# Patient Record
Sex: FEMALE | Race: WHITE | NOT HISPANIC OR LATINO | Employment: FULL TIME | URBAN - METROPOLITAN AREA
[De-identification: names, ages, dates, MRNs, and addresses within clinical notes are randomized per-mention and may not be internally consistent; named-entity substitution may affect disease eponyms.]

---

## 2017-01-09 ENCOUNTER — PRENATAL OFFICE VISIT (OUTPATIENT)
Dept: OBGYN | Facility: CLINIC | Age: 32
End: 2017-01-09
Payer: COMMERCIAL

## 2017-01-09 VITALS
BODY MASS INDEX: 35.91 KG/M2 | HEIGHT: 61 IN | WEIGHT: 190.2 LBS | TEMPERATURE: 98.2 F | SYSTOLIC BLOOD PRESSURE: 94 MMHG | DIASTOLIC BLOOD PRESSURE: 60 MMHG

## 2017-01-09 DIAGNOSIS — O09.623 HIGH-RISK PREGNANCY, YOUNG MULTIGRAVIDA, THIRD TRIMESTER: Primary | ICD-10-CM

## 2017-01-09 DIAGNOSIS — O09.623 HIGH-RISK PREGNANCY, YOUNG MULTIGRAVIDA, THIRD TRIMESTER: ICD-10-CM

## 2017-01-09 PROCEDURE — 76816 OB US FOLLOW-UP PER FETUS: CPT | Performed by: OBSTETRICS & GYNECOLOGY

## 2017-01-09 PROCEDURE — 99207 ZZC COMPLICATED OB VISIT: CPT | Performed by: OBSTETRICS & GYNECOLOGY

## 2017-01-09 NOTE — NURSING NOTE
"30w4d    Chief Complaint   Patient presents with     Prenatal Care       Initial BP 94/60 mmHg  Temp(Src) 98.2  F (36.8  C) (Oral)  Ht 5' 1\" (1.549 m)  Wt 190 lb 3.2 oz (86.274 kg)  BMI 35.96 kg/m2  LMP 06/09/2016 Estimated body mass index is 35.96 kg/(m^2) as calculated from the following:    Height as of this encounter: 5' 1\" (1.549 m).    Weight as of this encounter: 190 lb 3.2 oz (86.274 kg).  BP completed using cuff size: cherelle Noel CMA      "

## 2017-01-23 ENCOUNTER — PRENATAL OFFICE VISIT (OUTPATIENT)
Dept: OBGYN | Facility: CLINIC | Age: 32
End: 2017-01-23
Payer: COMMERCIAL

## 2017-01-23 VITALS
SYSTOLIC BLOOD PRESSURE: 124 MMHG | BODY MASS INDEX: 36.75 KG/M2 | WEIGHT: 194.4 LBS | TEMPERATURE: 98.1 F | DIASTOLIC BLOOD PRESSURE: 76 MMHG

## 2017-01-23 DIAGNOSIS — O09.623 HIGH-RISK PREGNANCY, YOUNG MULTIGRAVIDA, THIRD TRIMESTER: Primary | ICD-10-CM

## 2017-01-23 PROCEDURE — 99207 ZZC COMPLICATED OB VISIT: CPT | Performed by: OBSTETRICS & GYNECOLOGY

## 2017-01-23 RX ORDER — BREAST PUMP
1 EACH MISCELLANEOUS PRN
Qty: 1 EACH | Refills: 0 | Status: SHIPPED | OUTPATIENT
Start: 2017-01-23 | End: 2017-07-04

## 2017-01-23 NOTE — NURSING NOTE
"Chief Complaint   Patient presents with     Prenatal Care     no concerns.     32w4d    Initial /76 mmHg  Temp(Src) 98.1  F (36.7  C) (Oral)  Wt 194 lb 6.4 oz (88.179 kg)  LMP 06/09/2016 Estimated body mass index is 36.75 kg/(m^2) as calculated from the following:    Height as of 1/9/17: 5' 1\" (1.549 m).    Weight as of this encounter: 194 lb 6.4 oz (88.179 kg).  BP completed using cuff size: regular  Sue Anton MA      "

## 2017-01-23 NOTE — PROGRESS NOTES
IUP at 32 4/7 weeks; no regular contractions/bleeding    Cerclage removal; 2/24/17 (37 weeks)    Repeat C/S 3/13/17 (39 weeks)

## 2017-01-30 ENCOUNTER — PRENATAL OFFICE VISIT (OUTPATIENT)
Dept: OBGYN | Facility: CLINIC | Age: 32
End: 2017-01-30
Payer: COMMERCIAL

## 2017-01-30 VITALS
BODY MASS INDEX: 37.2 KG/M2 | HEART RATE: 100 BPM | SYSTOLIC BLOOD PRESSURE: 110 MMHG | WEIGHT: 196.8 LBS | DIASTOLIC BLOOD PRESSURE: 64 MMHG

## 2017-01-30 DIAGNOSIS — O09.623 HIGH-RISK PREGNANCY, YOUNG MULTIGRAVIDA, THIRD TRIMESTER: Primary | ICD-10-CM

## 2017-01-30 PROCEDURE — 99207 ZZC COMPLICATED OB VISIT: CPT | Performed by: OBSTETRICS & GYNECOLOGY

## 2017-01-30 NOTE — NURSING NOTE
"Chief Complaint   Patient presents with     Prenatal Care   33w4d  C/o chest pain / SOB last night approx 9pm -3am    initial /64 mmHg  Pulse 100  Wt 196 lb 12.8 oz (89.268 kg)  LMP 06/09/2016 Estimated body mass index is 37.2 kg/(m^2) as calculated from the following:    Height as of 1/9/17: 5' 1\" (1.549 m).    Weight as of this encounter: 196 lb 12.8 oz (89.268 kg).  BP completed using cuff size regular.  Gina John CMA    "

## 2017-02-07 ENCOUNTER — PRENATAL OFFICE VISIT (OUTPATIENT)
Dept: OBGYN | Facility: CLINIC | Age: 32
End: 2017-02-07
Payer: COMMERCIAL

## 2017-02-07 VITALS
WEIGHT: 197 LBS | BODY MASS INDEX: 37.24 KG/M2 | SYSTOLIC BLOOD PRESSURE: 110 MMHG | DIASTOLIC BLOOD PRESSURE: 56 MMHG | HEART RATE: 88 BPM

## 2017-02-07 DIAGNOSIS — O09.623 HIGH-RISK PREGNANCY, YOUNG MULTIGRAVIDA, THIRD TRIMESTER: Primary | ICD-10-CM

## 2017-02-07 PROCEDURE — 99207 ZZC COMPLICATED OB VISIT: CPT | Performed by: OBSTETRICS & GYNECOLOGY

## 2017-02-07 NOTE — PROGRESS NOTES
"Chief Complaint   Patient presents with     Prenatal Care   34w5d  Baby active.  No contractions.    Initial /56 mmHg  Pulse 88  Wt 197 lb (89.359 kg)  LMP 06/09/2016 Estimated body mass index is 37.24 kg/(m^2) as calculated from the following:    Height as of 1/9/17: 5' 1\" (1.549 m).    Weight as of this encounter: 197 lb (89.359 kg).  Medication Reconciliation: complete   nurse assisted visit.  SERA Perez RN    "

## 2017-02-13 ENCOUNTER — PRENATAL OFFICE VISIT (OUTPATIENT)
Dept: OBGYN | Facility: CLINIC | Age: 32
End: 2017-02-13
Payer: COMMERCIAL

## 2017-02-13 ENCOUNTER — TELEPHONE (OUTPATIENT)
Dept: OBGYN | Facility: CLINIC | Age: 32
End: 2017-02-13

## 2017-02-13 VITALS
SYSTOLIC BLOOD PRESSURE: 118 MMHG | BODY MASS INDEX: 37.58 KG/M2 | WEIGHT: 198.9 LBS | HEART RATE: 96 BPM | DIASTOLIC BLOOD PRESSURE: 70 MMHG

## 2017-02-13 DIAGNOSIS — O09.623 HIGH-RISK PREGNANCY, YOUNG MULTIGRAVIDA, THIRD TRIMESTER: Primary | ICD-10-CM

## 2017-02-13 PROCEDURE — 87653 STREP B DNA AMP PROBE: CPT | Performed by: OBSTETRICS & GYNECOLOGY

## 2017-02-13 PROCEDURE — 87186 SC STD MICRODIL/AGAR DIL: CPT | Performed by: OBSTETRICS & GYNECOLOGY

## 2017-02-13 PROCEDURE — 99207 ZZC COMPLICATED OB VISIT: CPT | Performed by: OBSTETRICS & GYNECOLOGY

## 2017-02-13 NOTE — MR AVS SNAPSHOT
After Visit Summary   2/13/2017    Dot Ramirez    MRN: [id number]           Patient Information     Date Of Birth          1985        Visit Information        Provider Department      2/13/2017 10:15 AM Jamison Florian MD [facility]        Today's Diagnoses     High-risk pregnancy, young multigravida, third trimester    -  1       Follow-ups after your visit        Follow-up notes from your care team     Return in about 1 week (around 2/20/2017).      Your next 10 appointments already scheduled     Feb 20, 2017  3:00 PM CST   ESTABLISHED PRENATAL with Jamison Florian MD   [facility] ([facility]    [address]  [facility] [address]   [phone]            Feb 27, 2017  3:00 PM CST   ESTABLISHED PRENATAL with Elfego Mustafa MD   [facility] ([facility])    [address]  [facility] [address]   [phone]            Mar 06, 2017  6:45 PM CST   ESTABLISHED PRENATAL with Jamison Florian MD   [facility] ([facility])    [address]  [facility] [address]   [phone]            Mar 13, 2017   Procedure with Jamison Florian MD   [facility] Labor and Delivery (--)    [address]  [facility] [address]   [phone]              Who to contact     If you have questions or need follow up information about today's clinic visit or your schedule please contact [facility] directly at [phone].  Normal or non-critical lab and imaging results will be communicated to you by MyChart, letter or phone within 4 business days after the clinic has received the results. If you do not hear from us within 7 days, please contact the clinic through MyChart or phone. If you have a critical or abnormal lab result, we will notify you by phone as soon as possible.  Submit refill requests through DesignMyNighthart or  call your pharmacy and they will forward the refill request to us. Please allow 3 business days for your refill to be completed.          Additional Information About Your Visit        SlamDatahart Information     [employer] gives you secure access to your electronic health record. If you see a primary care provider, you can also send messages to your care team and make appointments. If you have questions, please call your primary care clinic.  If you do not have a primary care provider, please call [phone] and they will assist you.        Care EveryWhere ID     This is your Care EveryWhere ID. This could be used by other organizations to access your [place] medical records  [id number]        Your Vitals Were     Pulse Last Period BMI (Body Mass Index)             96 06/09/2016 37.58 kg/m2          Blood Pressure from Last 3 Encounters:   02/13/17 118/70   02/07/17 110/56   01/30/17 110/64    Weight from Last 3 Encounters:   02/13/17 198 lb 14.4 oz (90.2 kg)   02/07/17 197 lb (89.4 kg)   01/30/17 196 lb 12.8 oz (89.3 kg)              We Performed the Following     Group B strep PCR        Primary Care Provider Office Phone # Fax #    Jamison Florian [phone]       [facility] [address] [place] [address]        Thank you!     Thank you for choosing [facility]  for your care. Our goal is always to provide you with excellent care. Hearing back from our patients is one way we can continue to improve our services. Please take a few minutes to complete the written survey that you may receive in the mail after your visit with us. Thank you!             Your Updated Medication List - Protect others around you: Learn how to safely use, store and throw away your medicines at www.disposemymeds.org.          This list is accurate as of: 2/13/17 11:48 AM.  Always use your most recent med list.                   Brand Name Dispense Instructions for use    breast pump Misc      1 each    1 each as needed       PRE-AMEENA PO          sertraline 100 MG tablet    ZOLOFT    30 tablet    Take 1 tablet (100 mg) by mouth daily

## 2017-02-13 NOTE — TELEPHONE ENCOUNTER
Procedure: Cerclage Removal  Date: 2/24/2017  Time: 12:15pm  Hospital: [facility]  Orders faxed and the patient was advised to call [facility] [phone] labor and delivery department one hour prior to arrival.  Gina John CMA

## 2017-02-13 NOTE — NURSING NOTE
"Chief Complaint   Patient presents with     Prenatal Care   35w4d  GBS due  No specific concerns    initial /70  Pulse 96  Wt 198 lb 14.4 oz (90.2 kg)  LMP 06/09/2016  BMI 37.58 kg/m2 Estimated body mass index is 37.58 kg/(m^2) as calculated from the following:    Height as of 1/9/17: 5' 1\" (1.549 m).    Weight as of this encounter: 198 lb 14.4 oz (90.2 kg).  BP completed using cuff size regular.  Gina John CMA    "

## 2017-02-14 ENCOUNTER — PRENATAL OFFICE VISIT (OUTPATIENT)
Dept: OBGYN | Facility: CLINIC | Age: 32
End: 2017-02-14
Payer: COMMERCIAL

## 2017-02-14 ENCOUNTER — TELEPHONE (OUTPATIENT)
Dept: OBGYN | Facility: CLINIC | Age: 32
End: 2017-02-14

## 2017-02-14 VITALS
HEART RATE: 96 BPM | SYSTOLIC BLOOD PRESSURE: 110 MMHG | BODY MASS INDEX: 37.41 KG/M2 | DIASTOLIC BLOOD PRESSURE: 60 MMHG | WEIGHT: 198 LBS

## 2017-02-14 DIAGNOSIS — O09.623 HIGH-RISK PREGNANCY, YOUNG MULTIGRAVIDA, THIRD TRIMESTER: Primary | ICD-10-CM

## 2017-02-14 LAB
GP B STREP DNA SPEC QL NAA+PROBE: ABNORMAL
SPECIMEN SOURCE: ABNORMAL

## 2017-02-14 PROCEDURE — 99207 ZZC PRENATAL VISIT: CPT | Performed by: OBSTETRICS & GYNECOLOGY

## 2017-02-14 NOTE — MR AVS SNAPSHOT
After Visit Summary   2/14/2017    Dot Ramirez    MRN: [id number]           Patient Information     Date Of Birth          1985        Visit Information        Provider Department      2/14/2017 2:30 PM Jamison Florian MD [facility]        Today's Diagnoses     High-risk pregnancy, young multigravida, third trimester    -  1       Follow-ups after your visit        Follow-up notes from your care team     Return in about 1 week (around 2/21/2017).      Your next 10 appointments already scheduled     Feb 20, 2017  3:00 PM CST   ESTABLISHED PRENATAL with Jamison Florian MD   [facility] ([facility])    [address]  [facility] [address]   [phone]            Feb 27, 2017  3:00 PM CST   ESTABLISHED PRENATAL with Elfego Mustafa MD   [facility] ([facility])    [address]  [facility] [address]   [phone]            Mar 06, 2017  6:45 PM CST   ESTABLISHED PRENATAL with Jamison Florian MD   [facility] ([facility])    [address]  [facility] [address]   [phone]            Mar 13, 2017   Procedure with Jamison Florian MD   [facility] Labor and Delivery (--)    [address]  [facility] [address]   [phone]              Who to contact     If you have questions or need follow up information about today's clinic visit or your schedule please contact [facility] directly at [phone].  Normal or non-critical lab and imaging results will be communicated to you by MyChart, letter or phone within 4 business days after the clinic has received the results. If you do not hear from us within 7 days, please contact the clinic through MyChart or phone. If you have a critical or abnormal lab result, we will notify you by phone as soon as possible.  Submit refill requests through [employer] Helperhart or call your  pharmacy and they will forward the refill request to us. Please allow 3 business days for your refill to be completed.          Additional Information About Your Visit        Sernovahart Information     [employer] gives you secure access to your electronic health record. If you see a primary care provider, you can also send messages to your care team and make appointments. If you have questions, please call your primary care clinic.  If you do not have a primary care provider, please call [phone] and they will assist you.        Care EveryWhere ID     This is your Care EveryWhere ID. This could be used by other organizations to access your [place] medical records  [id number]        Your Vitals Were     Pulse Last Period BMI (Body Mass Index)             96 06/09/2016 37.41 kg/m2          Blood Pressure from Last 3 Encounters:   02/14/17 110/60   02/13/17 118/70   02/07/17 110/56    Weight from Last 3 Encounters:   02/14/17 198 lb (89.8 kg)   02/13/17 198 lb 14.4 oz (90.2 kg)   02/07/17 197 lb (89.4 kg)              We Performed the Following     Referral sensitivity        Primary Care Provider Office Phone # Fax #    Jamison Florian [phone]       [facility] [address] [place] [address]        Thank you!     Thank you for choosing [facility]  for your care. Our goal is always to provide you with excellent care. Hearing back from our patients is one way we can continue to improve our services. Please take a few minutes to complete the written survey that you may receive in the mail after your visit with us. Thank you!             Your Updated Medication List - Protect others around you: Learn how to safely use, store and throw away your medicines at www.disposemymeds.org.          This list is accurate as of: 2/14/17  3:00 PM.  Always use your most recent med list.                   Brand Name Dispense Instructions for use    breast pump Misc     1 each    1 each as  needed       PRE- PO          sertraline 100 MG tablet    ZOLOFT    30 tablet    Take 1 tablet (100 mg) by mouth daily

## 2017-02-14 NOTE — TELEPHONE ENCOUNTER
"Call from pt stating last night when she went to the bathroom, she felt a \"popping sound\" in her right groin. States she then had severe pain in her right groin to the point that she was unable to get off the toilet and back to bed with out assistance. States she still has pain in her right groin today but it is milder. Asking if this is anything she should be concerned about. Next apt 2/20. Please advise.    "

## 2017-02-14 NOTE — PROGRESS NOTES
Notes pain in R groin; sharp at first now dull ache    -no associated bleeding, regular contractions or bleeding    -cerclage intact  Recommend symptomatic therapy

## 2017-02-17 LAB
BACTERIA SPEC CULT: ABNORMAL
MICRO REPORT STATUS: ABNORMAL
MICROORGANISM SPEC CULT: ABNORMAL
SPECIMEN SOURCE: ABNORMAL

## 2017-02-20 ENCOUNTER — PRENATAL OFFICE VISIT (OUTPATIENT)
Dept: OBGYN | Facility: CLINIC | Age: 32
End: 2017-02-20
Payer: COMMERCIAL

## 2017-02-20 VITALS
BODY MASS INDEX: 37.41 KG/M2 | TEMPERATURE: 98.4 F | DIASTOLIC BLOOD PRESSURE: 74 MMHG | SYSTOLIC BLOOD PRESSURE: 102 MMHG | WEIGHT: 198 LBS

## 2017-02-20 DIAGNOSIS — O09.623 HIGH-RISK PREGNANCY, YOUNG MULTIGRAVIDA, THIRD TRIMESTER: Primary | ICD-10-CM

## 2017-02-20 PROCEDURE — 99207 ZZC COMPLICATED OB VISIT: CPT | Performed by: OBSTETRICS & GYNECOLOGY

## 2017-02-20 NOTE — NURSING NOTE
"Chief Complaint   Patient presents with     Prenatal Care       Initial /74 (BP Location: Left arm, Patient Position: Chair, Cuff Size: Adult Regular)  Temp 98.4  F (36.9  C) (Oral)  Wt 198 lb (89.8 kg)  LMP 06/09/2016  BMI 37.41 kg/m2 Estimated body mass index is 37.41 kg/(m^2) as calculated from the following:    Height as of 1/9/17: 5' 1\" (1.549 m).    Weight as of this encounter: 198 lb (89.8 kg).  Medication Reconciliation: complete  36w4d    "

## 2017-02-20 NOTE — MR AVS SNAPSHOT
After Visit Summary   2/20/2017    Dot Ramirez    MRN: [id number]           Patient Information     Date Of Birth          1985        Visit Information        Provider Department      2/20/2017 3:00 PM Jamison Florian MD [facility]        Today's Diagnoses     High-risk pregnancy, young multigravida, third trimester    -  1       Follow-ups after your visit        Follow-up notes from your care team     Return in about 4 days (around 2/24/2017).      Your next 10 appointments already scheduled     Feb 27, 2017  3:00 PM CST   ESTABLISHED PRENATAL with Elfego [facility]   [facility]    [address]  [facility] [address] [phone]            Mar 06, 2017  6:45 PM CST   ESTABLISHED PRENATAL with Jamison Florian MD   [facility]    [address]  [facility] [id number] [phone]            Mar 13, 2017   Procedure with Jamison Florian MD   [facility] Labor and Delivery (--)    [address]  [facility] [address] [phone]              Who to contact     If you have questions or need follow up information about today's clinic visit or your schedule please contact [facility] directly at [phone].  Normal or non-critical lab and imaging results will be communicated to you by MyChart, letter or phone within 4 business days after the clinic has received the results. If you do not hear from us within 7 days, please contact the clinic through MyChart or phone. If you have a critical or abnormal lab result, we will notify you by phone as soon as possible.  Submit refill requests through Yowza or call your pharmacy and they will forward the refill request to us. Please allow 3 business days for your refill to be completed.          Additional Information About Your Visit        MyChart Information     [employer]  gives you secure access to your electronic health record. If you see a primary care provider, you can also send messages to your care team and make appointments. If you have questions, please call your primary care clinic.  If you do not have a primary care provider, please call [phone] and they will assist you.        Care EveryWhere ID     This is your Care EveryWhere ID. This could be used by other organizations to access your [place] medical records  [id number]        Your Vitals Were     Temperature Last Period BMI (Body Mass Index)             98.4  F (36.9  C) (Oral) 2016 37.41 kg/m2          Blood Pressure from Last 3 Encounters:   17 102/74   17 110/60   17 118/70    Weight from Last 3 Encounters:   17 198 lb (89.8 kg)   17 198 lb (89.8 kg)   17 198 lb 14.4 oz (90.2 kg)              Today, you had the following     No orders found for display       Primary Care Provider Office Phone # Fax #    Jamison Florian [phone]       [address] [place] [address]        Thank you!     Thank you for choosing [facility]  for your care. Our goal is always to provide you with excellent care. Hearing back from our patients is one way we can continue to improve our services. Please take a few minutes to complete the written survey that you may receive in the mail after your visit with us. Thank you!             Your Updated Medication List - Protect others around you: Learn how to safely use, store and throw away your medicines at www.disposemymeds.org.          This list is accurate as of: 17  3:38 PM.  Always use your most recent med list.                   Brand Name Dispense Instructions for use    breast pump Misc     1 each    1 each as needed       PRE- PO          sertraline 100 MG tablet    ZOLOFT    30 tablet    Take 1 tablet (100 mg) by mouth daily

## 2017-02-27 ENCOUNTER — PRENATAL OFFICE VISIT (OUTPATIENT)
Dept: OBGYN | Facility: CLINIC | Age: 32
End: 2017-02-27
Payer: COMMERCIAL

## 2017-02-27 VITALS
WEIGHT: 200.7 LBS | DIASTOLIC BLOOD PRESSURE: 70 MMHG | SYSTOLIC BLOOD PRESSURE: 116 MMHG | TEMPERATURE: 97.9 F | BODY MASS INDEX: 37.92 KG/M2

## 2017-02-27 DIAGNOSIS — O09.623 HIGH-RISK PREGNANCY, YOUNG MULTIGRAVIDA, THIRD TRIMESTER: ICD-10-CM

## 2017-02-27 DIAGNOSIS — O99.820 STREPTOCOCCUS B CARRIER STATE COMPLICATING PREGNANCY: ICD-10-CM

## 2017-02-27 DIAGNOSIS — O34.219 PREVIOUS CESAREAN DELIVERY, ANTEPARTUM CONDITION OR COMPLICATION: Primary | ICD-10-CM

## 2017-02-27 DIAGNOSIS — Z86.19 HISTORY OF GROUP B STREPTOCOCCUS (GBS) INFECTION: ICD-10-CM

## 2017-02-27 PROCEDURE — 99207 ZZC PRENATAL VISIT: CPT | Performed by: OBSTETRICS & GYNECOLOGY

## 2017-02-27 NOTE — NURSING NOTE
"Chief Complaint   Patient presents with     Prenatal Care       Initial /70  Temp 97.9  F (36.6  C) (Oral)  Wt 200 lb 11.2 oz (91 kg)  LMP 06/09/2016  BMI 37.92 kg/m2 Estimated body mass index is 37.92 kg/(m^2) as calculated from the following:    Height as of 2/24/17: 5' 1\" (1.549 m).    Weight as of this encounter: 200 lb 11.2 oz (91 kg).  Medication Reconciliation: complete  37w4d    "

## 2017-02-27 NOTE — MR AVS SNAPSHOT
After Visit Summary   2017    Dot Ramirez    MRN: [id number]           Patient Information     Date Of Birth          1985        Visit Information        Provider Department      2017 3:00 PM Elfego Mustafa MD [facility]        Today's Diagnoses     Previous  delivery, antepartum condition or complication    -  1    High-risk pregnancy, young multigravida, third trimester        History of group B Streptococcus (GBS) infection        Streptococcus B carrier state complicating pregnancy          Care Instructions    You can reach your [facility] Care Team any time of the day by calling [phone]. This number will put you in touch with the 24 hour nurse line if the clinic is closed.    To contact your OB/GYN Station Coordinator/Surgery Scheduler please call [phone]. This is a direct number for your care team between 8 a.m. and 4 p.m. Monday through Friday.    [facility] is open for your convenience:  Monday through Friday 8 a.m. to 6 p.m.  Closed weekends and all major holidays.          Follow-ups after your visit        Follow-up notes from your care team     Return in about 1 week (around 3/6/2017) for prenatal Visit.      Your next 10 appointments already scheduled     Mar 06, 2017  6:45 PM CST   ESTABLISHED PRENATAL with Jamison Florian MD   [facility]    [address]  [facility] [id number] [phone]            Mar 13, 2017   Procedure with Jamison Florian MD   [facility] Labor and Delivery (--)    [address]  [facility] [id number] [phone]              Who to contact     If you have questions or need follow up information about today's clinic visit or your schedule please contact [facility] directly at [phone].  Normal or non-critical lab and imaging results will be communicated to you by MyChart, letter or phone within 4  business days after the clinic has received the results. If you do not hear from us within 7 days, please contact the clinic through Blueleaf or phone. If you have a critical or abnormal lab result, we will notify you by phone as soon as possible.  Submit refill requests through Blueleaf or call your pharmacy and they will forward the refill request to us. Please allow 3 business days for your refill to be completed.          Additional Information About Your Visit        Blueleaf Information     Blueleaf gives you secure access to your electronic health record. If you see a primary care provider, you can also send messages to your care team and make appointments. If you have questions, please call your primary care clinic.  If you do not have a primary care provider, please call [phone] and they will assist you.        Care EveryWhere ID     This is your Care EveryWhere ID. This could be used by other organizations to access your [place] medical records  [id number]        Your Vitals Were     Temperature Last Period BMI (Body Mass Index)             97.9  F (36.6  C) (Oral) 06/09/2016 37.92 kg/m2          Blood Pressure from Last 3 Encounters:   02/27/17 116/70   02/24/17 116/75   02/20/17 102/74    Weight from Last 3 Encounters:   02/27/17 200 lb 11.2 oz (91 kg)   02/24/17 189 lb (85.7 kg)   02/20/17 198 lb (89.8 kg)              Today, you had the following     No orders found for display       Primary Care Provider Office Phone # Fax #    Jamison Florian [phone]       [facility] [address] [place] [address]        Thank you!     Thank you for choosing [facility]  for your care. Our goal is always to provide you with excellent care. Hearing back from our patients is one way we can continue to improve our services. Please take a few minutes to complete the written survey that you may receive in the mail after your visit with us. Thank you!              Your Updated Medication List - Protect others around you: Learn how to safely use, store and throw away your medicines at www.disposemymeds.org.          This list is accurate as of: 17  3:13 PM.  Always use your most recent med list.                   Brand Name Dispense Instructions for use    breast pump Misc     1 each    1 each as needed       PRE- PO          sertraline 100 MG tablet    ZOLOFT    30 tablet    Take 1 tablet (100 mg) by mouth daily

## 2017-02-27 NOTE — PATIENT INSTRUCTIONS
You can reach your [facility] Care Team any time of the day by calling [phone]. This number will put you in touch with the 24 hour nurse line if the clinic is closed.    To contact your OB/GYN Station Coordinator/Surgery Scheduler please call [phone]. This is a direct number for your care team between 8 a.m. and 4 p.m. Monday through Friday.    [facility] is open for your convenience:  Monday through Friday 8 a.m. to 6 p.m.  Closed weekends and all major holidays.

## 2017-02-27 NOTE — PROGRESS NOTES
Dot Ramirez is a 31 year old female Estimated Date of Delivery: Mar 16, 2017 at 37-4/7 weeks who presents for prenatal care today  AGA doing well, good FM no concerns  No vaginal bleeding no labor symptoms No complications since cerclage removal    A: intrauterine pregnancy 37-4/7 weeks;  Group B strep carrier;  C/B scheduled for March 13, 2017  P: symptoms of concern and labor discussed patient to call

## 2017-03-01 ENCOUNTER — TELEPHONE (OUTPATIENT)
Dept: NURSING | Facility: CLINIC | Age: 32
End: 2017-03-01

## 2017-03-01 ENCOUNTER — HOSPITAL ENCOUNTER (OUTPATIENT)
Facility: CLINIC | Age: 32
Discharge: HOME | End: 2017-03-01
Attending: OBSTETRICS & GYNECOLOGY | Admitting: OBSTETRICS & GYNECOLOGY
Payer: COMMERCIAL

## 2017-03-01 VITALS
TEMPERATURE: 98.3 F | DIASTOLIC BLOOD PRESSURE: 73 MMHG | HEIGHT: 61 IN | WEIGHT: 199 LBS | RESPIRATION RATE: 16 BRPM | HEART RATE: 112 BPM | SYSTOLIC BLOOD PRESSURE: 124 MMHG | BODY MASS INDEX: 37.57 KG/M2

## 2017-03-01 LAB
ALBUMIN UR-MCNC: NEGATIVE MG/DL
APPEARANCE UR: CLEAR
BACTERIA #/AREA URNS HPF: ABNORMAL /HPF
BILIRUB UR QL STRIP: NEGATIVE
COLOR UR AUTO: YELLOW
CRYSTALS AMN MICRO: NORMAL
GLUCOSE UR STRIP-MCNC: NEGATIVE MG/DL
HGB UR QL STRIP: NEGATIVE
KETONES UR STRIP-MCNC: NEGATIVE MG/DL
LEUKOCYTE ESTERASE UR QL STRIP: ABNORMAL
MICRO REPORT STATUS: NORMAL
MUCOUS THREADS #/AREA URNS LPF: PRESENT /LPF
NITRATE UR QL: NEGATIVE
PH UR STRIP: 7 PH (ref 5–7)
RBC #/AREA URNS AUTO: <1 /HPF (ref 0–2)
SP GR UR STRIP: 1.02 (ref 1–1.03)
SPECIMEN SOURCE: NORMAL
SQUAMOUS #/AREA URNS AUTO: 2 /HPF (ref 0–1)
URN SPEC COLLECT METH UR: ABNORMAL
UROBILINOGEN UR STRIP-MCNC: 0 MG/DL (ref 0–2)
WBC #/AREA URNS AUTO: 3 /HPF (ref 0–2)
WET PREP SPEC: NORMAL

## 2017-03-01 PROCEDURE — 81001 URINALYSIS AUTO W/SCOPE: CPT | Performed by: OBSTETRICS & GYNECOLOGY

## 2017-03-01 PROCEDURE — 59025 FETAL NON-STRESS TEST: CPT

## 2017-03-01 PROCEDURE — 99214 OFFICE O/P EST MOD 30 MIN: CPT | Mod: 25

## 2017-03-01 PROCEDURE — 99214 OFFICE O/P EST MOD 30 MIN: CPT

## 2017-03-01 PROCEDURE — 87210 SMEAR WET MOUNT SALINE/INK: CPT | Performed by: OBSTETRICS & GYNECOLOGY

## 2017-03-01 RX ORDER — ONDANSETRON 2 MG/ML
4 INJECTION INTRAMUSCULAR; INTRAVENOUS EVERY 6 HOURS PRN
Status: DISCONTINUED | OUTPATIENT
Start: 2017-03-01 | End: 2017-03-02 | Stop reason: HOSPADM

## 2017-03-01 NOTE — IP AVS SNAPSHOT
MRN:[id number]                      After Visit Summary   3/1/2017    Dot Ramirez    MRN: [id number]           Thank you!     Thank you for choosing [facility] for your care. Our goal is always to provide you with excellent care. Hearing back from our patients is one way we can continue to improve our services. Please take a few minutes to complete the written survey that you may receive in the mail after you visit. If you would like to speak to someone directly about your visit please contact Patient Relations at [phone]. Thank you!          Patient Information     Date Of Birth          1985        About your hospital stay     You were admitted on:  March 1, 2017 You last received care in the:  [facility] Delivery    You were discharged on:  March 1, 2017       Who to Call     For medical emergencies, please call 911.  For non-urgent questions about your medical care, please call your primary care provider or clinic, [phone]          Attending Provider     Provider Specialty    Michelle Thomson MD OB/Gyn       Primary Care Provider Office Phone # Fax #    Jamison Florian [phone]       [address] [place] [address]        Your next 10 appointments already scheduled     Mar 06, 2017  6:45 PM CST   ESTABLISHED PRENATAL with Jamison Florian MD   [facility]    [address]  [facility] [id number] [phone]            Mar 13, 2017   Procedure with Jamison Florian MD   [facility] Delivery (--)    [address]  [facility] [address]   [phone]              Further instructions from your care team       Discharge Instruction for Undelivered Patients      You were seen for: Vaginal Discharge  We Consulted: Dr Thomson  You had (Test or Medicine): Wet prep, fern test, fetal and uterine monitoring, SVE  "    Diet:   Drink 8 to 12 glasses of liquids (milk, juice, water) every day.  You may eat meals and snacks.     Activity:  Count fetal kicks everyday (see handout)  Call your doctor or nurse midwife if your baby is moving less than usual.     Call your provider if you notice:  Swelling in your face or increased swelling in your hands or legs.  Headaches that are not relieved by Tylenol (acetaminophen).  Changes in your vision (blurring: seeing spots or stars.)  Nausea (sick to your stomach) and vomiting (throwing up).   Weight gain of 5 pounds or more per week.  Heartburn that doesn't go away.  Signs of bladder infection: pain when you urinate (use the toilet), need to go more often and more urgently.  The bag of dickinson (rupture of membranes) breaks, or you notice leaking in your underwear.  Bright red blood in your underwear.  Abdominal (lower belly) or stomach pain.  For first baby: Contractions (tightening) less than 5 minutes apart for one hour or more.  Second (plus) baby: Contractions (tightening) less than 10 minutes apart and getting stronger.  *If less than 34 weeks: Contractions (tightenings) more than 6 times in one hour.  Increase or change in vaginal discharge (note the color and amount)    Follow-up:  Follow up at your regular scheduled appointment          Pending Results     No orders found from 2/27/2017 to 3/2/2017.            Admission Information     Date & Time Provider Department Dept. Phone    3/1/2017 Michelle Thomson MD [facility] Labor and Delivery [phone]      Your Vitals Were     Blood Pressure Pulse Temperature Respirations Height Weight    124/73 112 98.3  F (36.8  C) (Axillary) 16 1.549 m (5' 1\") 90.3 kg (199 lb)    Last Period BMI (Body Mass Index)                06/09/2016 37.6 kg/m2          [employer] Information     [employer] gives you secure access to your electronic health record. If you see a primary care provider, you can also send messages to your care team and make " appointments. If you have questions, please call your primary care clinic.  If you do not have a primary care provider, please call [phone] and they will assist you.        Care EveryWhere ID     This is your Care EveryWhere ID. This could be used by other organizations to access your [facility] medical records  [id number]           Review of your medicines      UNREVIEWED medicines. Ask your doctor about these medicines        Dose / Directions    PRE- PO        Refills:  0       sertraline 100 MG tablet   Commonly known as:  ZOLOFT   Used for:  ELIANA (generalized anxiety disorder)        Dose:  100 mg   Take 1 tablet (100 mg) by mouth daily   Quantity:  30 tablet   Refills:  6         CONTINUE these medicines which have NOT CHANGED        Dose / Directions    breast pump Misc   Used for:  High-risk pregnancy, young multigravida, third trimester        Dose:  1 each   1 each as needed   Quantity:  1 each   Refills:  0                Protect others around you: Learn how to safely use, store and throw away your medicines at www.disposemymeds.org.             Medication List: This is a list of all your medications and when to take them. Check marks below indicate your daily home schedule. Keep this list as a reference.      Medications           Morning Afternoon Evening Bedtime As Needed    breast pump Misc   1 each as needed                                PRE- PO                                sertraline 100 MG tablet   Commonly known as:  ZOLOFT   Take 1 tablet (100 mg) by mouth daily                                          More Information        False Labor    If your pregnancy is at 37 weeks or longer and you are having contractions that are not true labor, you are having false labor. This means that it is not time yet to give birth to your baby.  True labor contractions can start unevenly but soon take on a regular pattern. As time goes on, the contractions will get stronger. Also, the intervals  between the contractions will get shorter. Even at the onset, these contractions last at least 30 seconds and may increase to a minute. True labor contractions often start in the back and then move to the front.  False labor contractions can be strong, frequent, and painful, but there is no regular pattern. The intensity can vary from strong to mild to strong again. False labor contractions are most often felt in the front. While true labor contractions don t stop no matter what you are doing, false labor contractions may stop on their own or when you rest or move around.  False labor contractions might make you feel anxious or lose sleep. However, they don t mean that you are sick or that anything is wrong with your baby. You don t need to take any medicine for false labor.  Sometimes, it may be too hard to tell false labor from true labor. In such cases, you may need to have a vaginal exam. This allows your healthcare provider to check for changes in the cervix that only occur with true labor.  Home care    It may help to drink plenty of water and take warm baths. Do what you can ahead of time to prepare for giving birth so you ll have less to worry about later.    Keep a record of your contractions. Write down what time each one starts and how long it lasts. A stopwatch is helpful. Look for the pattern of regularly spaced out contractions with a gradual increase in the time each one lasts.    Don t be embarrassed about going to the hospital with a  false alarm.  Think of it as good practice for the real thing.    Follow-up care  Follow up with your healthcare provider, or as advised. If you are very worried, confused, can t eat or sleep, or have questions about your health or pregnancy, schedule an appointment with your provider.  When to seek medical advice  Call your healthcare provider right away if any of these occur:    You are fewer than 37 weeks along in your pregnancy and you re having  contractions.    You have contractions that are regular, getting longer, stronger, and closer together.    Your water breaks.    You have vaginal bleeding.    You feel a decrease in your baby s movement or any other unusual changes.     You re not sure if you are having false or true labor.       0191-2196 The [employer]. [address] [place] [address]. All rights reserved. This information is not intended as a substitute for professional medical care. Always follow your healthcare professional's instructions.                Labor and Childbirth: Your Body Prepares  Labor is the series of uterine contractions that dilate (open) and efface (thin) your cervix for birth. Your due date is a guide to when labor will begin, but babies often come days or weeks before or after due dates. Even so, labor need not take you by surprise. In the last weeks of pregnancy, you or your healthcare provider may notice changes that mean labor is near.     Changes in your body  Physical changes often signal that your baby will soon be born:    Discharge from your vagina may increase and become thicker. You may notice a pink or brownish discharge called the bloody show.    The mucous plug may break down over a few weeks or all at once. Losing the plug doesn t mean that labor will start right away.    You may feel Sergio Balderas contractions (false labor). These irregular contractions start to soften and thin the cervix. Many women mistake these contractions for true labor. They may be more noticeable towards the end of the day.    Feeling like the baby has dropped lower. In preparation for birth, the baby's head has settled deep into your pelvis.     1487-5295 [employer]. [address] [place] [address]. All rights reserved. This information is not intended as a substitute for professional medical care. Always follow your healthcare professional's instructions.                Recognizing  Labor  The beginning of labor is the beginning of birth. You ll start to feel strong contractions. That s when the muscles of your uterus tighten up to help push your baby out during birth.    Yes, labor has probably started   Signs of labor include:    Your contractions are getting stronger and more painful instead of weaker. You ll probably feel them throughout your whole uterus.    Your contractions are regular (you feel them about every 5 to 10 minutes) and they are getting closer together.    You have pink-colored or blood-streaked fluid from your vagina.    Your water breaks. It may be a gush or a slow trickle of clear fluid from your vagina.  No, it s probably not real labor   Signs of false labor include:    Your contractions aren t regular or strong.    You feel the contractions only in your lower uterus.    Your contractions go away when you walk or change position.    Your contractions go away after drinking fluids.  When to call your healthcare provider  Call your healothcare provider or clinic right away if you notice any of these signs:    Fluid from your vagina, with or without contractions.    Bleeding heavy enough that you need a sanitary pad.    You don t feel your baby moving as much as before.     NOTE: Contractions are timed by both of these measures:    The length of each contraction from its start to its finish.    How far apart the contractions are -- the time between the start of 1 contraction and the start of the next contraction.     4092-7220 The [employer]. [address] [place] [address], [place] [address]. All rights reserved. This information is not intended as a substitute for professional medical care. Always follow your healthcare professional's instructions.                Pregnancy: Your Third Trimester Changes  As the baby grows, your body changes too. You may also see signs that your body is getting ready for labor. Be patient. Within a few more weeks, your baby will be  born.    How you are changing  Your body is preparing for the birth of your baby. Some of the most common changes are listed below. If you have any questions or concerns, ask your healthcare provider:    You ll gain more weight from fluids, extra blood, and fat deposits.    Your breasts will grow as your body gets ready to feed the baby. They may be more tender. You may also notice a slight yellow or white discharge from the nipples.    Discharge from your vagina may increase. This is normal.    You might see some skin color changes on your forehead, cheeks, or nose. Most of these will go away after you deliver.  How your baby is growing    Month 7  Your baby can open and close his or her eyes, and weighs around 4 pounds. If born prematurely (too early), your baby would likely survive with special care.   Month 8  Your baby is building up body fat, and weighs around 6 pounds.    Month 9  Your baby weighs nearly 7 pounds and is about 18 to 20 inches long. In other words, any day now...     9727-5703 The [employer]. [address] [place] [address], [place] [address]. All rights reserved. This information is not intended as a substitute for professional medical care. Always follow your healthcare professional's instructions.

## 2017-03-01 NOTE — IP AVS SNAPSHOT
[facility] Labor and Delivery    [address]    [facility] [address]    Phone:  [phone]    Fax:  [phone]                                       After Visit Summary   3/1/2017    Dot Ramirez    MRN: [id number]           After Visit Summary Signature Page     I have received my discharge instructions, and my questions have been answered. I have discussed any challenges I see with this plan with the nurse or doctor.    ..........................................................................................................................................  Patient/Patient Representative Signature      ..........................................................................................................................................  Patient Representative Print Name and Relationship to Patient    ..................................................               ................................................  Date                                            Time    ..........................................................................................................................................  Reviewed by Signature/Title    ...................................................              ..............................................  Date                                                            Time

## 2017-03-02 NOTE — DISCHARGE INSTRUCTIONS
Discharge Instruction for Undelivered Patients      You were seen for: Vaginal Discharge  We Consulted: Dr Thomson  You had (Test or Medicine): Wet prep, fern test, fetal and uterine monitoring, SVE     Diet:   Drink 8 to 12 glasses of liquids (milk, juice, water) every day.  You may eat meals and snacks.     Activity:  Count fetal kicks everyday (see handout)  Call your doctor or nurse midwife if your baby is moving less than usual.     Call your provider if you notice:  Swelling in your face or increased swelling in your hands or legs.  Headaches that are not relieved by Tylenol (acetaminophen).  Changes in your vision (blurring: seeing spots or stars.)  Nausea (sick to your stomach) and vomiting (throwing up).   Weight gain of 5 pounds or more per week.  Heartburn that doesn't go away.  Signs of bladder infection: pain when you urinate (use the toilet), need to go more often and more urgently.  The bag of dickinson (rupture of membranes) breaks, or you notice leaking in your underwear.  Bright red blood in your underwear.  Abdominal (lower belly) or stomach pain.  For first baby: Contractions (tightening) less than 5 minutes apart for one hour or more.  Second (plus) baby: Contractions (tightening) less than 10 minutes apart and getting stronger.  *If less than 34 weeks: Contractions (tightenings) more than 6 times in one hour.  Increase or change in vaginal discharge (note the color and amount)    Follow-up:  Follow up at your regular scheduled appointment

## 2017-03-02 NOTE — PROVIDER NOTIFICATION
03/01/17 3155   Provider Notification   Provider Name/Title Dr Thomson   Method of Notification Phone   Request Evaluate - Remote   Notification Reason Patient Arrived;Membrane Status;Labor Status;Uterine Activity;Pain;Lab/Diagnostic Study;SVE   Comments   Comments ok to send home, follow up in clinic   Went over discharge instructions with patient, patient ok with going home.

## 2017-03-02 NOTE — TELEPHONE ENCOUNTER
"Call Type: Triage Call    Presenting Problem: \"I had a cerclage removed on Friday and since  yesterday everytime I go to the bathroom I have thick yellow, slimy  mucos\". Caller is also reporting that clear fluid just leaked  through underwear.  Paged on call provider for [facility]/OB to  speak to caller at [phone].  Dr. Thomson is on call, page sent at  8:47 pm via smart web.  Triage Note:  Guideline Title: Pregnancy: Signs of Labor, 37 Weeks or Greater  Recommended Disposition: Call Provider Immediately  Original Inclination: Wanted to speak with a nurse  Override Disposition:  Intended Action: Follow advice given  Physician Contacted: Yes  Sudden gush or trickle of fluid from the vagina ?  YES  First childbirth with regular contractions for 1 hour and contractions are  feeling stronger and closer together. ? NO  New or worsening signs and symptoms that may indicate shock ? NO  Feeling of baby coming or wanting to push (urge to bear down) ? NO  Umbilical cord or any part of the baby (head, bottom, arm or leg) at the opening  of the vagina ? NO  Gestation 20 to 37 weeks ? NO  Gush or leakage of green or green-tinged or port-wine colored fluid (reddish and  watery) from the vagina ? NO  Previous childbirth AND moderate intensity contractions less than 5 minutes apart  for 1 hour or history of rapid delivery (less than 6 hours of labor) ? NO  Decreased fetal movement (less than 10 kicks/movements within two hours or a  significant change in usual pattern compared to previous days) ? NO  No relief between contractions ? NO  Continuous bright red vaginal bleeding for more than 15 minutes (more than  spotting) ? NO  Physician Instructions:  Care Advice: Lie on left side, if possible.  Follow your provider's instructions for the management of these signs and  symptoms.  May have clear liquids (such as water, clear fruit juices without pulp,  soda, tea or coffee without dairy or non-dairy creamer, clear broth " or  bouillon, oral hydration solution, or plain gelatin, fruit ices/popsicles,  hard candy) but do not eat solid foods before talking with your provider.  Call  if any of these occur: profuse bright red vaginal bleeding  continuous (without relaxation) abdominal pain  the umbilical cord or any fetal part in vagina  bag of dickinson coming through vagina  feeling of wanting to push or have a bowel movement.

## 2017-03-06 ENCOUNTER — ANESTHESIA EVENT (OUTPATIENT)
Dept: SURGERY | Facility: CLINIC | Age: 32
End: 2017-03-06
Payer: COMMERCIAL

## 2017-03-06 ENCOUNTER — PRENATAL OFFICE VISIT (OUTPATIENT)
Dept: OBGYN | Facility: CLINIC | Age: 32
End: 2017-03-06
Payer: COMMERCIAL

## 2017-03-06 ENCOUNTER — HOSPITAL ENCOUNTER (INPATIENT)
Facility: CLINIC | Age: 32
LOS: 3 days | Discharge: HOME HEALTH | End: 2017-03-09
Attending: OBSTETRICS & GYNECOLOGY | Admitting: OBSTETRICS & GYNECOLOGY
Payer: COMMERCIAL

## 2017-03-06 ENCOUNTER — SURGERY (OUTPATIENT)
Age: 32
End: 2017-03-06

## 2017-03-06 ENCOUNTER — ANESTHESIA (OUTPATIENT)
Dept: SURGERY | Facility: CLINIC | Age: 32
End: 2017-03-06
Payer: COMMERCIAL

## 2017-03-06 DIAGNOSIS — O34.219 PREVIOUS CESAREAN DELIVERY, ANTEPARTUM CONDITION OR COMPLICATION: ICD-10-CM

## 2017-03-06 DIAGNOSIS — Z01.818 PREOP GENERAL PHYSICAL EXAM: ICD-10-CM

## 2017-03-06 DIAGNOSIS — Z98.891 S/P C-SECTION: Primary | ICD-10-CM

## 2017-03-06 DIAGNOSIS — O09.623 HIGH-RISK PREGNANCY, YOUNG MULTIGRAVIDA, THIRD TRIMESTER: Primary | ICD-10-CM

## 2017-03-06 LAB
A1 MICROGLOB PLACENTAL VAG QL: POSITIVE
ABO + RH BLD: NORMAL
ABO + RH BLD: NORMAL
BLD GP AB SCN SERPL QL: NORMAL
BLOOD BANK CMNT PATIENT-IMP: NORMAL
HGB BLD-MCNC: 11.9 G/DL (ref 11.7–15.7)
SPECIMEN EXP DATE BLD: NORMAL

## 2017-03-06 PROCEDURE — 25000128 H RX IP 250 OP 636: Performed by: NURSE ANESTHETIST, CERTIFIED REGISTERED

## 2017-03-06 PROCEDURE — 36415 COLL VENOUS BLD VENIPUNCTURE: CPT | Performed by: OBSTETRICS & GYNECOLOGY

## 2017-03-06 PROCEDURE — 25000132 ZZH RX MED GY IP 250 OP 250 PS 637: Performed by: OBSTETRICS & GYNECOLOGY

## 2017-03-06 PROCEDURE — 84112 EVAL AMNIOTIC FLUID PROTEIN: CPT | Performed by: OBSTETRICS & GYNECOLOGY

## 2017-03-06 PROCEDURE — 36000056 ZZH SURGERY LEVEL 3 1ST 30 MIN: Performed by: OBSTETRICS & GYNECOLOGY

## 2017-03-06 PROCEDURE — 25800025 ZZH RX 258: Performed by: OBSTETRICS & GYNECOLOGY

## 2017-03-06 PROCEDURE — 12000031 ZZH R&B OB CRITICAL

## 2017-03-06 PROCEDURE — 37000008 ZZH ANESTHESIA TECHNICAL FEE, 1ST 30 MIN: Performed by: OBSTETRICS & GYNECOLOGY

## 2017-03-06 PROCEDURE — 59510 CESAREAN DELIVERY: CPT | Performed by: OBSTETRICS & GYNECOLOGY

## 2017-03-06 PROCEDURE — 27210794 ZZH OR GENERAL SUPPLY STERILE: Performed by: OBSTETRICS & GYNECOLOGY

## 2017-03-06 PROCEDURE — 86901 BLOOD TYPING SEROLOGIC RH(D): CPT | Performed by: OBSTETRICS & GYNECOLOGY

## 2017-03-06 PROCEDURE — 25000125 ZZHC RX 250: Performed by: OBSTETRICS & GYNECOLOGY

## 2017-03-06 PROCEDURE — 25000128 H RX IP 250 OP 636: Performed by: OBSTETRICS & GYNECOLOGY

## 2017-03-06 PROCEDURE — 86900 BLOOD TYPING SEROLOGIC ABO: CPT | Performed by: OBSTETRICS & GYNECOLOGY

## 2017-03-06 PROCEDURE — 25000125 ZZHC RX 250: Performed by: NURSE ANESTHETIST, CERTIFIED REGISTERED

## 2017-03-06 PROCEDURE — 37000009 ZZH ANESTHESIA TECHNICAL FEE, EACH ADDTL 15 MIN: Performed by: OBSTETRICS & GYNECOLOGY

## 2017-03-06 PROCEDURE — 99207 ZZC COMPLICATED OB VISIT: CPT | Performed by: OBSTETRICS & GYNECOLOGY

## 2017-03-06 PROCEDURE — 86780 TREPONEMA PALLIDUM: CPT | Performed by: OBSTETRICS & GYNECOLOGY

## 2017-03-06 PROCEDURE — 36000058 ZZH SURGERY LEVEL 3 EA 15 ADDTL MIN: Performed by: OBSTETRICS & GYNECOLOGY

## 2017-03-06 PROCEDURE — 71000012 ZZH RECOVERY PHASE 1 LEVEL 1 FIRST HR: Performed by: OBSTETRICS & GYNECOLOGY

## 2017-03-06 PROCEDURE — 86850 RBC ANTIBODY SCREEN: CPT | Performed by: OBSTETRICS & GYNECOLOGY

## 2017-03-06 PROCEDURE — 85018 HEMOGLOBIN: CPT | Performed by: OBSTETRICS & GYNECOLOGY

## 2017-03-06 PROCEDURE — 27210995 ZZH RX 272: Performed by: OBSTETRICS & GYNECOLOGY

## 2017-03-06 RX ORDER — NYSTATIN AND TRIAMCINOLONE ACETONIDE 100000; 1 [USP'U]/G; MG/G
OINTMENT TOPICAL 2 TIMES DAILY
Status: DISCONTINUED | OUTPATIENT
Start: 2017-03-06 | End: 2017-03-07 | Stop reason: CLARIF

## 2017-03-06 RX ORDER — NALBUPHINE HYDROCHLORIDE 10 MG/ML
2.5-5 INJECTION, SOLUTION INTRAMUSCULAR; INTRAVENOUS; SUBCUTANEOUS EVERY 6 HOURS PRN
Status: DISCONTINUED | OUTPATIENT
Start: 2017-03-06 | End: 2017-03-09 | Stop reason: HOSPADM

## 2017-03-06 RX ORDER — MORPHINE SULFATE 1 MG/ML
0.15 INJECTION, SOLUTION EPIDURAL; INTRATHECAL; INTRAVENOUS ONCE
Status: DISCONTINUED | OUTPATIENT
Start: 2017-03-06 | End: 2017-03-09 | Stop reason: HOSPADM

## 2017-03-06 RX ORDER — CEFAZOLIN SODIUM 2 G/100ML
2 INJECTION, SOLUTION INTRAVENOUS
Status: COMPLETED | OUTPATIENT
Start: 2017-03-06 | End: 2017-03-06

## 2017-03-06 RX ORDER — ACETAMINOPHEN 325 MG/1
650 TABLET ORAL EVERY 4 HOURS PRN
Status: DISCONTINUED | OUTPATIENT
Start: 2017-03-09 | End: 2017-03-09 | Stop reason: HOSPADM

## 2017-03-06 RX ORDER — OXYTOCIN/0.9 % SODIUM CHLORIDE 30/500 ML
PLASTIC BAG, INJECTION (ML) INTRAVENOUS PRN
Status: DISCONTINUED | OUTPATIENT
Start: 2017-03-06 | End: 2017-03-06

## 2017-03-06 RX ORDER — DIPHENHYDRAMINE HCL 25 MG
25 CAPSULE ORAL EVERY 6 HOURS PRN
Status: DISCONTINUED | OUTPATIENT
Start: 2017-03-06 | End: 2017-03-09 | Stop reason: HOSPADM

## 2017-03-06 RX ORDER — PROCHLORPERAZINE 25 MG
25 SUPPOSITORY, RECTAL RECTAL EVERY 12 HOURS PRN
Status: DISCONTINUED | OUTPATIENT
Start: 2017-03-06 | End: 2017-03-09 | Stop reason: HOSPADM

## 2017-03-06 RX ORDER — DEXAMETHASONE SODIUM PHOSPHATE 4 MG/ML
INJECTION, SOLUTION INTRA-ARTICULAR; INTRALESIONAL; INTRAMUSCULAR; INTRAVENOUS; SOFT TISSUE PRN
Status: DISCONTINUED | OUTPATIENT
Start: 2017-03-06 | End: 2017-03-06

## 2017-03-06 RX ORDER — FLUCONAZOLE 150 MG/1
150 TABLET ORAL ONCE
Status: COMPLETED | OUTPATIENT
Start: 2017-03-06 | End: 2017-03-07

## 2017-03-06 RX ORDER — OXYTOCIN 10 [USP'U]/ML
10 INJECTION, SOLUTION INTRAMUSCULAR; INTRAVENOUS
Status: DISCONTINUED | OUTPATIENT
Start: 2017-03-06 | End: 2017-03-09 | Stop reason: HOSPADM

## 2017-03-06 RX ORDER — BUPIVACAINE HYDROCHLORIDE 7.5 MG/ML
INJECTION, SOLUTION INTRASPINAL PRN
Status: DISCONTINUED | OUTPATIENT
Start: 2017-03-06 | End: 2017-03-06

## 2017-03-06 RX ORDER — HYDROMORPHONE HYDROCHLORIDE 1 MG/ML
.3-.5 INJECTION, SOLUTION INTRAMUSCULAR; INTRAVENOUS; SUBCUTANEOUS EVERY 30 MIN PRN
Status: DISCONTINUED | OUTPATIENT
Start: 2017-03-06 | End: 2017-03-09 | Stop reason: HOSPADM

## 2017-03-06 RX ORDER — MAGNESIUM HYDROXIDE 1200 MG/15ML
LIQUID ORAL PRN
Status: DISCONTINUED | OUTPATIENT
Start: 2017-03-06 | End: 2017-03-06

## 2017-03-06 RX ORDER — EPHEDRINE SULFATE 50 MG/ML
5 INJECTION, SOLUTION INTRAMUSCULAR; INTRAVENOUS; SUBCUTANEOUS
Status: DISCONTINUED | OUTPATIENT
Start: 2017-03-06 | End: 2017-03-09 | Stop reason: HOSPADM

## 2017-03-06 RX ORDER — ONDANSETRON 2 MG/ML
4 INJECTION INTRAMUSCULAR; INTRAVENOUS EVERY 6 HOURS PRN
Status: DISCONTINUED | OUTPATIENT
Start: 2017-03-06 | End: 2017-03-06

## 2017-03-06 RX ORDER — OXYTOCIN/0.9 % SODIUM CHLORIDE 30/500 ML
100 PLASTIC BAG, INJECTION (ML) INTRAVENOUS CONTINUOUS
Status: DISCONTINUED | OUTPATIENT
Start: 2017-03-06 | End: 2017-03-09 | Stop reason: HOSPADM

## 2017-03-06 RX ORDER — IBUPROFEN 400 MG/1
400-800 TABLET, FILM COATED ORAL EVERY 6 HOURS PRN
Status: DISCONTINUED | OUTPATIENT
Start: 2017-03-07 | End: 2017-03-09 | Stop reason: HOSPADM

## 2017-03-06 RX ORDER — MORPHINE SULFATE 1 MG/ML
INJECTION, SOLUTION EPIDURAL; INTRATHECAL; INTRAVENOUS PRN
Status: DISCONTINUED | OUTPATIENT
Start: 2017-03-06 | End: 2017-03-06

## 2017-03-06 RX ORDER — SCOLOPAMINE TRANSDERMAL SYSTEM 1 MG/1
1 PATCH, EXTENDED RELEASE TRANSDERMAL ONCE
Status: DISCONTINUED | OUTPATIENT
Start: 2017-03-06 | End: 2017-03-09 | Stop reason: HOSPADM

## 2017-03-06 RX ORDER — AMOXICILLIN 250 MG
1-2 CAPSULE ORAL 2 TIMES DAILY
Status: DISCONTINUED | OUTPATIENT
Start: 2017-03-06 | End: 2017-03-09 | Stop reason: HOSPADM

## 2017-03-06 RX ORDER — FENTANYL CITRATE 50 UG/ML
INJECTION, SOLUTION INTRAMUSCULAR; INTRAVENOUS PRN
Status: DISCONTINUED | OUTPATIENT
Start: 2017-03-06 | End: 2017-03-06

## 2017-03-06 RX ORDER — METOCLOPRAMIDE HYDROCHLORIDE 5 MG/ML
10 INJECTION INTRAMUSCULAR; INTRAVENOUS EVERY 6 HOURS PRN
Status: DISCONTINUED | OUTPATIENT
Start: 2017-03-06 | End: 2017-03-09 | Stop reason: HOSPADM

## 2017-03-06 RX ORDER — KETOROLAC TROMETHAMINE 30 MG/ML
30 INJECTION, SOLUTION INTRAMUSCULAR; INTRAVENOUS EVERY 6 HOURS
Status: COMPLETED | OUTPATIENT
Start: 2017-03-06 | End: 2017-03-07

## 2017-03-06 RX ORDER — LIDOCAINE 40 MG/G
CREAM TOPICAL
Status: DISCONTINUED | OUTPATIENT
Start: 2017-03-06 | End: 2017-03-09 | Stop reason: HOSPADM

## 2017-03-06 RX ORDER — BISACODYL 10 MG
10 SUPPOSITORY, RECTAL RECTAL DAILY PRN
Status: DISCONTINUED | OUTPATIENT
Start: 2017-03-08 | End: 2017-03-09 | Stop reason: HOSPADM

## 2017-03-06 RX ORDER — DIPHENHYDRAMINE HYDROCHLORIDE 50 MG/ML
25 INJECTION INTRAMUSCULAR; INTRAVENOUS EVERY 6 HOURS PRN
Status: DISCONTINUED | OUTPATIENT
Start: 2017-03-06 | End: 2017-03-09 | Stop reason: HOSPADM

## 2017-03-06 RX ORDER — NALOXONE HYDROCHLORIDE 0.4 MG/ML
.1-.4 INJECTION, SOLUTION INTRAMUSCULAR; INTRAVENOUS; SUBCUTANEOUS
Status: DISCONTINUED | OUTPATIENT
Start: 2017-03-06 | End: 2017-03-09 | Stop reason: HOSPADM

## 2017-03-06 RX ORDER — OXYCODONE HYDROCHLORIDE 5 MG/1
5-10 TABLET ORAL
Status: DISCONTINUED | OUTPATIENT
Start: 2017-03-06 | End: 2017-03-09 | Stop reason: HOSPADM

## 2017-03-06 RX ORDER — DEXTROSE, SODIUM CHLORIDE, SODIUM LACTATE, POTASSIUM CHLORIDE, AND CALCIUM CHLORIDE 5; .6; .31; .03; .02 G/100ML; G/100ML; G/100ML; G/100ML; G/100ML
INJECTION, SOLUTION INTRAVENOUS CONTINUOUS
Status: DISCONTINUED | OUTPATIENT
Start: 2017-03-06 | End: 2017-03-09 | Stop reason: HOSPADM

## 2017-03-06 RX ORDER — OXYTOCIN/0.9 % SODIUM CHLORIDE 30/500 ML
PLASTIC BAG, INJECTION (ML) INTRAVENOUS CONTINUOUS PRN
Status: DISCONTINUED | OUTPATIENT
Start: 2017-03-06 | End: 2017-03-06

## 2017-03-06 RX ORDER — SERTRALINE HYDROCHLORIDE 100 MG/1
100 TABLET, FILM COATED ORAL DAILY
Status: DISCONTINUED | OUTPATIENT
Start: 2017-03-06 | End: 2017-03-09 | Stop reason: HOSPADM

## 2017-03-06 RX ORDER — NALOXONE HYDROCHLORIDE 0.4 MG/ML
.1-.4 INJECTION, SOLUTION INTRAMUSCULAR; INTRAVENOUS; SUBCUTANEOUS
Status: DISCONTINUED | OUTPATIENT
Start: 2017-03-06 | End: 2017-03-06

## 2017-03-06 RX ORDER — ONDANSETRON 2 MG/ML
INJECTION INTRAMUSCULAR; INTRAVENOUS PRN
Status: DISCONTINUED | OUTPATIENT
Start: 2017-03-06 | End: 2017-03-06

## 2017-03-06 RX ORDER — CEFAZOLIN SODIUM 1 G/3ML
1 INJECTION, POWDER, FOR SOLUTION INTRAMUSCULAR; INTRAVENOUS
Status: DISCONTINUED | OUTPATIENT
Start: 2017-03-06 | End: 2017-03-06 | Stop reason: HOSPADM

## 2017-03-06 RX ORDER — ONDANSETRON 2 MG/ML
4 INJECTION INTRAMUSCULAR; INTRAVENOUS EVERY 6 HOURS PRN
Status: DISCONTINUED | OUTPATIENT
Start: 2017-03-06 | End: 2017-03-09 | Stop reason: HOSPADM

## 2017-03-06 RX ORDER — MISOPROSTOL 200 UG/1
400 TABLET ORAL
Status: DISCONTINUED | OUTPATIENT
Start: 2017-03-06 | End: 2017-03-09 | Stop reason: HOSPADM

## 2017-03-06 RX ORDER — HYDROCORTISONE 2.5 %
CREAM (GRAM) TOPICAL 3 TIMES DAILY PRN
Status: DISCONTINUED | OUTPATIENT
Start: 2017-03-06 | End: 2017-03-09 | Stop reason: HOSPADM

## 2017-03-06 RX ORDER — ACETAMINOPHEN 325 MG/1
975 TABLET ORAL EVERY 8 HOURS
Status: DISCONTINUED | OUTPATIENT
Start: 2017-03-06 | End: 2017-03-09 | Stop reason: HOSPADM

## 2017-03-06 RX ORDER — ONDANSETRON 4 MG/1
4 TABLET, ORALLY DISINTEGRATING ORAL EVERY 6 HOURS PRN
Status: DISCONTINUED | OUTPATIENT
Start: 2017-03-06 | End: 2017-03-06

## 2017-03-06 RX ORDER — SODIUM CHLORIDE, SODIUM LACTATE, POTASSIUM CHLORIDE, CALCIUM CHLORIDE 600; 310; 30; 20 MG/100ML; MG/100ML; MG/100ML; MG/100ML
INJECTION, SOLUTION INTRAVENOUS CONTINUOUS
Status: DISCONTINUED | OUTPATIENT
Start: 2017-03-06 | End: 2017-03-06 | Stop reason: HOSPADM

## 2017-03-06 RX ORDER — SIMETHICONE 80 MG
80 TABLET,CHEWABLE ORAL 4 TIMES DAILY PRN
Status: DISCONTINUED | OUTPATIENT
Start: 2017-03-06 | End: 2017-03-09 | Stop reason: HOSPADM

## 2017-03-06 RX ORDER — LANOLIN 100 %
OINTMENT (GRAM) TOPICAL
Status: DISCONTINUED | OUTPATIENT
Start: 2017-03-06 | End: 2017-03-09 | Stop reason: HOSPADM

## 2017-03-06 RX ORDER — OXYTOCIN/0.9 % SODIUM CHLORIDE 30/500 ML
340 PLASTIC BAG, INJECTION (ML) INTRAVENOUS CONTINUOUS PRN
Status: DISCONTINUED | OUTPATIENT
Start: 2017-03-06 | End: 2017-03-09 | Stop reason: HOSPADM

## 2017-03-06 RX ADMIN — PHENYLEPHRINE HYDROCHLORIDE 100 MCG: 10 INJECTION, SOLUTION INTRAMUSCULAR; INTRAVENOUS; SUBCUTANEOUS at 20:34

## 2017-03-06 RX ADMIN — CEFAZOLIN SODIUM 2 G: 2 INJECTION, SOLUTION INTRAVENOUS at 20:13

## 2017-03-06 RX ADMIN — SODIUM CHLORIDE, POTASSIUM CHLORIDE, SODIUM LACTATE AND CALCIUM CHLORIDE: 600; 310; 30; 20 INJECTION, SOLUTION INTRAVENOUS at 20:56

## 2017-03-06 RX ADMIN — DEXAMETHASONE SODIUM PHOSPHATE 8 MG: 4 INJECTION, SOLUTION INTRAMUSCULAR; INTRAVENOUS at 20:18

## 2017-03-06 RX ADMIN — SODIUM CHLORIDE, POTASSIUM CHLORIDE, SODIUM LACTATE AND CALCIUM CHLORIDE: 600; 310; 30; 20 INJECTION, SOLUTION INTRAVENOUS at 19:52

## 2017-03-06 RX ADMIN — BUPIVACAINE HYDROCHLORIDE IN DEXTROSE 13.5 MG: 7.5 INJECTION, SOLUTION SUBARACHNOID at 20:18

## 2017-03-06 RX ADMIN — OXYTOCIN-SODIUM CHLORIDE 0.9% IV SOLN 30 UNIT/500ML 100 ML/HR: 30-0.9/5 SOLUTION at 21:55

## 2017-03-06 RX ADMIN — FENTANYL CITRATE 10 MCG: 50 INJECTION, SOLUTION INTRAMUSCULAR; INTRAVENOUS at 20:18

## 2017-03-06 RX ADMIN — SODIUM CHLORIDE, POTASSIUM CHLORIDE, SODIUM LACTATE AND CALCIUM CHLORIDE 1000 ML: 600; 310; 30; 20 INJECTION, SOLUTION INTRAVENOUS at 18:57

## 2017-03-06 RX ADMIN — SODIUM CHLORIDE, POTASSIUM CHLORIDE, SODIUM LACTATE AND CALCIUM CHLORIDE: 600; 310; 30; 20 INJECTION, SOLUTION INTRAVENOUS at 20:26

## 2017-03-06 RX ADMIN — PHENYLEPHRINE HYDROCHLORIDE 250 MCG: 10 INJECTION, SOLUTION INTRAMUSCULAR; INTRAVENOUS; SUBCUTANEOUS at 20:18

## 2017-03-06 RX ADMIN — SODIUM CHLORIDE 5 MILLION UNITS: 9 INJECTION, SOLUTION INTRAVENOUS at 18:36

## 2017-03-06 RX ADMIN — MORPHINE SULFATE 0.15 MG: 1 INJECTION EPIDURAL; INTRATHECAL; INTRAVENOUS at 20:18

## 2017-03-06 RX ADMIN — SODIUM CITRATE AND CITRIC ACID MONOHYDRATE 30 ML: 500; 334 SOLUTION ORAL at 19:51

## 2017-03-06 RX ADMIN — SODIUM CHLORIDE 400 ML: 900 IRRIGANT IRRIGATION at 20:51

## 2017-03-06 RX ADMIN — ONDANSETRON 4 MG: 2 INJECTION INTRAMUSCULAR; INTRAVENOUS at 20:18

## 2017-03-06 RX ADMIN — KETOROLAC TROMETHAMINE 30 MG: 30 INJECTION, SOLUTION INTRAMUSCULAR at 22:15

## 2017-03-06 RX ADMIN — MIDAZOLAM HYDROCHLORIDE 2 MG: 1 INJECTION, SOLUTION INTRAMUSCULAR; INTRAVENOUS at 20:18

## 2017-03-06 RX ADMIN — OXYTOCIN-SODIUM CHLORIDE 0.9% IV SOLN 30 UNIT/500ML 200 ML/HR: 30-0.9/5 SOLUTION at 20:42

## 2017-03-06 NOTE — IP AVS SNAPSHOT
[facility]    [address]    [facility] [address]    Phone:  [phone]    Fax:  [phone]                                       After Visit Summary   3/6/2017    Dot Ramirez    MRN: [id number]           After Visit Summary Signature Page     I have received my discharge instructions, and my questions have been answered. I have discussed any challenges I see with this plan with the nurse or doctor.    ..........................................................................................................................................  Patient/Patient Representative Signature      ..........................................................................................................................................  Patient Representative Print Name and Relationship to Patient    ..................................................               ................................................  Date                                            Time    ..........................................................................................................................................  Reviewed by Signature/Title    ...................................................              ..............................................  Date                                                            Time

## 2017-03-06 NOTE — IP AVS SNAPSHOT
MRN:[id number]                      After Visit Summary   3/6/2017    Dot Ramirez    MRN: [id number]           Thank you!     Thank you for choosing [facility] for your care. Our goal is always to provide you with excellent care. Hearing back from our patients is one way we can continue to improve our services. Please take a few minutes to complete the written survey that you may receive in the mail after you visit. If you would like to speak to someone directly about your visit please contact Patient Relations at [phone]. Thank you!          Patient Information     Date Of Birth          1985        About your hospital stay     You were admitted on:  2017 You last received care in the:  [facility]    You were discharged on:  2017       Who to Call     For medical emergencies, please call 911.  For non-urgent questions about your medical care, please call your primary care provider or clinic, [phone]  For questions related to your surgery, please call your surgery clinic        Attending Provider     Provider Specialty    Jamison Florian MD OB/Gyn       Primary Care Provider Office Phone # Fax #    Jamison Florian [phone]       [facility] [address] [place] [address] [address] [place] [address]        Further instructions from your care team       Postop  Birth Instructions    Activity       Do not lift more than 10 pounds for 6 weeks after surgery.  Ask family and friends for help when you need it.    No driving until you have stopped taking your pain medications (usually two weeks after surgery).    No heavy exercise or activity for 6 weeks.  Don't do anything that will put a strain on your surgery site.    Don't strain when using the toilet.  Your care team may prescribe a stool softener if you have problems with your bowel movements.     To care for your incision:       Keep the incision  clean and dry.    Do not soak your incision in water. No swimming or hot tubs until it has fully healed. You may soak in the bathtub if the water level is below your incision.    Do not use peroxide, gel, cream, lotion, or ointment on your incision.    Adjust your clothes to avoid pressure on your surgery site (check the elastic in your underwear for example).     You may see a small amount of clear or pink drainage and this is normal.  Check with your health care provider:       If the drainage increases or has an odor.    If the incision reddens, you have swelling, or develop a rash.    If you have increased pain and the medicine we prescribed doesn't help.    If you have a fever above 100.4 F (38 C) with or without chills when placing thermometer under your tongue.   The area around your incision (surgery wound), will feel numb.  This is normal. The numbness should go away in less than a year.     Keep your hands clean:  Always wash your hands before touching your incision (surgery wound). This helps reduce your risk of infection. If your hands aren't dirty, you may use an alcohol hand-rub to clean your hands. Keep your nails clean and short.    Call your healthcare provider if you have any of these symptoms:       You soak a sanitary pad with blood within 1 hour, or you see blood clots larger than a golf ball.    Bleeding that lasts more than 6 weeks.    Vaginal discharge that smells bad.    Severe pain, cramping or tenderness in your lower belly area.    A need to urinate more frequently (use the toilet more often), more urgently (use the toilet very quickly), or it burns when you urinate.    Nausea and vomiting.    Redness, swelling or pain around a vein in your leg.    Problems breastfeeding or a red or painful area on your breast.    Lactation Consultant [phone]    Chest pain and cough or are gasping for air.    Problems with coping with sadness, anxiety or depression. If you have concerns about hurting  "yourself or the baby, call your provider immediately.      You have questions or concerns after you return home.       Pending Results     No orders found from 3/4/2017 to 3/7/2017.            Statement of Approval     Ordered          03/09/17 0944  I have reviewed and agree with all the recommendations and orders detailed in this document.  EFFECTIVE NOW     Approved and electronically signed by:  Karen Marin DO           03/08/17 1054  I have reviewed and agree with all the recommendations and orders detailed in this document.  EFFECTIVE NOW     Approved and electronically signed by:  Mulugeta Jacques MD             Admission Information     Date & Time Provider Department Dept. Phone    3/6/2017 Jamison Florian MD [facility] [phone]      Your Vitals Were     Blood Pressure Pulse Temperature Respirations Height Weight    126/86 107 98.3  F (36.8  C) (Oral) 16 1.549 m (5' 1\") 90.7 kg (200 lb)    Last Period Pulse Oximetry BMI (Body Mass Index)             06/09/2016 100% 37.79 kg/m2         [employer] Information     [employer] gives you secure access to your electronic health record. If you see a primary care provider, you can also send messages to your care team and make appointments. If you have questions, please call your primary care clinic.  If you do not have a primary care provider, please call [phone] and they will assist you.        Care EveryWhere ID     This is your Care EveryWhere ID. This could be used by other organizations to access your [place] medical records  [id number]           Review of your medicines      START taking        Dose / Directions    oxyCODONE 5 MG IR tablet   Commonly known as:  ROXICODONE        Dose:  5-10 mg   Take 1-2 tablets (5-10 mg) by mouth every 3 hours as needed for moderate to severe pain   Quantity:  28 tablet   Refills:  0         CONTINUE these medicines which have NOT CHANGED        Dose / Directions    breast pump Misc "   Used for:  High-risk pregnancy, young multigravida, third trimester        Dose:  1 each   1 each as needed   Quantity:  1 each   Refills:  0       PRE- PO        Refills:  0       sertraline 100 MG tablet   Commonly known as:  ZOLOFT   Used for:  ELIANA (generalized anxiety disorder)        Dose:  100 mg   Take 1 tablet (100 mg) by mouth daily   Quantity:  30 tablet   Refills:  6            Where to get your medicines      Some of these will need a paper prescription and others can be bought over the counter. Ask your nurse if you have questions.     Bring a paper prescription for each of these medications     oxyCODONE 5 MG IR tablet                Protect others around you: Learn how to safely use, store and throw away your medicines at www.disposemymeds.org.             Medication List: This is a list of all your medications and when to take them. Check marks below indicate your daily home schedule. Keep this list as a reference.      Medications           Morning Afternoon Evening Bedtime As Needed    breast pump Misc   1 each as needed                                oxyCODONE 5 MG IR tablet   Commonly known as:  ROXICODONE   Take 1-2 tablets (5-10 mg) by mouth every 3 hours as needed for moderate to severe pain   Last time this was given:  5 mg on 3/8/2017  6:15 PM                                PRE-AMEENA PO                                sertraline 100 MG tablet   Commonly known as:  ZOLOFT   Take 1 tablet (100 mg) by mouth daily   Last time this was given:  100 mg on 3/8/2017  9:26 PM

## 2017-03-06 NOTE — MR AVS SNAPSHOT
After Visit Summary   3/6/2017    Dot Ramirez    MRN: [id number]           Patient Information     Date Of Birth          1985        Visit Information        Provider Department      3/6/2017 6:45 PM Jamison Florian MD [facility]        Today's Diagnoses     High-risk pregnancy, young multigravida, third trimester    -  1    Previous  delivery, antepartum condition or complication        Preop general physical exam           Follow-ups after your visit        Follow-up notes from your care team     Return in about 2 weeks (around 3/20/2017).      Future tests that were ordered for you today     Open Future Orders        Priority Expected Expires Ordered    ABO/Rh type and screen Routine 3/10/2017 3/13/2017 3/6/2017    Hemoglobin Routine 3/10/2017 3/13/2017 3/6/2017            Who to contact     If you have questions or need follow up information about today's clinic visit or your schedule please contact [facility] directly at [phone].  Normal or non-critical lab and imaging results will be communicated to you by [employer], letter or phone within 4 business days after the clinic has received the results. If you do not hear from us within 7 days, please contact the clinic through [employer] or phone. If you have a critical or abnormal lab result, we will notify you by phone as soon as possible.  Submit refill requests through [employer] or call your pharmacy and they will forward the refill request to us. Please allow 3 business days for your refill to be completed.          Additional Information About Your Visit        MyChart Information     [employer] gives you secure access to your electronic health record. If you see a primary care provider, you can also send messages to your care team and make appointments. If you have questions, please call your primary care clinic.  If you do not have a primary care provider, please call [phone] and they  will assist you.        Care EveryWhere ID     This is your Care EveryWhere ID. This could be used by other organizations to access your [place] medical records  [id number]        Your Vitals Were     Last Period                   06/09/2016            Blood Pressure from Last 3 Encounters:   03/06/17 149/72   03/01/17 124/73   02/27/17 116/70    Weight from Last 3 Encounters:   03/06/17 200 lb (90.7 kg)   03/01/17 199 lb (90.3 kg)   02/27/17 200 lb 11.2 oz (91 kg)              Today, you had the following     No orders found for display         Today's Medication Changes      Notice     This visit is during an admission. Changes to the med list made in this visit will be reflected in the After Visit Summary of the admission.             Primary Care Provider Office Phone # Fax #    Jamison Florian [phone]       [address] [place] [address]        Thank you!     Thank you for choosing [facility]  for your care. Our goal is always to provide you with excellent care. Hearing back from our patients is one way we can continue to improve our services. Please take a few minutes to complete the written survey that you may receive in the mail after your visit with us. Thank you!             Your Updated Medication List - Protect others around you: Learn how to safely use, store and throw away your medicines at www.disposemymeds.org.      Notice     This visit is during an admission. Changes to the med list made in this visit will be reflected in the After Visit Summary of the admission.

## 2017-03-07 LAB
HGB BLD-MCNC: 10.8 G/DL (ref 11.7–15.7)
T PALLIDUM IGG+IGM SER QL: NEGATIVE

## 2017-03-07 PROCEDURE — 25800025 ZZH RX 258: Performed by: OBSTETRICS & GYNECOLOGY

## 2017-03-07 PROCEDURE — 25000125 ZZHC RX 250: Performed by: OBSTETRICS & GYNECOLOGY

## 2017-03-07 PROCEDURE — 25000132 ZZH RX MED GY IP 250 OP 250 PS 637: Performed by: OBSTETRICS & GYNECOLOGY

## 2017-03-07 PROCEDURE — 12000029 ZZH R&B OB INTERMEDIATE

## 2017-03-07 PROCEDURE — 40000083 ZZH STATISTIC IP LACTATION SERVICES 1-15 MIN

## 2017-03-07 PROCEDURE — 85018 HEMOGLOBIN: CPT | Performed by: OBSTETRICS & GYNECOLOGY

## 2017-03-07 PROCEDURE — 36415 COLL VENOUS BLD VENIPUNCTURE: CPT | Performed by: OBSTETRICS & GYNECOLOGY

## 2017-03-07 PROCEDURE — 25000128 H RX IP 250 OP 636: Performed by: OBSTETRICS & GYNECOLOGY

## 2017-03-07 RX ORDER — NYSTATIN AND TRIAMCINOLONE ACETONIDE 100000; 1 [USP'U]/G; MG/G
CREAM TOPICAL 2 TIMES DAILY
Status: DISCONTINUED | OUTPATIENT
Start: 2017-03-07 | End: 2017-03-09 | Stop reason: HOSPADM

## 2017-03-07 RX ADMIN — NYSTATIN AND TRIAMCINOLONE ACETONIDE: 100000; 1 CREAM TOPICAL at 08:36

## 2017-03-07 RX ADMIN — SENNOSIDES AND DOCUSATE SODIUM 1 TABLET: 8.6; 5 TABLET ORAL at 08:35

## 2017-03-07 RX ADMIN — SERTRALINE HYDROCHLORIDE 100 MG: 100 TABLET ORAL at 20:51

## 2017-03-07 RX ADMIN — DIPHENHYDRAMINE HYDROCHLORIDE 25 MG: 50 INJECTION, SOLUTION INTRAMUSCULAR; INTRAVENOUS at 02:58

## 2017-03-07 RX ADMIN — ACETAMINOPHEN 975 MG: 325 TABLET, FILM COATED ORAL at 08:35

## 2017-03-07 RX ADMIN — NYSTATIN AND TRIAMCINOLONE ACETONIDE: 100000; 1 CREAM TOPICAL at 20:51

## 2017-03-07 RX ADMIN — SERTRALINE HYDROCHLORIDE 100 MG: 100 TABLET ORAL at 00:31

## 2017-03-07 RX ADMIN — SENNOSIDES AND DOCUSATE SODIUM 1 TABLET: 8.6; 5 TABLET ORAL at 20:50

## 2017-03-07 RX ADMIN — ACETAMINOPHEN 975 MG: 325 TABLET, FILM COATED ORAL at 16:35

## 2017-03-07 RX ADMIN — SODIUM CHLORIDE, SODIUM LACTATE, POTASSIUM CHLORIDE, CALCIUM CHLORIDE AND DEXTROSE MONOHYDRATE: 5; 600; 310; 30; 20 INJECTION, SOLUTION INTRAVENOUS at 06:20

## 2017-03-07 RX ADMIN — ACETAMINOPHEN 975 MG: 325 TABLET, FILM COATED ORAL at 00:30

## 2017-03-07 RX ADMIN — KETOROLAC TROMETHAMINE 30 MG: 30 INJECTION, SOLUTION INTRAMUSCULAR at 04:19

## 2017-03-07 RX ADMIN — NYSTATIN AND TRIAMCINOLONE ACETONIDE: 100000; 1 CREAM TOPICAL at 01:40

## 2017-03-07 RX ADMIN — FLUCONAZOLE 150 MG: 150 TABLET ORAL at 00:30

## 2017-03-07 RX ADMIN — OXYTOCIN-SODIUM CHLORIDE 0.9% IV SOLN 30 UNIT/500ML 100 ML/HR: 30-0.9/5 SOLUTION at 01:04

## 2017-03-07 RX ADMIN — KETOROLAC TROMETHAMINE 30 MG: 30 INJECTION, SOLUTION INTRAMUSCULAR at 10:40

## 2017-03-07 RX ADMIN — IBUPROFEN 800 MG: 400 TABLET ORAL at 22:35

## 2017-03-07 RX ADMIN — KETOROLAC TROMETHAMINE 30 MG: 30 INJECTION, SOLUTION INTRAMUSCULAR at 16:35

## 2017-03-07 NOTE — PLAN OF CARE
Problem: Goal Outcome Summary  Goal: Goal Outcome Summary  Vital signs stable but remains tachycardic. Urine output adequate. Patient up with standby assist to bathroom, avalos catheter removed. Saline locked. Steady on feet. Pain controlled with use of orders medications. Tolerating regular diet. Incision now open to air with staples in place, no drainage. Patient filled out post partum depression screening, scored 13. Social work consult placed and saw patient. She states she had a history of depression and self-injurious thoughts when she found out she was pregnant after her 18 week loss, denies current thoughts. States she is on Zoloft at this time and has seen a therapist in the past. She feels very good right now and knows what she needs to do in order to minimize her symptoms.

## 2017-03-07 NOTE — PROGRESS NOTES
[facility]  [address]  [facility] [address]  [phone]  Dept: [phone]    PRE-OP EVALUATION:  Today's date: 3/6/2017    Dot Ramirez (: 1985) presents for pre-operative evaluation assessment           1. NO - Do you have a history of heart attack, stroke, stent, bypass or surgery on an artery in the head, neck, heart or legs?  2. NO - Do you ever have any pain or discomfort in your chest?  3. NO - Do you have a history of  Heart Failure?  4. NO - Are you troubled by shortness of breath when: walking on the level, up a slight hill or at night?  5. NO - Do you currently have a cold, bronchitis or other respiratory infection?  6. NO - Do you have a cough, shortness of breath or wheezing?  7. NO - Do you sometimes get pains in the calves of your legs when you walk?  8. NO - Do you or anyone in your family have previous history of blood clots?  9. NO - Do you or does anyone in your family have a serious bleeding problem such as prolonged bleeding following surgeries or cuts?  10. NO - Have you ever had problems with anemia or been told to take iron pills?  11. NO - Have you had any abnormal blood loss such as black, tarry or bloody stools, or abnormal vaginal bleeding?  12. NO - Have you ever had a blood transfusion?  13. NO - Have you or any of your relatives ever had problems with anesthesia?  14. NO - Do you have sleep apnea, excessive snoring or daytime drowsiness?  15. NO - Do you have any prosthetic heart valves?  16. NO - Do you have prosthetic joints?        HPI:                                                      Brief HPI related to upcoming procedure: previous C/S; desires repeat      See problem list for active medical problems.  Problems all longstanding and stable, except as noted/documented.  See ROS for pertinent symptoms related to these conditions.                                                                                                   .    MEDICAL HISTORY:                                                      Patient Active Problem List    Diagnosis Date Noted     Labor and delivery, indication for care 2017     Priority: Medium     Previous  delivery, antepartum condition or complication 10/10/2016     Priority: Medium     Previous C/S; plan repeat C/S       High-risk pregnancy, young multigravida 2016     Priority: Medium     ELIANA (generalized anxiety disorder) 2016     Priority: Medium     Restless legs syndrome (RLS) 2016     Priority: Medium     History of group B Streptococcus (GBS) infection 2014     Priority: Medium     Streptococcus B carrier state complicating pregnancy 10/03/2014     Priority: Medium     Family history of diabetes mellitus 2014     Priority: Medium     Vitamin D deficiency 2011     Priority: Medium      Past Medical History   Diagnosis Date     Anxiety      Depressive disorder 2014     onset with fetal loss     PONV (postoperative nausea and vomiting)      Past Surgical History   Procedure Laterality Date     Extraction(s) dental  2009     Cerclage cervical N/A 2015     Procedure: CERCLAGE CERVICAL;  Surgeon: Damion Nayak MD;  Location: [facility] L+[facility]      section N/A 2015     Procedure:  SECTION;  Surgeon: Jamison Florian MD;  Location: [facility] L+[facility]     Cystectomy hand  2016     Cerclage cervical N/A 2016     Procedure: CERCLAGE CERVICAL;  Surgeon: Damion Nayak MD;  Location: [facility] L[facility]     No current outpatient prescriptions on file.     OTC products: None, except as noted above    No Known Allergies   Latex Allergy: NO    Social History   Substance Use Topics     Smoking status: Never Smoker     Smokeless tobacco: Never Used     Alcohol use No     History   Drug Use No       REVIEW OF SYSTEMS:                                                    Constitutional, neuro, ENT, endocrine, pulmonary, cardiac, gastrointestinal, genitourinary,  musculoskeletal, integument and psychiatric systems are negative, except as otherwise noted.    EXAM:                                                    LMP 2016    GENERAL APPEARANCE: healthy, alert and no distress     EYES: EOMI, PERRL     HENT: ear canals and TM's normal and nose and mouth without ulcers or lesions     NECK: no adenopathy, no asymmetry, masses, or scars and thyroid normal to palpation     RESP: lungs clear to auscultation - no rales, rhonchi or wheezes     CV: regular rates and rhythm, normal S1 S2, no S3 or S4 and no murmur, click or rub     ABDOMEN:  IUP at term     MS: extremities normal- no gross deformities noted, no evidence of inflammation in joints, FROM in all extremities.     SKIN: no suspicious lesions or rashes     NEURO: Normal strength and tone, sensory exam grossly normal, mentation intact and speech normal     PSYCH: mentation appears normal. and affect normal/bright     LYMPHATICS: No axillary, cervical, or supraclavicular nodes    DIAGNOSTICS:                                                        Recent Labs   Lab Test  17   1837  16   1501  16   0636   12/12/15   1038   14   0335   14   2239  09   2305   HGB  11.9  11.9  12.6   < >  9.8*   < >  11.3*   < >  12.7  11.7   PLT   --   309  299   < >  311   < >  293   < >  335  402   INR   --    --    --    --    --    --   0.98   --    --    --    NA   --    --    --    --    --    --    --    --   137  137   POTASSIUM   --    --    --    --    --    --    --    --   3.3*  3.7   CR   --    --    --    --   0.70   --    --    --   0.58  0.59   A1C   --    --    --    --    --    --   5.1   --    --    --     < > = values in this interval not displayed.        IMPRESSION:                                                    IUP at 38 5/7 weeks      -previous C/S; desires repeat      ICD-10-CM    1. High-risk pregnancy, young multigravida, third trimester O09.623    2. Previous   delivery, antepartum condition or complication O34.219    3. Preop general physical exam Z01.818        RECOMMENDATIONS:                                                    Repeat LTCS     -Risks, benefits, alternatives discussed; including but not limited to risk of infection, bleeding, damage to bowel/bladder and any other intra-abdominal viscera.  Risks also include risks of anesthesia and blood transfusion. Patient understands and agrees to planned procedure.       Signed Electronically by: Jamison Florian MD

## 2017-03-07 NOTE — PLAN OF CARE
Amnisure postive. Plan for c/s for 2000. Pt last ate at 1400. Will treat with PCN for GBS positive now.

## 2017-03-07 NOTE — ANESTHESIA PREPROCEDURE EVALUATION
PAC NOTE:       ANESTHESIA PRE EVALUATION:  Anesthesia Evaluation     . Pt has had prior anesthetic. Type: Regional and General    History of anesthetic complications  - PONV    ROS/MED HX    ENT/Pulmonary:  - neg pulmonary ROS     Neurologic:  - neg neurologic ROS     Cardiovascular:  - neg cardiovascular ROS       METS/Exercise Tolerance:     Hematologic:  - neg hematologic  ROS       Musculoskeletal:  - neg musculoskeletal ROS       GI/Hepatic:  - neg GI/hepatic ROS       Renal/Genitourinary:  - ROS Renal section negative       Endo: Comment: Class 2 obesity    (+) Obesity, .      Psychiatric:     (+) psychiatric history depression      Infectious Disease:  - neg infectious disease ROS       Malignancy:      - no malignancy   Other: Comment: Previous C/S with ROM for repeat   (+) Possibly pregnant              Physical Exam  Normal systems: cardiovascular, pulmonary and dental    Airway   Mallampati: II  TM distance: >3 FB  Neck ROM: full    Dental     Cardiovascular   Rhythm and rate: regular and normal      Pulmonary     Other findings: Lab Test        12/26/16 08/29/16 08/02/16      --          11/17/14                       1501          0636          0858           --           0335          WBC          11.9*        11.0         11.2*          < >        22.7*         HGB          11.9         12.6         13.2           < >        11.3*         MCV          89           83           85             < >        85            PLT          309          299          344            < >        293           INR           --           --           --           --          0.98           < > = values in this interval not displayed.                  Lab Test        12/12/15     09/25/14     01/02/09                       1038          2239          2305          NA            --          137          137           POTASSIUM     --          3.3*         3.7           CHLORIDE      --          105          105            CO2           --          25           26            BUN           --          7            7             CR           0.70         0.58         0.59          ANIONGAP      --          7            6             [place]           --          8.9          9.1           GLC           --          95           94                            Anesthesia Plan      History & Physical Review  History and physical reviewed and following examination; no interval change.    ASA Status:  2 emergent.    NPO Status:  Full stomach and waived due to emergency    Plan for Spinal   PONV prophylaxis:  Ondansetron (or other 5HT-3) and Dexamethasone or Solumedrol       Postoperative Care  Postoperative pain management:  IV analgesics, Oral pain medications, Multi-modal analgesia and Neuraxial analgesia.      Consents  Anesthetic plan, risks, benefits and alternatives discussed with:  Patient.  Use of blood products discussed: Yes.   Use of blood products discussed with Patient.  Consented to blood products.  .                            .

## 2017-03-07 NOTE — OP NOTE
DATE OF PROCEDURE:  2017      PREOPERATIVE  DIAGNOSES:   1.  Intrauterine pregnancy at 38 4/7 weeks' previous  section, desires repeat.     2.  History of cerclage removed at 37 weeks.   3.  Spontaneous rupture of membranes.      POSTOPERATIVE DIAGNOSES:   1.  Intrauterine pregnancy at 38 4/7 weeks' previous  section, desires repeat.     2.  History of cerclage removed at 37 weeks.   3.  Spontaneous rupture of membranes.      PROCEDURE:  Repeat low transverse  section.      SURGEON:  Jamison Florian MD      ANESTHESIA:  Spinal.      COMPLICATIONS:  None apparent.      ESTIMATED BLOOD LOSS:  600 mL.      FINDINGS:  A living female, Apgars of 8 and 9, 8 pounds.      COMPLICATIONS:  None apparent.      INDICATIONS:  Dot Ramirez is a 31-year-old  3, para 1 patient who presents to Labor and Delivery with spontaneous rupture of membranes at 38-4/7 weeks.  Pregnancy has been of note for cervical cerclage which was removed at 37 weeks.  She has had a previous  section, desires repeat.  Risks, benefits, alternatives have been reviewed and discussed.  They include but are not limited to infection, bleeding, damage to bowel, bladder or any other intraabdominal viscera, as well as all risks attended to anesthesia and blood transfusion.  Patient gives written and verbal consent.      DESCRIPTION OF PROCEDURE:  She was taken to the operating suite, where after spinal anesthesia, the abdomen was prepped and draped in sterile fashion, Wharton catheter having been previously placed.  A transverse uterine incision was made with entry into the peritoneal cavity without incident.  The bladder flap was reflected caudad and a transverse uterine incision was made by scoring the uterus entered bluntly with Laura clamp and then finger fracturing transversely.  Clear fluid was encountered.  The baby's head was delivered through the incision.  The nasal oropharynx was suctioned with bulb suction  and the baby was delivered to the abdomen.  After 1 minute delay, the cord was clamped, cut and handed to the  nurse practitioner in attendance.  A living female weighing 8 pounds, Apgars of 8 and 9 was delivered.  Placenta was delivered manually.  Uterine cavity wiped free of debris and then the uterus was closed with 2 layers of #1 chromic suture, the first layer imbricated by the second.  Retrouterine space, pericolic gutters and subvesical space were irrigated copiously.  When hemostasis had been secured, the bladder flap was reapproximated with 2-0 Monocryl.  The parietoperitoneum was then closed with 2-0 Monocryl.  Subfascial space was irrigated.  When hemostasis had been secured, the fascia was reapproximated with 0 Vicryl in running fashion.  The subcutaneous space was irrigated copiously.  Hemostasis had been secured, it was reapproximated with 2-0 plain suture.  Skin edges were reapproximated with skin clips.  There were no intraoperative complications.  Sponge and needle counts were correct.  Estimated blood loss 600 mL.  Mother and  went to recovery and  nursery respectively.         CASSIUS JESUS MD             D: 2017 21:34   T: 2017 22:00   MT: [id number]      Name:     ALBERTO MOYA   MRN:      [id number]        Account:        [id number]   :      1985           Procedure Date: 2017      Document: [id number]

## 2017-03-07 NOTE — PROVIDER NOTIFICATION
03/07/17 0830   Provider Notification   Provider Name/Title Dr. Mustafa   Method of Notification In Department   Notification Reason Vital Signs Change   MD notified on rounds of tachycardia, morning hemoglobin. No new orders received. Continue to monitor.

## 2017-03-07 NOTE — PROGRESS NOTES
[facility] Obstetrics Post-Op / Progress Note         Assessment and Plan:    Assessment:   Post-operative day #1  Low transverse repeat  section  L&D complications: 1. Intrauterine pregnancy at 38 4/7 weeks' previous  section, desires repeat.   2. History of cerclage removed at 37 weeks.   3. Spontaneous rupture of membranes.       Doing well.  Clean wound without signs of infection.  Normal healing wound.  No immediate surgical complications identified.  No excessive bleeding  Pain well-controlled.      Plan:   Ambulation encouraged  Breast feeding strategies discussed  Monitor wound for signs of infection  Pain control measures as needed  Reportable signs and symptoms dicussed with the patient           Interval History:   Doing well.  Pain is well-controlled.  No fevers.  No history of wound drainage, warmth or significant erythema.  Good appetite.  Denies chest pain, shortness of breath, nausea or vomiting.  Ambulatory.  Breastfeeding well.          Significant Problems:      Past Medical History   Diagnosis Date     Anxiety      Depressive disorder 2014     onset with fetal loss     PONV (postoperative nausea and vomiting)              Review of Systems:    The patient denies any chest pain, shortness of breath, excessive pain, fever, chills, purulent drainage from the wound, nausea or vomiting.          Medications:     All medications related to the patient's surgery have been reviewed  Current Facility-Administered Medications   Medication     nystatin-triamcinolone (MYCOLOG II) cream     sertraline (ZOLOFT) tablet 100 mg     lidocaine 1 % 1 mL     lidocaine (LMX4) kit     sodium chloride (PF) 0.9% PF flush 3 mL     sodium chloride (PF) 0.9% PF flush 3 mL     oxytocin (PITOCIN) 30 units in 500 mL 0.9% NaCl infusion     dextrose 5% in lactated ringers infusion     naloxone (NARCAN) injection 0.1-0.4 mg     senna-docusate (SENOKOT-S;PERICOLACE) 8.6-50 MG per tablet 1-2 tablet      [START ON 3/8/2017] bisacodyl (DULCOLAX) Suppository 10 mg     [START ON 3/8/2017] sodium phosphate (FLEET ENEMA) 1 enema     ondansetron (ZOFRAN) injection 4 mg     hydrocortisone 2.5 % cream     diphenhydrAMINE (BENADRYL) capsule 25 mg    Or     diphenhydrAMINE (BENADRYL) injection 25 mg     lanolin ointment     simethicone (MYLICON) chewable tablet 80 mg     lactated ringers BOLUS 1,000 mL     oxytocin (PITOCIN) 30 units in 500 mL 0.9% NaCl infusion     oxytocin (PITOCIN) injection 10 Units     misoprostol (CYTOTEC) tablet 400 mcg     NO Rho (D) immune globulin (RhoGam) needed - mother Rh POSITIVE     acetaminophen (TYLENOL) tablet 975 mg     [START ON 3/9/2017] acetaminophen (TYLENOL) tablet 650 mg     oxyCODONE (ROXICODONE) IR tablet 5-10 mg     ketorolac (TORADOL) injection 30 mg     ibuprofen (ADVIL/MOTRIN) tablet 400-800 mg     HYDROmorphone (PF) (DILAUDID) injection 0.3-0.5 mg     prochlorperazine (COMPAZINE) injection 5-10 mg    Or     prochlorperazine (COMPAZINE) Suppository 25 mg     metoclopramide (REGLAN) injection 10 mg     Tdap (tetanus-diphtheria-acell pertussis) (ADACEL) injection 0.5 mL     measles, mumps and rubella vaccine (MMR) injection 0.5 mL     medication instruction     lactated ringers BOLUS 250 mL     ePHEDrine injection 5 mg     nalbuphine (NUBAIN) injection 2.5-5 mg     Opioid plan postpartum - medication instruction     morphine (PF) (ASTRAMORPH /DURAMORPH) injection 0.15 mg     scopolamine (TRANSDERM) 1 MG/3DAYS 72 hr patch 1 patch             Physical Exam:   Vitals were reviewed  All vitals stable  Temp: 98.3  F (36.8  C) Temp src: Oral BP: 111/75 Pulse: 107 Heart Rate: 125 Resp: 18 SpO2: 99 %      Wound clean and dry with minimal or no drainage.  Surrounding skin with minimal erythema.          Data:     All laboratory data related to this surgery reviewed  Hemoglobin   Date Value Ref Range Status   03/07/2017 10.8 (L) 11.7 - 15.7 g/dL Final   03/06/2017 11.9 11.7 - 15.7 g/dL  Final   12/26/2016 11.9 11.7 - 15.7 g/dL Final   08/29/2016 12.6 11.7 - 15.7 g/dL Final   08/02/2016 13.2 11.7 - 15.7 g/dL Final     No imaging studies have been ordered    Elfego Mustafa MD

## 2017-03-07 NOTE — ANESTHESIA POSTPROCEDURE EVALUATION
Patient: Dot Ramirez    Procedure(s):   SECTION repeat     - Wound Class: II-Clean Contaminated    Diagnosis:previous  Diagnosis Additional Information: Previous C/S with [facility]  Dr. Florian    Anesthesia Type:  Spinal    Note:  Anesthesia Post Evaluation    Patient location during evaluation: Bedside  Patient participation: Able to participate in evaluation but full recovery from regional anesthesia has not yet ocurrred but is anticipated to occur within 48 hours  Level of consciousness: awake  Pain management: adequate  Airway patency: patent  Cardiovascular status: acceptable  Respiratory status: acceptable  Hydration status: acceptable  PONV: none             Last vitals:  Vitals:    17 1740 17 1914   BP: 124/78 149/72   Pulse: 107    Resp: 16 16   Temp: 98.4  F (36.9  C) 98.5  F (36.9  C)         Electronically Signed By: Hank Vela MD  2017  9:46 PM

## 2017-03-07 NOTE — ANESTHESIA PROCEDURE NOTES
Peripheral nerve/Neuraxial procedure note : intrathecal  Pre-Procedure  Performed by WILL RAZA  Location: OR      Pre-Anesthestic Checklist: patient identified, IV checked, site marked, risks and benefits discussed, informed consent, monitors and equipment checked, pre-op evaluation, at physician/surgeon's request and post-op pain management    Timeout  Correct Patient: Yes   Correct Procedure: Yes   Correct Site: Yes   Correct Laterality: Yes   Correct Position: Yes   Site Marked: Yes   .   Procedure Documentation    .    Procedure:    Intrathecal.  Insertion Site:L3-4  (midline approach)      Patient Prep;mask, sterile gloves, povidone-iodine 7.5% surgical scrub.  .  Needle: (). # of attempts: 1. # of redirects: 1.  Spinal Needle: Jose tip 25 G. 3.5 in.  Introducer used. Introducer: 20 G. .     Assessment/Narrative  Paresthesias: No.  .  .  clear CSF fluid removed . Sensory Level: T4

## 2017-03-07 NOTE — OP NOTE
[facility]  Obstetrics Brief Operative Note    Pre-operative diagnosis: Previous LTCS, desires repeat      IUP at 38 5/7 weeks; [facility]   Post-operative diagnosis: Same   Procedure: Repeat low transverse  section   Surgeon: Jamison Florian MD   Assistant(s): None   Anesthesia: Spinal anesthesia   Estimated blood loss: 600ml   Total IV fluids: (See anesthesia record)   Blood transfusion: No transfusion was given during surgery   Total urine output: (See anesthesia record)   Drains: None   Specimens: none   Findings: Live   Female   Complications: None   Condition: Infant stable, transferred to general care nursery  Mother stable, transfered to floor   Comments: See dictated operative report for full details

## 2017-03-07 NOTE — PLAN OF CARE
Problem: Goal Outcome Summary  Goal: Goal Outcome Summary  Stable patient meeting expected goals. BP stable, heart rate elevated: between . Continuing to monitor. Medicated with scheduled Toradol this shift for pain and Benadryl for itching. Nystatin cream applied to rash on bilateral inner thighs. Incision healing well with small drainage, dressing marked. Adequate urine output. Up for the first time to the bathroom with assist of 1, steady gait. Breastfeeding infant. Bonding well with .  at bedside, supportive.

## 2017-03-07 NOTE — ANESTHESIA CARE TRANSFER NOTE
Patient: Dot Ramirez    Procedure(s):   SECTION repeat     - Wound Class: II-Clean Contaminated    Diagnosis: previous  Diagnosis Additional Information: Previous C/S with [facility]  Dr. Florian    Anesthesia Type:   Spinal     Note:  Airway :Room Air  Patient transferred to:Labor and Delivery  Comments: vss      Vitals: (Last set prior to Anesthesia Care Transfer)    CRNA VITALS  3/6/2017 2046 - 3/6/2017 2120      3/6/2017             Pulse: 97    SpO2: 93 %                Electronically Signed By: RADHA Sanchez CRNA  2017  9:20 PM

## 2017-03-07 NOTE — CONSULTS
D) SWS responding to MD consult.   I) SWS met with Dot who is  to Dante. They live together in [place] with their 15 month old daughter Chelita and now  daughter Harjinder. They are prepared for her at home and are not on WIC. They both have family nearby who are caring for Chelita and also provide childcare when parents are working. Dante has 2 weeks off work and Dot will take 12 weeks and is able to work from home some after that. Dot scored 13 on EPDS with no thoughts of harm. She has a history of depression which was significant after the 18 week loss of her first pregnancy, a son named Caesar. She is now taking Zoloft for depression and is followed closely by her doctor. She reports that the medication is managing her symptoms. She was seeing a therapist until 2016 and would return there if she felt the need. SWS discussed baby blues/postpartum depression and gave information on this. Dot is aware of her signs/symptoms of depression and will contact her PCP as needed. SWS also gave Parent Resource Guide with SWS contact information.   A) Dot is A&O with good affect and eye contact. She feels she is doing so much better this time. She is holding/bonding well with baby. Extended family are nearby and supportive.  P) No further d/c needs at this time. SWS available upon request

## 2017-03-07 NOTE — PLAN OF CARE
Data: Dot Ramirez transferred to 425 via cart at 2355. Baby transferred via parent's arms.  Action: Receiving unit notified of transfer: Yes. Patient and family notified of room change. Report given to Candie BELTRE at 2355. Belongings sent to receiving unit. Accompanied by Registered Nurse. Oriented patient to surroundings. Call light within reach. ID bands double-checked with receiving RN.  Response: Patient tolerated transfer and is stable.

## 2017-03-08 PROCEDURE — 40000084 ZZH STATISTIC IP LACTATION SERVICES 16-30 MIN

## 2017-03-08 PROCEDURE — 12000027 ZZH R&B OB

## 2017-03-08 PROCEDURE — 25000132 ZZH RX MED GY IP 250 OP 250 PS 637: Performed by: OBSTETRICS & GYNECOLOGY

## 2017-03-08 RX ORDER — OXYCODONE HYDROCHLORIDE 5 MG/1
5-10 TABLET ORAL
Qty: 28 TABLET | Refills: 0 | Status: SHIPPED | OUTPATIENT
Start: 2017-03-08 | End: 2017-04-17

## 2017-03-08 RX ADMIN — SENNOSIDES AND DOCUSATE SODIUM 1 TABLET: 8.6; 5 TABLET ORAL at 08:50

## 2017-03-08 RX ADMIN — IBUPROFEN 800 MG: 400 TABLET ORAL at 12:12

## 2017-03-08 RX ADMIN — OXYCODONE HYDROCHLORIDE 5 MG: 5 TABLET ORAL at 18:15

## 2017-03-08 RX ADMIN — NYSTATIN AND TRIAMCINOLONE ACETONIDE: 100000; 1 CREAM TOPICAL at 21:27

## 2017-03-08 RX ADMIN — OXYCODONE HYDROCHLORIDE 5 MG: 5 TABLET ORAL at 08:50

## 2017-03-08 RX ADMIN — SERTRALINE HYDROCHLORIDE 100 MG: 100 TABLET ORAL at 21:26

## 2017-03-08 RX ADMIN — OXYCODONE HYDROCHLORIDE 5 MG: 5 TABLET ORAL at 15:04

## 2017-03-08 RX ADMIN — ACETAMINOPHEN 975 MG: 325 TABLET, FILM COATED ORAL at 08:49

## 2017-03-08 RX ADMIN — IBUPROFEN 800 MG: 400 TABLET ORAL at 06:18

## 2017-03-08 RX ADMIN — NYSTATIN AND TRIAMCINOLONE ACETONIDE: 100000; 1 CREAM TOPICAL at 11:07

## 2017-03-08 RX ADMIN — SENNOSIDES AND DOCUSATE SODIUM 2 TABLET: 8.6; 5 TABLET ORAL at 21:26

## 2017-03-08 RX ADMIN — ACETAMINOPHEN 975 MG: 325 TABLET, FILM COATED ORAL at 00:42

## 2017-03-08 RX ADMIN — ACETAMINOPHEN 975 MG: 325 TABLET, FILM COATED ORAL at 16:14

## 2017-03-08 RX ADMIN — IBUPROFEN 800 MG: 400 TABLET ORAL at 18:16

## 2017-03-08 NOTE — LACTATION NOTE
LC to see patient.  She reports that baby has been latching well and often, however some bruising noted to the tip of her nipple.  She has been leaning down toward infant to feed.  LC advised patient to use laid back positioning and encouraged hand expression prior to latch to assist with milk transfer and enticing latch.  LC also reviewed the risk of jaundice, importance of feeding infant often and on demand, and monitoring infant output.  Patient requesting an early discharge if possible.  She is aware she may call lactation prn, but if infant becomes sleepy at home and unwilling to latch, she was advised to call her Pediatrician.  If no discharge today, plan for follow up lactation tomorrow.

## 2017-03-08 NOTE — LACTATION NOTE
Lactation visit. Patient states  is nursing well. She had a history of oversupply with her second child who was in NICU. Discussed nursing on demand every 2-3 hours to allow  to regulate supply, discouraged pumping at this time, and treatments for engorgement. Patient will call for assistance PRN.

## 2017-03-08 NOTE — PLAN OF CARE
Problem: Goal Outcome Summary  Goal: Goal Outcome Summary     Stable patient meeting expected goals. All VS stable. Medicated with Tylenol and ibuprofen this shift for pain. Stapled incision healing well. Brought in an abdominal binder for comfort. Up to bathroom independently, voiding without difficulty. Breastfeeding infant without assistance from staff. Independent with self and  cares.

## 2017-03-08 NOTE — PROGRESS NOTES
[facility] Obstetrics Post-Op / Progress Note         Assessment and Plan:    Assessment:   Post-operative day #2    L&D complications: None      Doing well.  Anxious for discharge      Plan:   Discharge later today           Interval History:   Doing well.  Pain is well-controlled.  No fevers.  No history of wound drainage, warmth or significant erythema.  Good appetite.  Denies chest pain, shortness of breath, nausea or vomiting.  Ambulatory.  Breastfeeding well.           Significant Problems:    None          Review of Systems:    The patient denies any chest pain, shortness of breath, excessive pain, fever, chills, purulent drainage from the wound, nausea or vomiting.          Medications:   All medications related to the patient's surgery have been reviewed          Physical Exam:     All vitals stable  Patient Vitals for the past 12 hrs:   BP Temp Temp src Heart Rate Resp   03/08/17 0927 97/59 97.8  F (36.6  C) Oral 97 18   03/08/17 0020 118/59 98.6  F (37  C) Oral 103 18     Wound clean and dry with minimal or no drainage.  Surrounding skin with minimal erythema.          Data:     All laboratory data related to this surgery reviewed  Hemoglobin   Date Value Ref Range Status   03/07/2017 10.8 (L) 11.7 - 15.7 g/dL Final   03/06/2017 11.9 11.7 - 15.7 g/dL Final   12/26/2016 11.9 11.7 - 15.7 g/dL Final   08/29/2016 12.6 11.7 - 15.7 g/dL Final   08/02/2016 13.2 11.7 - 15.7 g/dL Final     No imaging studies have been ordered    Mulugeta Jacques MD, MD

## 2017-03-08 NOTE — DISCHARGE SUMMARY
[facility] Discharge Summary    Dot Ramirez MRN# [id number]   Age: 31 year old YOB: 1985     Date of Admission:  3/6/2017  Date of Discharge::  3/8/2017  Admitting Physician:  Jamison Florian MD  Discharge Physician:  Mulugeta Jacques MD, MD     Home clinic: [facility]           Admission Diagnoses:   previous  Labor and delivery, indication for care  S/P           Discharge Diagnosis:   previous          Procedures:   Procedure(s): Repeat low transverse  section       No other procedures performed during this admission           Medications Prior to Admission:     Prescriptions Prior to Admission   Medication Sig Dispense Refill Last Dose     sertraline (ZOLOFT) 100 MG tablet Take 1 tablet (100 mg) by mouth daily 30 tablet 6 3/5/2017 at Unknown time     Prenatal Multivit-Min-Fe-FA (PRE- PO)    3/5/2017 at Unknown time     Misc. Devices (BREAST PUMP) MISC 1 each as needed 1 each 0 Not Taking             Discharge Medications:     Current Discharge Medication List      START taking these medications    Details   oxyCODONE (ROXICODONE) 5 MG IR tablet Take 1-2 tablets (5-10 mg) by mouth every 3 hours as needed for moderate to severe pain  Qty: 28 tablet, Refills: 0    Associated Diagnoses: S/P          CONTINUE these medications which have NOT CHANGED    Details   sertraline (ZOLOFT) 100 MG tablet Take 1 tablet (100 mg) by mouth daily  Qty: 30 tablet, Refills: 6    Associated Diagnoses: ELIANA (generalized anxiety disorder)      Prenatal Multivit-Min-Fe-FA (PRE-AMEENA PO)       Misc. Devices (BREAST PUMP) MISC 1 each as needed  Qty: 1 each, Refills: 0    Associated Diagnoses: High-risk pregnancy, young multigravida, third trimester                   Consultations:   No consultations were requested during this admission          Brief History of Labor or Admission:   Intrauterine pregnancy at 38 4/7 weeks' previous  section, desires  repeat.   2. History of cerclage removed at 37 weeks.   3. Spontaneous rupture of membranes.  Repeat  section           Hospital Course:   The patient's hospital course was unremarkable.  She recovered as anticipated and experienced no post-operative complications. On discharge, her pain was well controlled. Vaginal bleeding is similar to peak menstrual flow.  Voiding without difficulty.  Ambulating well and tolerating a normal diet.  No fever or significant wound drainage.  Breastfeeding well.  Infant is stable.  No bowel movement yet.  She was discharged on post-partum day #3.    Post-partum hemoglobin:   Hemoglobin   Date Value Ref Range Status   2017 10.8 (L) 11.7 - 15.7 g/dL Final             Discharge Instructions and Follow-Up:   Discharge diet: Regular   Discharge activity: Activity as tolerated   Discharge follow-up: Follow up tomorrow for staple removal   Wound care: Keep wound clean and dry           Discharge Disposition:   Discharged to home      Attestation:  I have reviewed today's vital signs, notes, medications, labs and imaging.    Mulugeta Jacques MD, MD

## 2017-03-08 NOTE — PLAN OF CARE
Problem: Goal Outcome Summary  Goal: Goal Outcome Summary  Outcome: Improving  Pt stable, vitals WNL. Breastfeeding well. Pt up and voiding ind. Pain controlled well with toradol and tylenol this shift. Pt ambulating in room ind. Pt caring for self and ind.

## 2017-03-08 NOTE — PLAN OF CARE
Problem: Goal Outcome Summary  Goal: Goal Outcome Summary  Patient up ad jace in room, voiding without difficulty. Pain controlled with use of oral pain medications. Incision open to air with staples in place. Plan to remove staples prior to discharge per Dr. Florian.

## 2017-03-09 VITALS
OXYGEN SATURATION: 100 % | HEIGHT: 61 IN | RESPIRATION RATE: 16 BRPM | SYSTOLIC BLOOD PRESSURE: 126 MMHG | BODY MASS INDEX: 37.76 KG/M2 | WEIGHT: 200 LBS | DIASTOLIC BLOOD PRESSURE: 86 MMHG | HEART RATE: 107 BPM | TEMPERATURE: 98.3 F

## 2017-03-09 PROCEDURE — 25000132 ZZH RX MED GY IP 250 OP 250 PS 637: Performed by: OBSTETRICS & GYNECOLOGY

## 2017-03-09 PROCEDURE — 40000083 ZZH STATISTIC IP LACTATION SERVICES 1-15 MIN

## 2017-03-09 RX ADMIN — ACETAMINOPHEN 975 MG: 325 TABLET, FILM COATED ORAL at 01:21

## 2017-03-09 RX ADMIN — ACETAMINOPHEN 975 MG: 325 TABLET, FILM COATED ORAL at 09:47

## 2017-03-09 RX ADMIN — NYSTATIN AND TRIAMCINOLONE ACETONIDE: 100000; 1 CREAM TOPICAL at 09:48

## 2017-03-09 RX ADMIN — SENNOSIDES AND DOCUSATE SODIUM 2 TABLET: 8.6; 5 TABLET ORAL at 08:35

## 2017-03-09 RX ADMIN — OXYCODONE HYDROCHLORIDE 5 MG: 5 TABLET ORAL at 12:27

## 2017-03-09 RX ADMIN — IBUPROFEN 800 MG: 400 TABLET ORAL at 08:35

## 2017-03-09 RX ADMIN — IBUPROFEN 800 MG: 400 TABLET ORAL at 01:22

## 2017-03-09 NOTE — PROVIDER NOTIFICATION
03/09/17 0850   Provider Notification   Provider Name/Title Dr. Marin   Method of Notification Electronic Page   Request Evaluate-Remote   Notification Reason Other     Pt did not discharge 3/8/16 - provider notified. Will monitor.

## 2017-03-09 NOTE — PLAN OF CARE
Problem: Goal Outcome Summary  Goal: Goal Outcome Summary  Outcome: Improving  VSS except tachycardic. Pain managed with the use of ibuprofen and tylenol. Independent with cares for self and infant.

## 2017-03-09 NOTE — PLAN OF CARE
Problem: Goal Outcome Summary  Goal: Goal Outcome Summary  Outcome: Improving  Data: Vital signs within normal limits. Postpartum checks within normal limits - see flow record. Patient eating and drinking normally. Patient able to empty bladder independently and is up ambulating. No apparent signs of infection. Incision healing well. Patient performing self cares and is able to care for infant. Breastfeeding. Pt discharge instructions reviewed with Dr. Marin. Pt can take colace, tylenol, ibuprofen, and ferrous sulfate at home over the counter if needed. Pt to follow - up at 1 week and 6 weeks and continue nystatin cream for 7 days. Pt and provider aware. [facility] home care order placed 3/8/17 - [facility] care contacted and pt still qualifies for home visit. Pt aware. Belongings home with pt.  Action: Patient medicated during the shift for pain. See MAR. Patient reassessed within 1 hour after each medication and pain was improved - patient stated she was comfortable. Patient education done about discharge education. See flow record.  Response: Positive attachment behaviors observed with infant. Support persons  present.   Plan: Anticipate discharge on 3/9/17.

## 2017-03-09 NOTE — LACTATION NOTE
"Lactation visit.  latched upon entrance to room. Large swallows/gulps heard and pointed out to patient. Patient performing breast compression as well during feeding. Has 1.5 ounces of milk at bedside which she states she pumping in just a \"few short minutes.\" She also has milk in fridge for supplementation due to 's 10.8% loss. Encouraged her to lessen amount of pumping sessions due to having extra supply available already and patient's recall of engorgement and oversupply with last child. Continuing following pediatrician's supplementation recommendations until otherwise directed by MD.   "

## 2017-03-09 NOTE — DISCHARGE SUMMARY
30 y/o  s/p ERCS POD # 3    d/c home   On colace, breast pump and ibuprofen ferrous sulfate oxycodone   Follow-up in 1 week for post op check   Follow-up in 6 weeks for post partum examination    [facility]   Obstetrics Post-Op / Progress Note         Assessment and Plan:    Assessment:   Post-operative day #3  Low transverse repeat  section  L&D complications: previous      Doing well.  Pain well-controlled.      Plan:   Ambulation encouraged  Discharge later today            Interval History:   Doing well.  Pain is well-controlled.  No fevers.  No history of wound drainage, warmth or significant erythema.  Good appetite.  Denies chest pain, shortness of breath, nausea or vomiting.  Ambulatory.  Breastfeeding well.          Significant Problems:    None          Review of Systems:    The patient denies any chest pain, shortness of breath, excessive pain, fever, chills, purulent drainage from the wound, nausea or vomiting.          Medications:   All medications related to the patient's surgery have been reviewed          Physical Exam:   All vitals stable  Wound clean and dry with minimal or no drainage.  Surrounding skin with minimal erythema.          Data:     All laboratory data related to this surgery reviewed  Hemoglobin   Date Value Ref Range Status   2017 10.8 (L) 11.7 - 15.7 g/dL Final   2017 11.9 11.7 - 15.7 g/dL Final   2016 11.9 11.7 - 15.7 g/dL Final   2016 12.6 11.7 - 15.7 g/dL Final   2016 13.2 11.7 - 15.7 g/dL Final     -    DO Dr. Karen Centeno, DO    OB/GYN   [facility]

## 2017-03-09 NOTE — PLAN OF CARE
Problem: Goal Outcome Summary  Goal: Goal Outcome Summary  Outcome: Improving  Data: Vital signs within normal limits. Postpartum check within normal limits - see flow record. Patient eating and drinking normally. Incision is DEBORA and staples removed this shift, no redness or swelling. Patient performing self cares and is able to care for infant. Bilateral electric breastpump utilized.     Action: Patient medicated during the shift for pain. See MAR. Patient reassessed within 1 hour after each medication and pain was improved - patient stated she was comfortable. Patient education done about DVT prevention, pain management, and normal postpartum findings. See flow record.     Response: Positive attachment behaviors observed with infant. Father of baby present and supportive.     Plan: Anticipate continued discharge planning.

## 2017-03-09 NOTE — PLAN OF CARE
Problem: Goal Outcome Summary  Goal: Goal Outcome Summary  Discharge instructions completed. Patient states she understands all discharge instructions and all her questions have been answered. Verbalizes when she needs to return to the clinic for follow up for herself and baby. She is caring for herself and her baby independently.

## 2017-03-09 NOTE — DISCHARGE INSTRUCTIONS

## 2017-03-13 ENCOUNTER — OFFICE VISIT (OUTPATIENT)
Dept: OBGYN | Facility: CLINIC | Age: 32
End: 2017-03-13
Payer: COMMERCIAL

## 2017-03-13 VITALS
SYSTOLIC BLOOD PRESSURE: 118 MMHG | BODY MASS INDEX: 35.72 KG/M2 | DIASTOLIC BLOOD PRESSURE: 72 MMHG | WEIGHT: 189.2 LBS | HEIGHT: 61 IN | TEMPERATURE: 98.4 F

## 2017-03-13 DIAGNOSIS — Z98.890 POSTOPERATIVE STATE: Primary | ICD-10-CM

## 2017-03-13 PROCEDURE — 99024 POSTOP FOLLOW-UP VISIT: CPT | Performed by: OBSTETRICS & GYNECOLOGY

## 2017-03-13 NOTE — NURSING NOTE
"Chief Complaint   Patient presents with     Wound Check     C/S 03/06/2017       Initial /72 (BP Location: Left arm, Patient Position: Chair, Cuff Size: Adult Regular)  Temp 98.4  F (36.9  C) (Oral)  Ht 5' 1\" (1.549 m)  Wt 189 lb 3.2 oz (85.8 kg)  LMP 06/09/2016  Breastfeeding? Yes  BMI 35.75 kg/m2 Estimated body mass index is 35.75 kg/(m^2) as calculated from the following:    Height as of this encounter: 5' 1\" (1.549 m).    Weight as of this encounter: 189 lb 3.2 oz (85.8 kg).  Medication Reconciliation: complete   Sarah Schneider MA    "

## 2017-03-13 NOTE — PROGRESS NOTES
Here for postop check     -no gi or gu complaints    O: incision intact    A: satisfactory postop check  P: return for PP check

## 2017-03-13 NOTE — MR AVS SNAPSHOT
After Visit Summary   3/13/2017    Dot Ramirez    MRN: [id number]           Patient Information     Date Of Birth          1985        Visit Information        Provider Department      3/13/2017 3:00 PM Jamison Florian MD [facility]        Today's Diagnoses     Postoperative state    -  1       Follow-ups after your visit        Your next 10 appointments already scheduled     Apr 17, 2017  3:15 PM CDT   Post Partum with Jamison Flroian MD   [facility] ([facility])    [address]  [facility] [id number] [phone]              Who to contact     If you have questions or need follow up information about today's clinic visit or your schedule please contact [facility] directly at [phone].  Normal or non-critical lab and imaging results will be communicated to you by MyChart, letter or phone within 4 business days after the clinic has received the results. If you do not hear from us within 7 days, please contact the clinic through MyChart or phone. If you have a critical or abnormal lab result, we will notify you by phone as soon as possible.  Submit refill requests through [employer] or call your pharmacy and they will forward the refill request to us. Please allow 3 business days for your refill to be completed.          Additional Information About Your Visit        MyChart Information     [employer] gives you secure access to your electronic health record. If you see a primary care provider, you can also send messages to your care team and make appointments. If you have questions, please call your primary care clinic.  If you do not have a primary care provider, please call [phone] and they will assist you.        Care EveryWhere ID     This is your Care EveryWhere ID. This could be used by other organizations to access your [facility] medical records  [id number]        Your Vitals Were      "Temperature Height Last Period Breastfeeding? BMI (Body Mass Index)       98.4  F (36.9  C) (Oral) 5' 1\" (1.549 m) 2016 Yes 35.75 kg/m2        Blood Pressure from Last 3 Encounters:   17 118/72   17 126/86   17 124/73    Weight from Last 3 Encounters:   17 189 lb 3.2 oz (85.8 kg)   17 200 lb (90.7 kg)   17 199 lb (90.3 kg)              Today, you had the following     No orders found for display       Primary Care Provider Office Phone # Fax #    Jamison Floiran [phone]       [address] [place] [address]        Thank you!     Thank you for choosing [facility]  for your care. Our goal is always to provide you with excellent care. Hearing back from our patients is one way we can continue to improve our services. Please take a few minutes to complete the written survey that you may receive in the mail after your visit with us. Thank you!             Your Updated Medication List - Protect others around you: Learn how to safely use, store and throw away your medicines at www.disposemymeds.org.          This list is accurate as of: 3/13/17  3:23 PM.  Always use your most recent med list.                   Brand Name Dispense Instructions for use    breast pump Misc     1 each    1 each as needed       oxyCODONE 5 MG IR tablet    ROXICODONE    28 tablet    Take 1-2 tablets (5-10 mg) by mouth every 3 hours as needed for moderate to severe pain       PRE-AMEENA PO          sertraline 100 MG tablet    ZOLOFT    30 tablet    Take 1 tablet (100 mg) by mouth daily         "

## 2017-04-17 ENCOUNTER — PRENATAL OFFICE VISIT (OUTPATIENT)
Dept: OBGYN | Facility: CLINIC | Age: 32
End: 2017-04-17
Payer: COMMERCIAL

## 2017-04-17 VITALS
WEIGHT: 184.6 LBS | SYSTOLIC BLOOD PRESSURE: 94 MMHG | DIASTOLIC BLOOD PRESSURE: 64 MMHG | BODY MASS INDEX: 34.88 KG/M2 | HEART RATE: 80 BPM

## 2017-04-17 DIAGNOSIS — Z30.011 ENCOUNTER FOR INITIAL PRESCRIPTION OF CONTRACEPTIVE PILLS: ICD-10-CM

## 2017-04-17 PROBLEM — Z98.891 S/P C-SECTION: Status: RESOLVED | Noted: 2017-03-06 | Resolved: 2017-04-17

## 2017-04-17 PROCEDURE — 99207 ZZC POST PARTUM EXAM: CPT | Performed by: OBSTETRICS & GYNECOLOGY

## 2017-04-17 RX ORDER — ACETAMINOPHEN AND CODEINE PHOSPHATE 120; 12 MG/5ML; MG/5ML
1 SOLUTION ORAL DAILY
Qty: 84 TABLET | Refills: 3 | Status: SHIPPED | OUTPATIENT
Start: 2017-04-17 | End: 2018-06-11

## 2017-04-17 NOTE — PROGRESS NOTES
SUBJECTIVE: Dot is here for a 6-week postpartum checkup.    Delivery date was 3/06/2017. She had a repeat c/s of a viable girl, weight 8 pounds 0 oz., with no complications.  Since delivery, she has been breast feeding.  She has no signs of infection, bleeding or other complications.  She is not pregnant.  We discussed contraceptions and she has chosen unsure.  She  has had intercourse since delivery and complains of No discomfort. Patient screened for postpartum depression and complaints are NEGATIVE. Screening has also been completed for intimate partner violence.    EXAM:  Today's Depression Rating was   PHQ-9 SCORE 4/17/2017   Total Score -   Total Score 5       GENERAL healthy, alert and no distress  GI: bowel sounds normal and liver span normal to percussion  : NEGATIVE  NEURO: NEGATIVE    ASSESSMENT:   Normal postpartum exam after repeat c/s.    PLAN:  Return as needed or at time of next expected pap, pelvic, or breast exam.

## 2017-04-17 NOTE — MR AVS SNAPSHOT
After Visit Summary   4/17/2017    Dot Ramirez    MRN: [id number]           Patient Information     Date Of Birth          1985        Visit Information        Provider Department      4/17/2017 3:15 PM Jamison Florian MD [facility]        Today's Diagnoses     Routine postpartum follow-up    -  1    Encounter for initial prescription of contraceptive pills           Follow-ups after your visit        Follow-up notes from your care team     Return if symptoms worsen or fail to improve.      Who to contact     If you have questions or need follow up information about today's clinic visit or your schedule please contact [facility] directly at [phone].  Normal or non-critical lab and imaging results will be communicated to you by MyChart, letter or phone within 4 business days after the clinic has received the results. If you do not hear from us within 7 days, please contact the clinic through [employer] or phone. If you have a critical or abnormal lab result, we will notify you by phone as soon as possible.  Submit refill requests through [employer] or call your pharmacy and they will forward the refill request to us. Please allow 3 business days for your refill to be completed.          Additional Information About Your Visit        MyChart Information     [employer] gives you secure access to your electronic health record. If you see a primary care provider, you can also send messages to your care team and make appointments. If you have questions, please call your primary care clinic.  If you do not have a primary care provider, please call [phone] and they will assist you.        Care EveryWhere ID     This is your Care EveryWhere ID. This could be used by other organizations to access your [facility] medical records  [id number]        Your Vitals Were     Pulse Breastfeeding? BMI (Body Mass Index)             80 Yes 34.88 kg/m2          Blood  Pressure from Last 3 Encounters:   04/17/17 94/64   03/13/17 118/72   03/09/17 126/86    Weight from Last 3 Encounters:   04/17/17 184 lb 9.6 oz (83.7 kg)   03/13/17 189 lb 3.2 oz (85.8 kg)   03/06/17 200 lb (90.7 kg)              We Performed the Following     OB HEMOGLOBIN          Today's Medication Changes          These changes are accurate as of: 4/17/17  3:58 PM.  If you have any questions, ask your nurse or doctor.               Start taking these medicines.        Dose/Directions    norethindrone 0.35 MG per tablet   Commonly known as:  MICRONOR   Used for:  Encounter for initial prescription of contraceptive pills   Started by:  Jamison Florian MD        Dose:  1 tablet   Take 1 tablet (0.35 mg) by mouth daily   Quantity:  84 tablet   Refills:  3            Where to get your medicines      These medications were sent to [address] [address]     Phone:  [phone]     norethindrone 0.35 MG per tablet                Primary Care Provider Office Phone # Fax #    Jamison Florian [phone]       [address] [place] [address]        Thank you!     Thank you for choosing [facility]  for your care. Our goal is always to provide you with excellent care. Hearing back from our patients is one way we can continue to improve our services. Please take a few minutes to complete the written survey that you may receive in the mail after your visit with us. Thank you!             Your Updated Medication List - Protect others around you: Learn how to safely use, store and throw away your medicines at www.disposemymeds.org.          This list is accurate as of: 4/17/17  3:58 PM.  Always use your most recent med list.                   Brand Name Dispense Instructions for use    breast pump Misc     1 each    1 each as needed       norethindrone 0.35 MG per  tablet    MICRONOR    84 tablet    Take 1 tablet (0.35 mg) by mouth daily       PRE-AMEENA PO          sertraline 100 MG tablet    ZOLOFT    30 tablet    Take 1 tablet (100 mg) by mouth daily

## 2017-04-17 NOTE — NURSING NOTE
"Chief Complaint   Patient presents with     Postpartum Care   baby girl Harjinder England born Repeat  3/6/17    Initial BP 94/64  Pulse 80  Wt 184 lb 9.6 oz (83.7 kg)  Breastfeeding? Yes  BMI 34.88 kg/m2 Estimated body mass index is 34.88 kg/(m^2) as calculated from the following:    Height as of 3/13/17: 5' 1\" (1.549 m).    Weight as of this encounter: 184 lb 9.6 oz (83.7 kg).  Medication Reconciliation: complete    "

## 2017-04-18 ASSESSMENT — PATIENT HEALTH QUESTIONNAIRE - PHQ9: SUM OF ALL RESPONSES TO PHQ QUESTIONS 1-9: 5

## 2017-05-22 DIAGNOSIS — F41.1 GAD (GENERALIZED ANXIETY DISORDER): ICD-10-CM

## 2017-05-22 RX ORDER — SERTRALINE HYDROCHLORIDE 100 MG/1
100 TABLET, FILM COATED ORAL DAILY
Qty: 30 TABLET | Refills: 6 | Status: SHIPPED | OUTPATIENT
Start: 2017-05-22 | End: 2018-01-28

## 2017-05-22 NOTE — TELEPHONE ENCOUNTER
Prescription approved per [facility] Refill Protocol.    KAL Chan RN

## 2017-05-22 NOTE — TELEPHONE ENCOUNTER
Sertraline     Last Written Prescription Date: 10/3/16  Last Fill Quantity: 30, # refills: 6  Last Office Visit with [facility] primary care provider:  3/13/17        Last PHQ-9 score on record=   PHQ-9 SCORE 4/17/2017   Total Score -   Total Score 5

## 2017-07-04 ENCOUNTER — APPOINTMENT (OUTPATIENT)
Dept: GENERAL RADIOLOGY | Facility: CLINIC | Age: 32
End: 2017-07-04
Attending: EMERGENCY MEDICINE
Payer: COMMERCIAL

## 2017-07-04 ENCOUNTER — HOSPITAL ENCOUNTER (EMERGENCY)
Facility: CLINIC | Age: 32
Discharge: HOME | End: 2017-07-04
Attending: EMERGENCY MEDICINE | Admitting: EMERGENCY MEDICINE
Payer: COMMERCIAL

## 2017-07-04 VITALS
HEIGHT: 61 IN | RESPIRATION RATE: 20 BRPM | SYSTOLIC BLOOD PRESSURE: 113 MMHG | TEMPERATURE: 97.7 F | HEART RATE: 85 BPM | DIASTOLIC BLOOD PRESSURE: 73 MMHG | BODY MASS INDEX: 35.87 KG/M2 | OXYGEN SATURATION: 98 % | WEIGHT: 190 LBS

## 2017-07-04 DIAGNOSIS — S93.431A SPRAIN OF TIBIOFIBULAR LIGAMENT OF RIGHT ANKLE, INITIAL ENCOUNTER: ICD-10-CM

## 2017-07-04 PROCEDURE — 25000132 ZZH RX MED GY IP 250 OP 250 PS 637: Performed by: EMERGENCY MEDICINE

## 2017-07-04 PROCEDURE — 29515 APPLICATION SHORT LEG SPLINT: CPT | Mod: RT

## 2017-07-04 PROCEDURE — 40000986 XR KNEE RT 1 /2 VW: Mod: RT

## 2017-07-04 PROCEDURE — 99285 EMERGENCY DEPT VISIT HI MDM: CPT | Mod: 25

## 2017-07-04 PROCEDURE — 40000986 XR FOOT RT G/E 3 VW: Mod: RT

## 2017-07-04 PROCEDURE — 40000986 XR ANKLE RT G/E 3 VW: Mod: RT

## 2017-07-04 RX ORDER — IBUPROFEN 800 MG/1
800 TABLET, FILM COATED ORAL ONCE
Status: COMPLETED | OUTPATIENT
Start: 2017-07-04 | End: 2017-07-04

## 2017-07-04 RX ADMIN — IBUPROFEN 800 MG: 800 TABLET, FILM COATED ORAL at 00:59

## 2017-07-04 NOTE — ED PROVIDER NOTES
"  History     Chief Complaint:  Right lower leg injury    HPI   Dot Ramirez is a 31 year old female who presents with right leg injury. The patient reports tonight while walking outside she twisted her right ankle in a gopher hole and fell into the grass. Since her fall the patient has had lateral right ankle discomfort, right knee pain, and has been unable to ambulate secondary to pain. The patient immediately presented to the emergency department due to her discomfort. The patient has not taken any medications for pain.     Allergies:  The patient has no known allergies to medications.      Medications:    Zoloft  Micronor    Past Medical History:    ELIANA  Anxiety  Depressive disorder  RLS    Past Surgical History:    , x2  Cystectomy hand  Dental extractions  Cerclage cervical x2    Family History:    Diabetes  Alzheimer disease    Social History:  .  The patient denies smoking.   The patient denies alcohol consumption.      Review of Systems   Musculoskeletal:        Positive for right lower leg pain.   All other systems reviewed and are negative.    Physical Exam   First Vitals:  BP: 139/95  Heart Rate: 85  Temp: 97.7  F (36.5  C)  Resp: 20  Height: 154.9 cm (5' 1\")  Weight: 86.2 kg (190 lb)  SpO2: 100 %      Physical Exam   Nursing note and vitals reviewed.  Constitutional: Cooperative. Patient very apprehensive during examination.   HENT:   Freely moving neck  Pulmonary/Chest: Effort normal  Musculoskeletal: RLE: Full ROM of hip and knee.  Tenderness to proximal fibula, no tenderness to midfoot.  5th metatarsal tenderness. Lateral malleolar tenderness and soft tissue swelling. Achilles intact by exam.    Neurological: Alert. GCS 15.   Skin: Skin is warm and dry.  Several small abrasions to RLE  Psychiatric: Normal mood and affect.     Emergency Department Course   Imaging:  Radiographic findings were communicated with the patient who voiced understanding of the findings.    XR Right " Ankle:   No evidence for acute fracture or dislocation in the right ankle. Small plantar calcaneal spur.  Results per radiology.     XR Right Foot:   Unremarkable examination. No evidence for acute fracture or dislocation. Small plantar calcaneal spur.  Results per radiology.     XR Right Knee:   No evidence for acute fracture or dislocation involving the right knee. No right knee joint effusion.  Results per radiology.     Interventions:  Advil 800 mg  tablet    Emergency Department Course:  Nursing notes and vitals reviewed.  I performed an exam of the patient as documented above.     The work up above was undertaken.     The patient received the intervention(s) above.     Findings and plan explained to the Patient. Patient discharged home with instructions regarding supportive care, medications, and reasons to return. The importance of close follow-up was reviewed.     Impression & Plan    Medical Decision Making:  Dot Rmairez is a 31 year old female who presents for evaluation of ankle pain.  Signs and symptoms are consistent with an ankle sprain.  This involves the ligaments of the lateral ankle by clinical exam.There are no signs of fracture.  The patients neurovascular status is normal. Plan is for protected weightbearing, RICE treatment with ice 15 minutes on, 1 hour off, ace wrap.  Patient will advance weightbearing and follow-up with primary in 2-3 days.  They will begin gentle ROM exercises of the ankle including PF,DF, alphabet exercises.     Diagnosis:    ICD-10-CM    1. Sprain of tibiofibular ligament of right ankle, initial encounter S93.431A        Disposition:  Discharged.    Wilton DOVER, am serving as a scribe at 1:58 AM on 7/4/2017 to document services personally performed by Dr. Moser, based on my observations and the provider's statements to me.    [facility] EMERGENCY DEPARTMENT       Conrad Moser MD  07/04/17 0222

## 2017-07-04 NOTE — ED AVS SNAPSHOT
[facility] Emergency Department    [address] [place] [address]    Phone:  [phone]    Fax:  [phone]                                       Dot Ramirez   MRN: [id number]    Department:  [facility] Emergency Department   Date of Visit:  7/4/2017           Patient Information     Date Of Birth          1985        Your diagnoses for this visit were:     Sprain of tibiofibular ligament of right ankle, initial encounter        You were seen by Conrad Moser MD.      Follow-up Information     Follow up with Jamison Florian MD.    Specialty:  OB/Gyn    Why:  As needed    Contact information:    [facility] [place] [id number] [place] [address]  [phone]          Discharge Instructions       Discharge Instructions  Ankle Sprain    An ankle sprain is a stretching or tearing of a ligament around your ankle joint. In most cases, we recommend resting the ankle for about 3 days, followed by return to activity. Some severe sprains need longer periods of rest, or can require a cast or boot to immobilize them.    Return to the Emergency Department if:    Your pain is much worse, or if there is pain in a new area.    Your foot or leg becomes pale, cool, blue, or numb or tingling.    There is anything concerning to you about how your ankle looks.    Any splint or device is feeling too tight, causing pain, or rubbing into your skin.    Follow-up with your doctor:    As recommended by your emergency physician.    If your ankle is not back to normal within about 1 week.    If you are involved in significant athletic activities.        Treatment:    Apply ice your injured area for 15 minutes at a time, at least 3 times a day for the first 1-2 days. Use a cloth between the ice bag and your skin to prevent frostbite.     Do not sleep with an ice pack or heating pad on, since this can cause burns or skin injury.    Raise the injured area above the  level of your heart as much as possible in the first 1-2 days.    Pain medications -- You may take a pain medication such as Tylenol  (acetaminophen), Advil , Nuprin  (ibuprofen) or Aleve  (naproxen).  If you have been given a narcotic such as Vicodin  (hydrocodone with acetaminophen), Percocet  (oxycodone with acetaminophen), or codeine, do not drive for four hours after you have taken it. If the narcotic contains Tylenol  (acetaminophen), do not take Tylenol  with it. All narcotics will cause constipation, so eat a high fiber diet.      Splint. We often give a stirrup-shaped ankle splint to support your ankle and prevent it from turning again. Wear this all the time for the first 3-5 days, and then as directed by your doctor.    Crutches. If you can t put wait on the ankle without a lot of pain, we recommend crutches. You can put as much weight on the ankle as possible without severe pain.     Compression. An elastic bandage (Ace  wrap) can help with pain and swelling. Remove this at least twice a day, and leave it off for several hours if you develop swelling of the foot.   If you were given a prescription for medicine here today, be sure to read all of the information (including the package insert) that comes with your prescription.  This will include important information about the medicine, its side effects, and any warnings that you need to know about.  The pharmacist who fills the prescription can provide more information and answer questions you may have about the medicine.  If you have questions or concerns that the pharmacist cannot address, please call or return to the Emergency Department.  Remember that you can always come back to the Emergency Department if you are not able to see your regular doctor in the amount of time listed above, if you get any new symptoms, or if there is anything that worries you.          24 Hour Appointment Hotline       To make an appointment at any [facility], call  [phone] ([phone]). If you don't have a family doctor or clinic, we will help you find one. [facility] clinics are conveniently located to serve the needs of you and your family.             Review of your medicines      Our records show that you are taking the medicines listed below. If these are incorrect, please call your family doctor or clinic.        Dose / Directions Last dose taken    norethindrone 0.35 MG per tablet   Commonly known as:  MICRONOR   Dose:  1 tablet   Quantity:  84 tablet        Take 1 tablet (0.35 mg) by mouth daily   Refills:  3        PRE-AMEENA PO        Refills:  0        sertraline 100 MG tablet   Commonly known as:  ZOLOFT   Dose:  100 mg   Quantity:  30 tablet        Take 1 tablet (100 mg) by mouth daily   Refills:  6                Procedures and tests performed during your visit     Ankle XR, G/E 3 views, right    Foot  XR, G/E 3 views, right    Knee XR, 1-2 views, right      Orders Needing Specimen Collection     None      Pending Results     No orders found from 2017 to 2017.            Pending Culture Results     No orders found from 2017 to 2017.            Pending Results Instructions     If you had any lab results that were not finalized at the time of your Discharge, you can call the ED Lab Result RN at [phone]. You will be contacted by this team for any positive Lab results or changes in treatment. The nurses are available 7 days a week from 10A to 6:30P.  You can leave a message 24 hours per day and they will return your call.        Test Results From Your Hospital Stay        2017  1:49 AM      Narrative     RIGHT KNEE 2 VIEWS   2017 1:40 AM     HISTORY: Fall.    COMPARISON: None.        Impression     IMPRESSION: No evidence for acute fracture or dislocation involving  the right knee. No right knee joint effusion.    ROSELIA ACHARYA MD         2017  1:49 AM      Narrative     RIGHT FOOT 3 VIEWS   2017 1:41 AM     HISTORY:  Fall.    COMPARISON: None.        Impression     IMPRESSION: Unremarkable examination. No evidence for acute fracture  or dislocation. Small plantar calcaneal spur.    ROSELIA ACHARYA MD         7/4/2017  1:48 AM      Narrative     RIGHT ANKLE 3 VIEWS   7/4/2017 1:42 AM     HISTORY: Fall.    COMPARISON: None.        Impression     IMPRESSION: No evidence for acute fracture or dislocation in the right  ankle. Small plantar calcaneal spur.    ROSELIA ACHARYA MD                Clinical Quality Measure: Blood Pressure Screening     Your blood pressure was checked while you were in the emergency department today. The last reading we obtained was  BP: (!) 139/95 . Please read the guidelines below about what these numbers mean and what you should do about them.  If your systolic blood pressure (the top number) is less than 120 and your diastolic blood pressure (the bottom number) is less than 80, then your blood pressure is normal. There is nothing more that you need to do about it.  If your systolic blood pressure (the top number) is 120-139 or your diastolic blood pressure (the bottom number) is 80-89, your blood pressure may be higher than it should be. You should have your blood pressure rechecked within a year by a primary care provider.  If your systolic blood pressure (the top number) is 140 or greater or your diastolic blood pressure (the bottom number) is 90 or greater, you may have high blood pressure. High blood pressure is treatable, but if left untreated over time it can put you at risk for heart attack, stroke, or kidney failure. You should have your blood pressure rechecked by a primary care provider within the next 4 weeks.  If your provider in the emergency department today gave you specific instructions to follow-up with your doctor or provider even sooner than that, you should follow that instruction and not wait for up to 4 weeks for your follow-up visit.        Thank you for choosing [facility]        Thank you for choosing [facility] for your care. Our goal is always to provide you with excellent care. Hearing back from our patients is one way we can continue to improve our services. Please take a few minutes to complete the written survey that you may receive in the mail after you visit with us. Thank you!        [employer] Information     [email or web address] gives you secure access to your electronic health record. If you see a primary care provider, you can also send messages to your care team and make appointments. If you have questions, please call your primary care clinic.  If you do not have a primary care provider, please call [phone] and they will assist you.        Care EveryWhere ID     This is your Care EveryWhere ID. This could be used by other organizations to access your [facility] medical records  [id number]        Equal Access to Services     NEVAEH MCGHEE : Ngozi Horn, heriberto ruiz, alonzo khan, helio ashton. So [facility] [phone].    ATENCIÓN: Si habla español, tiene a camacho disposición servicios gratuitos de asistencia lingüística. Llame al [phone].    We comply with applicable federal civil rights laws and [place] laws. We do not discriminate on the basis of race, color, national origin, age, disability sex, sexual orientation or gender identity.            After Visit Summary       This is your record. Keep this with you and show to your community pharmacist(s) and doctor(s) at your next visit.

## 2017-07-04 NOTE — ED AVS SNAPSHOT
[facility] Emergency Department    [address]    [facility] [address]    Phone:  [phone]    Fax:  [phone]                                       Dot Ramirez   MRN: [id number]    Department:  [facility] Emergency Department   Date of Visit:  7/4/2017           After Visit Summary Signature Page     I have received my discharge instructions, and my questions have been answered. I have discussed any challenges I see with this plan with the nurse or doctor.    ..........................................................................................................................................  Patient/Patient Representative Signature      ..........................................................................................................................................  Patient Representative Print Name and Relationship to Patient    ..................................................               ................................................  Date                                            Time    ..........................................................................................................................................  Reviewed by Signature/Title    ...................................................              ..............................................  Date                                                            Time

## 2017-07-04 NOTE — DISCHARGE INSTRUCTIONS
Discharge Instructions  Ankle Sprain    An ankle sprain is a stretching or tearing of a ligament around your ankle joint. In most cases, we recommend resting the ankle for about 3 days, followed by return to activity. Some severe sprains need longer periods of rest, or can require a cast or boot to immobilize them.    Return to the Emergency Department if:    Your pain is much worse, or if there is pain in a new area.    Your foot or leg becomes pale, cool, blue, or numb or tingling.    There is anything concerning to you about how your ankle looks.    Any splint or device is feeling too tight, causing pain, or rubbing into your skin.    Follow-up with your doctor:    As recommended by your emergency physician.    If your ankle is not back to normal within about 1 week.    If you are involved in significant athletic activities.        Treatment:    Apply ice your injured area for 15 minutes at a time, at least 3 times a day for the first 1-2 days. Use a cloth between the ice bag and your skin to prevent frostbite.     Do not sleep with an ice pack or heating pad on, since this can cause burns or skin injury.    Raise the injured area above the level of your heart as much as possible in the first 1-2 days.    Pain medications -- You may take a pain medication such as Tylenol  (acetaminophen), Advil , Nuprin  (ibuprofen) or Aleve  (naproxen).  If you have been given a narcotic such as Vicodin  (hydrocodone with acetaminophen), Percocet  (oxycodone with acetaminophen), or codeine, do not drive for four hours after you have taken it. If the narcotic contains Tylenol  (acetaminophen), do not take Tylenol  with it. All narcotics will cause constipation, so eat a high fiber diet.      Splint. We often give a stirrup-shaped ankle splint to support your ankle and prevent it from turning again. Wear this all the time for the first 3-5 days, and then as directed by your doctor.    Crutches. If you can t put wait on the ankle  without a lot of pain, we recommend crutches. You can put as much weight on the ankle as possible without severe pain.     Compression. An elastic bandage (Ace  wrap) can help with pain and swelling. Remove this at least twice a day, and leave it off for several hours if you develop swelling of the foot.   If you were given a prescription for medicine here today, be sure to read all of the information (including the package insert) that comes with your prescription.  This will include important information about the medicine, its side effects, and any warnings that you need to know about.  The pharmacist who fills the prescription can provide more information and answer questions you may have about the medicine.  If you have questions or concerns that the pharmacist cannot address, please call or return to the Emergency Department.  Remember that you can always come back to the Emergency Department if you are not able to see your regular doctor in the amount of time listed above, if you get any new symptoms, or if there is anything that worries you.

## 2017-07-04 NOTE — ED NOTES
Pt reports twisted left ankle in a gopher hole tonight; reports fall onto grass with pain from foot to below knee; CMS intact to toes; states she fell while bracing to avoid dropping infant.  ABCs intact.  Swelling to left ankle; ice in place.

## 2017-12-16 ENCOUNTER — HEALTH MAINTENANCE LETTER (OUTPATIENT)
Age: 32
End: 2017-12-16

## 2018-01-28 ENCOUNTER — MYC REFILL (OUTPATIENT)
Dept: OBGYN | Facility: CLINIC | Age: 33
End: 2018-01-28

## 2018-01-28 DIAGNOSIS — F41.1 GAD (GENERALIZED ANXIETY DISORDER): ICD-10-CM

## 2018-01-29 RX ORDER — SERTRALINE HYDROCHLORIDE 100 MG/1
100 TABLET, FILM COATED ORAL DAILY
Qty: 30 TABLET | Refills: 3 | Status: SHIPPED | OUTPATIENT
Start: 2018-01-29 | End: 2018-06-11 | Stop reason: ALTCHOICE

## 2018-01-29 NOTE — TELEPHONE ENCOUNTER
Message from MyChart:  Original authorizing provider: MD Dot Zamora would like a refill of the following medications:  sertraline (ZOLOFT) 100 MG tablet [Jamison Florian MD]    Preferred pharmacy: [facility] - [address]    Comment:

## 2018-03-06 ENCOUNTER — HOSPITAL ENCOUNTER (EMERGENCY)
Facility: CLINIC | Age: 33
Discharge: HOME | End: 2018-03-06
Attending: PHYSICIAN ASSISTANT | Admitting: PHYSICIAN ASSISTANT
Payer: COMMERCIAL

## 2018-03-06 VITALS
OXYGEN SATURATION: 98 % | TEMPERATURE: 98.3 F | WEIGHT: 185 LBS | DIASTOLIC BLOOD PRESSURE: 91 MMHG | BODY MASS INDEX: 34.96 KG/M2 | SYSTOLIC BLOOD PRESSURE: 122 MMHG | HEART RATE: 80 BPM | RESPIRATION RATE: 16 BRPM

## 2018-03-06 DIAGNOSIS — R42 DIZZINESS: ICD-10-CM

## 2018-03-06 DIAGNOSIS — H92.01 OTALGIA, RIGHT: ICD-10-CM

## 2018-03-06 DIAGNOSIS — H60.501 ACUTE OTITIS EXTERNA OF RIGHT EAR, UNSPECIFIED TYPE: ICD-10-CM

## 2018-03-06 PROCEDURE — 25000131 ZZH RX MED GY IP 250 OP 636 PS 637: Performed by: PHYSICIAN ASSISTANT

## 2018-03-06 PROCEDURE — 99283 EMERGENCY DEPT VISIT LOW MDM: CPT

## 2018-03-06 RX ORDER — AMOXICILLIN 500 MG/1
500 CAPSULE ORAL 3 TIMES DAILY
Qty: 30 CAPSULE | Refills: 0 | Status: SHIPPED | OUTPATIENT
Start: 2018-03-06 | End: 2018-03-16

## 2018-03-06 RX ORDER — NEOMYCIN SULFATE, POLYMYXIN B SULFATE AND HYDROCORTISONE 10; 3.5; 1 MG/ML; MG/ML; [USP'U]/ML
3 SUSPENSION/ DROPS AURICULAR (OTIC) 4 TIMES DAILY
Qty: 5 ML | Refills: 0 | Status: SHIPPED | OUTPATIENT
Start: 2018-03-06 | End: 2018-03-13

## 2018-03-06 RX ORDER — MECLIZINE HYDROCHLORIDE 25 MG/1
25 TABLET ORAL 3 TIMES DAILY PRN
Qty: 30 TABLET | Refills: 0 | Status: SHIPPED | OUTPATIENT
Start: 2018-03-06 | End: 2018-06-11

## 2018-03-06 RX ORDER — MECLIZINE HYDROCHLORIDE 25 MG/1
50 TABLET ORAL ONCE
Status: COMPLETED | OUTPATIENT
Start: 2018-03-06 | End: 2018-03-06

## 2018-03-06 RX ADMIN — MECLIZINE 50 MG: 25 TABLET ORAL at 18:02

## 2018-03-06 NOTE — LETTER
[facility] EMERGENCY DEPARTMENT  [address]  [facility] [address]  Phone: [phone]  Fax: [phone]    March 6, 2018        Dot Ramirez  [address] [address] [facility] [address]          To whom it may concern:    RE: Dot Ramirez    Please excuse Dot's  from work as he was here with wife in the emergency department today.     Please contact me for questions or concerns.      Sincerely,

## 2018-03-06 NOTE — ED AVS SNAPSHOT
[facility] Emergency Department    [address]    [facility] [address]    Phone:  [phone]    Fax:  [phone]                                       Dot Ramirez   MRN: [id number]    Department:  [facility] Emergency Department   Date of Visit:  3/6/2018           After Visit Summary Signature Page     I have received my discharge instructions, and my questions have been answered. I have discussed any challenges I see with this plan with the nurse or doctor.    ..........................................................................................................................................  Patient/Patient Representative Signature      ..........................................................................................................................................  Patient Representative Print Name and Relationship to Patient    ..................................................               ................................................  Date                                            Time    ..........................................................................................................................................  Reviewed by Signature/Title    ...................................................              ..............................................  Date                                                            Time

## 2018-03-06 NOTE — DISCHARGE INSTRUCTIONS
"Discharge Instructions  Otitis Externa    Today you were seen for otitis externa which is a condition that occurs when the ear canal, which is the area that leads from the outer ear to the ear drum, becomes irritated as a result of an infection, allergy, or skin problem.   The most common symptoms of this are:  pain in the outer ear, especially when the ear is moved, itchiness of the ear, fluid or pus leaking from the ear and difficulty hearing clearly.  \"Swimmer's ear\" is the name for external otitis that occurs in a person who swims frequently.    Generally, every Emergency Department visit should have a follow-up clinic visit with either a primary or a specialty clinic/provider. Please follow-up as instructed by your emergency provider today.    Return to the Emergency Department if:    Your symptoms get worse, or if you develop a severe headache, stiff neck, or new symptoms.    The pain and swelling spread to your face.    You develop high fever.      Treatment:    Treatment of otitis externa aims to reduce pain and eliminate the infection.   You should take either Advil  (ibuprofen) or Tylenol  (acetaminophen) for control of the ear pain.      Ear drops -- Ear drops are usually prescribed to both reduce pain and swelling and kill the bacteria which causes external otitis. It is important to apply the ear drops correctly so that they reach the ear canal:  o Lie on your side or tilt your head towards the opposite shoulder.  o Fill the ear canal with drops.  o Lie on your side for 20 minutes or place a cotton ball in the ear canal for 20 minutes.  o Finish the entire course of treatment, even if you begin to feel better within a few days.  o Avoid getting ears wet -- during treatment, you should avoid getting the inside of your ears wet. While showering, you can place a cotton ball coated with petroleum jelly in the ear. However, you should not swim for 7 to 10 days after starting treatment. Avoid wearing hearing " aids and in-ear headphones until pain improves.  o If a wick has been placed in your ear, please do not remove it until seen by your primary provider or Ear, Nose, and Throat (ENT) specialist as directed.    Prevention:    Do not clean ear canal. Ear wax serves to protect the ears from water, bacteria, and injury. Excessive cleaning or scratching can injure the skin, potentially leading to infection.    Swimming on a regular basis removes some of the ear wax, allowing water to soften the skin. Bacteria, which normally live in the ear canal, can then enter the skin more easily.    Wearing devices that block the ear canals, such as hearing aids, headphones, or ear plugs, can increase the risk of external otitis (if worn frequently) by injuring the skin.    If you swim frequently, experts recommend the following tips to reduce the chance of developing external otitis:    Shake your ears dry after swimming.    Use ear drops after swimming to prevent ear infections; these are available at most pharmacies without a prescription.    Consider wearing ear plugs made for swimming.  If you were given a prescription for medicine here today, be sure to read all of the information (including the package insert) that comes with your prescription.  This will include important information about the medicine, its side effects, and any warnings that you need to know about.  The pharmacist who fills the prescription can provide more information and answer questions you may have about the medicine.  If you have questions or concerns that the pharmacist cannot address, please call or return to the Emergency Department.   Remember that you can always come back to the Emergency Department if you are not able to see your regular provider in the amount of time listed above, if you get any new symptoms, or if there is anything that worries you.    Discharge Instructions  Otitis Media  You or your child have an ear infection known as otitis  "media or middle ear infection (otitis = ear, media = middle). These infections often develop after a viral infection, such as a cold. The cold causes swelling around the pressure-equalizing tube of the ear, which allows fluid to build up in the space behind the eardrum (the middle ear). This fluid build-up can trap bacteria and viruses and increase pressure on the eardrum causing pain. Symptoms of an ear infection can include earache/pain and decreased hearing loss. These symptoms often come on suddenly. For children, symptoms may include fever (temperature >100.4 F), pulling on the ear, fussiness, and decreased activity/appetite.  Generally, every Emergency Department visit should have a follow-up clinic visit with either a primary or a specialty clinic/provider. Please follow-up as instructed by your emergency provider today.    Return to the Emergency Department if:    Your child becomes very fussy or weak.    The symptoms get worse, or if you develop a severe headache, stiff neck, or new symptoms.    Treatment:    The \"best\" treatment depends on your age, history of previous infections, and any underlying medical problems.    Antibiotics are not given to every patient with an ear infection because studies show that many people with ear infections will improve without using antibiotics. Because antibiotics can have side effects such as diarrhea and stomach upset and can also cause severe allergic reactions, providers are trying to avoid using antibiotics if it is safe for the patient to do so.   In these cases, a prescription for antibiotics may be given to be filled in 24 -48 hours if symptoms are getting worse or not improving (this is often called  wait and see  treatment). If the symptoms are improving, the antibiotic does not need to be taken.     Remember, antibiotics do not treat pain.      Pain medications. You may take a pain medication such as Tylenol  (acetaminophen), Advil  (ibuprofen), Nuprin  " (ibuprofen) or Aleve  (naproxen).    Complications:      Tympanic membrane rupture - One possible complication of an ear infection is rupture of the tympanic membrane, or ear drum. This happens because of pressure on the tympanic membrane from the infected fluid. When the tympanic membrane ruptures, you may have pus or blood drain from the ear. It does not hurt when the membrane ruptures, and many people actually feel better because pressure is released. Fortunately, the tympanic membrane usually heals quickly after rupturing, within hours to days. You should keep water out of the ear until you re-check with your provider to be sure the ear drum has healed.       Mastoiditis - Rarely, the area behind the ear can become infected, this area is called the mastoid.  If you notice redness and swelling behind your ear, see your provider or return to the Emergency Department immediately.        Hearing loss - The fluid that collects behind the eardrum (called an effusion) can persist for weeks to months after the pain of an ear infection resolves. An effusion causes trouble hearing, which is usually temporary. If the fluid persists, however, it can interfere with the process of learning to speak.   For this reason, children under 2 need to be seen by their pediatrician WITHIN 3 MONTHS to ensure that the fluid has resolved.  If you were given a prescription for medicine here today, be sure to read all of the information (including the package insert) that comes with your prescription.  This will include important information about the medicine, its side effects, and any warnings that you need to know about.  The pharmacist who fills the prescription can provide more information and answer questions you may have about the medicine.  If you have questions or concerns that the pharmacist cannot address, please call or return to the Emergency Department.   Remember that you can always come back to the Emergency Department if  you are not able to see your regular provider in the amount of time listed above, if you get any new symptoms, or if there is anything that worries you.    Discharge Instructions  Vertigo  You have been diagnosed with vertigo.  This is a dizzy feeling often described as spinning or that the room is moving around you. You will often have nausea (sick to your stomach), vomiting (throwing up), and balance problems with it.  Vertigo is usually caused by a problem in the inner ear which helps control your balance.  Many things can cause vertigo, including calcium collections in the inner ear, a virus infection of the inner ear, concussion, migraine, and some medicines.  Luckily, these causes are not life threatening and will eventually go away.  However, sometimes there is a serious problem that does not show up right away.  Generally, every Emergency Department visit should have a follow-up clinic visit with either a primary or a specialty clinic/provider. Please follow-up as instructed by your emergency provider today.  Return to the Emergency Department if you have:    New or severe headache.    Double vision (seeing two of things).    Trouble speaking or hearing.    Weakness or trouble moving/using one side of your body.    Passing out.    Numbness or tingling.    Chest pain.    Vomiting that will not stop.    Treatment:    There are several commonly prescribed medications:  o Antihistamines such as meclizine (Antivert ), dimenhydrinate (Dramamine ), or diphenhydramine (Benadryl ).  o Prescription anti-nausea medicines, such as promethazine (Phenergan ), metoclopramide (Reglan ), or ondansetron (Zofran ).  o Prescription sedative medicines, such as diazepam (Valium ), lorazepam (Ativan ), or clonazepam (Klonopin ).    Most of these medicines make you sleepy, and you should not take them before you work or drive. You should only take prescription medicines to treat severe vertigo symptoms, and you should stop the  medicine when your symptoms improve.    Follow Up:    If you have vertigo longer than three days, it is important that you follow up either with your primary provider or an Ear, Nose, and Throat (ENT) specialist.  You may need further testing to evaluate your vertigo and you may also need  vestibular  therapy which is a special form of physical therapy to make the vertigo go away.    If you were given a prescription for medicine here today, be sure to read all of the information (including the package insert) that comes with your prescription.  This will include important information about the medicine, its side effects, and any warnings that you need to know about.  The pharmacist who fills the prescription can provide more information and answer questions you may have about the medicine.  If you have questions or concerns that the pharmacist cannot address, please call or return to the Emergency Department.     Remember that you can always come back to the Emergency Department if you are not able to see your regular provider in the amount of time listed above, if you get any new symptoms, or if there is anything that worries you.

## 2018-03-06 NOTE — ED AVS SNAPSHOT
" [facility] Emergency Department    [address]    [facility] [address]    Phone:  [phone]    Fax:  [phone]                                       Dot Ramirez   MRN: [id number]    Department:  [facility] Emergency Department   Date of Visit:  3/6/2018           Patient Information     Date Of Birth          1985        Your diagnoses for this visit were:     Otalgia, right     Acute otitis externa of right ear, unspecified type     Dizziness        You were seen by Nuha Lyman PA-C.      Follow-up Information     Follow up with [facility] Emergency Department.    Specialty:  EMERGENCY MEDICINE    Why:  If symptoms worsen    Contact information:    [address]  [facility] [address]  [phone]        Follow up with Jamison Florian MD In 5 days.    Specialty:  OB/Gyn    Why:  to ensure improving    Contact information:    [address] [place] [address]  [phone]          Discharge Instructions       Discharge Instructions  Otitis Externa    Today you were seen for otitis externa which is a condition that occurs when the ear canal, which is the area that leads from the outer ear to the ear drum, becomes irritated as a result of an infection, allergy, or skin problem.   The most common symptoms of this are:  pain in the outer ear, especially when the ear is moved, itchiness of the ear, fluid or pus leaking from the ear and difficulty hearing clearly.  \"Swimmer's ear\" is the name for external otitis that occurs in a person who swims frequently.    Generally, every Emergency Department visit should have a follow-up clinic visit with either a primary or a specialty clinic/provider. Please follow-up as instructed by your emergency provider today.    Return to the Emergency Department if:    Your symptoms get worse, or if you develop a severe headache, stiff neck, or new symptoms.    The pain " and swelling spread to your face.    You develop high fever.      Treatment:    Treatment of otitis externa aims to reduce pain and eliminate the infection.   You should take either Advil  (ibuprofen) or Tylenol  (acetaminophen) for control of the ear pain.      Ear drops -- Ear drops are usually prescribed to both reduce pain and swelling and kill the bacteria which causes external otitis. It is important to apply the ear drops correctly so that they reach the ear canal:  o Lie on your side or tilt your head towards the opposite shoulder.  o Fill the ear canal with drops.  o Lie on your side for 20 minutes or place a cotton ball in the ear canal for 20 minutes.  o Finish the entire course of treatment, even if you begin to feel better within a few days.  o Avoid getting ears wet -- during treatment, you should avoid getting the inside of your ears wet. While showering, you can place a cotton ball coated with petroleum jelly in the ear. However, you should not swim for 7 to 10 days after starting treatment. Avoid wearing hearing aids and in-ear headphones until pain improves.  o If a wick has been placed in your ear, please do not remove it until seen by your primary provider or Ear, Nose, and Throat (ENT) specialist as directed.    Prevention:    Do not clean ear canal. Ear wax serves to protect the ears from water, bacteria, and injury. Excessive cleaning or scratching can injure the skin, potentially leading to infection.    Swimming on a regular basis removes some of the ear wax, allowing water to soften the skin. Bacteria, which normally live in the ear canal, can then enter the skin more easily.    Wearing devices that block the ear canals, such as hearing aids, headphones, or ear plugs, can increase the risk of external otitis (if worn frequently) by injuring the skin.    If you swim frequently, experts recommend the following tips to reduce the chance of developing external otitis:    Shake your ears dry  after swimming.    Use ear drops after swimming to prevent ear infections; these are available at most pharmacies without a prescription.    Consider wearing ear plugs made for swimming.  If you were given a prescription for medicine here today, be sure to read all of the information (including the package insert) that comes with your prescription.  This will include important information about the medicine, its side effects, and any warnings that you need to know about.  The pharmacist who fills the prescription can provide more information and answer questions you may have about the medicine.  If you have questions or concerns that the pharmacist cannot address, please call or return to the Emergency Department.   Remember that you can always come back to the Emergency Department if you are not able to see your regular provider in the amount of time listed above, if you get any new symptoms, or if there is anything that worries you.    Discharge Instructions  Otitis Media  You or your child have an ear infection known as otitis media or middle ear infection (otitis = ear, media = middle). These infections often develop after a viral infection, such as a cold. The cold causes swelling around the pressure-equalizing tube of the ear, which allows fluid to build up in the space behind the eardrum (the middle ear). This fluid build-up can trap bacteria and viruses and increase pressure on the eardrum causing pain. Symptoms of an ear infection can include earache/pain and decreased hearing loss. These symptoms often come on suddenly. For children, symptoms may include fever (temperature >100.4 F), pulling on the ear, fussiness, and decreased activity/appetite.  Generally, every Emergency Department visit should have a follow-up clinic visit with either a primary or a specialty clinic/provider. Please follow-up as instructed by your emergency provider today.    Return to the Emergency Department if:    Your child becomes  "very fussy or weak.    The symptoms get worse, or if you develop a severe headache, stiff neck, or new symptoms.    Treatment:    The \"best\" treatment depends on your age, history of previous infections, and any underlying medical problems.    Antibiotics are not given to every patient with an ear infection because studies show that many people with ear infections will improve without using antibiotics. Because antibiotics can have side effects such as diarrhea and stomach upset and can also cause severe allergic reactions, providers are trying to avoid using antibiotics if it is safe for the patient to do so.   In these cases, a prescription for antibiotics may be given to be filled in 24 -48 hours if symptoms are getting worse or not improving (this is often called  wait and see  treatment). If the symptoms are improving, the antibiotic does not need to be taken.     Remember, antibiotics do not treat pain.      Pain medications. You may take a pain medication such as Tylenol  (acetaminophen), Advil  (ibuprofen), Nuprin  (ibuprofen) or Aleve  (naproxen).    Complications:      Tympanic membrane rupture - One possible complication of an ear infection is rupture of the tympanic membrane, or ear drum. This happens because of pressure on the tympanic membrane from the infected fluid. When the tympanic membrane ruptures, you may have pus or blood drain from the ear. It does not hurt when the membrane ruptures, and many people actually feel better because pressure is released. Fortunately, the tympanic membrane usually heals quickly after rupturing, within hours to days. You should keep water out of the ear until you re-check with your provider to be sure the ear drum has healed.       Mastoiditis - Rarely, the area behind the ear can become infected, this area is called the mastoid.  If you notice redness and swelling behind your ear, see your provider or return to the Emergency Department immediately.        Hearing " loss - The fluid that collects behind the eardrum (called an effusion) can persist for weeks to months after the pain of an ear infection resolves. An effusion causes trouble hearing, which is usually temporary. If the fluid persists, however, it can interfere with the process of learning to speak.   For this reason, children under 2 need to be seen by their pediatrician WITHIN 3 MONTHS to ensure that the fluid has resolved.  If you were given a prescription for medicine here today, be sure to read all of the information (including the package insert) that comes with your prescription.  This will include important information about the medicine, its side effects, and any warnings that you need to know about.  The pharmacist who fills the prescription can provide more information and answer questions you may have about the medicine.  If you have questions or concerns that the pharmacist cannot address, please call or return to the Emergency Department.   Remember that you can always come back to the Emergency Department if you are not able to see your regular provider in the amount of time listed above, if you get any new symptoms, or if there is anything that worries you.    Discharge Instructions  Vertigo  You have been diagnosed with vertigo.  This is a dizzy feeling often described as spinning or that the room is moving around you. You will often have nausea (sick to your stomach), vomiting (throwing up), and balance problems with it.  Vertigo is usually caused by a problem in the inner ear which helps control your balance.  Many things can cause vertigo, including calcium collections in the inner ear, a virus infection of the inner ear, concussion, migraine, and some medicines.  Luckily, these causes are not life threatening and will eventually go away.  However, sometimes there is a serious problem that does not show up right away.  Generally, every Emergency Department visit should have a follow-up clinic  visit with either a primary or a specialty clinic/provider. Please follow-up as instructed by your emergency provider today.  Return to the Emergency Department if you have:    New or severe headache.    Double vision (seeing two of things).    Trouble speaking or hearing.    Weakness or trouble moving/using one side of your body.    Passing out.    Numbness or tingling.    Chest pain.    Vomiting that will not stop.    Treatment:    There are several commonly prescribed medications:  o Antihistamines such as meclizine (Antivert ), dimenhydrinate (Dramamine ), or diphenhydramine (Benadryl ).  o Prescription anti-nausea medicines, such as promethazine (Phenergan ), metoclopramide (Reglan ), or ondansetron (Zofran ).  o Prescription sedative medicines, such as diazepam (Valium ), lorazepam (Ativan ), or clonazepam (Klonopin ).    Most of these medicines make you sleepy, and you should not take them before you work or drive. You should only take prescription medicines to treat severe vertigo symptoms, and you should stop the medicine when your symptoms improve.    Follow Up:    If you have vertigo longer than three days, it is important that you follow up either with your primary provider or an Ear, Nose, and Throat (ENT) specialist.  You may need further testing to evaluate your vertigo and you may also need  vestibular  therapy which is a special form of physical therapy to make the vertigo go away.    If you were given a prescription for medicine here today, be sure to read all of the information (including the package insert) that comes with your prescription.  This will include important information about the medicine, its side effects, and any warnings that you need to know about.  The pharmacist who fills the prescription can provide more information and answer questions you may have about the medicine.  If you have questions or concerns that the pharmacist cannot address, please call or return to the Emergency  Department.     Remember that you can always come back to the Emergency Department if you are not able to see your regular provider in the amount of time listed above, if you get any new symptoms, or if there is anything that worries you.      24 Hour Appointment Hotline       To make an appointment at any [facility]3], call [phone] ([phone]). If you don't have a family doctor or clinic, we will help you find one. [facility] are conveniently located to serve the needs of you and your family.             Review of your medicines      START taking        Dose / Directions Last dose taken    amoxicillin 500 MG capsule   Commonly known as:  AMOXIL   Dose:  500 mg   Quantity:  30 capsule        Take 1 capsule (500 mg) by mouth 3 times daily for 10 days   Refills:  0        meclizine 25 MG tablet   Commonly known as:  ANTIVERT   Dose:  25 mg   Quantity:  30 tablet        Take 1 tablet (25 mg) by mouth 3 times daily as needed for dizziness   Refills:  0        neomycin-polymyxin-hydrocortisone 3.5-13197-5 otic suspension   Commonly known as:  CORTISPORIN   Dose:  3 drop   Quantity:  5 mL        Place 3 drops into the right ear 4 times daily for 7 days   Refills:  0          Our records show that you are taking the medicines listed below. If these are incorrect, please call your family doctor or clinic.        Dose / Directions Last dose taken    norethindrone 0.35 MG per tablet   Commonly known as:  MICRONOR   Dose:  1 tablet   Quantity:  84 tablet        Take 1 tablet (0.35 mg) by mouth daily   Refills:  3        PRE-AMEENA PO        Refills:  0        sertraline 100 MG tablet   Commonly known as:  ZOLOFT   Dose:  100 mg   Quantity:  30 tablet        Take 1 tablet (100 mg) by mouth daily   Refills:  3                Prescriptions were sent or printed at these locations (3 Prescriptions)                   Other Prescriptions                Printed at Department/Unit printer (3 of 3)          amoxicillin (AMOXIL) 500 MG capsule               meclizine (ANTIVERT) 25 MG tablet               neomycin-polymyxin-hydrocortisone (CORTISPORIN) 3.5-37068-3 otic suspension                Orders Needing Specimen Collection     None      Pending Results     No orders found from 3/4/2018 to 3/7/2018.            Pending Culture Results     No orders found from 3/4/2018 to 3/7/2018.            Pending Results Instructions     If you had any lab results that were not finalized at the time of your Discharge, you can call the ED Lab Result RN at [phone]. You will be contacted by this team for any positive Lab results or changes in treatment. The nurses are available 7 days a week from 10A to 6:30P.  You can leave a message 24 hours per day and they will return your call.        Test Results From Your Hospital Stay               Clinical Quality Measure: Blood Pressure Screening     Your blood pressure was checked while you were in the emergency department today. The last reading we obtained was  BP: (!) 122/91 . Please read the guidelines below about what these numbers mean and what you should do about them.  If your systolic blood pressure (the top number) is less than 120 and your diastolic blood pressure (the bottom number) is less than 80, then your blood pressure is normal. There is nothing more that you need to do about it.  If your systolic blood pressure (the top number) is 120-139 or your diastolic blood pressure (the bottom number) is 80-89, your blood pressure may be higher than it should be. You should have your blood pressure rechecked within a year by a primary care provider.  If your systolic blood pressure (the top number) is 140 or greater or your diastolic blood pressure (the bottom number) is 90 or greater, you may have high blood pressure. High blood pressure is treatable, but if left untreated over time it can put you at risk for heart attack, stroke, or kidney failure. You should have your blood  pressure rechecked by a primary care provider within the next 4 weeks.  If your provider in the emergency department today gave you specific instructions to follow-up with your doctor or provider even sooner than that, you should follow that instruction and not wait for up to 4 weeks for your follow-up visit.        Thank you for choosing [facility]       Thank you for choosing [facility] for your care. Our goal is always to provide you with excellent care. Hearing back from our patients is one way we can continue to improve our services. Please take a few minutes to complete the written survey that you may receive in the mail after you visit with us. Thank you!        [employer] Information     [employer] gives you secure access to your electronic health record. If you see a primary care provider, you can also send messages to your care team and make appointments. If you have questions, please call your primary care clinic.  If you do not have a primary care provider, please call [phone] and they will assist you.        Care EveryWhere ID     This is your Care EveryWhere ID. This could be used by other organizations to access your [facility] medical records  [id number]        Equal Access to Services     NEVAEH MCGHEE : Hadniko Horn, waprincess ruiz, qafaisal khan, helio chaney . So [facility] [phone].    ATENCIÓN: Si habla español, tiene a camacho disposición servicios gratuitos de asistencia lingüística. Llame al [phone].    We comply with applicable federal civil rights laws and [place] laws. We do not discriminate on the basis of race, color, national origin, age, disability, sex, sexual orientation, or gender identity.            After Visit Summary       This is your record. Keep this with you and show to your community pharmacist(s) and doctor(s) at your next visit.

## 2018-03-07 ASSESSMENT — ENCOUNTER SYMPTOMS
VOMITING: 0
COUGH: 1
SHORTNESS OF BREATH: 0
FEVER: 0
DIZZINESS: 1
WEAKNESS: 0
CHILLS: 0
NAUSEA: 1

## 2018-03-08 NOTE — ED PROVIDER NOTES
History     Chief Complaint:  Ear pain, Dizziness    HPI   Dot Ramirez is a 32 year old female who presents with  to ED for evaluation of ear pain and dizziness. The patient states she developed right ear pain while driving that significantly worsened, resulting in dizziness that she describes as a spinning sensation. She notes she has had a preceding upper respiratory illness including congestion and mild cough. The patient denies any trauma to ear or fevers. She denies any severe headache, numbness, focal weakness or vision loss.     Allergies:  NKDA     Medications:    Zoloft  Norethindrone  Prenatal vitamin      Past Medical History:    Anxiety   Depression  Restless leg syndrome  Vitamin d deficiency    Past Surgical History:    Cervical cerclage    section  Cystectomy on hand   Dental extraction    Family History:    History reviewed. No pertinent family history to today's presentation.    Social History:  Marital Status:   [2]  Never smoker.   No alcohol use.   Here with .      Review of Systems   Constitutional: Negative for chills and fever.   HENT: Positive for congestion and ear pain. Negative for ear discharge.    Eyes: Negative for visual disturbance.   Respiratory: Positive for cough. Negative for shortness of breath.    Gastrointestinal: Positive for nausea. Negative for vomiting.   Neurological: Positive for dizziness. Negative for syncope and weakness.   All other systems reviewed and are negative.      Physical Exam   First Vitals:  BP: (!) 122/91  Pulse: 80  Temp: 98.3  F (36.8  C)  Resp: 16  Weight: 83.9 kg (185 lb)  SpO2: 98 %      Physical Exam  Nursing note and vitals reviewed.     GENERAL: Alert, mild distress, non toxic appearing.    HEENT:   Head: No facial asymmetry.   Eyes: Normal conjunctiva. No scleral icterus. PERRLA. EOMI. No nystagmus.    Ears: Normal pinnae. Left EAC and TM normal. Right EAC with mild edema and erythema. Right TM erythematous  but no bulging. No erythema or edema over right mastoid.  Throat: MMM. No oropharyngeal erythema, edema, or exudate. Uvula midline. Tolerating oral secretions without difficulty.   NECK: Supple, no nuchal rigidity.  CARDIAC: Normal rate and regular rhythm. Normal heart sounds. No murmurs, rubs, or gallops appreciated.   PULMONARY: CTA bilaterally. Normal breath sounds. No wheezing, crackles, or rhonchi appreciated.     NEURO: Alert and oriented. GCS 15. Normal speech. CN II-XII intact. Sensation intact throughout. Normal strength of bilateral upper and lower extremities.   MUSCULOSKELETAL: Normal range of motion.  SKIN: Skin is warm and dry. No rashes. No pallor or jaundice.   PSYCH: Normal affect and mood.       Emergency Department Course     Interventions:  Meclizine 50 mg PO     Emergency Department Course:  The patient arrived in triage where vitals were measured and recorded.   The patient was then escorted back to the emergency department.   The patient's medical records were reviewed.  Nursing notes and vitals were reviewed.    I performed an exam of the patient as documented above. The patient is in agreement with my plan of care.     I discussed plan of care with the patient who was in agreement. Patient expressed understanding of the discharge instructions, questions were answered, written instructions provided, and patient discharged in satisfactory condition.      Impression & Plan      Medical Decision Making:  This is a 32 year old female who presents to ED for evaluation of right ear pain with dizziness. On exam, she does have evidence of early otitis media in addition to externa. No signs of perforation, mastoiditis, pharyngitis, peritonsillar abscess, dental abscess, or deep space infection. Suspect this is what is causing her dizziness, which seems consistent with benign vertigo. She has a non focal neuro exam thus doubt acute intracranial process and do not feel head imaging is indicated. Will  treat with antibiotics and have her follow up with PCP to ensure improving. Will also provide meclizine for symptomatic relief of her dizziness. Tylenol or Ibuprofen for pain. Reviewed reasons to return to ED, including worsening symptoms, syncope, focal numbness/weakness, high fevers, or any new concerns. The patient was in agreement with plan and discharged in satisfactory condition with all questions answered.      Diagnosis:    ICD-10-CM    1. Otalgia, right H92.01    2. Acute otitis externa of right ear, unspecified type H60.501    3. Dizziness R42        Disposition:  Discharge to home    Discharge Medications:  Discharge Medication List as of 3/6/2018  5:51 PM      START taking these medications    Details   amoxicillin (AMOXIL) 500 MG capsule Take 1 capsule (500 mg) by mouth 3 times daily for 10 days, Disp-30 capsule, R-0, Local Print      meclizine (ANTIVERT) 25 MG tablet Take 1 tablet (25 mg) by mouth 3 times daily as needed for dizziness, Disp-30 tablet, R-0, Local Print      neomycin-polymyxin-hydrocortisone (CORTISPORIN) 3.5-29887-3 otic suspension Place 3 drops into the right ear 4 times daily for 7 days, Disp-5 mL, R-0, Local Print               Nuha Lyman  3/6/2018   [facility] EMERGENCY DEPARTMENT       Nuha Lyman PA-C  03/07/18 2016

## 2018-06-11 ENCOUNTER — OFFICE VISIT (OUTPATIENT)
Dept: INTERNAL MEDICINE | Facility: CLINIC | Age: 33
End: 2018-06-11
Payer: COMMERCIAL

## 2018-06-11 VITALS
HEIGHT: 61 IN | BODY MASS INDEX: 35.33 KG/M2 | HEART RATE: 89 BPM | DIASTOLIC BLOOD PRESSURE: 82 MMHG | OXYGEN SATURATION: 98 % | SYSTOLIC BLOOD PRESSURE: 124 MMHG | WEIGHT: 187.1 LBS | RESPIRATION RATE: 14 BRPM | TEMPERATURE: 98 F

## 2018-06-11 DIAGNOSIS — F32.1 MODERATE MAJOR DEPRESSION (H): ICD-10-CM

## 2018-06-11 DIAGNOSIS — Z00.00 ENCOUNTER FOR ROUTINE ADULT HEALTH EXAMINATION WITHOUT ABNORMAL FINDINGS: Primary | ICD-10-CM

## 2018-06-11 DIAGNOSIS — G25.81 RESTLESS LEGS SYNDROME (RLS): ICD-10-CM

## 2018-06-11 LAB
ERYTHROCYTE [DISTWIDTH] IN BLOOD BY AUTOMATED COUNT: 13.4 % (ref 10–15)
HCT VFR BLD AUTO: 41.6 % (ref 35–47)
HGB BLD-MCNC: 13.9 G/DL (ref 11.7–15.7)
MCH RBC QN AUTO: 28.2 PG (ref 26.5–33)
MCHC RBC AUTO-ENTMCNC: 33.4 G/DL (ref 31.5–36.5)
MCV RBC AUTO: 84 FL (ref 78–100)
PLATELET # BLD AUTO: 278 10E9/L (ref 150–450)
RBC # BLD AUTO: 4.93 10E12/L (ref 3.8–5.2)
WBC # BLD AUTO: 7.2 10E9/L (ref 4–11)

## 2018-06-11 PROCEDURE — 80061 LIPID PANEL: CPT | Performed by: INTERNAL MEDICINE

## 2018-06-11 PROCEDURE — 80053 COMPREHEN METABOLIC PANEL: CPT | Performed by: INTERNAL MEDICINE

## 2018-06-11 PROCEDURE — 99213 OFFICE O/P EST LOW 20 MIN: CPT | Mod: 25 | Performed by: INTERNAL MEDICINE

## 2018-06-11 PROCEDURE — 82728 ASSAY OF FERRITIN: CPT | Performed by: INTERNAL MEDICINE

## 2018-06-11 PROCEDURE — 84443 ASSAY THYROID STIM HORMONE: CPT | Performed by: INTERNAL MEDICINE

## 2018-06-11 PROCEDURE — 99395 PREV VISIT EST AGE 18-39: CPT | Performed by: INTERNAL MEDICINE

## 2018-06-11 PROCEDURE — 87624 HPV HI-RISK TYP POOLED RSLT: CPT | Performed by: INTERNAL MEDICINE

## 2018-06-11 PROCEDURE — 85027 COMPLETE CBC AUTOMATED: CPT | Performed by: INTERNAL MEDICINE

## 2018-06-11 PROCEDURE — G0145 SCR C/V CYTO,THINLAYER,RESCR: HCPCS | Performed by: INTERNAL MEDICINE

## 2018-06-11 PROCEDURE — 36415 COLL VENOUS BLD VENIPUNCTURE: CPT | Performed by: INTERNAL MEDICINE

## 2018-06-11 RX ORDER — ROPINIROLE 0.25 MG/1
0.25-0.5 TABLET, FILM COATED ORAL AT BEDTIME
Qty: 60 TABLET | Refills: 1 | Status: SHIPPED | OUTPATIENT
Start: 2018-06-11 | End: 2018-07-25

## 2018-06-11 RX ORDER — ESCITALOPRAM OXALATE 10 MG/1
10 TABLET ORAL DAILY
Qty: 30 TABLET | Refills: 1 | Status: SHIPPED | OUTPATIENT
Start: 2018-06-11 | End: 2021-01-29

## 2018-06-11 ASSESSMENT — PATIENT HEALTH QUESTIONNAIRE - PHQ9
SUM OF ALL RESPONSES TO PHQ QUESTIONS 1-9: 20
SUM OF ALL RESPONSES TO PHQ QUESTIONS 1-9: 20
10. IF YOU CHECKED OFF ANY PROBLEMS, HOW DIFFICULT HAVE THESE PROBLEMS MADE IT FOR YOU TO DO YOUR WORK, TAKE CARE OF THINGS AT HOME, OR GET ALONG WITH OTHER PEOPLE: VERY DIFFICULT

## 2018-06-11 NOTE — NURSING NOTE
"/82  Pulse 89  Temp 98  F (36.7  C) (Oral)  Resp 14  Ht 5' 1\" (1.549 m)  Wt 187 lb 1.6 oz (84.9 kg)  LMP 05/14/2018  SpO2 98%  Breastfeeding? No  BMI 35.35 kg/m2  Patient in for annual female physical.  Carol Mallory CMA    "

## 2018-06-11 NOTE — PROGRESS NOTES
SUBJECTIVE:   CC: Dot Ramirez is an 32 year old woman who presents for preventive health visit.     Physical   Annual:     Getting at least 3 servings of Calcium per day::  NO    Bi-annual eye exam::  Yes    Dental care twice a year::  Yes    Sleep apnea or symptoms of sleep apnea::  Daytime drowsiness    Diet::  Regular (no restrictions)    Frequency of exercise::  1 day/week    Duration of exercise::  45-60 minutes    Taking medications regularly::  Yes    Medication side effects::  None    Additional concerns today::  YES             Pt also c/o restless legs for many yrs and is on Zoloft w/o much relief.     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 Feels tired, feels bad about self, trouble concentrating, over whelmed . Appetite varies, sleep disturbed  No suicidal ideation or thoughts,       Today's PHQ-2 Score:   PHQ-2 (  [employer]) 2018   Q1: Little interest or pleasure in doing things 0   Q2: Feeling down, depressed or hopeless 0   PHQ-2 Score 0   Q1: Little interest or pleasure in doing things Several days   Q2: Feeling down, depressed or hopeless More than half the days   PHQ-2 Score 3       Abuse: Current or Past(Physical, Sexual or Emotional)- No  Do you feel safe in your environment - Yes      Past Medical History:   Diagnosis Date     Anxiety      Moderate major depression (H) 2014    onset with fetal loss       Past Surgical History:   Procedure Laterality Date     CERCLAGE CERVICAL N/A 2015    Procedure: CERCLAGE CERVICAL;  Surgeon: Damion Nayak MD;  [facility]     CERCLAGE CERVICAL N/A 2016    Procedure: CERCLAGE CERVICAL;  Surgeon: Damion Nayak MD;  [facility]      SECTION N/A 2015    Procedure:  SECTION;  Surgeon: Jamison Florian MD;  Location: [facility]      SECTION N/A 3/6/2017    Procedure:  SECTION;   Surgeon: Jamison Florian MD;  Location: [facility] OR     Cystectomy hand  2016     EXCISE MASS FINGER Left 2016    Procedure: Excision of cystic mass, left ring finger. Surgeon:  Rosales Jones MD  Location: [facility]     EXTRACTION(S) DENTAL  2009       Current Outpatient Prescriptions   Medication Sig Dispense Refill     escitalopram (LEXAPRO) 10 MG tablet Take 1 tablet (10 mg) by mouth daily 30 tablet 1     rOPINIRole (REQUIP) 0.25 MG tablet Take 1-2 tablets (0.25-0.5 mg) by mouth At Bedtime 60 tablet 1       Family History   Problem Relation Age of Onset     Family History Negative Mother      born [birth year]     Neurologic Disorder Father      born [birth year] Charcot Rosemary Tooth     Prostate Cancer Father      DIABETES Maternal Grandmother       99yo Type II     Alzheimer Disease Maternal Grandfather 89      96yo      Family History Negative Paternal Grandmother           Family History Negative Paternal Grandfather           Family History Negative Brother      1/2 brother Hemochromatosis       Social History   Substance Use Topics     Smoking status: Never Smoker     Smokeless tobacco: Never Used     Alcohol use No     Alcohol Use 2018   If you drink alcohol do you typically have greater than 3 drinks per day OR greater than 7 drinks per week? No   No flowsheet data found.    Reviewed orders with patient.  Reviewed health maintenance and updated orders accordingly - Yes     Mammogram not appropriate for this patient based on age.    Pertinent mammograms are reviewed under the imaging tab.  History of abnormal Pap smear: NO - age 30- 65 PAP every 3 years recommended    Reviewed and updated as needed this visit by clinical staff  Tobacco  Allergies  Meds  Med Hx  Surg Hx  Fam Hx  Soc Hx        Reviewed and updated as needed this visit by Provider            Review of Systems  CONSTITUTIONAL: NEGATIVE for fever, chills, change in weight  INTEGUMENTARU/SKIN:  "NEGATIVE for worrisome rashes, moles or lesions  EYES: NEGATIVE for vision changes or irritation  ENT: NEGATIVE for ear, mouth and throat problems  RESP: NEGATIVE for significant cough or SOB  BREAST: NEGATIVE for masses, tenderness or discharge  CV: NEGATIVE for chest pain, palpitations or peripheral edema  GI: NEGATIVE for nausea, abdominal pain, heartburn, or change in bowel habits  : NEGATIVE for unusual urinary or vaginal symptoms. Periods are regular.  MUSCULOSKELETAL: NEGATIVE for significant arthralgias or myalgia  NEURO:RLS,  NEGATIVE for weakness, dizziness or paresthesias  PSYCHIATRIC: depression      OBJECTIVE:   /82  Pulse 89  Temp 98  F (36.7  C) (Oral)  Resp 14  Ht 5' 1\" (1.549 m)  Wt 187 lb 1.6 oz (84.9 kg)  LMP 05/14/2018  SpO2 98%  Breastfeeding? No  BMI 35.35 kg/m2  Physical Exam  GENERAL: healthy, alert and no distress  EYES: Eyes grossly normal to inspection, PERRL and conjunctivae and sclerae normal  HENT: ear canals and TM's normal, nose and mouth without ulcers or lesions  NECK: no adenopathy, no asymmetry, masses, or scars and thyroid normal to palpation  RESP: lungs clear to auscultation - no rales, rhonchi or wheezes  BREAST: normal without masses, tenderness or nipple discharge and no palpable axillary masses or adenopathy  CV: regular rate and rhythm, normal S1 S2, no S3 or S4, no murmur, click or rub, no peripheral edema and peripheral pulses strong  ABDOMEN: soft, nontender, no hepatosplenomegaly, no masses and bowel sounds normal   (female): normal female external genitalia, normal urethral meatus, vaginal mucosa pink, moist, well rugated, and normal cervix/adnexa without masses or discharge, pap obtained .  MS: no gross musculoskeletal defects noted, no edema  NEURO: Normal strength and tone, mentation intact and speech normal  PSYCH: mentation appears normal, affect normal/bright    ASSESSMENT/PLAN:     (Z00.00) Encounter for routine adult health examination " "without abnormal findings  (primary encounter diagnosis)  Plan: CBC with platelets, Comprehensive metabolic         panel, Lipid panel reflex to direct LDL         Fasting, TSH with free T4 reflex, Pap imaged         thin layer screen with HPV - recommended age 30        - 65 years (select HPV order below)            (G25.81) Restless legs syndrome (RLS)  Plan: check Ferritin, started on rOPINIRole (REQUIP) 0.25 MG tablet as directed.explained clearly about the medication,insructions and side effects. Call or return to clinic prn if these symtoms worsen, fail to improve as anticipated, or if new symptoms develop.            (F32.1) Moderate major depression (H)  Plan:advised to d/c Zoloft and started on  escitalopram (LEXAPRO) 10 MG tablet as directed.explained clearly about the medication,insructions and side effects. Advised to f/u in 1 mth.              COUNSELING:  Reviewed preventive health counseling, as reflected in patient instructions       Regular exercise       Healthy diet/nutrition       reports that she has never smoked. She has never used smokeless tobacco.    Estimated body mass index is 35.35 kg/(m^2) as calculated from the following:    Height as of this encounter: 5' 1\" (1.549 m).    Weight as of this encounter: 187 lb 1.6 oz (84.9 kg).   Weight management plan: Discussed healthy diet and exercise guidelines and patient will follow up in 12 months in clinic to re-evaluate.    Additional 15  minutes is spent with the patient discussing multiple health concerns; Over 50% Counselling/Education provided.    Counseling Resources:  ATP IV Guidelines  Pooled Cohorts Equation Calculator  Breast Cancer Risk Calculator  FRAX Risk Assessment  ICSI Preventive Guidelines  Dietary Guidelines for Americans, 2010  [employer]'s MyPlate  ASA Prophylaxis  Lung CA Screening    Bassam Narvaez MD  [facility]  Answers for HPI/ROS submitted by the patient on 6/11/2018   PHQ-2 Score: 3  If you " checked off any problems, how difficult have these problems made it for you to do your work, take care of things at home, or get along with other people?: Very difficult  PHQ9 TOTAL SCORE: 20

## 2018-06-11 NOTE — MR AVS SNAPSHOT
After Visit Summary   6/11/2018    Dot Ramirez    MRN: [id number]           Patient Information     Date Of Birth          1985        Visit Information        Provider Department      6/11/2018 8:40 AM Bassam Narvaez MD [facility]        Today's Diagnoses     Encounter for routine adult health examination without abnormal findings    -  1    Restless legs syndrome (RLS)        Moderate major depression (H)          Care Instructions      Preventive Health Recommendations  Female Ages 26 - 39  Yearly exam:   See your health care provider every year in order to    Review health changes.     Discuss preventive care.      Review your medicines if you your doctor has prescribed any.    Until age 30: Get a Pap test every three years (more often if you have had an abnormal result).    After age 30: Talk to your doctor about whether you should have a Pap test every 3 years or have a Pap test with HPV screening every 5 years.   You do not need a Pap test if your uterus was removed (hysterectomy) and you have not had cancer.  You should be tested each year for STDs (sexually transmitted diseases), if you're at risk.   Talk to your provider about how often to have your cholesterol checked.  If you are at risk for diabetes, you should have a diabetes test (fasting glucose).  Shots: Get a flu shot each year. Get a tetanus shot every 10 years.   Nutrition:     Eat at least 5 servings of fruits and vegetables each day.    Eat whole-grain bread, whole-wheat pasta and brown rice instead of white grains and rice.    Talk to your provider about Calcium and Vitamin D.     Lifestyle    Exercise at least 150 minutes a week (30 minutes a day, 5 days of the week). This will help you control your weight and prevent disease.    Limit alcohol to one drink per day.    No smoking.     Wear sunscreen to prevent skin cancer.    See your dentist every six months for an exam and  "cleaning.            Follow-ups after your visit        Who to contact     If you have questions or need follow up information about today's clinic visit or your schedule please contact [facility] directly at [phone].  Normal or non-critical lab and imaging results will be communicated to you by MyChart, letter or phone within 4 business days after the clinic has received the results. If you do not hear from us within 7 days, please contact the clinic through MyChart or phone. If you have a critical or abnormal lab result, we will notify you by phone as soon as possible.  Submit refill requests through [employer] or call your pharmacy and they will forward the refill request to us. Please allow 3 business days for your refill to be completed.          Additional Information About Your Visit        [employer] Information     [employer] gives you secure access to your electronic health record. If you see a primary care provider, you can also send messages to your care team and make appointments. If you have questions, please call your primary care clinic.  If you do not have a primary care provider, please call [phone] and they will assist you.        Care EveryWhere ID     This is your Care EveryWhere ID. This could be used by other organizations to access your [place] medical records  [id number]        Your Vitals Were     Pulse Temperature Respirations Height Last Period Pulse Oximetry    89 98  F (36.7  C) (Oral) 14 5' 1\" (1.549 m) 05/14/2018 98%    Breastfeeding? BMI (Body Mass Index)                No 35.35 kg/m2           Blood Pressure from Last 3 Encounters:   06/11/18 124/82   03/06/18 (!) 122/91   07/04/17 113/73    Weight from Last 3 Encounters:   06/11/18 187 lb 1.6 oz (84.9 kg)   03/06/18 185 lb (83.9 kg)   07/04/17 190 lb (86.2 kg)              We Performed the Following     CBC with platelets     Comprehensive metabolic panel     DEPRESSION ACTION PLAN (DAP)     Ferritin     Lipid " panel reflex to direct LDL Fasting     OFFICE/OUTPT VISIT,EST,LEVL III     Pap imaged thin layer screen with HPV - recommended age 30 - 65 years (select HPV order below)     TSH with free T4 reflex          Today's Medication Changes          These changes are accurate as of 18 11:54 AM.  If you have any questions, ask your nurse or doctor.               Start taking these medicines.        Dose/Directions    escitalopram 10 MG tablet   Commonly known as:  LEXAPRO   Used for:  Moderate major depression (H)   Started by:  Bassam Narvaez MD        Dose:  10 mg   Take 1 tablet (10 mg) by mouth daily   Quantity:  30 tablet   Refills:  1       rOPINIRole 0.25 MG tablet   Commonly known as:  REQUIP   Used for:  Restless legs syndrome (RLS)   Started by:  Bassam Narvaez MD        Dose:  0.25-0.5 mg   Take 1-2 tablets (0.25-0.5 mg) by mouth At Bedtime   Quantity:  60 tablet   Refills:  1         Stop taking these medicines if you haven't already. Please contact your care team if you have questions.     meclizine 25 MG tablet   Commonly known as:  ANTIVERT   Stopped by:  Bassam Narvaez MD           norethindrone 0.35 MG per tablet   Commonly known as:  MICRONOR   Stopped by:  Bassam Narvaez MD           PRE- PO   Stopped by:  Bassam Narvaez MD           sertraline 100 MG tablet   Commonly known as:  ZOLOFT   Stopped by:  Bassam Narvaez MD                Where to get your medicines      These medications were sent to [address] [address]     Phone:  [phone]     escitalopram 10 MG tablet    rOPINIRole 0.25 MG tablet                Primary Care Provider Office Phone # Fax #    Jamison Florian [phone]       [facility] [place] [address]        Equal Access to Services     NEVAEH MCGHEE AH: Ngozi Horn,  heriberto ruiz, qaybta kacornelio khan, helio restrepo yonasalize laSuhailaaerrol ah. So [facility] [phone].    ATENCIÓN: Si barron goldman, tiene a camacho disposición servicios gratuitos de asistencia lingüística. Ian al [phone].    We comply with applicable federal civil rights laws and [place] laws. We do not discriminate on the basis of race, color, national origin, age, disability, sex, sexual orientation, or gender identity.            Thank you!     Thank you for choosing [facility]  for your care. Our goal is always to provide you with excellent care. Hearing back from our patients is one way we can continue to improve our services. Please take a few minutes to complete the written survey that you may receive in the mail after your visit with us. Thank you!             Your Updated Medication List - Protect others around you: Learn how to safely use, store and throw away your medicines at www.disposemymeds.org.          This list is accurate as of 6/11/18 11:54 AM.  Always use your most recent med list.                   Brand Name Dispense Instructions for use Diagnosis    escitalopram 10 MG tablet    LEXAPRO    30 tablet    Take 1 tablet (10 mg) by mouth daily    Moderate major depression (H)       rOPINIRole 0.25 MG tablet    REQUIP    60 tablet    Take 1-2 tablets (0.25-0.5 mg) by mouth At Bedtime    Restless legs syndrome (RLS)

## 2018-06-12 LAB
ALBUMIN SERPL-MCNC: 3.9 G/DL (ref 3.4–5)
ALP SERPL-CCNC: 79 U/L (ref 40–150)
ALT SERPL W P-5'-P-CCNC: 46 U/L (ref 0–50)
ANION GAP SERPL CALCULATED.3IONS-SCNC: 5 MMOL/L (ref 3–14)
AST SERPL W P-5'-P-CCNC: 21 U/L (ref 0–45)
BILIRUB SERPL-MCNC: 0.4 MG/DL (ref 0.2–1.3)
BUN SERPL-MCNC: 12 MG/DL (ref 7–30)
CALCIUM SERPL-MCNC: 8.7 MG/DL (ref 8.5–10.1)
CHLORIDE SERPL-SCNC: 106 MMOL/L (ref 94–109)
CHOLEST SERPL-MCNC: 119 MG/DL
CO2 SERPL-SCNC: 28 MMOL/L (ref 20–32)
CREAT SERPL-MCNC: 0.56 MG/DL (ref 0.52–1.04)
FERRITIN SERPL-MCNC: 44 NG/ML (ref 12–150)
GFR SERPL CREATININE-BSD FRML MDRD: >90 ML/MIN/1.7M2
GLUCOSE SERPL-MCNC: 104 MG/DL (ref 70–99)
HDLC SERPL-MCNC: 57 MG/DL
LDLC SERPL CALC-MCNC: 45 MG/DL
NONHDLC SERPL-MCNC: 62 MG/DL
POTASSIUM SERPL-SCNC: 4.4 MMOL/L (ref 3.4–5.3)
PROT SERPL-MCNC: 7.8 G/DL (ref 6.8–8.8)
SODIUM SERPL-SCNC: 139 MMOL/L (ref 133–144)
TRIGL SERPL-MCNC: 83 MG/DL
TSH SERPL DL<=0.005 MIU/L-ACNC: 2.01 MU/L (ref 0.4–4)

## 2018-06-12 ASSESSMENT — PATIENT HEALTH QUESTIONNAIRE - PHQ9: SUM OF ALL RESPONSES TO PHQ QUESTIONS 1-9: 20

## 2018-06-13 LAB
COPATH REPORT: NORMAL
PAP: NORMAL

## 2018-06-15 LAB
FINAL DIAGNOSIS: NORMAL
HPV HR 12 DNA CVX QL NAA+PROBE: NEGATIVE
HPV16 DNA SPEC QL NAA+PROBE: NEGATIVE
HPV18 DNA SPEC QL NAA+PROBE: NEGATIVE
SPECIMEN DESCRIPTION: NORMAL
SPECIMEN SOURCE CVX/VAG CYTO: NORMAL

## 2018-07-09 ENCOUNTER — TELEPHONE (OUTPATIENT)
Dept: INTERNAL MEDICINE | Facility: CLINIC | Age: 33
End: 2018-07-09

## 2018-07-09 NOTE — TELEPHONE ENCOUNTER
Patient calling stating she is having recurrent pain in her right heel. Patient reports heel spurs previously and was treated with a walking boot, advised to be seen. Scheduled at [facility] at patient's request, tomorrow at 7/10/18 at 3:40pm.

## 2018-07-10 ENCOUNTER — OFFICE VISIT (OUTPATIENT)
Dept: FAMILY MEDICINE | Facility: CLINIC | Age: 33
End: 2018-07-10
Payer: COMMERCIAL

## 2018-07-10 VITALS
SYSTOLIC BLOOD PRESSURE: 114 MMHG | HEIGHT: 61 IN | TEMPERATURE: 98.3 F | DIASTOLIC BLOOD PRESSURE: 82 MMHG | RESPIRATION RATE: 16 BRPM | HEART RATE: 80 BPM | BODY MASS INDEX: 35.57 KG/M2 | WEIGHT: 188.4 LBS | OXYGEN SATURATION: 98 %

## 2018-07-10 DIAGNOSIS — M79.671 RIGHT FOOT PAIN: Primary | ICD-10-CM

## 2018-07-10 PROCEDURE — 99213 OFFICE O/P EST LOW 20 MIN: CPT | Performed by: PHYSICIAN ASSISTANT

## 2018-07-10 NOTE — PROGRESS NOTES
SUBJECTIVE:   Dot Ramirez is a 32 year old female who presents to clinic today for the following health issues:    Musculoskeletal problem/pain      Duration: 3 weeks, worse in the last couple days    Description  Location: right foot    Intensity:  moderate, severe    Accompanying signs and symptoms: Pain     History  Previous similar problem: YES- in the past   Previous evaluation:  X-rays last July     Precipitating or alleviating factors:  Trauma or overuse: YES- sprained ankle last July, no current injury but possible overuse   Aggravating factors include: sitting to long and going from sitting to standing     Therapies tried and outcome: nothing    -Patient notes a 3 week hx of right foot pain  -she notes this is similar to foot pain that she has had previously with a heel spur  -this follows a lengthy hx of time on feet at home and work  -she notes pain in the foot pads of the great and also most lateral foot  -she is also having pain along the lateral arch  -worse when waking up    Problem list and histories reviewed & adjusted, as indicated.  Additional history: as documented    Patient Active Problem List   Diagnosis     ELIANA (generalized anxiety disorder)     Restless legs syndrome (RLS)     Family history of diabetes mellitus     Vitamin D deficiency     Moderate major depression (H)     Past Surgical History:   Procedure Laterality Date     CERCLAGE CERVICAL N/A 2015    Procedure: CERCLAGE CERVICAL;  Surgeon: Damion Nayak MD;  [facility]     CERCLAGE CERVICAL N/A 2016    Procedure: CERCLAGE CERVICAL;  Surgeon: Damion Nayak MD;  [facility]      SECTION N/A 2015    Procedure:  SECTION;  Surgeon: Jamison Florian MD;  Location: [facility]      SECTION N/A 3/6/2017    Procedure:  SECTION;  Surgeon: Jamison Florian MD;  Location: [facility] OR     Cystectomy hand  2016     EXCISE MASS FINGER Left 2016    Procedure: Excision of  "cystic mass, left ring finger. Surgeon:  Rosales Jones MD  Location: [facility]     EXTRACTION(S) DENTAL  2009       Social History   Substance Use Topics     Smoking status: Never Smoker     Smokeless tobacco: Never Used     Alcohol use No     Family History   Problem Relation Age of Onset     Family History Negative Mother      born      Neurologic Disorder Father      born  Charcot Rosemary Tooth     Prostate Cancer Father      Diabetes Maternal Grandmother       99yo Type II     Alzheimer Disease Maternal Grandfather 89      96yo      Family History Negative Paternal Grandmother           Family History Negative Paternal Grandfather           Family History Negative Brother      1/2 brother Hemochromatosis           Reviewed and updated as needed this visit by clinical staff       Reviewed and updated as needed this visit by Provider         ROS:  Constitutional, HEENT, cardiovascular, pulmonary, gi and gu systems are negative, except as otherwise noted.    OBJECTIVE:     /82 (BP Location: Right arm, Patient Position: Chair, Cuff Size: Adult Large)  Pulse 80  Temp 98.3  F (36.8  C) (Tympanic)  Resp 16  Ht 5' 1\" (1.549 m)  Wt 188 lb 6.4 oz (85.5 kg)  SpO2 98%  BMI 35.6 kg/m2  Body mass index is 35.6 kg/(m^2).  GENERAL: healthy, alert and no distress  MS: there is ttp over the ball of the foot, great toe and 5th toe. Pain down the lateral arch. Increased irritation with toe extension  SKIN: no suspicious lesions or rashes    Diagnostic Test Results:  none     ASSESSMENT/PLAN:   1. Right foot pain  Suspect simple overuse injury at this time. Reviewed increased otc/home supportive cares. If not improving she'll need to see podiatry.     Charlie Lynch PA-C  [facility]  "

## 2018-07-10 NOTE — MR AVS SNAPSHOT
After Visit Summary   7/10/2018    Dot Ramirez    MRN: [id number]           Patient Information     Date Of Birth          1985        Visit Information        Provider Department      7/10/2018 3:40 PM Charlie Lynch PA-C [facility]        Today's Diagnoses     Right foot pain    -  1      Care Instructions    Lets have you start the treatments we discussed - stretching, icing, massaging, aleve and no barefoot walking.   If not improving would have you see the foot doctor          Follow-ups after your visit        Follow-up notes from your care team     Return in about 2 weeks (around 7/24/2018) for recheck if symptoms are not improving..      Who to contact     If you have questions or need follow up information about today's clinic visit or your schedule please contact [facility] directly at [phone].  Normal or non-critical lab and imaging results will be communicated to you by MyChart, letter or phone within 4 business days after the clinic has received the results. If you do not hear from us within 7 days, please contact the clinic through [employer] or phone. If you have a critical or abnormal lab result, we will notify you by phone as soon as possible.  Submit refill requests through [employer] or call your pharmacy and they will forward the refill request to us. Please allow 3 business days for your refill to be completed.          Additional Information About Your Visit        MyChart Information     [employer] gives you secure access to your electronic health record. If you see a primary care provider, you can also send messages to your care team and make appointments. If you have questions, please call your primary care clinic.  If you do not have a primary care provider, please call [phone] and they will assist you.        Care EveryWhere ID     This is your Care EveryWhere ID. This could be used by other organizations to access your  "[place] medical records  [id number]        Your Vitals Were     Pulse Temperature Respirations Height Pulse Oximetry BMI (Body Mass Index)    80 98.3  F (36.8  C) (Tympanic) 16 5' 1\" (1.549 m) 98% 35.6 kg/m2       Blood Pressure from Last 3 Encounters:   07/10/18 114/82   06/11/18 124/82   03/06/18 (!) 122/91    Weight from Last 3 Encounters:   07/10/18 188 lb 6.4 oz (85.5 kg)   06/11/18 187 lb 1.6 oz (84.9 kg)   03/06/18 185 lb (83.9 kg)              Today, you had the following     No orders found for display       Primary Care Provider Office Phone # Fax #    Jamison Florian [phone] [address] [place] [address]        Equal Access to Services     [facility]: Hadii aad analy hadasho Soomaali, waaxda luqadaha, qaybta kaalmada adeegyada, helio cunningham hayperlita chaney . So [facility] [phone].    ATENCIÓN: Si habla español, tiene a camacho disposición servicios gratuitos de asistencia lingüística. Llame al [phone].    We comply with applicable federal civil rights laws and [place] laws. We do not discriminate on the basis of race, color, national origin, age, disability, sex, sexual orientation, or gender identity.            Thank you!     Thank you for choosing [facility]  for your care. Our goal is always to provide you with excellent care. Hearing back from our patients is one way we can continue to improve our services. Please take a few minutes to complete the written survey that you may receive in the mail after your visit with us. Thank you!             Your Updated Medication List - Protect others around you: Learn how to safely use, store and throw away your medicines at www.disposemymeds.org.          This list is accurate as of 7/10/18  4:07 PM.  Always use your most recent med list.                   Brand Name Dispense Instructions for use Diagnosis    escitalopram 10 MG tablet    LEXAPRO    30 tablet    Take 1 tablet (10 mg) by " mouth daily    Moderate major depression (H)       rOPINIRole 0.25 MG tablet    REQUIP    60 tablet    Take 1-2 tablets (0.25-0.5 mg) by mouth At Bedtime    Restless legs syndrome (RLS)

## 2018-07-10 NOTE — PATIENT INSTRUCTIONS
Lets have you start the treatments we discussed - stretching, icing, massaging, aleve and no barefoot walking.   If not improving would have you see the foot doctor

## 2018-07-25 ENCOUNTER — MYC REFILL (OUTPATIENT)
Dept: INTERNAL MEDICINE | Facility: CLINIC | Age: 33
End: 2018-07-25

## 2018-07-25 DIAGNOSIS — G25.81 RESTLESS LEGS SYNDROME (RLS): ICD-10-CM

## 2018-07-25 RX ORDER — ROPINIROLE 0.25 MG/1
0.25-0.5 TABLET, FILM COATED ORAL AT BEDTIME
Qty: 60 TABLET | Refills: 1 | Status: CANCELLED | OUTPATIENT
Start: 2018-07-25

## 2018-07-26 RX ORDER — ROPINIROLE 0.25 MG/1
0.25-0.5 TABLET, FILM COATED ORAL AT BEDTIME
Qty: 60 TABLET | Refills: 10 | Status: SHIPPED | OUTPATIENT
Start: 2018-07-26 | End: 2018-08-26

## 2018-07-26 NOTE — TELEPHONE ENCOUNTER
Message from MyChart:  Original authorizing provider: MD Dot Louis would like a refill of the following medications:  rOPINIRole (REQUIP) 0.25 MG tablet [Bassam Narvaez MD]    Preferred pharmacy: [facility] - [address]    Comment:

## 2018-07-26 NOTE — TELEPHONE ENCOUNTER
"Requested Prescriptions   Pending Prescriptions Disp Refills     rOPINIRole (REQUIP) 0.25 MG tablet 60 tablet 1     Sig: Take 1-2 tablets (0.25-0.5 mg) by mouth At Bedtime    Antiparkinson's Agents Protocol Passed    7/26/2018  9:22 AM       Passed - Blood pressure under 140/90 in past 12 months    BP Readings from Last 3 Encounters:   07/10/18 114/82   06/11/18 124/82   03/06/18 (!) 122/91                Passed - CBC on record in past 12 months    Recent Labs   Lab Test  06/11/18   0909   WBC  7.2   RBC  4.93   HGB  13.9   HCT  41.6   PLT  278       For G[facility] ONLY: PCSB500 = WBC, LIZE630 = RBC         Passed - ALT on record in past 12 months        Recent Labs   Lab Test  06/11/18   0909   ALT  46            Passed - Serum Creatinine on file in past 12 months    Recent Labs   Lab Test  06/11/18   0909   CR  0.56            Passed - Patient is age 18 or older       Passed - No active pregnancy on record       Passed - No positive pregnancy test in the past 12 months       Passed - Recent (6 mo) or future (30 days) visit within the authorizing provider's specialty    Patient had office visit in the last 6 months or has a visit in the next 30 days with authorizing provider or within the authorizing provider's specialty.  See \"Patient Info\" tab in inbasket, or \"Choose Columns\" in Meds & Orders section of the refill encounter.              Prescription approved per [facility] Refill Protocol.    "

## 2018-07-26 NOTE — TELEPHONE ENCOUNTER
Message from MyChart:  Original authorizing provider: MD Dot Louis would like a refill of the following medications:  rOPINIRole (REQUIP) 0.25 MG tablet [Bassam Narvaez MD]    Preferred pharmacy: [facility] - [address]    Comment:

## 2018-07-30 ENCOUNTER — OFFICE VISIT (OUTPATIENT)
Dept: PODIATRY | Facility: CLINIC | Age: 33
End: 2018-07-30
Payer: COMMERCIAL

## 2018-07-30 VITALS — WEIGHT: 191.9 LBS | BODY MASS INDEX: 36.26 KG/M2 | DIASTOLIC BLOOD PRESSURE: 80 MMHG | SYSTOLIC BLOOD PRESSURE: 114 MMHG

## 2018-07-30 DIAGNOSIS — G57.91 NEURITIS OF RIGHT HEEL: ICD-10-CM

## 2018-07-30 DIAGNOSIS — M72.2 PLANTAR FASCIAL FIBROMATOSIS: Primary | ICD-10-CM

## 2018-07-30 PROCEDURE — 99243 OFF/OP CNSLTJ NEW/EST LOW 30: CPT | Performed by: PODIATRIST

## 2018-07-30 NOTE — PATIENT INSTRUCTIONS
Thank you for choosing [facility]!    DR. ACUÑA'S CLINIC LOCATIONS:   MONDAY - [place] TUESDAY - [place]   [address] [facility]  [address]   [place] [address] [place] [address]   [phone]       THURSDAY AM - [place] THURSDAY PM - [place]   [address]   [place] [address] [place] [address]   [phone]       FRIDAY AM - [place] SET UP SURGERY: [phone] [address] APPOINTMENTS: [phone]   [place] [address] BILLING QUESTIONS: [phone] FAX NUMBER: [phone]     Follow Up: 4 wks    PLANTAR FASCIITIS    Plantar fasciitis is often referred to as heel spurs or heel pain. Plantar fasciitis is a very common problem that affects people of all foot shapes, age, weight and activity level. Pain may be in the arch or on the weight-bearing surface of the heel. The pain may come on without injury or identifiable cause. Pain is generally present when first getting out of bed in the morning or up from a seated break.     CAUSES  The plantar fascia is a dense fibrous band of tissue that stretches across the bottom surface of the foot. The fascia helps support the foot muscles and arch. Plantar fasciitis is thought to be caused by mechanical strain or overload. Frequent walking without shoes or wearing unsupportive shoes is thought to cause structural overload and ultimately inflammation of the plantar fascia. Some people have heel spurs that can be seen on x-ray. The heel spur is actually a minor component of plantar fascitis and is largely ignored.       SELF TREATMENT   The easiest solution is to stop walking around your home without shoes. Plantar fasciitis is largely a shoe problem. Shoes are either not being worn often enough or your current shoes are inadequate for your weight, foot structure or activity level. The majority of shoes on the market today are not  sufficient to resist development of plantar fasciitis or to promote healing. Assume that your current shoes are inadequate and will need to be replaced. Even high quality shoes wear out with 6 months to one year of frequent use. Weight loss is another option. Losing ten pounds in the next two months may be enough to resolve the problem. Ice applied to the area of pain two to three times per day for ten minutes each session can be very helpful. This should continue until the problem resolves. Achilles tendon stretching is essential. Stretch multiple times daily to promote healing and to prevent recurrence in the future.     MEDICAL TREATMENT  Medical treatments often include custom arch supports, cortisone injections, physical therapy, splints to be worn in bed, prescription medications and surgery. The home treatments listed above will be necessary regardless of these advanced medical treatments. Surgery is rarely needed but is very helpful in selected cases.     PROGNOSIS  Plantar fasciitis can last from one day to a lifetime. Some people get intermittent fascitis that is very short-lived. Others suffer daily for years. Excessive body weight, frequent bare foot walking, long hours on the feet, inadequate shoes, predisposing foot structures and excessive activity such as running are all potential issues that lead to chronic and/or recurring plantar fascitis. Having plantar fasciitis means that you are forever prone to this problem and will require modification of some of the above factors. Most people seek treatment within one to four months. Healing usually requires a similar one to four month time frame. Healing time is relative to the amount of effort spent treating the problem.   Plantar fasciitis is highly recurrent. Risk factors often continue, including return to bare foot walking, inadequate shoes, excessive body weight, excessive activities, etc. Your life style and foot structure may predispose you to  recurrent plantar fasciitis. A daily prevention regimen can be very helpful. Ongoing use of shoe inserts, careful attention to appropriate shoes, daily Achilles stretching, etc. may prevent recurrence. Prompt attention at the earliest warning signs of heel pain can resolve the problem in as short as a few days.     EXERCISES    Stair Exercise: Step on the stairs with the ball of your foot and hold your position for at least 15 seconds, then slowly step down with the heels of your foot. You can do this daily and as often as you want.   Picking the Towel: Sit comfortably and then pick the towel up with your toes. You can use any object other than a towel as long as the material can be soft and you can pick it up with your toes.  Rolling the Bottle: Use a small ball or frozen water bottle and then roll it around with your foot.   Flex the Toes: Sit comfortably and then flex your toes by pointing it towards the floor or towards your body. This will relax and flex your foot and exercise your plantar fascia, the calf, and the Achilles tendon. The inability of the foot to stretch often causes the bunching up of the plantar fascia area leading to the pain.  Calf/Achilles Stretching: Lay on you back and raise one foot, then point your toes towards the floor. See photo below:               Hold each stretch for 10 seconds. Stretch 10 times per set, three sets per day. Morning, afternoon and evening. If your heel pain is very severe in the morning, consider doing the first set of stretches before you get out of bed.    THERAPIES COVERED:  1.  Supportive Shoes: minimizing barefoot ambulation helps to provide cushion, padding and support to the ligament that is inflamed. Socks, flip flops, flats and some slippers are not typically sufficient to provide support. Shoes should be worn even indoors  2.  Insert/Orthotics: ones with an arch support built in to them provide further stress relief for the ligament. See the information  below on recommended inserts.  3. Icing: using a frozen water bottle or orange, and rolling it along the bottom of the arch/heel can help to alleviate discomfort, and can act as a tissue massage to the painful, inflamed ligament.  There is evidence that shows icing at least three times daily can be beneficial  4.  Antiinflammatory (NSAID): Ibuprofen, Aleve, as well as Tylenol can be used to help decrease symptoms and improve pain levels. If you have high blood pressure, heart disease, stomach or kidney problems, use antiinflammatories sparingly. Tylenol should not be used if you have liver problems.   5. Activity Modifications: if there are certain things that you do, whether it's going barefoot or certain shoes/activities, you should try to minimize those activities as much as possible until your symptoms are sufficiently resolved. Certainly, some activities, such as running on the treadmill, are easier to take a break from versus others, such as work or chores at home. If there are certain activities that hurt your heel, and you keep doing those activities that hurt your heel, your heel will keep hurting.  **If these initial therapies are insufficient, we have our tier 2 therapies that can more aggressively work to improve your symptoms and get you back to the activities that you enjoy!    OVER THE COUNTER INSERTS    SuperFeet   Sofsole Fit Spenco   Power Step   Walk-Fit Arch Cradles     Most of these can be found at your local [employer], sporting [employer], or online.  **A good high quality over the counter insert should cost around $40-$50      [employer] LOCATIONS    [address]  [phone]   [address]  [phone] [place]  [address] [place] [address]  [phone]   [place]  [address] [place] [address] [address].  [phone]   [place]  [address]  [phone] [place] [address]  [phone]   [place]  [address] [place] [address]  [phone]    [place]  [address] [facility]  [phone] [place]  [address] [facility] [phone]           Body Mass Index (BMI)  Many things can cause foot and ankle problems. Foot structure, activity level, foot mechanics and injuries are common causes of pain. One very important issue that often goes unmentioned, is body weight. Extra weight can cause increased stress on muscles, ligaments, bones and tendons. Sometimes just a few extra pounds is all it takes to put one over her/his threshold. Without reducing that stress, it can be difficult to alleviate pain. Some people are uncomfortable addressing this issue, but we feel it is important for you to think about it. As Foot &  Ankle specialists, our job is addressing the lower extremity problem and possible causes. Regarding extra body weight, we encourage patients to discuss diet and weight management plans with their primary care doctors. It is this team approach that gives you the best opportunity for pain relief and getting you back on your feet.

## 2018-07-30 NOTE — MR AVS SNAPSHOT
After Visit Summary   7/30/2018    Dot Ramirez    MRN: [id number]           Patient Information     Date Of Birth          1985        Visit Information        Provider Department      7/30/2018 1:45 PM Roberto Carlos Vo DPM [facility]        Care Instructions    Thank you for choosing [facility]!    DR. VO'S CLINIC LOCATIONS:   MONDAY - [place] TUESDAY - [place]   [address] [place] [address]  [address]   [place] [address] [place] [address]   [phone]       THURSDAY AM - [place] THURSDAY PM - [place]   [address]   [place] [address] [place] [address]   [phone]       FRIDAY AM - [place] SET UP SURGERY: [phone] [address] APPOINTMENTS: [phone]   [place] [address] BILLING QUESTIONS: [phone] FAX NUMBER: [phone]     Follow Up: 4 wks    PLANTAR FASCIITIS    Plantar fasciitis is often referred to as heel spurs or heel pain. Plantar fasciitis is a very common problem that affects people of all foot shapes, age, weight and activity level. Pain may be in the arch or on the weight-bearing surface of the heel. The pain may come on without injury or identifiable cause. Pain is generally present when first getting out of bed in the morning or up from a seated break.     CAUSES  The plantar fascia is a dense fibrous band of tissue that stretches across the bottom surface of the foot. The fascia helps support the foot muscles and arch. Plantar fasciitis is thought to be caused by mechanical strain or overload. Frequent walking without shoes or wearing unsupportive shoes is thought to cause structural overload and ultimately inflammation of the plantar fascia. Some people have heel spurs that can be seen on x-ray. The heel spur is actually a minor component of plantar fascitis and is largely ignored.       SELF  TREATMENT   The easiest solution is to stop walking around your home without shoes. Plantar fasciitis is largely a shoe problem. Shoes are either not being worn often enough or your current shoes are inadequate for your weight, foot structure or activity level. The majority of shoes on the market today are not sufficient to resist development of plantar fasciitis or to promote healing. Assume that your current shoes are inadequate and will need to be replaced. Even high quality shoes wear out with 6 months to one year of frequent use. Weight loss is another option. Losing ten pounds in the next two months may be enough to resolve the problem. Ice applied to the area of pain two to three times per day for ten minutes each session can be very helpful. This should continue until the problem resolves. Achilles tendon stretching is essential. Stretch multiple times daily to promote healing and to prevent recurrence in the future.     MEDICAL TREATMENT  Medical treatments often include custom arch supports, cortisone injections, physical therapy, splints to be worn in bed, prescription medications and surgery. The home treatments listed above will be necessary regardless of these advanced medical treatments. Surgery is rarely needed but is very helpful in selected cases.     PROGNOSIS  Plantar fasciitis can last from one day to a lifetime. Some people get intermittent fascitis that is very short-lived. Others suffer daily for years. Excessive body weight, frequent bare foot walking, long hours on the feet, inadequate shoes, predisposing foot structures and excessive activity such as running are all potential issues that lead to chronic and/or recurring plantar fascitis. Having plantar fasciitis means that you are forever prone to this problem and will require modification of some of the above factors. Most people seek treatment within one to four months. Healing usually requires a similar one to four month time frame.  Healing time is relative to the amount of effort spent treating the problem.   Plantar fasciitis is highly recurrent. Risk factors often continue, including return to bare foot walking, inadequate shoes, excessive body weight, excessive activities, etc. Your life style and foot structure may predispose you to recurrent plantar fasciitis. A daily prevention regimen can be very helpful. Ongoing use of shoe inserts, careful attention to appropriate shoes, daily Achilles stretching, etc. may prevent recurrence. Prompt attention at the earliest warning signs of heel pain can resolve the problem in as short as a few days.     EXERCISES    Stair Exercise: Step on the stairs with the ball of your foot and hold your position for at least 15 seconds, then slowly step down with the heels of your foot. You can do this daily and as often as you want.   Picking the Towel: Sit comfortably and then pick the towel up with your toes. You can use any object other than a towel as long as the material can be soft and you can pick it up with your toes.  Rolling the Bottle: Use a small ball or frozen water bottle and then roll it around with your foot.   Flex the Toes: Sit comfortably and then flex your toes by pointing it towards the floor or towards your body. This will relax and flex your foot and exercise your plantar fascia, the calf, and the Achilles tendon. The inability of the foot to stretch often causes the bunching up of the plantar fascia area leading to the pain.  Calf/Achilles Stretching: Lay on you back and raise one foot, then point your toes towards the floor. See photo below:               Hold each stretch for 10 seconds. Stretch 10 times per set, three sets per day. Morning, afternoon and evening. If your heel pain is very severe in the morning, consider doing the first set of stretches before you get out of bed.    THERAPIES COVERED:  1.  Supportive Shoes: minimizing barefoot ambulation helps to provide cushion,  padding and support to the ligament that is inflamed. Socks, flip flops, flats and some slippers are not typically sufficient to provide support. Shoes should be worn even indoors  2.  Insert/Orthotics: ones with an arch support built in to them provide further stress relief for the ligament. See the information below on recommended inserts.  3. Icing: using a frozen water bottle or orange, and rolling it along the bottom of the arch/heel can help to alleviate discomfort, and can act as a tissue massage to the painful, inflamed ligament.  There is evidence that shows icing at least three times daily can be beneficial  4.  Antiinflammatory (NSAID): Ibuprofen, Aleve, as well as Tylenol can be used to help decrease symptoms and improve pain levels. If you have high blood pressure, heart disease, stomach or kidney problems, use antiinflammatories sparingly. Tylenol should not be used if you have liver problems.   5. Activity Modifications: if there are certain things that you do, whether it's going barefoot or certain shoes/activities, you should try to minimize those activities as much as possible until your symptoms are sufficiently resolved. Certainly, some activities, such as running on the treadmill, are easier to take a break from versus others, such as work or chores at home. If there are certain activities that hurt your heel, and you keep doing those activities that hurt your heel, your heel will keep hurting.  **If these initial therapies are insufficient, we have our tier 2 therapies that can more aggressively work to improve your symptoms and get you back to the activities that you enjoy!    OVER THE COUNTER INSERTS    SuperFeet   Sofsole Fit Spenco   Power Step   Walk-Fit Arch Cradles     Most of these can be found at your local [employer], sporting [employer], or online.  **A good high quality over the counter insert should cost around $40-$50      [employer] [facility]  [address]  [facility]  [phone]   [address]  [phone] [place]  [address]  [phone]   [place]  [address]  [phone]   [place]  [address]  [phone] [place]  [address]  [phone]   [place]  [address]  [phone]   [place]  [address] [facility]  [phone] [place]  [address]  [phone]           Body Mass Index (BMI)  Many things can cause foot and ankle problems. Foot structure, activity level, foot mechanics and injuries are common causes of pain. One very important issue that often goes unmentioned, is body weight. Extra weight can cause increased stress on muscles, ligaments, bones and tendons. Sometimes just a few extra pounds is all it takes to put one over her/his threshold. Without reducing that stress, it can be difficult to alleviate pain. Some people are uncomfortable addressing this issue, but we feel it is important for you to think about it. As Foot &  Ankle specialists, our job is addressing the lower extremity problem and possible causes. Regarding extra body weight, we encourage patients to discuss diet and weight management plans with their primary care doctors. It is this team approach that gives you the best opportunity for pain relief and getting you back on your feet.            Follow-ups after your visit        Who to contact     If you have questions or need follow up information about today's clinic visit or your schedule please contact [facility] directly at [phone].  Normal or non-critical lab and imaging results will be communicated to you by MyChart, letter or phone within 4 business days after the clinic has received the results. If you do not hear from us within 7 days, please contact the clinic through MyChart or phone. If you have a critical or abnormal lab result, we will notify you by phone as soon as possible.  Submit refill requests  through [employer] or call your pharmacy and they will forward the refill request to us. Please allow 3 business days for your refill to be completed.          Additional Information About Your Visit        [employer] Information     [employer] gives you secure access to your electronic health record. If you see a primary care provider, you can also send messages to your care team and make appointments. If you have questions, please call your primary care clinic.  If you do not have a primary care provider, please call [phone] and they will assist you.        Care EveryWhere ID     This is your Care EveryWhere ID. This could be used by other organizations to access your [facility] medical records  [id number]        Your Vitals Were     BMI (Body Mass Index)                   36.26 kg/m2            Blood Pressure from Last 3 Encounters:   07/30/18 114/80   07/10/18 114/82   06/11/18 124/82    Weight from Last 3 Encounters:   07/30/18 191 lb 14.4 oz (87 kg)   07/10/18 188 lb 6.4 oz (85.5 kg)   06/11/18 187 lb 1.6 oz (84.9 kg)              Today, you had the following     No orders found for display       Primary Care Provider Office Phone # Fax #    Jamison Florian [phone]       [address] [place] [address]        Equal Access to Services     [facility] AH: Hadii aad ku hadasho Soomaali, waaxda luqadaha, qaybta kaalmada adeegyada, helio aguilerain hayaan kaye chaney . So [facility] [phone].    ATENCIÓN: Si habla español, tiene a camacho disposición servicios gratuitos de asistencia lingüística. Llame al [phone].    We comply with applicable federal civil rights laws and [place] laws. We do not discriminate on the basis of race, color, national origin, age, disability, sex, sexual orientation, or gender identity.            Thank you!     Thank you for choosing [facility]  for your care. Our goal is always to provide you with excellent care. Hearing back from our  patients is one way we can continue to improve our services. Please take a few minutes to complete the written survey that you may receive in the mail after your visit with us. Thank you!             Your Updated Medication List - Protect others around you: Learn how to safely use, store and throw away your medicines at www.disposemymeds.org.          This list is accurate as of 7/30/18  2:10 PM.  Always use your most recent med list.                   Brand Name Dispense Instructions for use Diagnosis    escitalopram 10 MG tablet    LEXAPRO    30 tablet    Take 1 tablet (10 mg) by mouth daily    Moderate major depression (H)       rOPINIRole 0.25 MG tablet    REQUIP    60 tablet    Take 1-2 tablets (0.25-0.5 mg) by mouth At Bedtime    Restless legs syndrome (RLS)

## 2018-08-10 NOTE — PROGRESS NOTES
"Foot & Ankle Surgery  2018    CC: \"heel pain\"    I was asked to see Johnmacy Ro Ashley regarding the chief complaint by:  FILOMENA Lynch PA-C    HPI:  Pt is a 33 year old female who presents with above complaint.  Right heel pain x 1 month.  Describes a deep ache.  Pain 8/10, \"in AM it's very painful and when I walk\".  Worse with walking.  She has tried tennis shoes vs flip flops, stretching, icing.  xrays 2017 showed plantar heel spur.  Around , having a garage sale, lots of loading/transportingj.  Describes post-static dyskinesia.  Can adiate up leg.  Was hurting under \"thumper\"(1st MPJ)    ROS:   Pos for CC.  The patient denies current nausea, vomiting, chills, fevers, belly pain, calf pain, chest pain or SOB.  Complete remainder of ROS is otherwise neg.    VITALS:    Vitals:    18 1348   BP: 114/80   Weight: 191 lb 14.4 oz (87 kg)       PMH:    Past Medical History:   Diagnosis Date     Abnormal Pap smear of cervix 2010    see problem list     Anxiety      Moderate major depression (H) 2014    onset with fetal loss       SXHX:    Past Surgical History:   Procedure Laterality Date     CERCLAGE CERVICAL N/A 2015    Procedure: CERCLAGE CERVICAL;  Surgeon: Damion Nayak MD;  Location: [facility]     CERCLAGE CERVICAL N/A 2016    Procedure: CERCLAGE CERVICAL;  Surgeon: Damion Nayak MD;  Location: [facility]      SECTION N/A 2015    Procedure:  SECTION;  Surgeon: Jamison Florian MD;  Location: [facility] L[facility]      SECTION N/A 3/6/2017    Procedure:  SECTION;  Surgeon: Jamison Florian MD;  Location: [facility] OR     Cystectomy hand  2016     EXCISE MASS FINGER Left 2016    Procedure: Excision of cystic mass, left ring finger. Surgeon:  Rosales Jones MD  [facility]     EXTRACTION(S) DENTAL  2009        MEDS:    Current Outpatient Prescriptions   Medication     escitalopram (LEXAPRO) 10 MG tablet     rOPINIRole " (REQUIP) 0.25 MG tablet     No current facility-administered medications for this visit.        ALL:   No Known Allergies    FMH:    Family History   Problem Relation Age of Onset     Family History Negative Mother      born      Neurologic Disorder Father      born  [place] Rosemary Tooth     Prostate Cancer Father      Diabetes Maternal Grandmother       99yo Type II     Alzheimer Disease Maternal Grandfather 89      94yo      Family History Negative Paternal Grandmother           Family History Negative Paternal Grandfather           Family History Negative Brother      1/2 brother Hemochromatosis       SocHx:    Social History     Social History     Marital status:      Spouse name: Ankit     Number of children: N/A     Years of education: N/A     Occupational History           Social History Main Topics     Smoking status: Never Smoker     Smokeless tobacco: Never Used     Alcohol use No     Drug use: No     Sexual activity: Yes     Partners: Male     Other Topics Concern     Not on file     Social History Narrative           EXAMINATION:  Gen:   No apparent distress  Neuro:   A&Ox3, no deficits  Psych:    Answering questions appropriately for age and situation with normal affect  Head:    NCAT  Eye:    Visual scanning without deficit  Ear:    Response to auditory stimuli wnl  Lung:    Non-labored breathing on RA noted  Abd:    NTND per patient report  Lymph:    Neg for pitting/non-pitting edema BLE  Vasc:    Pulses palpable, CFT minimally delayed  Neuro:    Light touch sensation intact to all sensory nerve distributions without paresthesias  Derm:    Neg for nodules, lesions or ulcerations  MSK:    Right lower extremity - plantar and plantar-medial heel pain.  ICN painful.  Mild calcaneal compression pain  Calf:    Neg for redness, swelling or tenderness    Assessment:  33 year old female with right lower extremity plantar fasciitis with Payan's  neuritis      Plan:  Discussed etiologies, anatomy and options  1.  Right lower extremity plantar fasciitis with Payan's neuritis   -Regarding the plantar fasciitis, the Plantar Fasciitis handout was dispensed and discussed.  We talked about stretching, resting/activity modification, icing, NSAID/tylenol use as tolerated, inserts, supportive/comfortable shoes and minimizing shoeless walking.    -discussed Achilles, plantar fascial and hamstring stretches  -OTC insert information dispensed and discussed   -consider diagnostic/therapeutic Payan's nerve block    Follow up:  4 weeks or sooner with acute issues      Patient's medical history was reviewed today    Body mass index is 36.26 kg/(m^2).  Weight management plan: Patient was referred to their PCP to discuss a diet and exercise plan.        Roberto Carlos Vo DPM FACFAS FACFAOM  Podiatric Foot & Ankle Surgeon  [facility]  [phone]

## 2018-08-26 ENCOUNTER — MYC REFILL (OUTPATIENT)
Dept: INTERNAL MEDICINE | Facility: CLINIC | Age: 33
End: 2018-08-26

## 2018-08-26 DIAGNOSIS — G25.81 RESTLESS LEGS SYNDROME (RLS): ICD-10-CM

## 2018-08-27 RX ORDER — ROPINIROLE 0.25 MG/1
0.25-0.5 TABLET, FILM COATED ORAL AT BEDTIME
Qty: 180 TABLET | Refills: 0 | Status: SHIPPED | OUTPATIENT
Start: 2018-08-27 | End: 2018-11-28

## 2018-08-27 NOTE — TELEPHONE ENCOUNTER
"Requested Prescriptions   Pending Prescriptions Disp Refills     rOPINIRole (REQUIP) 0.25 MG tablet [Pharmacy Med Name: ROPINIROLE HCL 0.25MG TABS] 60 tablet 1    Last Written Prescription Date:  08/27/2018  Last Fill Quantity: 180,  # refills: 0   Last office visit: 6/11/2018 with prescribing provider:     Future Office Visit:   Sig: TAKE ONE TO TWO TABLETS BY MOUTH AT BEDTIME    Antiparkinson's Agents Protocol Passed    8/26/2018 10:33 PM       Passed - Blood pressure under 140/90 in past 12 months    BP Readings from Last 3 Encounters:   07/30/18 114/80   07/10/18 114/82   06/11/18 124/82                Passed - CBC on record in past 12 months    Recent Labs   Lab Test  06/11/18   0909   WBC  7.2   RBC  4.93   HGB  13.9   HCT  41.6   PLT  278       For [facility] ONLY: [id number] = WBC, [id number] = RBC         Passed - ALT on record in past 12 months        Recent Labs   Lab Test  06/11/18   0909   ALT  46            Passed - Serum Creatinine on file in past 12 months    Recent Labs   Lab Test  06/11/18   0909   CR  0.56            Passed - Patient is age 18 or older       Passed - No active pregnancy on record       Passed - No positive pregnancy test in the past 12 months       Passed - Recent (6 mo) or future (30 days) visit within the authorizing provider's specialty    Patient had office visit in the last 6 months or has a visit in the next 30 days with authorizing provider or within the authorizing provider's specialty.  See \"Patient Info\" tab in inbasket, or \"Choose Columns\" in Meds & Orders section of the refill encounter.            "

## 2018-08-27 NOTE — TELEPHONE ENCOUNTER
Requip refill request   Prescription approved per [facility] Ref[facility] Protocol.    Last OV 6/11/18.     BP Readings from Last 3 Encounters:   07/30/18 114/80   07/10/18 114/82   06/11/18 124/82     CBC RESULTS:   Recent Labs   Lab Test  06/11/18   0909   WBC  7.2   RBC  4.93   HGB  13.9   HCT  41.6   MCV  84   MCH  28.2   MCHC  33.4   RDW  13.4   PLT  278     Lab Results   Component Value Date    ALT 46 06/11/2018     Creatinine   Date Value Ref Range Status   06/11/2018 0.56 0.52 - 1.04 mg/dL Final

## 2018-08-27 NOTE — TELEPHONE ENCOUNTER
Message from MyChart:  Original authorizing provider: MD Dot Louis would like a refill of the following medications:  rOPINIRole (REQUIP) 0.25 MG tablet [Bassam Narvaez MD]    Preferred pharmacy: [facility] - [address]    Comment:

## 2018-08-29 ENCOUNTER — OFFICE VISIT (OUTPATIENT)
Dept: PODIATRY | Facility: CLINIC | Age: 33
End: 2018-08-29
Payer: COMMERCIAL

## 2018-08-29 VITALS
WEIGHT: 190 LBS | SYSTOLIC BLOOD PRESSURE: 110 MMHG | DIASTOLIC BLOOD PRESSURE: 76 MMHG | BODY MASS INDEX: 35.87 KG/M2 | HEIGHT: 61 IN

## 2018-08-29 DIAGNOSIS — M79.671 RIGHT FOOT PAIN: Primary | ICD-10-CM

## 2018-08-29 DIAGNOSIS — M72.2 PLANTAR FASCIITIS, RIGHT: ICD-10-CM

## 2018-08-29 PROCEDURE — 99213 OFFICE O/P EST LOW 20 MIN: CPT | Performed by: PODIATRIST

## 2018-08-29 RX ORDER — ROPINIROLE 0.25 MG/1
TABLET, FILM COATED ORAL
Qty: 60 TABLET | Refills: 1 | OUTPATIENT
Start: 2018-08-29

## 2018-08-29 RX ORDER — DEXAMETHASONE SODIUM PHOSPHATE 4 MG/ML
4 INJECTION, SOLUTION INTRA-ARTICULAR; INTRALESIONAL; INTRAMUSCULAR; INTRAVENOUS; SOFT TISSUE ONCE
Qty: 30 ML | Refills: 0 | Status: SHIPPED | OUTPATIENT
Start: 2018-08-29 | End: 2021-01-29

## 2018-08-29 NOTE — MR AVS SNAPSHOT
After Visit Summary   8/29/2018    Dot Ramirez    MRN: [id number]           Patient Information     Date Of Birth          1985        Visit Information        Provider Department      8/29/2018 1:15 PM Josey Villalobos DPM, Podiatry/Foot and Ankle Surgery [facility]        Today's Diagnoses     Right foot pain    -  1    Plantar fasciitis, right          Care Instructions    Thank you for choosing [facility] / [facility]!    DR. VILLALOBOS'S CLINIC SCHEDULE  MONDAY AM - [place] TUESDAY - [place]   [address] [address] [place] [address] [place] [address]   [phone] / [phone] / [phone]       WEDNESDAY - [place] FRIDAY AM - [facility]   [address]   [place] [address] [place] [address]   [phone] / [phone]       FRIDAY PM - [place] SCHEDULE SURGERY: [phone]   [address] BILLING QUESTIONS: [phone]   [place] [address] AFTER HOURS: [phone]   [phone] / [phone] APPOINTMENTS: [phone]     Consumer Price Line (CPL) [phone]       PLANTAR FASCIITIS  Plantar fasciitis is often referred to as heel spurs or heel pain. Plantar fasciitis is a very common problem that affects people of all foot shapes, age, weight and activity level. Pain may be in the arch or on the weight-bearing surface of the heel. The pain may come on without injury or identifiable cause. Pain is generally present when first getting out of bed in the morning or up from a seated break.     CAUSES  The plantar fascia is a dense fibrous band of tissue that stretches across the bottom surface of the foot. The fascia helps support the foot muscles and arch. Plantar fasciitis is thought to be caused by mechanical strain or overload. Frequent walking without shoes or wearing unsupportive shoes is thought to cause structural overload and  ultimately inflammation of the plantar fascia. Some people have heel spurs that can be seen on x-ray. The heel spur is actually a minor component of plantar fascitis and is largely ignored.       SELF TREATMENT   The easiest solution is to stop walking around your home without shoes. Plantar fasciitis is largely a shoe problem. Shoes are either not being worn often enough or your current shoes are inadequate for your weight, foot structure or activity level. The majority of shoes on the market today are not sufficient to resist development of plantar fasciitis or to promote healing. Assume that your current shoes are inadequate and will need to be replaced. Even high quality shoes wear out with 6 months to one year of frequent use. Weight loss is another option. Losing ten pounds in the next two months may be enough to resolve the problem. Ice applied to the area of pain two to three times per day for ten minutes each session can be very helpful. Warm foot soaks in epsom salts can also relieve pain. This should continue until the problem resolves. Achilles tendon stretching is essential. Stretch multiple times daily to promote healing and to prevent recurrence in the future. Over all stretching of the body is helpful as well such as the calves, thighs and lower back. Normally when one area of the body is tight, other areas are too. Gentle Yoga can be good for this.     Over the counter topical anti inflammatories can be helpful such as biofreeze, bengay, salon pas, ect...  Oral ibuprofen or aleve is recommended as well to try to calm down inflammation.     Night splints can be helpful to gradually stretch the foot at night as a lot of pain is when you get up in the morning. Taking a towel or thera band and stretching the foot back multiple times before you get ou of bed can be beneficial as well.     MEDICAL TREATMENT  Medical treatments often include custom arch supports, cortisone injections, physical therapy,  splints to be worn in bed, prescription medications and surgery. The home treatments listed above will be necessary regardless of these advanced medical treatments. Surgery is rarely needed but is very helpful in selected cases.     PROGNOSIS  Plantar fasciitis can last from one day to a lifetime. Some people get intermittent fascitis that is very short-lived. Others suffer daily for years. Excessive body weight, frequent bare foot walking, long hours on the feet, inadequate shoes, predisposing foot structures and excessive activity such as running are all potential issues that lead to chronic and/or recurring plantar fascitis. Having plantar fasciitis means that you are forever prone to this problem and will require modification of some of the above factors. Most people seek treatment within one to four months. Healing usually requires a similar one to four month time frame. Healing time is relative to the amount of effort spent treating the problem.   Plantar fasciitis is highly recurrent. Risk factors often continue, including return to bare foot walking, inadequate shoes, excessive body weight, excessive activities, etc. Your life style and foot structure may predispose you to recurrent plantar fasciitis. A daily prevention regimen can be very helpful. Ongoing use of shoe inserts, careful attention to appropriate shoes, daily Achilles stretching, etc. may prevent recurrence. Prompt attention at the earliest warning signs of heel pain can resolve the problem in as short as a few days.     EXERCISES  Stair Exercise: Step on the stairs with the ball of your foot and hold your position for at least 15 seconds, then slowly step down with the heels of your foot. You can do this daily and as often as you want.   Picking the Towel: Sit comfortably and then pick the towel up with your toes. You can use any object other than a towel as long as the material can be soft and you can pick it up with your toes.  Rolling the  Bottle: Use a small ball or frozen water bottle and then roll it around with your foot.   Flex the Toes: Sit comfortably and then flex your toes by pointing it towards the floor or towards your body. This will relax and flex your foot and exercise your plantar fascia, the calf, and the Achilles tendon. The inability of the foot to stretch often causes the bunching up of the plantar fascia area leading to the pain.  Calf/Achilles Stretching: Lay on you back and raise one foot, then point your toes towards the floor. See photo below:               Hold each stretch for 10 seconds. Stretch 10 times per set, three sets per day. Morning, afternoon and evening. If your heel pain is very severe in the morning, consider doing the first set of stretches before you get out of bed.      OVER THE COUNTER INSERT RECOMMENDATIONS  SuperFeet   Sofsole Fit Spenco   Power Step   Walk-Fit Arch Cradles     Most of these can be found at your local [employer], [employer], or online.  **A good high quality over the counter insert should cost around $40-$50      [employer] [address]  [phone]   [address]  [phone] [place]  [address] [place] [address]  [phone]   [address]  [phone]   [place]  [address]  [phone] [place] [address]  [phone]   [place]  [address] [place], [place] [address]or [facility]  [phone]   [place]  [address]  [phone] [place]  [address]  [phone]       Home Medical Equipment:  1. [facility]    [facility] -[address]  [address]   [place] [phone]     2. [facility] - [address] [facility] [place], [phone]          Body Mass Index (BMI)  Many things can cause foot and ankle problems. Foot structure, activity level, foot mechanics and  injuries are common causes of pain.  One very important issue that often goes unmentioned, is body weight. Extra weight can cause increased stress on muscles, ligaments, bones and tendons.  Sometimes just a few extra pounds is all it takes to put one over her/his threshold. Without reducing that stress, it can be difficult to alleviate pain. Some people are uncomfortable addressing this issue, but we feel it is important for you to think about it. As Foot &  Ankle specialists, our job is addressing the lower extremity problem and possible causes. Regarding extra body weight, we encourage patients to discuss diet and weight management plans with their primary care doctors. It is this team approach that gives you the best opportunity for pain relief and getting you back on your feet.                  Follow-ups after your visit        Additional Services     [facility] PT, HAND, AND CHIROPRACTIC REFERRAL       **This order will print in the [facility] Scheduling Office**    Physical Therapy, Hand Therapy and Chiropractic Care are available through:    *[place]  *[facility]  *[facility]    Call one number to schedule at any of the above locations: [phone].    Your provider has referred you to: Physical Therapy at [facility] or [facility]    Indication/Reason for Referral: right foot plantar fasciitis  Onset of Illness: few months  Therapy Orders: Evaluate and Treat  Special Programs: None  Special Request: Exercise: Conditioning, Home Exercise Program, Posture/Body Mechanics and Stretching/Flexibility  Modalities: Iontophoresis (Please Order: Dexamethasone Sodium Phosphate - 4mg/ml injectable, 30 cc total volume) and Ultrasound    Gema Ribeiro      Additional Comments for the Therapist or Chiropractor:    Please be aware that coverage of these services is subject to the terms and limitations of your health insurance plan.  Call member services at your health plan with any  "benefit or coverage questions.      Please bring the following to your appointment:    *Your personal calendar for scheduling future appointments  *Comfortable clothing                  Who to contact     If you have questions or need follow up information about today's clinic visit or your schedule please contact [facility] directly at [phone].  Normal or non-critical lab and imaging results will be communicated to you by MyChart, letter or phone within 4 business days after the clinic has received the results. If you do not hear from us within 7 days, please contact the clinic through [employer] or phone. If you have a critical or abnormal lab result, we will notify you by phone as soon as possible.  Submit refill requests through [employer] or call your pharmacy and they will forward the refill request to us. Please allow 3 business days for your refill to be completed.          Additional Information About Your Visit        MyChart Information     [employer] gives you secure access to your electronic health record. If you see a primary care provider, you can also send messages to your care team and make appointments. If you have questions, please call your primary care clinic.  If you do not have a primary care provider, please call [phone] and they will assist you.        Care EveryWhere ID     This is your Care EveryWhere ID. This could be used by other organizations to access your [facility] medical records  [id number]        Your Vitals Were     Height BMI (Body Mass Index)                5' 1\" (1.549 m) 35.9 kg/m2           Blood Pressure from Last 3 Encounters:   08/29/18 110/76   07/30/18 114/80   07/10/18 114/82    Weight from Last 3 Encounters:   08/29/18 190 lb (86.2 kg)   07/30/18 191 lb 14.4 oz (87 kg)   07/10/18 188 lb 6.4 oz (85.5 kg)              We Performed the Following     [facility] PT, HAND, AND CHIROPRACTIC REFERRAL          Today's Medication Changes          These changes are " accurate as of 8/29/18  1:26 PM.  If you have any questions, ask your nurse or doctor.               Start taking these medicines.        Dose/Directions    dexamethasone 4 MG/ML injection   Commonly known as:  DECADRON   Used for:  Right foot pain, Plantar fasciitis, right   Started by:  Josey Villalobos DPM, Podiatry/Foot and Ankle Surgery        Dose:  4 mg   Apply 1 mL (4 mg) topically once for 1 dose For physical therapy, iontophoresis   Quantity:  30 mL   Refills:  0       order for DME   Used for:  Right foot pain, Plantar fasciitis, right   Started by:  Josey Villalobos DPM, Podiatry/Foot and Ankle Surgery        Equipment being ordered:tall aircast boot   Quantity:  1 Device   Refills:  0       order for DME   Used for:  Right foot pain, Plantar fasciitis, right   Started by:  Josey Villalobos DPM, Podiatry/Foot and Ankle Surgery        Equipment being ordered:  Even up   Quantity:  1 Device   Refills:  0            Where to get your medicines      These medications were sent to [place] [id number]     Phone:  [phone]     dexamethasone 4 MG/ML injection         Some of these will need a paper prescription and others can be bought over the counter.  Ask your nurse if you have questions.     Bring a paper prescription for each of these medications     order for DME    order for DME                Primary Care Provider Office Phone # Fax #    Jamison Florian [phone] [address] [place] [address]        Equal Access to Services     [facility] AH: Hadii reed ku hadasho Soomaali, waaxda luqadaha, qaybta kaalmada adeegyada, helio ashton. So [facility] [phone].    ATENCIÓN: Si habla español, tiene a camacho disposición servicios gratuitos de asistencia lingüística. Llame al [phone].    We comply with applicable federal civil rights laws and [place] laws.  We do not discriminate on the basis of race, color, national origin, age, disability, sex, sexual orientation, or gender identity.            Thank you!     Thank you for choosing [facility] [place]  for your care. Our goal is always to provide you with excellent care. Hearing back from our patients is one way we can continue to improve our services. Please take a few minutes to complete the written survey that you may receive in the mail after your visit with us. Thank you!             Your Updated Medication List - Protect others around you: Learn how to safely use, store and throw away your medicines at www.disposemymeds.org.          This list is accurate as of 8/29/18  1:26 PM.  Always use your most recent med list.                   Brand Name Dispense Instructions for use Diagnosis    dexamethasone 4 MG/ML injection    DECADRON    30 mL    Apply 1 mL (4 mg) topically once for 1 dose For physical therapy, iontophoresis    Right foot pain, Plantar fasciitis, right       escitalopram 10 MG tablet    LEXAPRO    30 tablet    Take 1 tablet (10 mg) by mouth daily    Moderate major depression (H)       order for DME     1 Device    Equipment being ordered:tall aircast boot    Right foot pain, Plantar fasciitis, right       order for DME     1 Device    Equipment being ordered:  Even up    Right foot pain, Plantar fasciitis, right       rOPINIRole 0.25 MG tablet    REQUIP    180 tablet    Take 1-2 tablets (0.25-0.5 mg) by mouth At Bedtime    Right foot pain, Plantar fasciitis, right

## 2018-08-29 NOTE — PATIENT INSTRUCTIONS
Thank you for choosing [facility]!    DR. CHUNG'S CLINIC SCHEDULE  MONDAY AM - [place] TUESDAY - [place]   [address] [address] [address] [address] [place] [address]   [phone] / [phone] / [phone]       WEDNESDAY - [place] FRIDAY AM - [facility]   [address]   [place] [address] [place] [address]   [phone] / [phone]       FRIDAY PM - [place] SCHEDULE SURGERY: [phone]   [address] [facility] BILLING QUESTIONS: [phone]   [place] [address] AFTER HOURS: [phone]   [phone] / [phone] APPOINTMENTS: [phone]     Consumer Price Line (CPL) [phone]       PLANTAR FASCIITIS  Plantar fasciitis is often referred to as heel spurs or heel pain. Plantar fasciitis is a very common problem that affects people of all foot shapes, age, weight and activity level. Pain may be in the arch or on the weight-bearing surface of the heel. The pain may come on without injury or identifiable cause. Pain is generally present when first getting out of bed in the morning or up from a seated break.     CAUSES  The plantar fascia is a dense fibrous band of tissue that stretches across the bottom surface of the foot. The fascia helps support the foot muscles and arch. Plantar fasciitis is thought to be caused by mechanical strain or overload. Frequent walking without shoes or wearing unsupportive shoes is thought to cause structural overload and ultimately inflammation of the plantar fascia. Some people have heel spurs that can be seen on x-ray. The heel spur is actually a minor component of plantar fascitis and is largely ignored.       SELF TREATMENT   The easiest solution is to stop walking around your home without shoes. Plantar fasciitis is largely a shoe problem. Shoes are either not being worn often enough or your current shoes are inadequate for your weight,  foot structure or activity level. The majority of shoes on the market today are not sufficient to resist development of plantar fasciitis or to promote healing. Assume that your current shoes are inadequate and will need to be replaced. Even high quality shoes wear out with 6 months to one year of frequent use. Weight loss is another option. Losing ten pounds in the next two months may be enough to resolve the problem. Ice applied to the area of pain two to three times per day for ten minutes each session can be very helpful. Warm foot soaks in epsom salts can also relieve pain. This should continue until the problem resolves. Achilles tendon stretching is essential. Stretch multiple times daily to promote healing and to prevent recurrence in the future. Over all stretching of the body is helpful as well such as the calves, thighs and lower back. Normally when one area of the body is tight, other areas are too. Gentle Yoga can be good for this.     Over the counter topical anti inflammatories can be helpful such as biofreeze, bengay, salon pas, ect...  Oral ibuprofen or aleve is recommended as well to try to calm down inflammation.     Night splints can be helpful to gradually stretch the foot at night as a lot of pain is when you get up in the morning. Taking a towel or thera band and stretching the foot back multiple times before you get ou of bed can be beneficial as well.     MEDICAL TREATMENT  Medical treatments often include custom arch supports, cortisone injections, physical therapy, splints to be worn in bed, prescription medications and surgery. The home treatments listed above will be necessary regardless of these advanced medical treatments. Surgery is rarely needed but is very helpful in selected cases.     PROGNOSIS  Plantar fasciitis can last from one day to a lifetime. Some people get intermittent fascitis that is very short-lived. Others suffer daily for years. Excessive body weight, frequent bare  foot walking, long hours on the feet, inadequate shoes, predisposing foot structures and excessive activity such as running are all potential issues that lead to chronic and/or recurring plantar fascitis. Having plantar fasciitis means that you are forever prone to this problem and will require modification of some of the above factors. Most people seek treatment within one to four months. Healing usually requires a similar one to four month time frame. Healing time is relative to the amount of effort spent treating the problem.   Plantar fasciitis is highly recurrent. Risk factors often continue, including return to bare foot walking, inadequate shoes, excessive body weight, excessive activities, etc. Your life style and foot structure may predispose you to recurrent plantar fasciitis. A daily prevention regimen can be very helpful. Ongoing use of shoe inserts, careful attention to appropriate shoes, daily Achilles stretching, etc. may prevent recurrence. Prompt attention at the earliest warning signs of heel pain can resolve the problem in as short as a few days.     EXERCISES  Stair Exercise: Step on the stairs with the ball of your foot and hold your position for at least 15 seconds, then slowly step down with the heels of your foot. You can do this daily and as often as you want.   Picking the Towel: Sit comfortably and then pick the towel up with your toes. You can use any object other than a towel as long as the material can be soft and you can pick it up with your toes.  Rolling the Bottle: Use a small ball or frozen water bottle and then roll it around with your foot.   Flex the Toes: Sit comfortably and then flex your toes by pointing it towards the floor or towards your body. This will relax and flex your foot and exercise your plantar fascia, the calf, and the Achilles tendon. The inability of the foot to stretch often causes the bunching up of the plantar fascia area leading to the pain.  Calf/Achilles  Stretching: Lay on you back and raise one foot, then point your toes towards the floor. See photo below:               Hold each stretch for 10 seconds. Stretch 10 times per set, three sets per day. Morning, afternoon and evening. If your heel pain is very severe in the morning, consider doing the first set of stretches before you get out of bed.      OVER THE COUNTER INSERT RECOMMENDATIONS  SuperFeet   Sofsole Fit Spenco   Power Step   Walk-Fit Arch Cradles     Most of these can be found at your local [employer], [employer], or online.  **A good high quality over the counter insert should cost around $40-$50      [facility] LOCATIONS  [place]  [address] [place] [address]  [phone]   [address]  [phone] [place]  [address] [place] [address]  [phone]   [place]  [address]  [phone]   [place]  [address]  [phone] [place] [address]  [phone]   [place]  [address]  [phone]   [place]  [address] [place] [address]  [phone] [place]  [address] [phone]       Home Medical Equipment:  1. [facility]    [facility] -[address]  [address]   [place] [phone]     2. [facility] - [address] [place] [address], [place], [phone]          Body Mass Index (BMI)  Many things can cause foot and ankle problems. Foot structure, activity level, foot mechanics and injuries are common causes of pain.  One very important issue that often goes unmentioned, is body weight. Extra weight can cause increased stress on muscles, ligaments, bones and tendons.  Sometimes just a few extra pounds is all it takes to put one over her/his threshold. Without reducing that stress, it can be difficult to alleviate pain. Some people are uncomfortable addressing this issue, but we feel it is important for you to  think about it. As Foot &  Ankle specialists, our job is addressing the lower extremity problem and possible causes. Regarding extra body weight, we encourage patients to discuss diet and weight management plans with their primary care doctors. It is this team approach that gives you the best opportunity for pain relief and getting you back on your feet.

## 2018-08-29 NOTE — LETTER
2018         RE: Dot Ramirez  [address] [facility] [address]        Dear Colleague,    Thank you for referring your patient, Dot Ramirez, to the [facility]. Please see a copy of my visit note below.    PATIENT HISTORY:   Dot Ramirez is a 33 year old female who presents to clinic for continued pain to the right heel. Notes it has been going on for a few months. Has been doing stretches, icing, and did get new shoes and inserts. Still gets 8/10 pain especially in the morning and by the end of the day. On her feet all day at work.     Review of Systems:  Patient denies fever, chills, rash, wound, stiffness, numbness, weakness, heart burn, blood in stool, chest pain with activity, calf pain when walking, shortness of breath with activity, chronic cough, easy bleeding/bruising, swelling of ankles, excessive thirst, fatigue, depression, anxiety.  Patient admits to limping.     PAST MEDICAL HISTORY:   Past Medical History:   Diagnosis Date     Abnormal Pap smear of cervix 2010    see problem list     Anxiety      Moderate major depression (H) 2014    onset with fetal loss        PAST SURGICAL HISTORY:   Past Surgical History:   Procedure Laterality Date     CERCLAGE CERVICAL N/A 2015    Procedure: CERCLAGE CERVICAL;  Surgeon: Damion Nayak MD;  [facility]: [facility]     CERCLAGE CERVICAL N/A 2016    Procedure: CERCLAGE CERVICAL;  Surgeon: Damion Nayak MD;  Location: [facility]      SECTION N/A 2015    Procedure:  SECTION;  Surgeon: Jamison Florian MD;  Location: [facility] L+[facility]      SECTION N/A 3/6/2017    Procedure:  SECTION;  Surgeon: Jamison Florian MD;  Location: [facility] OR     Cystectomy hand  2016     EXCISE MASS FINGER Left 2016    Procedure: Excision of cystic mass, left ring finger. Surgeon:  oRsales Jones MD  Location: [facility]     EXTRACTION(S) DENTAL  2009        MEDICATIONS:  "  Current Outpatient Prescriptions:      escitalopram (LEXAPRO) 10 MG tablet, Take 1 tablet (10 mg) by mouth daily, Disp: 30 tablet, Rfl: 1     rOPINIRole (REQUIP) 0.25 MG tablet, Take 1-2 tablets (0.25-0.5 mg) by mouth At Bedtime, Disp: 180 tablet, Rfl: 0     ALLERGIES:  No Known Allergies     SOCIAL HISTORY:   Social History     Social History     Marital status:      Spouse name: Ankit     Number of children: N/A     Years of education: N/A     Occupational History           Social History Main Topics     Smoking status: Never Smoker     Smokeless tobacco: Never Used     Alcohol use No     Drug use: No     Sexual activity: Yes     Partners: Male     Other Topics Concern     Not on file     Social History Narrative        FAMILY HISTORY:   Family History   Problem Relation Age of Onset     Family History Negative Mother      born      Neurologic Disorder Father      born  Charcot Rosemary Tooth     Prostate Cancer Father      Diabetes Maternal Grandmother       99yo Type II     Alzheimer Disease Maternal Grandfather 89      96yo      Family History Negative Paternal Grandmother           Family History Negative Paternal Grandfather           Family History Negative Brother      1/2 brother Hemochromatosis        EXAM:Vitals: Ht 5' 1\" (1.549 m)  Wt 190 lb (86.2 kg)  BMI 35.9 kg/m2  BMI= Body mass index is 35.9 kg/(m^2).    General appearance: Patient is alert and fully cooperative with history & exam.  No sign of distress is noted during the visit.     Psychiatric: Affect is pleasant & appropriate.  Patient appears motivated to improve health.     Respiratory: Breathing is regular & unlabored while sitting.     HEENT: Hearing is intact to spoken word.  Speech is clear.  No gross evidence of visual impairment that would impact ambulation.     Dermatologic: Skin is intact to both lower extremities without significant lesions, rash or abrasion.  No paronychia " or evidence of soft tissue infection is noted.     Vascular: DP & PT pulses are intact & regular bilaterally.  No significant edema or varicosities noted.  CFT and skin temperature is normal to both lower extremities.     Neurologic: Lower extremity sensation is intact to light touch.  No evidence of weakness or contracture in the lower extremities.  No evidence of neuropathy.     Musculoskeletal: Patient is ambulatory without assistive device or brace. Increased arch height. Pain to palpation of the right plantar heel.        ASSESSMENT:    Right foot pain  Plantar fasciitis, right     PLAN:  Reviewed patient's chart in [facility]. At this time, recommend tall aircast boot to take pressure off foot. Was given an order for an even up incase. Also recommend PT with ionto. If no improvement in a month, recommend MRI. If better, transition to shoes and inserts.        Josey Villalobos DPM, Podiatry/Foot and Ankle Surgery    Weight management plan: Patient was referred to their PCP to discuss a diet and exercise plan.    Patient to follow up with Primary Care provider regarding elevated blood pressure.        Again, thank you for allowing me to participate in the care of your patient.        Sincerely,        Josey Villalobos DPM, Podiatry/Foot and Ankle Surgery

## 2018-08-29 NOTE — PROGRESS NOTES
PATIENT HISTORY:   Dot Ramirez is a 33 year old female who presents to clinic for continued pain to the right heel. Notes it has been going on for a few months. Has been doing stretches, icing, and did get new shoes and inserts. Still gets 8/10 pain especially in the morning and by the end of the day. On her feet all day at work.     Review of Systems:  Patient denies fever, chills, rash, wound, stiffness, numbness, weakness, heart burn, blood in stool, chest pain with activity, calf pain when walking, shortness of breath with activity, chronic cough, easy bleeding/bruising, swelling of ankles, excessive thirst, fatigue, depression, anxiety.  Patient admits to limping.     PAST MEDICAL HISTORY:   Past Medical History:   Diagnosis Date     Abnormal Pap smear of cervix 2010    see problem list     Anxiety      Moderate major depression (H) 2014    onset with fetal loss        PAST SURGICAL HISTORY:   Past Surgical History:   Procedure Laterality Date     CERCLAGE CERVICAL N/A 2015    Procedure: CERCLAGE CERVICAL;  Surgeon: Damion Nayak MD;  [facility]     CERCLAGE CERVICAL N/A 2016    Procedure: CERCLAGE CERVICAL;  Surgeon: Damion Nayak MD;  [facility]      SECTION N/A 2015    Procedure:  SECTION;  Surgeon: Jamison Florian MD;  [facility]      SECTION N/A 3/6/2017    Procedure:  SECTION;  Surgeon: Jamison Florian MD;  Location: [facility]     Cystectomy hand  2016     EXCISE MASS FINGER Left 2016    Procedure: Excision of cystic mass, left ring finger. Surgeon:  Rosales Jones MD  Location: [facility]     EXTRACTION(S) DENTAL  2009        MEDICATIONS:   Current Outpatient Prescriptions:      escitalopram (LEXAPRO) 10 MG tablet, Take 1 tablet (10 mg) by mouth daily, Disp: 30 tablet, Rfl: 1     rOPINIRole (REQUIP) 0.25 MG tablet, Take 1-2 tablets (0.25-0.5 mg) by mouth At Bedtime, Disp: 180 tablet, Rfl:  "0     ALLERGIES:  No Known Allergies     SOCIAL HISTORY:   Social History     Social History     Marital status:      Spouse name: Ankit     Number of children: N/A     Years of education: N/A     Occupational History           Social History Main Topics     Smoking status: Never Smoker     Smokeless tobacco: Never Used     Alcohol use No     Drug use: No     Sexual activity: Yes     Partners: Male     Other Topics Concern     Not on file     Social History Narrative        FAMILY HISTORY:   Family History   Problem Relation Age of Onset     Family History Negative Mother      born      Neurologic Disorder Father      born  Charcot Rosemary Tooth     Prostate Cancer Father      Diabetes Maternal Grandmother       97yo Type II     Alzheimer Disease Maternal Grandfather 89      96yo      Family History Negative Paternal Grandmother           Family History Negative Paternal Grandfather           Family History Negative Brother      1/2 brother Hemochromatosis        EXAM:Vitals: Ht 5' 1\" (1.549 m)  Wt 190 lb (86.2 kg)  BMI 35.9 kg/m2  BMI= Body mass index is 35.9 kg/(m^2).    General appearance: Patient is alert and fully cooperative with history & exam.  No sign of distress is noted during the visit.     Psychiatric: Affect is pleasant & appropriate.  Patient appears motivated to improve health.     Respiratory: Breathing is regular & unlabored while sitting.     HEENT: Hearing is intact to spoken word.  Speech is clear.  No gross evidence of visual impairment that would impact ambulation.     Dermatologic: Skin is intact to both lower extremities without significant lesions, rash or abrasion.  No paronychia or evidence of soft tissue infection is noted.     Vascular: DP & PT pulses are intact & regular bilaterally.  No significant edema or varicosities noted.  CFT and skin temperature is normal to both lower extremities.     Neurologic: Lower extremity " sensation is intact to light touch.  No evidence of weakness or contracture in the lower extremities.  No evidence of neuropathy.     Musculoskeletal: Patient is ambulatory without assistive device or brace. Increased arch height. Pain to palpation of the right plantar heel.        ASSESSMENT:    Right foot pain  Plantar fasciitis, right     PLAN:  Reviewed patient's chart in [facility]. At this time, recommend tall aircast boot to take pressure off foot. Was given an order for an even up incase. Also recommend PT with ionto. If no improvement in a month, recommend MRI. If better, transition to shoes and inserts.        Josey Villalobos DPM, Podiatry/Foot and Ankle Surgery    Weight management plan: Patient was referred to their PCP to discuss a diet and exercise plan.    Patient to follow up with Primary Care provider regarding elevated blood pressure.

## 2018-08-29 NOTE — TELEPHONE ENCOUNTER
Duplicate  E-Prescribing Status: Receipt confirmed by pharmacy (8/27/2018  1:38 PM CDT)  Luh Black RN, BSN

## 2018-09-12 ENCOUNTER — THERAPY VISIT (OUTPATIENT)
Dept: PHYSICAL THERAPY | Facility: CLINIC | Age: 33
End: 2018-09-12
Payer: COMMERCIAL

## 2018-09-12 DIAGNOSIS — M79.671 RIGHT FOOT PAIN: Primary | ICD-10-CM

## 2018-09-12 PROCEDURE — 97161 PT EVAL LOW COMPLEX 20 MIN: CPT | Mod: GP | Performed by: PHYSICAL THERAPIST

## 2018-09-12 PROCEDURE — 97033 APP MDLTY 1+IONTPHRSIS EA 15: CPT | Mod: GP | Performed by: PHYSICAL THERAPIST

## 2018-09-12 PROCEDURE — 97110 THERAPEUTIC EXERCISES: CPT | Mod: GP | Performed by: PHYSICAL THERAPIST

## 2018-09-12 NOTE — PROGRESS NOTES
[facility] Initial Evaluation  Subjective:  Patient is a 33 year old female presenting with rehab right ankle/foot hpi. The history is provided by the patient. No  was used.   Dot Ramirez is a 33 year old female with a right foot condition.  Condition occurred with:  Insidious onset.  Condition occurred: for unknown reasons.  This is a new condition  Patient reports a gradual onset of plantar heel pain beginning in July 2018 over the course of a few days of excessive use.  Patient c/o pain with first steps in the morning and at the end of the day.    Patient reports pain:  Sub calcaneus.  Radiates to:  No radiation.  Pain is described as sharp and aching and is constant Pain Scale: 0-10/10.  Associated with: patient denies numbness/tingling. Pain is worse in the A.M. and worse in the P.M..  Symptoms are exacerbated by walking and standing and relieved by rest and bracing/immobilizing.  Since onset symptoms are unchanged.  Special testing: none recently.      General health as reported by patient is fair.  Pertinent medical history includes:  Anemia, depression and overweight.  Medical allergies: no.  Other surgeries include:  Other.  Current medications:  Anti-depressants.  Current occupation is , [employer] .  Patient is working in normal job without restrictions.      Barriers include:  None as reported by patient.    Red flags:  None as reported by patient.                        Objective:  Standing Alignment:                Ankle/Foot:  Normal    Gait:    Gait Type:  Normal and antalgic         Flexibility/Screens:       Lower Extremity:      Decreased right lower extremity flexibility:  Gastroc and Soleus          Ankle/Foot Evaluation  ROM:  AROM is normal.PROM is normal.      Strength:    Dorsiflexion:  Right: 5/5   Pain:  Plantarflexion: Right: 5/5  Pain:  Inversion:Right: 5/5  Pain:  Eversion:Right: 5/5  Pain:  Flexion Great Toe:Right:  4+/5    Pain:-            Strength is normal.      PALPATION:     Right ankle tenderness present at:   plantar fascia and medial calcaneal  Right ankle tenderness not present at:  gastroc/soleus; achilles tendon; anterior tibialis or deltoid ligament  EDEMA: normal          MOBILITY TESTING:       Talocrural Left: hypermobile    Talocrural Right: hypermobile  Subtalar Left: hypermobile    Subtalar Right: hypermobile  Midtarsal Left: hypermobile    Midtarsal Right: hypermobile                                                      General     ROS    Assessment/Plan:    Patient is a 33 year old female with right foot complaints.    Patient has the following significant findings with corresponding treatment plan.                Diagnosis 1:  Plantar Fascitis   Pain -  self management, education and home program  Decreased ROM/flexibility - manual therapy, therapeutic exercise, therapeutic activity and home program  Decreased strength - therapeutic exercise, therapeutic activities and home program  Inflammation - iontophoresis and self management/home program  Impaired gait - gait training and home program  Impaired muscle performance - neuro re-education and home program  Decreased function - therapeutic activities and home program  Hypermobility -  Therapeutic Activity  Therapeutic Exercise  Neuromuscular Re-education  home program    Therapy Evaluation Codes:   1) History comprised of:   Personal factors that impact the plan of care:      None.    Comorbidity factors that impact the plan of care are:      Depression and Overweight.     Medications impacting care: Anti-depressant.  2) Examination of Body Systems comprised of:   Body structures and functions that impact the plan of care:      Foot.   Activity limitations that impact the plan of care are:      Standing, Walking and Working.  3) Clinical presentation characteristics are:   Stable/Uncomplicated.  4) Decision-Making    Low complexity using standardized  patient assessment instrument and/or measureable assessment of functional outcome.  Cumulative Therapy Evaluation is: Low complexity.    Previous and current functional limitations:  (See Goal Flow Sheet for this information)    Short term and Long term goals: (See Goal Flow Sheet for this information)     Communication ability:  Patient appears to be able to clearly communicate and understand verbal and written communication and follow directions correctly.  Treatment Explanation - The following has been discussed with the patient:   RX ordered/plan of care  Anticipated outcomes  Possible risks and side effects  This patient would benefit from PT intervention to resume normal activities.   Rehab potential is good.    Frequency:  1 X week, once daily  Duration:  for 8 weeks  Discharge Plan:  Achieve all LTG.  Independent in home treatment program.  Reach maximal therapeutic benefit.    Please refer to the daily flowsheet for treatment today, total treatment time and time spent performing 1:1 timed codes.

## 2018-09-12 NOTE — MR AVS SNAPSHOT
After Visit Summary   9/12/2018    Dot Ramirez    MRN: [id number]           Patient Information     Date Of Birth          1985        Visit Information        Provider Department      9/12/2018 2:50 PM Rob Deshpande, [facility] [place] [facility]        Today's Diagnoses     Right foot pain    -  1       Follow-ups after your visit        Your next 10 appointments already scheduled     Sep 24, 2018 11:00 AM CDT   ESTRADA Extremity with Rob Deshapnde PT   [facility] [place] [facility] ([facility])    [address]  [place] [address] [phone]              Who to contact     If you have questions or need follow up information about today's clinic visit or your schedule please contact [facility] [place] [facility] directly at [phone].  Normal or non-critical lab and imaging results will be communicated to you by [employer], letter or phone within 4 business days after the clinic has received the results. If you do not hear from us within 7 days, please contact the clinic through [employer] or phone. If you have a critical or abnormal lab result, we will notify you by phone as soon as possible.  Submit refill requests through [employer] or call your pharmacy and they will forward the refill request to us. Please allow 3 business days for your refill to be completed.          Additional Information About Your Visit        MyChart Information     [employer] gives you secure access to your electronic health record. If you see a primary care provider, you can also send messages to your care team and make appointments. If you have questions, please call your primary care clinic.  If you do not have a primary care provider, please call [phone] and they will assist you.        Care EveryWhere ID     This is your Care EveryWhere ID. This could be used by other organizations to access your [place] medical records  [id number]          Blood Pressure from Last 3 Encounters:   08/29/18 110/76   07/30/18 114/80   07/10/18 114/82    Weight from Last 3 Encounters:   08/29/18 86.2 kg (190 lb)   07/30/18 87 kg (191 lb 14.4 oz)   07/10/18 85.5 kg (188 lb 6.4 oz)              We Performed the Following     ELECTRIC CURRENT THERAPY     HC PT EVAL, LOW COMPLEXITY     E[facility]ADA INITIAL EVAL REPORT     THERAPEUTIC EXERCISES        Primary Care Provider Office Phone # Fax #    Jamison Florian [phone] [address] [place] [address]        Equal Access to Services     [facility]: Hadii aad analy hadasho Sohayley, waaxda luqadaha, qaybta kaalmada adesusiyada, helio chaney . So [facility] [phone].    ATENCIÓN: Si habla español, tiene a camacho disposición servicios gratuitos de asistencia lingüística. [facility] [phone].    We comply with applicable federal civil rights laws and [place] laws. We do not discriminate on the basis of race, color, national origin, age, disability, sex, sexual orientation, or gender identity.            Thank you!     Thank you for choosing [facility]  for your care. Our goal is always to provide you with excellent care. Hearing back from our patients is one way we can continue to improve our services. Please take a few minutes to complete the written survey that you may receive in the mail after your visit with us. Thank you!             Your Updated Medication List - Protect others around you: Learn how to safely use, store and throw away your medicines at www.disposemymeds.org.          This list is accurate as of 9/12/18  3:45 PM.  Always use your most recent med list.                   Brand Name Dispense Instructions for use Diagnosis    dexamethasone 4 MG/ML injection    DECADRON    30 mL    Apply 1 mL (4 mg) topically once for 1 dose For physical therapy, iontophoresis    Right foot pain, Plantar fasciitis, right        escitalopram 10 MG tablet    LEXAPRO    30 tablet    Take 1 tablet (10 mg) by mouth daily    Moderate major depression (H)       order for DME     1 Device    Equipment being ordered:tall aircast boot    Right foot pain, Plantar fasciitis, right       order for DME     1 Device    Equipment being ordered:  Even up    Right foot pain, Plantar fasciitis, right       rOPINIRole 0.25 MG tablet    REQUIP    180 tablet    Take 1-2 tablets (0.25-0.5 mg) by mouth At Bedtime    Right foot pain

## 2018-09-24 ENCOUNTER — THERAPY VISIT (OUTPATIENT)
Dept: PHYSICAL THERAPY | Facility: CLINIC | Age: 33
End: 2018-09-24
Payer: COMMERCIAL

## 2018-09-24 DIAGNOSIS — M79.671 RIGHT FOOT PAIN: ICD-10-CM

## 2018-09-24 PROCEDURE — 97110 THERAPEUTIC EXERCISES: CPT | Mod: GP | Performed by: PHYSICAL THERAPIST

## 2018-09-24 PROCEDURE — 97112 NEUROMUSCULAR REEDUCATION: CPT | Mod: GP | Performed by: PHYSICAL THERAPIST

## 2018-10-01 ENCOUNTER — THERAPY VISIT (OUTPATIENT)
Dept: PHYSICAL THERAPY | Facility: CLINIC | Age: 33
End: 2018-10-01
Payer: COMMERCIAL

## 2018-10-01 DIAGNOSIS — M79.671 RIGHT FOOT PAIN: ICD-10-CM

## 2018-10-01 PROCEDURE — 97110 THERAPEUTIC EXERCISES: CPT | Mod: GP | Performed by: PHYSICAL THERAPIST

## 2018-10-01 PROCEDURE — 97112 NEUROMUSCULAR REEDUCATION: CPT | Mod: GP | Performed by: PHYSICAL THERAPIST

## 2018-10-01 NOTE — MR AVS SNAPSHOT
After Visit Summary   10/1/2018    Dot Ramirez    MRN: [id number]           Patient Information     Date Of Birth          1985        Visit Information        Provider Department      10/1/2018 11:40 AM Rob Deshpande PT [facility] [place] [facility]        Today's Diagnoses     Right foot pain           Follow-ups after your visit        Your next 10 appointments already scheduled     Oct 10, 2018  9:40 AM CDT   ESTRADA Extremity with Rob Deshpande PT   [facility] [place] [facility] ([place])    [address]  [place] [address] [phone]              Who to contact     If you have questions or need follow up information about today's clinic visit or your schedule please contact [facility] [place] [facility] directly at [phone].  Normal or non-critical lab and imaging results will be communicated to you by MyChart, letter or phone within 4 business days after the clinic has received the results. If you do not hear from us within 7 days, please contact the clinic through [employer] or phone. If you have a critical or abnormal lab result, we will notify you by phone as soon as possible.  Submit refill requests through [employer] or call your pharmacy and they will forward the refill request to us. Please allow 3 business days for your refill to be completed.          Additional Information About Your Visit        MyChart Information     [employer] gives you secure access to your electronic health record. If you see a primary care provider, you can also send messages to your care team and make appointments. If you have questions, please call your primary care clinic.  If you do not have a primary care provider, please call [phone] and they will assist you.        Care EveryWhere ID     This is your Care EveryWhere ID. This could be used by other organizations to access your [place] medical records  [id number]          Blood Pressure from Last 3 Encounters:   08/29/18 110/76   07/30/18 114/80   07/10/18 114/82    Weight from Last 3 Encounters:   08/29/18 86.2 kg (190 lb)   07/30/18 87 kg (191 lb 14.4 oz)   07/10/18 85.5 kg (188 lb 6.4 oz)              We Performed the Following     NEUROMUSCULAR RE-EDUCATION     THERAPEUTIC EXERCISES        Primary Care Provider Office Phone # Fax #    Jamison Florian [phone] [address] [place] [address]        Equal Access to Services     [facility]: Hadii aad ku hadasho Soomaali, waaxda luqadaha, qaybta kaalmada adeegyada, helio cunningham hayperlita adesusi chaney . So [facility] [phone].    ATENCIÓN: Si habla español, tiene a camacho disposición servicios gratuitos de asistencia lingüística. [facility] [phone].    We comply with applicable federal civil rights laws and [place] laws. We do not discriminate on the basis of race, color, national origin, age, disability, sex, sexual orientation, or gender identity.            Thank you!     Thank you for choosing [facility]  for your care. Our goal is always to provide you with excellent care. Hearing back from our patients is one way we can continue to improve our services. Please take a few minutes to complete the written survey that you may receive in the mail after your visit with us. Thank you!             Your Updated Medication List - Protect others around you: Learn how to safely use, store and throw away your medicines at www.disposemymeds.org.          This list is accurate as of 10/1/18 12:14 PM.  Always use your most recent med list.                   Brand Name Dispense Instructions for use Diagnosis    dexamethasone 4 MG/ML injection    DECADRON    30 mL    Apply 1 mL (4 mg) topically once for 1 dose For physical therapy, iontophoresis    Right foot pain, Plantar fasciitis, right       escitalopram 10 MG tablet    LEXAPRO    30 tablet    Take 1 tablet  (10 mg) by mouth daily    Moderate major depression (H)       order for DME     1 Device    Equipment being ordered:tall aircast boot    Right foot pain, Plantar fasciitis, right       order for DME     1 Device    Equipment being ordered:  Even up    Right foot pain, Plantar fasciitis, right       rOPINIRole 0.25 MG tablet    REQUIP    180 tablet    Take 1-2 tablets (0.25-0.5 mg) by mouth At Bedtime    Right foot pain

## 2018-11-28 ENCOUNTER — MYC REFILL (OUTPATIENT)
Dept: INTERNAL MEDICINE | Facility: CLINIC | Age: 33
End: 2018-11-28

## 2018-11-28 DIAGNOSIS — M79.671 RIGHT FOOT PAIN: ICD-10-CM

## 2018-11-28 DIAGNOSIS — M72.2 PLANTAR FASCIITIS, RIGHT: ICD-10-CM

## 2018-11-28 NOTE — TELEPHONE ENCOUNTER
Message from MyChart:  Original authorizing provider: MD Dot Louis would like a refill of the following medications:  rOPINIRole (REQUIP) 0.25 MG tablet [Bassam Narvaez MD]    Preferred pharmacy: [facility] - [address]    Comment:

## 2018-11-29 RX ORDER — ROPINIROLE 0.25 MG/1
0.25-0.5 TABLET, FILM COATED ORAL AT BEDTIME
Qty: 180 TABLET | Refills: 0 | Status: SHIPPED | OUTPATIENT
Start: 2018-11-29 | End: 2019-03-18

## 2018-11-29 NOTE — TELEPHONE ENCOUNTER
"Requested Prescriptions   Pending Prescriptions Disp Refills     rOPINIRole (REQUIP) 0.25 MG tablet 180 tablet 0     Sig: Take 1-2 tablets (0.25-0.5 mg) by mouth At Bedtime    Antiparkinson's Agents Protocol Passed    11/28/2018  2:58 PM       Passed - Blood pressure under 140/90 in past 12 months    BP Readings from Last 3 Encounters:   08/29/18 110/76   07/30/18 114/80   07/10/18 114/82                Passed - CBC on record in past 12 months    Recent Labs   Lab Test  06/11/18   0909   WBC  7.2   RBC  4.93   HGB  13.9   HCT  41.6   PLT  278                Passed - ALT on record in past 12 months        Recent Labs   Lab Test  06/11/18   0909   ALT  46            Passed - Serum Creatinine on file in past 12 months    Recent Labs   Lab Test  06/11/18   0909   CR  0.56            Passed - Patient is age 18 or older       Passed - No active pregnancy on record       Passed - No positive pregnancy test in the past 12 months       Passed - Recent (6 mo) or future (30 days) visit within the authorizing provider's specialty    Patient had office visit in the last 6 months or has a visit in the next 30 days with authorizing provider or within the authorizing provider's specialty.  See \"Patient Info\" tab in inbasket, or \"Choose Columns\" in Meds & Orders section of the refill encounter.              "

## 2018-12-04 NOTE — PROGRESS NOTES
Discharge Note    Progress reporting period is from initial eval to Oct 1, 2018.     Dot failed to return for next follow up visit and current status is unknown.  Please see information below for last relevant information on current status.  Patient seen for 3 visits.  SUBJECTIVE  Subjective changes noted by patient:  Patient reports pain is worse from a very busy weekend at work.  She was not always able to wear cam boot.  .  Changes in function:  Yes (See Goal flowsheet attached for changes in current functional level)  Adverse reaction to treatment or activity: None    OBJECTIVE  Changes noted in objective findings: Fair/Good control with T-Band IN/EV.  MMT: 4+/5 great toe flexion.     ASSESSMENT/PLAN  Diagnosis: R Foot   DIAGP:  The encounter diagnosis was Right foot pain.  Updated problem list and treatment plan:   Pain - HEP  Decreased ROM/flexibility - HEP  Decreased function - HEP  Decreased strength - HEP  STG/LTGs have been met or progress has been made towards goals:  Yes, please see goal flowsheet for most current information  Assessment of Progress: current status is unknown.    Last current status: Pt has experienced exacerbation of symptoms   Self Management Plans:  HEP  I have re-evaluated this patient and find that the nature, scope, duration and intensity of the therapy is appropriate for the medical condition of the patient.  Dot continues to require the following intervention to meet STG and LTG's:  HEP.    Recommendations:  Discharge with current home program.  Patient to follow up with MD as needed.    Please refer to the daily flowsheet for treatment today, total treatment time and time spent performing 1:1 timed codes.

## 2018-12-25 ENCOUNTER — OFFICE VISIT (OUTPATIENT)
Dept: URGENT CARE | Facility: URGENT CARE | Age: 33
End: 2018-12-25
Payer: COMMERCIAL

## 2018-12-25 VITALS
SYSTOLIC BLOOD PRESSURE: 114 MMHG | WEIGHT: 197 LBS | HEART RATE: 96 BPM | OXYGEN SATURATION: 98 % | TEMPERATURE: 97 F | RESPIRATION RATE: 14 BRPM | DIASTOLIC BLOOD PRESSURE: 76 MMHG | BODY MASS INDEX: 37.22 KG/M2

## 2018-12-25 DIAGNOSIS — H92.01 RIGHT EAR PAIN: ICD-10-CM

## 2018-12-25 DIAGNOSIS — R09.81 CONGESTION OF PARANASAL SINUS: Primary | ICD-10-CM

## 2018-12-25 DIAGNOSIS — H92.02 LEFT EAR PAIN: ICD-10-CM

## 2018-12-25 DIAGNOSIS — R05.8 PRODUCTIVE COUGH: ICD-10-CM

## 2018-12-25 PROCEDURE — 99213 OFFICE O/P EST LOW 20 MIN: CPT | Performed by: FAMILY MEDICINE

## 2018-12-25 RX ORDER — AMOXICILLIN 500 MG/1
500 CAPSULE ORAL 3 TIMES DAILY
Qty: 30 CAPSULE | Refills: 0 | Status: SHIPPED | OUTPATIENT
Start: 2018-12-25 | End: 2018-12-26

## 2018-12-25 NOTE — PROGRESS NOTES
Chief Complaint   Patient presents with     Urgent Care     Otalgia     r ear pain, uri sx, fever, nausea     SUBJECTIVE:   Dot Ramirez is a 33 year old female presenting with a chief complaint of runny nose, stuffy nose, cough - productive, sore throat, facial pain/pressure and sinus congestion and drainage and rt ear pain . She is a new patient of [facility].  Onset of symptoms was 4 day(s) ago.  Course of illness is waxing and waning.    Severity moderate  Current and Associated symptoms: runny nose, stuffy nose and ear pain right  Treatment measures tried include Tylenol/Ibuprofen.  Predisposing factors include ill contact: Family member .    Past Medical History:   Diagnosis Date     Abnormal Pap smear of cervix 02/22/2010    see problem list     Anxiety      Moderate major depression (H) 11/2014    onset with fetal loss     Current Outpatient Medications   Medication Sig Dispense Refill     amoxicillin (AMOXIL) 500 MG capsule Take 1 capsule (500 mg) by mouth 3 times daily for 10 days 30 capsule 0     order for DME Equipment being ordered:tall aircast boot 1 Device 0     order for DME Equipment being ordered:  Even up 1 Device 0     rOPINIRole (REQUIP) 0.25 MG tablet Take 1-2 tablets (0.25-0.5 mg) by mouth At Bedtime 180 tablet 0     dexamethasone (DECADRON) 4 MG/ML injection Apply 1 mL (4 mg) topically once for 1 dose For physical therapy, iontophoresis 30 mL 0     escitalopram (LEXAPRO) 10 MG tablet Take 1 tablet (10 mg) by mouth daily (Patient not taking: Reported on 12/25/2018) 30 tablet 1     Social History     Tobacco Use     Smoking status: Never Smoker     Smokeless tobacco: Never Used   Substance Use Topics     Alcohol use: No     Alcohol/week: 0.0 oz     Family History   Problem Relation Age of Onset     Family History Negative Mother         born 1952     Neurologic Disorder Father         born 1948 [place]     Prostate Cancer Father      Diabetes Maternal Grandmother           99yo Type II     Alzheimer Disease Maternal Grandfather 89         94yo      Family History Negative Paternal Grandmother              Family History Negative Paternal Grandfather              Family History Negative Brother         1/2 brother Hemochromatosis         ROS:    10 point ROS of systems including Constitutional, Eyes, Respiratory, Cardiovascular, Gastroenterology, Genitourinary, Integumentary, Muscularskeletal, Psychiatric were all negative except for pertinent positives noted in my HPI         OBJECTIVE:  /76   Pulse 96   Temp 97  F (36.1  C) (Tympanic)   Resp 14   Wt 89.4 kg (197 lb)   SpO2 98%   BMI 37.22 kg/m    GENERAL APPEARANCE: healthy, alert and no distress  EYES: EOMI,  PERRL, conjunctiva clear  HENT: ear canals and TM congested bilaterally  Nose- congested  and mouth without ulcers, erythema or lesions  NECK: supple, nontender, no lymphadenopathy  RESP: lungs clear to auscultation - no rales, rhonchi or wheezes  CV: regular rates and rhythm, normal S1 S2, no murmur noted  ABDOMEN:  soft, nontender, no HSM or masses and bowel sounds normal  SKIN: no suspicious lesions or rashes  PSYCH: mentation appears normal  Physical Exam            ASSESSMENT:  Assessment     Dot was seen today for urgent care and otalgia.    Diagnoses and all orders for this visit:    Congestion of paranasal sinus  -     amoxicillin (AMOXIL) 500 MG capsule; Take 1 capsule (500 mg) by mouth 3 times daily for 10 days    Left ear pain    Productive cough    Right ear pain          PLAN:  Tylenol, Fluids, Rest, OTC decongestant/antihistamine, Saline gargles, Saline nasal spray and Vaporizer  If symptoms dont get better in next 48 hrs can start the antibiotic   Discussed it seems viral but if it gets more productive should follow up    Cesia Costello MD     See orders in Epic

## 2018-12-26 DIAGNOSIS — R09.81 CONGESTION OF PARANASAL SINUS: ICD-10-CM

## 2018-12-26 RX ORDER — AMOXICILLIN 500 MG/1
500 CAPSULE ORAL 3 TIMES DAILY
Qty: 30 CAPSULE | Refills: 0 | Status: SHIPPED | OUTPATIENT
Start: 2018-12-26 | End: 2021-01-29

## 2018-12-26 NOTE — TELEPHONE ENCOUNTER
Pt requesting to have the antibiotic she was prescribed yesterday sent to a different pharmacy.     Writer will send this to [facility] in [place].     Odette Vance RN/STONE

## 2019-07-13 ENCOUNTER — HOSPITAL ENCOUNTER (EMERGENCY)
Facility: CLINIC | Age: 34
Discharge: HOME | End: 2019-07-14
Attending: EMERGENCY MEDICINE | Admitting: EMERGENCY MEDICINE
Payer: COMMERCIAL

## 2019-07-13 VITALS
BODY MASS INDEX: 35.82 KG/M2 | RESPIRATION RATE: 21 BRPM | TEMPERATURE: 99 F | OXYGEN SATURATION: 99 % | DIASTOLIC BLOOD PRESSURE: 119 MMHG | SYSTOLIC BLOOD PRESSURE: 153 MMHG | WEIGHT: 189.6 LBS

## 2019-07-13 DIAGNOSIS — F32.A DEPRESSION, UNSPECIFIED DEPRESSION TYPE: ICD-10-CM

## 2019-07-13 DIAGNOSIS — R45.851 SUICIDAL IDEATION: ICD-10-CM

## 2019-07-13 DIAGNOSIS — Z71.1 MENTAL HEALTH-RELATED COMPLAINT: ICD-10-CM

## 2019-07-13 PROCEDURE — 25000132 ZZH RX MED GY IP 250 OP 250 PS 637: Performed by: EMERGENCY MEDICINE

## 2019-07-13 PROCEDURE — 90791 PSYCH DIAGNOSTIC EVALUATION: CPT

## 2019-07-13 PROCEDURE — 99285 EMERGENCY DEPT VISIT HI MDM: CPT | Mod: 25

## 2019-07-13 RX ORDER — LORAZEPAM 1 MG/1
1 TABLET ORAL ONCE
Status: COMPLETED | OUTPATIENT
Start: 2019-07-13 | End: 2019-07-13

## 2019-07-13 RX ADMIN — LORAZEPAM 1 MG: 1 TABLET ORAL at 21:45

## 2019-07-13 ASSESSMENT — ENCOUNTER SYMPTOMS: SLEEP DISTURBANCE: 1

## 2019-07-13 NOTE — LETTER
07/14/19      To Whom it may concern:    Dante Ramirez was in our Emergency Department today, 07/14/19. with a patient who needed their assistance.  Please excuse them from work/school for 1-2 days.      Sincerely,

## 2019-07-13 NOTE — LETTER
July 14, 2019      To Whom It May Concern:      Dot Ramirez was seen in our Emergency Department today, 07/14/19.  I expect her condition to improve over the next 1-2 days.  She may return to work/school when improved.    Sincerely,        Bishop Gruber RN

## 2019-07-14 NOTE — ED PROVIDER NOTES
Received sign out from DR Ambrose to follow up on DEC consult.    Dec recommended outpatient follow up and Dot agreed to safety contract.  is on board with the plan.    Appropriate for discharge     Disposition home    (F32.9) Depression, unspecified depression type    (R45.851) Suicidal ideation    (Z71.1) Mental health-related complaint           Dileep Andino MD  07/14/19 0006

## 2019-07-14 NOTE — ED TRIAGE NOTES
"Pt is tearful and anxious in triage.  States \"I'm just done.\"  Admits to having thoughts of wanting to end her life, but did not do anything tonight to harm herself.  STates \"I just came straight here.\"    "

## 2019-07-14 NOTE — DISCHARGE INSTRUCTIONS
Discharge Instructions  Mental Health Concerns    You were seen today for mental health concerns, such as depression, severe anxiety, or suicidal thinking. Your doctor feels that you do not require hospitalization at this time. However, your symptoms may become worse, and you may need to return to the Emergency Department. Most treatments of depression and suicidal thoughts are a process rather than a single intervention.  Medications and counseling can take several weeks or more to help.  It is important to follow up as discussed with your family doctor or counselor.      By accepting these discharge instructions:  You promise to not harm yourself or others.  You agree that if you feel you are becoming unable to keep that promise, you will do something to help yourself before you do anything to harm yourself or others.   You agree to keep any safety plan arranged on your visit here today.  You agree to take any medication prescribed or recommended by your doctor.  If you are getting worse, you can contact a friend or a family member, contact your counselor or family doctor, contact a crisis line, or other options discussed with the doctor or therapist today.  At any time, you can call 911 and return to the emergency department for more help.  You understand that follow-up is essential to your treatment, and you will make and keep appointments recommended on your visit today.    How to improve your mental health and prevent suicide:  Involve others by letting family, friends, counselors know.  Do not isolate yourself.  Avoid alcohol or drugs. Remove weapons, poisons from your home.  Try to stick to routines for eating, sleeping and getting regular exercise.    Try to get into sunlight. Bright natural light not only treats seasonal affective disorder but also depression.  Increase safe activities that you enjoy.    If you feel worse, contact [phone], or call 911, or your family doctor/counselor for additional  assistance.    If you were given a prescription for medicine here today, be sure to read all of the information (including the package insert) that comes with your prescription.  This will include important information about the medicine, its side effects, and any warnings that you need to know about.  The pharmacist who fills the prescription can provide more information and answer questions you may have about the medicine.  If you have questions or concerns that the pharmacist cannot address, please call or return to the Emergency Department.     Opioid Medication Information    Pain medications are among the most commonly prescribed medicines, so we are including this information for all our patients. If you did not receive pain medication or get a prescription for pain medicine, you can ignore it.     You may have been given a prescription for an opioid (narcotic) pain medicine and/or have received a pain medicine while here in the Emergency Department. These medicines can make you drowsy or impaired. You must not drive, operate dangerous equipment, or engage in any other dangerous activities while taking these medications. If you drive while taking these medications, you could be arrested for DUI, or driving under the influence. Do not drink any alcohol while you are taking these medications.     Opioid pain medications can cause addiction. If you have a history of chemical dependency of any type, you are at a higher risk of becoming addicted to pain medications.  Only take these prescribed medications to treat your pain when all other options have been tried. Take it for as short a time and as few doses as possible. Store your pain pills in a secure place, as they are frequently stolen and provide a dangerous opportunity for children or visitors in your house to start abusing these powerful medications. We will not replace any lost or stolen medicine.  As soon as your pain is better, you should flush all your  remaining medication.     Many prescription pain medications contain Tylenol  (acetaminophen), including Vicodin , Tylenol #3 , Norco , Lortab , and Percocet .  You should not take any extra pills of Tylenol  if you are using these prescription medications or you can get very sick.  Do not ever take more than 3000 mg of acetaminophen in any 24 hour period.    All opioids tend to cause constipation. Drink plenty of water and eat foods that have a lot of fiber, such as fruits, vegetables, prune juice, apple juice and high fiber cereal.  Take a laxative if you don t move your bowels at least every other day. Miralax , Milk of Magnesia, Colace , or Senna  can be used to keep you regular.      Remember that you can always come back to the Emergency Department if you are not able to see your regular doctor in the amount of time listed above, if you get any new symptoms, or if there is anything that worries you.
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
## 2019-07-14 NOTE — ED NOTES
"AO x 4.  ABCs intact.  Pt tearful, reports thoughts of \"Ending it all\".  Denies specific plan.  Sitter present in room with patient.  "

## 2019-08-15 ENCOUNTER — OFFICE VISIT (OUTPATIENT)
Dept: URGENT CARE | Facility: URGENT CARE | Age: 34
End: 2019-08-15
Payer: COMMERCIAL

## 2019-08-15 ENCOUNTER — ANCILLARY PROCEDURE (OUTPATIENT)
Dept: GENERAL RADIOLOGY | Facility: CLINIC | Age: 34
End: 2019-08-15
Attending: PHYSICIAN ASSISTANT
Payer: COMMERCIAL

## 2019-08-15 VITALS
OXYGEN SATURATION: 98 % | HEART RATE: 78 BPM | SYSTOLIC BLOOD PRESSURE: 120 MMHG | TEMPERATURE: 98.2 F | WEIGHT: 189 LBS | DIASTOLIC BLOOD PRESSURE: 78 MMHG | BODY MASS INDEX: 35.71 KG/M2

## 2019-08-15 DIAGNOSIS — S69.91XA HAND INJURY, RIGHT, INITIAL ENCOUNTER: ICD-10-CM

## 2019-08-15 DIAGNOSIS — S62.662A NONDISPLACED FRACTURE OF DISTAL PHALANX OF RIGHT MIDDLE FINGER, INITIAL ENCOUNTER FOR CLOSED FRACTURE: Primary | ICD-10-CM

## 2019-08-15 PROCEDURE — 73130 X-RAY EXAM OF HAND: CPT | Mod: RT

## 2019-08-15 PROCEDURE — 99214 OFFICE O/P EST MOD 30 MIN: CPT | Performed by: PHYSICIAN ASSISTANT

## 2019-08-15 RX ORDER — LORAZEPAM 0.5 MG/1
TABLET ORAL
COMMUNITY
Start: 2019-08-01 | End: 2021-01-29

## 2019-08-15 ASSESSMENT — ENCOUNTER SYMPTOMS: WOUND: 0

## 2019-08-15 NOTE — PATIENT INSTRUCTIONS
Patient Education   Ibuprofen 600 mg to 800 mg every 8 hours as needed for pain    Icing  Finger Fracture, Closed  You have a broken finger (fracture). This causes local pain, swelling, and bruising. This injury usually takes about 4 to 6 weeks to heal, but can take longer in some cases. Finger injuries are often treated with a splint or cast, or by taping the injured finger to the next one (buddy taping). This protects the injured finger and holds the bone in position while it heals. More serious fractures may need surgery.     If the fingernail has been severely injured, it will probably fall off in 1 to 2 weeks. A new fingernail will usually start to grow back within a month.  Home care  Follow these guidelines when caring for yourself at home:    Keep your hand elevated to reduce pain and swelling. When sitting or lying down keep your arm above the level of your heart. You can do this by placing your arm on a pillow that rests on your chest or on a pillow at your side. This is most important during the first 2 days (48 hours) after the injury.    Put an ice pack on the injured area. Do this for 20 minutes every 1 to 2 hours the first day for pain relief. You can make an ice pack by wrapping a plastic bag of ice cubes in a thin towel. As the ice melts, be careful that the cast or splint doesn t get wet. Continue using the ice pack 3 to 4 times a day until the pain and swelling go away.    Keep the cast or splint completely dry at all times. Bathe with your cast or splint out of the water. Protect it with a large plastic bag, rubber-banded at the top end. If a fiberglass cast or splint gets wet, you can dry it with a hair dryer.    If buddy tape was put on and it becomes wet or dirty, change it. You may replace it with paper, plastic, or cloth tape. Cloth tape and paper tapes must be kept dry. Keep the buddy tape in place for at least 4 weeks.    You may use acetaminophen or ibuprofen to control pain, unless  another pain medicine was prescribed. If you have chronic liver or kidney disease, talk with your healthcare provider before using these medicines. Also talk with your provider if you ve had a stomach ulcer or gastrointestinal bleeding.    Don t put creams or objects under the cast if you have itching.  Follow-up care  Follow up with your healthcare provider, or as advised. This is to make sure the bone is healing the way it should.  X-rays may be taken. You will be told of any new findings that may affect your care.  When to seek medical advice  Call your healthcare provider right away if any of these occur:    The plaster cast or splint becomes wet or soft    The cast or splint cracks    The fiberglass cast or splint stays wet for more than 24 hours    Pain or swelling gets worse    Redness, warmth, swelling, drainage from the wound, or foul odor from a cast or splint    Finger becomes more cold, blue, numb, or tingly    You can t move your finger    The skin around the cast or splint becomes red    Fever of 100.4 F (38 C) or higher, or as directed by your healthcare provider  Date Last Reviewed: 2/1/2017 2000-2018 [employer]. [address] [place] [id number]. All rights reserved. This information is not intended as a substitute for professional medical care. Always follow your healthcare professional's instructions.

## 2019-08-15 NOTE — PROGRESS NOTES
SUBJECTIVE:   Dot Ramirez is a 34 year old female presenting with a chief complaint of   Chief Complaint   Patient presents with     Urgent Care     Hand Injury     Right hand jammed fingers, toy box fell on hand, happened today at 230 pm, painful 3 middle fingers        She is an established patient of [facility].    MS Injury/Pain    Onset of symptoms was 2 hour(s) ago.  Location: right hand  Context:       The injury happened while at home      Mechanism: direct blow she was cleaning the house and the lid of the toy box inadvertently fell on her right hand      Patient experienced immediate pain  Course of symptoms is worsening.    Severity moderate  Current and Associated symptoms: Pain, Swelling and Bruising  Denies  Warmth and Redness  Aggravating Factors: movement  Therapies to improve symptoms include: none  This is the first time this type of problem has occurred for this patient.       Review of Systems   Musculoskeletal:        Right hand injury   Skin: Negative for wound.       Past Medical History:   Diagnosis Date     Abnormal Pap smear of cervix 2010    see problem list     Anxiety      Moderate major depression (H) 2014    onset with fetal loss     Family History   Problem Relation Age of Onset     Family History Negative Mother         born 195     Neurologic Disorder Father         born 1948 Charcot Rosemary Tooth     Prostate Cancer Father      Diabetes Maternal Grandmother          97yo Type II     Alzheimer Disease Maternal Grandfather 89         96yo      Family History Negative Paternal Grandmother              Family History Negative Paternal Grandfather              Family History Negative Brother         1/2 brother Hemochromatosis     Current Outpatient Medications   Medication Sig Dispense Refill     rOPINIRole (REQUIP) 0.25 MG tablet Take 1-2 tablets (0.25-0.5 mg) by mouth At Bedtime 180 tablet 0     sertraline (ZOLOFT) 50 MG tablet         amoxicillin (AMOXIL) 500 MG capsule Take 1 capsule (500 mg) by mouth 3 times daily (Patient not taking: Reported on 8/15/2019) 30 capsule 0     dexamethasone (DECADRON) 4 MG/ML injection Apply 1 mL (4 mg) topically once for 1 dose For physical therapy, iontophoresis 30 mL 0     escitalopram (LEXAPRO) 10 MG tablet Take 1 tablet (10 mg) by mouth daily (Patient not taking: Reported on 8/15/2019) 30 tablet 1     LORazepam (ATIVAN) 0.5 MG tablet        order for DME Equipment being ordered:tall aircast boot (Patient not taking: Reported on 8/15/2019) 1 Device 0     order for DME Equipment being ordered:  Even up (Patient not taking: Reported on 8/15/2019) 1 Device 0     Social History     Tobacco Use     Smoking status: Never Smoker     Smokeless tobacco: Never Used   Substance Use Topics     Alcohol use: No     Alcohol/week: 0.0 oz       OBJECTIVE  /78   Pulse 78   Temp 98.2  F (36.8  C) (Oral)   Wt 85.7 kg (189 lb)   SpO2 98%   BMI 35.71 kg/m      Physical Exam   Constitutional: She appears well-developed and well-nourished. No distress.   HENT:   Head: Normocephalic.   Eyes: Conjunctivae are normal.   Pulmonary/Chest: Effort normal. No respiratory distress.   Musculoskeletal: She exhibits tenderness. She exhibits no deformity.   Right hand exam: Tenderness to palpation right middle distal finger.  There is moderate swelling and ecchymosis noted right distal middle finger.  Range of motion is limited due to pain.  Right second and fourth finger tenderness to palpation. Remainder of right hand exam is normal.   Neurological: She is alert.   Psychiatric: She has a normal mood and affect.       Labs:  No results found for this or any previous visit (from the past 24 hour(s)).    X-Ray was done, my findings are: middle right finger distal phalanx fracture    ASSESSMENT:      ICD-10-CM    1. Nondisplaced fracture of distal phalanx of right middle finger, initial encounter for closed fracture S62.662A ORTHO   REFERRAL   2. Hand injury, right, initial encounter S69.91XA XR Hand Right G/E 3 Views          PLAN:    Nondisplaced fracture of distal phalanx of right middle finger: We put her in an aluminum and foam finger splint today.  Offered Tylenol #3 for pain, patient declines.  She will take ibuprofen or Tylenol as needed for pain.  Recommended icing.  Ortho  referral.  Follow-up if any worsening symptoms.  Patient agrees with the plan.    Followup:    Follow up with orthopedics    Patient Instructions     Patient Education   Ibuprofen 600 mg to 800 mg every 8 hours as needed for pain    Icing  Finger Fracture, Closed  You have a broken finger (fracture). This causes local pain, swelling, and bruising. This injury usually takes about 4 to 6 weeks to heal, but can take longer in some cases. Finger injuries are often treated with a splint or cast, or by taping the injured finger to the next one (buddy taping). This protects the injured finger and holds the bone in position while it heals. More serious fractures may need surgery.     If the fingernail has been severely injured, it will probably fall off in 1 to 2 weeks. A new fingernail will usually start to grow back within a month.  Home care  Follow these guidelines when caring for yourself at home:    Keep your hand elevated to reduce pain and swelling. When sitting or lying down keep your arm above the level of your heart. You can do this by placing your arm on a pillow that rests on your chest or on a pillow at your side. This is most important during the first 2 days (48 hours) after the injury.    Put an ice pack on the injured area. Do this for 20 minutes every 1 to 2 hours the first day for pain relief. You can make an ice pack by wrapping a plastic bag of ice cubes in a thin towel. As the ice melts, be careful that the cast or splint doesn t get wet. Continue using the ice pack 3 to 4 times a day until the pain and swelling go away.    Keep the  cast or splint completely dry at all times. Bathe with your cast or splint out of the water. Protect it with a large plastic bag, rubber-banded at the top end. If a fiberglass cast or splint gets wet, you can dry it with a hair dryer.    If mando tape was put on and it becomes wet or dirty, change it. You may replace it with paper, plastic, or cloth tape. Cloth tape and paper tapes must be kept dry. Keep the mando tape in place for at least 4 weeks.    You may use acetaminophen or ibuprofen to control pain, unless another pain medicine was prescribed. If you have chronic liver or kidney disease, talk with your healthcare provider before using these medicines. Also talk with your provider if you ve had a stomach ulcer or gastrointestinal bleeding.    Don t put creams or objects under the cast if you have itching.  Follow-up care  Follow up with your healthcare provider, or as advised. This is to make sure the bone is healing the way it should.  X-rays may be taken. You will be told of any new findings that may affect your care.  When to seek medical advice  Call your healthcare provider right away if any of these occur:    The plaster cast or splint becomes wet or soft    The cast or splint cracks    The fiberglass cast or splint stays wet for more than 24 hours    Pain or swelling gets worse    Redness, warmth, swelling, drainage from the wound, or foul odor from a cast or splint    Finger becomes more cold, blue, numb, or tingly    You can t move your finger    The skin around the cast or splint becomes red    Fever of 100.4 F (38 C) or higher, or as directed by your healthcare provider  Date Last Reviewed: 2/1/2017 2000-2018 [employer]. [address] [place] [address], [place] [address]. All rights reserved. This information is not intended as a substitute for professional medical care. Always follow your healthcare professional's instructions.

## 2019-08-15 NOTE — LETTER
August 15, 2019      Dot Ramirez  [address] [address] [facility] [id number]        To Whom It May Concern:    Dot Ramirez  was seen on 8/15/2019  Please allow for light duties at work due to Right distal phalanx fracture.    Limitations: No bending, pulling, gripping with the right middle finger until evaluated by orthopedics.        Sincerely,        Kimberly Sheth PA-C

## 2019-08-19 ENCOUNTER — ANCILLARY PROCEDURE (OUTPATIENT)
Dept: GENERAL RADIOLOGY | Facility: CLINIC | Age: 34
End: 2019-08-19
Attending: FAMILY MEDICINE
Payer: COMMERCIAL

## 2019-08-19 ENCOUNTER — OFFICE VISIT (OUTPATIENT)
Dept: ORTHOPEDICS | Facility: CLINIC | Age: 34
End: 2019-08-19
Payer: COMMERCIAL

## 2019-08-19 VITALS
HEIGHT: 62 IN | DIASTOLIC BLOOD PRESSURE: 98 MMHG | SYSTOLIC BLOOD PRESSURE: 142 MMHG | BODY MASS INDEX: 34.78 KG/M2 | WEIGHT: 189 LBS

## 2019-08-19 DIAGNOSIS — M79.644 FINGER PAIN, RIGHT: ICD-10-CM

## 2019-08-19 DIAGNOSIS — S62.662A CLOSED NONDISPLACED FRACTURE OF DISTAL PHALANX OF RIGHT MIDDLE FINGER, INITIAL ENCOUNTER: Primary | ICD-10-CM

## 2019-08-19 PROCEDURE — 99204 OFFICE O/P NEW MOD 45 MIN: CPT | Performed by: FAMILY MEDICINE

## 2019-08-19 PROCEDURE — 73140 X-RAY EXAM OF FINGER(S): CPT | Mod: RT | Performed by: FAMILY MEDICINE

## 2019-08-19 RX ORDER — MELOXICAM 15 MG/1
15 TABLET ORAL DAILY
Qty: 30 TABLET | Refills: 1 | Status: SHIPPED | OUTPATIENT
Start: 2019-08-19 | End: 2021-01-29

## 2019-08-19 ASSESSMENT — MIFFLIN-ST. JEOR: SCORE: 1510.55

## 2019-08-19 NOTE — LETTER
8/19/2019         RE: Dot Ramirez  [address] [address] [facility] [address]        Dear Colleague,    Thank you for referring your patient, Dot Ramirez, to the [facility]. Please see a copy of my visit note below.    ASSESSMENT & PLAN  Patient Instructions     1. Closed nondisplaced fracture of distal phalanx of right middle finger, initial encounter    2. 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  Patient was also given a prescription for Tylenol with codeine for severe breakthrough pain.  -Patient may participate in swimming lessons with her toddler.  She should mando tape her fingers during that time.  -Patient will follow-up in 3 and half weeks for repeat x-rays and progression of activity.  -Call direct clinic number [[phone]] at any time with questions or concerns.    Albert Yeo MD [facility]  [facility]          -----    SUBJECTIVE  Dot Ramirez is a/an 34 year old Right handed female who is seen in consultation at the request of Kimberly Sheth PA-C for evaluation of right middle finger pain. The patient is seen by themselves.    Onset: 8/15/19. Patient describes injury as a wooden toy box lid fell on her right middle finger.  Location of Pain: right middle finger from distal phalanx to MCP joint  Rating of Pain at worst: 10/10  Rating of Pain Currently: 4/10  Worsened by: lifting, gripping/grasping, bumping the finger  Better with: rest/activity avoidance, splint  Treatments tried: rest/activity avoidance, ice, ibuprofen, previous imaging (xray 8/15/19) and casting/splinting/bracing  Associated symptoms: swelling  and bruising  Orthopedic history: NO  Relevant surgical history: NO  Social history: social history: works at a restaurant    Past Medical History:   Diagnosis Date     Abnormal Pap smear of cervix 02/22/2010    see problem list     Anxiety      Moderate major depression (H) 11/2014    onset with fetal loss     Social History     Socioeconomic History     Marital status:      Spouse name: Ankit     Number of children: Not on file     Years of education: Not on file     Highest education level: Not on file   Occupational History     Occupation:    Social Needs     Financial resource strain: Not on file     Food insecurity:     Worry: Not on file     Inability: Not on file     Transportation needs:     Medical: Not on file     Non-medical: Not on file   Tobacco Use     Smoking status: Never Smoker     Smokeless tobacco: Never Used   Substance and Sexual Activity     Alcohol use: No     Alcohol/week: 0.0 oz     Drug use: No     Sexual activity: Yes     Partners: Male   Lifestyle     Physical activity:     Days per week: Not on file     Minutes per session: Not on file     Stress: Not on file   Relationships     Social connections:     Talks on phone: Not on file     Gets together: Not on file     Attends Evangelical service: Not on file     Active member of club or organization: Not on file     Attends meetings of clubs or organizations: Not on file     Relationship status: Not on file     Intimate partner violence:     Fear of current or ex partner: Not on file     Emotionally abused: Not on file     Physically abused: Not on file     Forced sexual activity: Not on file   Other Topics Concern     Parent/sibling w/ CABG, MI or angioplasty before 65F 55M? Not Asked   Social History Narrative     Not on file         Patient's past medical, surgical, social, and family histories were reviewed today and no changes are noted.    REVIEW OF SYSTEMS:  10 point ROS is negative other than symptoms noted above in  "HPI, Past Medical History or as stated below  Constitutional: NEGATIVE for fever, chills, change in weight  Skin: NEGATIVE for worrisome rashes, moles or lesions  GI/: NEGATIVE for bowel or bladder changes  Neuro: NEGATIVE for weakness, dizziness or paresthesias    OBJECTIVE:  BP (!) 142/98   Ht 1.575 m (5' 2\")   Wt 85.7 kg (189 lb)   BMI 34.57 kg/m      General: healthy, alert and in no distress  HEENT: no scleral icterus or conjunctival erythema  Skin: no suspicious lesions or rash. No jaundice.  CV: regular rhythm by palpation  Resp: normal respiratory effort without conversational dyspnea   Psych: normal mood and affect  Gait: normal steady gait with appropriate coordination and balance  Neuro: normal light touch sensory exam of the bilateral hands.    MSK:  RIGHT HAND  Inspection:    Swelling, bruising of 3rd digit  Palpation:   Carpals: normal   Metacarpals: normal   Thumb: normal   Fingers: distal phalanx, DIP joint  Range of Motion:    flexion PIP limited substantially by pain, extension PIP limited substantially by pain limited by tightness, flexion DIP limited substantially by pain limited by tightness, extension DIP limited substantially by pain limited by tightness  Strength:     limited substantially by pain, flexion limited substantially by pain, opposition limited substantially by pain  Special Tests:    Positive: none    Negative: flexor digitorum superficialis testing, flexor digitorum profundus testing    Independent visualization of the below image:  Recent Results (from the past 24 hour(s))   XR Finger Right G/E 2 Views    Narrative    Vertical fracture of 3rd digit distal phalanx which extends to the ulnar   aspect of the  articular surface at the DIP joint.  Fracture appears unchanged compared   to previous xray.     Results for orders placed or performed in visit on 08/15/19   XR Hand Right G/E 3 Views    Narrative    XR HAND RT G/E 3 VW 8/15/2019 4:05 PM     HISTORY: Hand injury, " right, initial encounter    COMPARISON: None.      Impression    IMPRESSION: There is a vertical fracture involving the distal phalanx  of the middle finger which extends to the ulnar aspect of the  articular surface at the DIP joint. There is mild diastasis at the  articular surface with broadening of the base of the distal phalanx on  the volar to dorsal dimension.    SCOTT S NIELSEN, MD Albert Yeo MD [facility]      Again, thank you for allowing me to participate in the care of your patient.        Sincerely,        Albert Yeo, MD

## 2019-08-19 NOTE — PROGRESS NOTES
ASSESSMENT & PLAN  Patient Instructions     1. Closed nondisplaced fracture of distal phalanx of right middle finger, initial encounter    2. 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  Patient was also given a prescription for Tylenol with codeine for severe breakthrough pain.  -Patient may participate in swimming lessons with her toddler.  She should mando tape her fingers during that time.  -Patient will follow-up in 3 and half weeks for repeat x-rays and progression of activity.  -Call direct clinic number [[phone]] at any time with questions or concerns.    Albert Yeo MD [facility] Orthopedics and Sports Medicine  [facility]          -----    SUBJECTIVE  Dot Ramirez is a/an 34 year old Right handed female who is seen in consultation at the request of Kimberly Sheth PA-C for evaluation of right middle finger pain. The patient is seen by themselves.    Onset: 8/15/19. Patient describes injury as a wooden toy box lid fell on her right middle finger.  Location of Pain: right middle finger from distal phalanx to MCP joint  Rating of Pain at worst: 10/10  Rating of Pain Currently: 4/10  Worsened by: lifting, gripping/grasping, bumping the finger  Better with: rest/activity avoidance, splint  Treatments tried: rest/activity avoidance, ice, ibuprofen, previous imaging (xray 8/15/19) and casting/splinting/bracing  Associated symptoms: swelling and bruising  Orthopedic history: NO  Relevant surgical history: NO  Social history: social history: works at a restaurant    Past Medical History:   Diagnosis Date     Abnormal Pap smear of cervix 02/22/2010    see problem list     Anxiety      Moderate major  depression (H) 11/2014    onset with fetal loss     Social History     Socioeconomic History     Marital status:      Spouse name: Ankit     Number of children: Not on file     Years of education: Not on file     Highest education level: Not on file   Occupational History     Occupation:    Social Needs     Financial resource strain: Not on file     Food insecurity:     Worry: Not on file     Inability: Not on file     Transportation needs:     Medical: Not on file     Non-medical: Not on file   Tobacco Use     Smoking status: Never Smoker     Smokeless tobacco: Never Used   Substance and Sexual Activity     Alcohol use: No     Alcohol/week: 0.0 oz     Drug use: No     Sexual activity: Yes     Partners: Male   Lifestyle     Physical activity:     Days per week: Not on file     Minutes per session: Not on file     Stress: Not on file   Relationships     Social connections:     Talks on phone: Not on file     Gets together: Not on file     Attends [employer] service: Not on file     Active member of club or organization: Not on file     Attends meetings of clubs or organizations: Not on file     Relationship status: Not on file     Intimate partner violence:     Fear of current or ex partner: Not on file     Emotionally abused: Not on file     Physically abused: Not on file     Forced sexual activity: Not on file   Other Topics Concern     Parent/sibling w/ CABG, MI or angioplasty before 65F 55M? Not Asked   Social History Narrative     Not on file         Patient's past medical, surgical, social, and family histories were reviewed today and no changes are noted.    REVIEW OF SYSTEMS:  10 point ROS is negative other than symptoms noted above in HPI, Past Medical History or as stated below  Constitutional: NEGATIVE for fever, chills, change in weight  Skin: NEGATIVE for worrisome rashes, moles or lesions  GI/: NEGATIVE for bowel or bladder changes  Neuro: NEGATIVE for weakness, dizziness or  "paresthesias    OBJECTIVE:  BP (!) 142/98   Ht 1.575 m (5' 2\")   Wt 85.7 kg (189 lb)   BMI 34.57 kg/m     General: healthy, alert and in no distress  HEENT: no scleral icterus or conjunctival erythema  Skin: no suspicious lesions or rash. No jaundice.  CV: regular rhythm by palpation  Resp: normal respiratory effort without conversational dyspnea   Psych: normal mood and affect  Gait: normal steady gait with appropriate coordination and balance  Neuro: normal light touch sensory exam of the bilateral hands.    MSK:  RIGHT HAND  Inspection:    Swelling, bruising of 3rd digit  Palpation:   Carpals: normal   Metacarpals: normal   Thumb: normal   Fingers: distal phalanx, DIP joint  Range of Motion:    flexion PIP limited substantially by pain, extension PIP limited substantially by pain limited by tightness, flexion DIP limited substantially by pain limited by tightness, extension DIP limited substantially by pain limited by tightness  Strength:     limited substantially by pain, flexion limited substantially by pain, opposition limited substantially by pain  Special Tests:    Positive: none    Negative: flexor digitorum superficialis testing, flexor digitorum profundus testing    Independent visualization of the below image:  Recent Results (from the past 24 hour(s))   XR Finger Right G/E 2 Views    Narrative    Vertical fracture of 3rd digit distal phalanx which extends to the ulnar   aspect of the  articular surface at the DIP joint.  Fracture appears unchanged compared   to previous xray.     Results for orders placed or performed in visit on 08/15/19   XR Hand Right G/E 3 Views    Narrative    XR HAND RT G/E 3 VW 8/15/2019 4:05 PM     HISTORY: Hand injury, right, initial encounter    COMPARISON: None.      Impression    IMPRESSION: There is a vertical fracture involving the distal phalanx  of the middle finger which extends to the ulnar aspect of the  articular surface at the DIP joint. There is mild " diastasis at the  articular surface with broadening of the base of the distal phalanx on  the volar to dorsal dimension.    SCOTT S NIELSEN, MD Albert Yeo MD [facility] Sports and Orthopedic Care

## 2019-08-19 NOTE — PATIENT INSTRUCTIONS
1. Closed nondisplaced fracture of distal phalanx of right middle finger, initial encounter    2. 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  Patient was also given a prescription for Tylenol with codeine for severe breakthrough pain.  -Patient may participate in swimming lessons with her toddler.  She should mando tape her fingers during that time.  -Patient will follow-up in 3 and half weeks for repeat x-rays and progression of activity.  -Call direct clinic number [[phone]] at any time with questions or concerns.    Albert Yeo MD [facility] Orthopedics and Sports Medicine  [facility] [place]

## 2019-09-20 ENCOUNTER — OFFICE VISIT (OUTPATIENT)
Dept: ORTHOPEDICS | Facility: CLINIC | Age: 34
End: 2019-09-20
Payer: COMMERCIAL

## 2019-09-20 ENCOUNTER — ANCILLARY PROCEDURE (OUTPATIENT)
Dept: GENERAL RADIOLOGY | Facility: CLINIC | Age: 34
End: 2019-09-20
Attending: FAMILY MEDICINE
Payer: COMMERCIAL

## 2019-09-20 VITALS
SYSTOLIC BLOOD PRESSURE: 146 MMHG | HEIGHT: 62 IN | WEIGHT: 189 LBS | BODY MASS INDEX: 34.78 KG/M2 | DIASTOLIC BLOOD PRESSURE: 100 MMHG

## 2019-09-20 DIAGNOSIS — M79.644 FINGER PAIN, RIGHT: Primary | ICD-10-CM

## 2019-09-20 DIAGNOSIS — S62.662D CLOSED NONDISPLACED FRACTURE OF DISTAL PHALANX OF RIGHT MIDDLE FINGER WITH ROUTINE HEALING, SUBSEQUENT ENCOUNTER: ICD-10-CM

## 2019-09-20 PROCEDURE — 99213 OFFICE O/P EST LOW 20 MIN: CPT | Performed by: FAMILY MEDICINE

## 2019-09-20 PROCEDURE — 73140 X-RAY EXAM OF FINGER(S): CPT | Mod: RT | Performed by: FAMILY MEDICINE

## 2019-09-20 RX ORDER — SERTRALINE HYDROCHLORIDE 100 MG/1
TABLET, FILM COATED ORAL
COMMUNITY
Start: 2019-08-27 | End: 2021-01-29

## 2019-09-20 RX ORDER — GABAPENTIN 300 MG/1
CAPSULE ORAL
COMMUNITY
Start: 2019-09-03 | End: 2021-01-29

## 2019-09-20 ASSESSMENT — MIFFLIN-ST. JEOR: SCORE: 1510.55

## 2019-09-20 NOTE — PROGRESS NOTES
ASSESSMENT & PLAN  Patient Instructions     1. Finger pain, right    2. Closed nondisplaced fracture of distal phalanx of right middle finger with routine healing, subsequent encounter      -Patient is here for follow up of right hand pain due to distal phalanx fracture  -Patient has minimal healing of the fracture compared to previous x-ray but no significant displacement of the fracture.  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  Patient broke her Exos brace and continues to use her finger splint  -I recommend that she decrease the amount of heavy lifting to allow faster bony healing.  Fracture extends into the joint space patient was educated on development of posttraumatic arthritis  -Patient will start mando taping the third digits to improve the stiffness.  She may wear her finger splint while working as a  only.  -Patient will follow-up in 3 weeks for repeat x-rays reevaluation and assess need for possible formal hand therapy.  -Call direct clinic number [[phone]] at any time with questions or concerns.    Albert Yeo MD [facility] Orthopedics and Sports Medicine  [facility]          -----    SUBJECTIVE:  Dot Ramirez is a 34 year old female who is seen in follow-up for right long finger DIP fracture, DOI :8/15/19. Patient is 5 weeks out from injury .They were last seen 8/19/2019 .     Since their last visit reports 50% improvement. They indicate that their current pain level is 3/10.  Patient reports that she overdid things yesterday at work, and at home and experienced pain 9/10. They have tried alumofoam splint, Exos splint. Patient states that the Exos splint cracked, and also fell into her dog's diarrhea mop bucket and has not worn it for 2 weeks. Patient did not bring brace today. She also has not taken T-3 Rx to pharmacy, and has been utilizing Meloxicam.        The patient is seen by themselves.    Patient's past  "medical, surgical, social, and family histories were reviewed today and no changes are noted.    REVIEW OF SYSTEMS:  Constitutional: NEGATIVE for fever, chills, change in weight  Skin: NEGATIVE for worrisome rashes, moles or lesions  GI/: NEGATIVE for bowel or bladder changes  Neuro: NEGATIVE for weakness, dizziness or paresthesias    OBJECTIVE:  BP (!) 146/100 (BP Location: Right arm, Patient Position: Chair, Cuff Size: Adult Regular)   Ht 1.575 m (5' 2\")   Wt 85.7 kg (189 lb)   BMI 34.57 kg/m     General: healthy, alert and in no distress  HEENT: no scleral icterus or conjunctival erythema  Skin: no suspicious lesions or rash. No jaundice.  CV: regular rhythm by palpation, no pedal edema  Resp: normal respiratory effort without conversational dyspnea   Psych: normal mood and affect  Gait: normal steady gait with appropriate coordination and balance  Neuro: normal light touch sensory exam of the extremities.    MSK:  RIGHT HAND  Inspection:    Mild Swelling of 3rd digit  Palpation:   Carpals: normal   Metacarpals: normal   Thumb: normal   Fingers: distal phalanx, DIP joint  Range of Motion:    flexion PIP limited substantially by pain, extension PIP limited substantially by pain limited by tightness, flexion DIP limited substantially by pain limited by tightness, extension DIP limited substantially by pain limited by tightness  Strength:     limited substantially by pain, flexion limited substantially by pain, opposition limited substantially by pain  Special Tests:    Positive: none    Negative: flexor digitorum superficialis testing, flexor digitorum profundus testing       Independent visualization of the below image:                  Recent Results (from the past 24 hour(s))   XR Finger Right G/E 2 Views    Narrative    X-rays taken office today of the third digits shows a persistent   vertically oriented minimally displaced fracture of the proximal aspect of   the distal phalanx extending into the DIP " joint.  Fracture appears   virtually unchanged compared to previous x-ray.       Patient's conditions were thoroughly discussed during today's visit with greater than 50% of the visit spent counseling the patient with total time spent face-to-face with the patient being 15 minutes.    Albert Yeo MD, [facility] Sports and Orthopedic Care

## 2019-09-20 NOTE — PATIENT INSTRUCTIONS
1. Finger pain, right    2. Closed nondisplaced fracture of distal phalanx of right middle finger with routine healing, subsequent encounter      -Patient is here for follow up of right hand pain due to distal phalanx fracture  -Patient has minimal healing of the fracture compared to previous x-ray but no significant displacement of the fracture.  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  Patient broke her Exos brace and continues to use her finger splint  -I recommend that she decrease the amount of heavy lifting to allow faster bony healing.  Fracture extends into the joint space patient was educated on development of posttraumatic arthritis  -Patient will start mando taping the third digits to improve the stiffness.  She may wear her finger splint while working as a  only.  -Patient will follow-up in 3 weeks for repeat x-rays reevaluation and assess need for possible formal hand therapy.  -Call direct clinic number [[phone]] at any time with questions or concerns.    Albert Yeo MD CAQSM  [place] Orthopedics and Sports Medicine  [facility] [place]

## 2019-09-20 NOTE — LETTER
September 20, 2019      Dot Ramirez  [address] [address]        To Whom It May Concern:    Dot Ramirez was seen in our clinic. She may return to work with the following: limited to 1 shift per week. She will follow up for re-evaluation and progression of activity in 3 weeks.       Sincerely,        Albert Yeo, MD

## 2019-09-20 NOTE — LETTER
9/20/2019         RE: Dot Ramirez  [address] [address]        Dear Colleague,    Thank you for referring your patient, Dot Ramirez, to the [facility] SPORTS MEDICINE. Please see a copy of my visit note below.    ASSESSMENT & PLAN  Patient Instructions     1. Finger pain, right    2. Closed nondisplaced fracture of distal phalanx of right middle finger with routine healing, subsequent encounter      -Patient is here for follow up of right hand pain due to distal phalanx fracture  -Patient has minimal healing of the fracture compared to previous x-ray but no significant displacement of the fracture.  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  Patient broke her Exos brace and continues to use her finger splint  -I recommend that she decrease the amount of heavy lifting to allow faster bony healing.  Fracture extends into the joint space patient was educated on development of posttraumatic arthritis  -Patient will start mando taping the third digits to improve the stiffness.  She may wear her finger splint while working as a  only.  -Patient will follow-up in 3 weeks for repeat x-rays reevaluation and assess need for possible formal hand therapy.  -Call direct clinic number [[phone]] at any time with questions or concerns.    Albert Yeo MD [facility] Orthopedics and Sports Medicine  [facility] [place]          -----    SUBJECTIVE:  Dot Ramirez is a 34 year old female who is seen in follow-up for right long finger DIP fracture, DOI :8/15/19. Patient is 5 weeks out from injury .They were last seen 8/19/2019 .     Since their last visit reports 50% improvement. They indicate that their current pain level is 3/10.  Patient reports that she overdid things yesterday at work, and at home and experienced pain 9/10. They have tried alumofoam splint, Exos splint. Patient states that the Exos splint  "cracked, and also fell into her dog's diarrhea mop bucket and has not worn it for 2 weeks. Patient did not bring brace today. She also has not taken T-3 Rx to pharmacy, and has been utilizing Meloxicam.        The patient is seen by themselves.    Patient's past medical, surgical, social, and family histories were reviewed today and no changes are noted.    REVIEW OF SYSTEMS:  Constitutional: NEGATIVE for fever, chills, change in weight  Skin: NEGATIVE for worrisome rashes, moles or lesions  GI/: NEGATIVE for bowel or bladder changes  Neuro: NEGATIVE for weakness, dizziness or paresthesias    OBJECTIVE:  BP (!) 146/100 (BP Location: Right arm, Patient Position: Chair, Cuff Size: Adult Regular)   Ht 1.575 m (5' 2\")   Wt 85.7 kg (189 lb)   BMI 34.57 kg/m      General: healthy, alert and in no distress  HEENT: no scleral icterus or conjunctival erythema  Skin: no suspicious lesions or rash. No jaundice.  CV: regular rhythm by palpation, no pedal edema  Resp: normal respiratory effort without conversational dyspnea   Psych: normal mood and affect  Gait: normal steady gait with appropriate coordination and balance  Neuro: normal light touch sensory exam of the extremities.    MSK:  RIGHT HAND  Inspection:     Mild Swelling of 3rd digit  Palpation:   Carpals: normal   Metacarpals: normal   Thumb: normal   Fingers: distal phalanx, DIP joint  Range of Motion:    flexion PIP limited substantially by pain, extension PIP limited substantially by pain limited by tightness, flexion DIP limited substantially by pain limited by tightness, extension DIP limited substantially by pain limited by tightness  Strength:     limited substantially by pain, flexion limited substantially by pain, opposition limited substantially by pain  Special Tests:    Positive: none    Negative: flexor digitorum superficialis testing, flexor digitorum profundus testing       Independent visualization of the below image:                  Recent " Results (from the past 24 hour(s))   XR Finger Right G/E 2 Views    Narrative    X-rays taken office today of the third digits shows a persistent   vertically oriented minimally displaced fracture of the proximal aspect of   the distal phalanx extending into the DIP joint.  Fracture appears   virtually unchanged compared to previous x-ray.       Patient's conditions were thoroughly discussed during today's visit with greater than 50% of the visit spent counseling the patient with total time spent face-to-face with the patient being 15 minutes.    Albert Yeo MD, [facility] Sports and Orthopedic Care          Again, thank you for allowing me to participate in the care of your patient.        Sincerely,        Albert Yeo, MD

## 2019-10-02 ENCOUNTER — HEALTH MAINTENANCE LETTER (OUTPATIENT)
Age: 34
End: 2019-10-02

## 2019-10-11 ENCOUNTER — ANCILLARY PROCEDURE (OUTPATIENT)
Dept: GENERAL RADIOLOGY | Facility: CLINIC | Age: 34
End: 2019-10-11
Attending: FAMILY MEDICINE
Payer: COMMERCIAL

## 2019-10-11 ENCOUNTER — OFFICE VISIT (OUTPATIENT)
Dept: ORTHOPEDICS | Facility: CLINIC | Age: 34
End: 2019-10-11
Payer: COMMERCIAL

## 2019-10-11 VITALS — SYSTOLIC BLOOD PRESSURE: 118 MMHG | DIASTOLIC BLOOD PRESSURE: 78 MMHG

## 2019-10-11 DIAGNOSIS — S62.662D CLOSED NONDISPLACED FRACTURE OF DISTAL PHALANX OF RIGHT MIDDLE FINGER WITH ROUTINE HEALING, SUBSEQUENT ENCOUNTER: ICD-10-CM

## 2019-10-11 DIAGNOSIS — M79.644 FINGER PAIN, RIGHT: ICD-10-CM

## 2019-10-11 DIAGNOSIS — M79.644 FINGER PAIN, RIGHT: Primary | ICD-10-CM

## 2019-10-11 DIAGNOSIS — S62.662A CLOSED NONDISPLACED FRACTURE OF DISTAL PHALANX OF RIGHT MIDDLE FINGER, INITIAL ENCOUNTER: ICD-10-CM

## 2019-10-11 PROCEDURE — 99213 OFFICE O/P EST LOW 20 MIN: CPT | Performed by: FAMILY MEDICINE

## 2019-10-11 PROCEDURE — 73140 X-RAY EXAM OF FINGER(S): CPT | Mod: RT | Performed by: FAMILY MEDICINE

## 2019-10-11 NOTE — LETTER
10/11/2019         RE: Dot Ramirez  [address] [address]        Dear Colleague,    Thank you for referring your patient, Dot Ramirez, to the [facility] SPORTS MEDICINE. Please see a copy of my visit note below.    ASSESSMENT & PLAN  Patient Instructions     1. Finger pain, right    2. Closed nondisplaced fracture of distal phalanx of right middle finger with routine healing, subsequent encounter      -Patient is here for follow-up of third digit pain due to distal phalanx fracture  -X-rays taken today show more significant bony healing compared to previous x-ray.  -Patient does have persistent swelling and stiffness  -Physical therapy was offered but with her busy schedule with work and her kids is difficult for her to go to.  Patient will start with home exercises as discussed in office today.  If she does not have significant improvement in 1 to 2 weeks, she will call our office for formal physical therapy order.  -Patient was informed that while the fracture was healing there is some derangement of the joint and she will develop post traumatic arthritis.  Patient understands and accepts the outcome.  Patient had to work throughout her recovery time in which compromised her healing  -Call direct clinic number [[phone]] at any time with questions or concerns.    Albert Yeo MD [facility] Orthopedics and Sports Medicine  [facility] [place]          -----    SUBJECTIVE:  Dot Ramirez is a 34 year old female who is seen in follow-up for right hand, middle finger.They were last seen 9/20/2019 and reports being better.     Since their last visit reports 90% improvement. They indicate that their current pain level is 2/10. They have tried casting/splinting/bracing.      The patient is seen by themselves.    Patient's past medical, surgical, social, and family histories were reviewed today and no changes are noted.    REVIEW OF  SYSTEMS:  Constitutional: NEGATIVE for fever, chills, change in weight  Skin: NEGATIVE for worrisome rashes, moles or lesions  GI/: NEGATIVE for bowel or bladder changes  Neuro: NEGATIVE for weakness, dizziness or paresthesias    OBJECTIVE:  /78    General: healthy, alert and in no distress  HEENT: no scleral icterus or conjunctival erythema  Skin: no suspicious lesions or rash. No jaundice.  CV: regular rhythm by palpation, no pedal edema  Resp: normal respiratory effort without conversational dyspnea   Psych: normal mood and affect  Gait: normal steady gait with appropriate coordination and balance  Neuro: normal light touch sensory exam of the extremities.    MSK:  RIGHT HAND  Inspection:    Mild Swelling of 3rd digit  Palpation:   Carpals: normal   Metacarpals: normal   Thumb: normal   Fingers: distal phalanx, DIP joint  Range of Motion:    flexion PIP limited slightly by pain, extension PIP normal, flexion DIP limited slightly by pain limited by tightness, extension DIP normal  Strength:     limited slightly by pain, flexion limited slightly by pain, opposition limited slightly by pain  Special Tests:    Positive: none    Negative: flexor digitorum superficialis testing, flexor digitorum profundus testing    Independent visualization of the below image:  Recent Results (from the past 24 hour(s))   XR Finger Right G/E 2 Views    Narrative    X-rays taken office today of the third digits shows a vertically   oriented minimally displaced fracture of the proximal aspect of   the distal phalanx extending into the DIP joint with increased   Bony healing compared to previous xray.   Mild derangement of   Intra-articular extension.             Albert Yeo MD, [facility] Sports and Orthopedic Care          Again, thank you for allowing me to participate in the care of your patient.        Sincerely,        Albert Yeo, MD

## 2019-10-11 NOTE — PATIENT INSTRUCTIONS
1. Finger pain, right    2. Closed nondisplaced fracture of distal phalanx of right middle finger with routine healing, subsequent encounter      -Patient is here for follow-up of third digit pain due to distal phalanx fracture  -X-rays taken today show more significant bony healing compared to previous x-ray.  -Patient does have persistent swelling and stiffness  -Physical therapy was offered but with her busy schedule with work and her kids is difficult for her to go to.  Patient will start with home exercises as discussed in office today.  If she does not have significant improvement in 1 to 2 weeks, she will call our office for formal physical therapy order.  -Patient was informed that while the fracture was healing there is some derangement of the joint and she will develop post traumatic arthritis.  Patient understands and accepts the outcome.  Patient had to work throughout her recovery time in which compromised her healing  -Call direct clinic number [[phone]] at any time with questions or concerns.    Al[facility] MD [facility]  [facility] [place]

## 2019-10-11 NOTE — PROGRESS NOTES
ASSESSMENT & PLAN  Patient Instructions     1. Finger pain, right    2. Closed nondisplaced fracture of distal phalanx of right middle finger with routine healing, subsequent encounter      -Patient is here for follow-up of third digit pain due to distal phalanx fracture  -X-rays taken today show more significant bony healing compared to previous x-ray.  -Patient does have persistent swelling and stiffness  -Physical therapy was offered but with her busy schedule with work and her kids is difficult for her to go to.  Patient will start with home exercises as discussed in office today.  If she does not have significant improvement in 1 to 2 weeks, she will call our office for formal physical therapy order.  -Patient was informed that while the fracture was healing there is some derangement of the joint and she will develop post traumatic arthritis.  Patient understands and accepts the outcome.  Patient had to work throughout her recovery time in which compromised her healing  -Call direct clinic number [[phone]] at any time with questions or concerns.    Albert Yeo MD [facility]  [facility]          -----    SUBJECTIVE:  Dot Ramirez is a 34 year old female who is seen in follow-up for right hand, middle finger.They were last seen 9/20/2019 and reports being better.     Since their last visit reports 90% improvement. They indicate that their current pain level is 2/10. They have tried casting/splinting/bracing.      The patient is seen by themselves.    Patient's past medical, surgical, social, and family histories were reviewed today and no changes are noted.    REVIEW OF SYSTEMS:  Constitutional: NEGATIVE for fever, chills, change in weight  Skin: NEGATIVE for worrisome rashes, moles or lesions  GI/: NEGATIVE for bowel or bladder changes  Neuro: NEGATIVE for weakness, dizziness or paresthesias    OBJECTIVE:  /78    General: healthy, alert and  in no distress  HEENT: no scleral icterus or conjunctival erythema  Skin: no suspicious lesions or rash. No jaundice.  CV: regular rhythm by palpation, no pedal edema  Resp: normal respiratory effort without conversational dyspnea   Psych: normal mood and affect  Gait: normal steady gait with appropriate coordination and balance  Neuro: normal light touch sensory exam of the extremities.    MSK:  RIGHT HAND  Inspection:    Mild Swelling of 3rd digit  Palpation:   Carpals: normal   Metacarpals: normal   Thumb: normal   Fingers: distal phalanx, DIP joint  Range of Motion:    flexion PIP limited slightly by pain, extension PIP normal, flexion DIP limited slightly by pain limited by tightness, extension DIP normal  Strength:     limited slightly by pain, flexion limited slightly by pain, opposition limited slightly by pain  Special Tests:    Positive: none    Negative: flexor digitorum superficialis testing, flexor digitorum profundus testing    Independent visualization of the below image:  Recent Results (from the past 24 hour(s))   XR Finger Right G/E 2 Views    Narrative    X-rays taken office today of the third digits shows a vertically   oriented minimally displaced fracture of the proximal aspect of   the distal phalanx extending into the DIP joint with increased   Bony healing compared to previous xray.   Mild derangement of   Intra-articular extension.             Albert Yeo MD, CAM  [facility]

## 2019-11-13 ENCOUNTER — ANCILLARY PROCEDURE (OUTPATIENT)
Dept: GENERAL RADIOLOGY | Facility: CLINIC | Age: 34
End: 2019-11-13
Attending: PHYSICIAN ASSISTANT
Payer: COMMERCIAL

## 2019-11-13 ENCOUNTER — OFFICE VISIT (OUTPATIENT)
Dept: URGENT CARE | Facility: URGENT CARE | Age: 34
End: 2019-11-13
Payer: COMMERCIAL

## 2019-11-13 VITALS
OXYGEN SATURATION: 98 % | RESPIRATION RATE: 20 BRPM | TEMPERATURE: 98 F | DIASTOLIC BLOOD PRESSURE: 76 MMHG | SYSTOLIC BLOOD PRESSURE: 120 MMHG | HEART RATE: 74 BPM

## 2019-11-13 DIAGNOSIS — M25.521 RIGHT ELBOW PAIN: Primary | ICD-10-CM

## 2019-11-13 DIAGNOSIS — M25.521 RIGHT ELBOW PAIN: ICD-10-CM

## 2019-11-13 PROCEDURE — 99214 OFFICE O/P EST MOD 30 MIN: CPT | Performed by: PHYSICIAN ASSISTANT

## 2019-11-13 PROCEDURE — 73080 X-RAY EXAM OF ELBOW: CPT | Mod: RT

## 2019-11-13 NOTE — PROGRESS NOTES
SUBJECTIVE:  Chief Complaint   Patient presents with     Urgent Care     Elbow Pain     R elbow pain      Dot Ramirez is a 34 year old female presents with a chief complaint of right elbow pain.  The pain occurred 2 month(s) ago.   The injury happened while not known. The patient complained of mild pain  and has not had decreased ROM.  Pain exacerbated by movement.  Relieved by rest and ice.  She treated it initially with no therapy. This is the first time this type of injury has occurred to this patient.     Past Medical History:   Diagnosis Date     Abnormal Pap smear of cervix 02/22/2010    see problem list     Anxiety      Moderate major depression (H) 11/2014    onset with fetal loss     Current Outpatient Medications   Medication Sig Dispense Refill     gabapentin (NEURONTIN) 300 MG capsule        LORazepam (ATIVAN) 0.5 MG tablet        meloxicam (MOBIC) 15 MG tablet Take 1 tablet (15 mg) by mouth daily 30 tablet 1     naloxone (NARCAN) 4 MG/0.1ML nasal spray Spray 1 spray (4 mg) into one nostril alternating nostrils once as needed for opioid reversal Every 2-3 minutes until patient responsive or EMS arrives 0.2 mL 0     sertraline (ZOLOFT) 100 MG tablet        sertraline (ZOLOFT) 50 MG tablet        acetaminophen-codeine (TYLENOL #3) 300-30 MG tablet Take 1 tablet by mouth every 8 hours as needed for severe pain (Patient not taking: Reported on 9/20/2019) 12 tablet 0     amoxicillin (AMOXIL) 500 MG capsule Take 1 capsule (500 mg) by mouth 3 times daily (Patient not taking: Reported on 8/15/2019) 30 capsule 0     dexamethasone (DECADRON) 4 MG/ML injection Apply 1 mL (4 mg) topically once for 1 dose For physical therapy, iontophoresis 30 mL 0     escitalopram (LEXAPRO) 10 MG tablet Take 1 tablet (10 mg) by mouth daily (Patient not taking: Reported on 8/15/2019) 30 tablet 1     order for DME Equipment being ordered: EXOS Radial Gutter Fracture Brace, RT, SM (Patient not taking: Reported on 9/20/2019) 1  Device 0     order for DME Equipment being ordered:tall aircast boot (Patient not taking: Reported on 8/15/2019) 1 Device 0     order for DME Equipment being ordered:  Even up (Patient not taking: Reported on 8/15/2019) 1 Device 0     rOPINIRole (REQUIP) 0.25 MG tablet Take 1-2 tablets (0.25-0.5 mg) by mouth At Bedtime (Patient not taking: Reported on 9/20/2019) 180 tablet 0     Social History     Tobacco Use     Smoking status: Never Smoker     Smokeless tobacco: Never Used   Substance Use Topics     Alcohol use: No     Alcohol/week: 0.0 standard drinks       ROS:  10 point ROS negative except as listed above      EXAM:   /76   Pulse 74   Temp 98  F (36.7  C) (Tympanic)   Resp 20   SpO2 98%   Gen: healthy,alert,no distress  Extremity: ROM intact. Point tender at olecranon.  No abnormalities    NECK: supple, non-tender to palpation, FROM   CHEST: clear to auscultation  CV: regular rate and rhythm  EXTREMITIES: peripheral pulses normal  MS: no gross deformities noted, no evidence of inflammation in joints, FROM in all extremities.  SKIN: no suspicious lesions or rashes  NEURO: Normal strength and tone, sensory exam grossly normal, mentation intact and speech normal    X-ray image initially interpreted in clinic by me in order to rule out fracture/dislocation.  No evidence appreciated.      ASSESSMENT:   (M25.521) Right elbow pain  (primary encounter diagnosis)  Comment: no evidence of fracture or dislocation  Plan: XR Elbow Right 3 Views  RICE    Patient Instructions       Patient Education     RICE     Rest an injury, elevate it, and use ice and compression as directed.   RICE stands for rest, ice, compression, and elevation. These can limit pain and swelling after an injury. RICE may be recommended to help treat breaks (fractures), sprains, strains, and bruises or bumps.   Home care  Here are the details of RICE:    Rest. Limit the use of the injured body part. This helps prevent further damage to the  body part and gives it time to heal. In some cases, you may need a sling, brace, splint, or cast to help keep the body part still until it has healed.    Ice. Applying ice right after an injury helps relieve pain and swelling. To make an ice pack, put ice cubes in a plastic bag that seals at the top. Wrap the bag in a clean, thin towel or cloth. Then place it over the injured area. Do this for 10 to 15 minutes every 3 to 4 hours. Continue for the next 1 to 3 days or until your symptoms improve. Never put ice directly on your skin. Don't ice an area longer than 15 minutes at a time.    Compression. Putting pressure on an injury helps reduce swelling and provides support. Wrap the injured area firmly with an elastic bandage or wrap. Make sure not to wrap the bandage too tightly or you will cut off blood flow to the injured area. If your bandage loosens, rewrap it.    Elevation. Keeping an injury raised or elevated above the level of your heart reduces swelling, pain, and throbbing. For instance, if you have a broken leg, it may help to rest your leg on several pillows when sitting or lying down. Try to keep the injured area elevated as often as possible.  Follow-up care  Follow up with your healthcare provider, or as advised.  When to seek medical advice  Call your healthcare provider right away if any of these occur:    Fever of 100.4 F (38 C) or higher, or as directed by your healthcare provider    Chills    Increased pain or swelling in the injured body part    Injured body part becomes cold, blue, numb, or tingly    Signs of infection. These include warmth in the skin, redness, drainage, or bad smell coming from the injured body part.  Date Last Reviewed: 6/1/2018 2000-2018 [employer]. [address] [place] [address], [place] [address]. All rights reserved. This information is not intended as a substitute for professional medical care. Always follow your healthcare professional's instructions.

## 2019-11-13 NOTE — PATIENT INSTRUCTIONS
Patient Education     RICE     Rest an injury, elevate it, and use ice and compression as directed.   RICE stands for rest, ice, compression, and elevation. These can limit pain and swelling after an injury. RICE may be recommended to help treat breaks (fractures), sprains, strains, and bruises or bumps.   Home care  Here are the details of RICE:    Rest. Limit the use of the injured body part. This helps prevent further damage to the body part and gives it time to heal. In some cases, you may need a sling, brace, splint, or cast to help keep the body part still until it has healed.    Ice. Applying ice right after an injury helps relieve pain and swelling. To make an ice pack, put ice cubes in a plastic bag that seals at the top. Wrap the bag in a clean, thin towel or cloth. Then place it over the injured area. Do this for 10 to 15 minutes every 3 to 4 hours. Continue for the next 1 to 3 days or until your symptoms improve. Never put ice directly on your skin. Don't ice an area longer than 15 minutes at a time.    Compression. Putting pressure on an injury helps reduce swelling and provides support. Wrap the injured area firmly with an elastic bandage or wrap. Make sure not to wrap the bandage too tightly or you will cut off blood flow to the injured area. If your bandage loosens, rewrap it.    Elevation. Keeping an injury raised or elevated above the level of your heart reduces swelling, pain, and throbbing. For instance, if you have a broken leg, it may help to rest your leg on several pillows when sitting or lying down. Try to keep the injured area elevated as often as possible.  Follow-up care  Follow up with your healthcare provider, or as advised.  When to seek medical advice  Call your healthcare provider right away if any of these occur:    Fever of 100.4 F (38 C) or higher, or as directed by your healthcare provider    Chills    Increased pain or swelling in the injured body part    Injured body part  becomes cold, blue, numb, or tingly    Signs of infection. These include warmth in the skin, redness, drainage, or bad smell coming from the injured body part.  Date Last Reviewed: 6/1/2018 2000-2018 [employer]. [address] [place] [id number]. All rights reserved. This information is not intended as a substitute for professional medical care. Always follow your healthcare professional's instructions.

## 2021-01-15 ENCOUNTER — HEALTH MAINTENANCE LETTER (OUTPATIENT)
Age: 36
End: 2021-01-15

## 2021-01-28 ENCOUNTER — TELEPHONE (OUTPATIENT)
Dept: INTERNAL MEDICINE | Facility: CLINIC | Age: 36
End: 2021-01-28

## 2021-01-28 NOTE — TELEPHONE ENCOUNTER
Sun morning 1/24/21 around  3 a.m. patient had diarrhea stools x3.  She went back to sleep and got up at 8 a.m., diarrhea continued all day.  Stools were liquid and too numerous to count.  Had 5-10 kernals of popcorn Sat. Evening and has history of diverticulitis x1 several years ago so wondered if that was the problem.  By Sun evening her temp was 102.9, and when she got up during the night to get something to eat she realized she had lost her sense of taste.  Mon. At [facility] she did a self swab (nasal), that was negative.    Mon. 1/25/21 she had a bad headache, doing a lot of sleeping and diarrhea continued  Tues 1/26/21 she felt slowed, like she was moving slower than the rest of the world  Continues with loose stools, no longer as watery now 3-5 stools per day but she is taking Pepto Bismol.  She is drinking plenty of Gatorade and water.   [place] and [place] came [place] negative also.  This morning temp 99.3 ear thermometer.  Temp now is less than 98.6  Denies cough, head or chest congestion, shortness of breath, chest tightness  No one else in household is ill.  If she fails to improve or worsens she will be seen for symptoms.   JOSE Holder R.N.

## 2021-01-29 ENCOUNTER — OFFICE VISIT (OUTPATIENT)
Dept: FAMILY MEDICINE | Facility: CLINIC | Age: 36
End: 2021-01-29
Payer: COMMERCIAL

## 2021-01-29 VITALS
TEMPERATURE: 98.5 F | HEART RATE: 94 BPM | DIASTOLIC BLOOD PRESSURE: 87 MMHG | SYSTOLIC BLOOD PRESSURE: 115 MMHG | RESPIRATION RATE: 16 BRPM | OXYGEN SATURATION: 100 %

## 2021-01-29 DIAGNOSIS — R43.0 LOSS OF SMELL: ICD-10-CM

## 2021-01-29 DIAGNOSIS — R53.83 FATIGUE, UNSPECIFIED TYPE: ICD-10-CM

## 2021-01-29 DIAGNOSIS — R19.7 DIARRHEA, UNSPECIFIED TYPE: Primary | ICD-10-CM

## 2021-01-29 LAB
SARS-COV-2 RNA RESP QL NAA+PROBE: NOT DETECTED
SPECIMEN SOURCE: NORMAL

## 2021-01-29 PROCEDURE — U0005 INFEC AGEN DETEC AMPLI PROBE: HCPCS | Performed by: FAMILY MEDICINE

## 2021-01-29 PROCEDURE — U0003 INFECTIOUS AGENT DETECTION BY NUCLEIC ACID (DNA OR RNA); SEVERE ACUTE RESPIRATORY SYNDROME CORONAVIRUS 2 (SARS-COV-2) (CORONAVIRUS DISEASE [COVID-19]), AMPLIFIED PROBE TECHNIQUE, MAKING USE OF HIGH THROUGHPUT TECHNOLOGIES AS DESCRIBED BY CMS-2020-01-R: HCPCS | Performed by: FAMILY MEDICINE

## 2021-01-29 PROCEDURE — 99213 OFFICE O/P EST LOW 20 MIN: CPT | Performed by: FAMILY MEDICINE

## 2021-01-29 ASSESSMENT — ENCOUNTER SYMPTOMS
SHORTNESS OF BREATH: 0
DIARRHEA: 1
FATIGUE: 1
ABDOMINAL PAIN: 0
FEVER: 1
NAUSEA: 1
COUGH: 0

## 2021-01-29 NOTE — LETTER
February 1, 2021      Dot Ramirez  [address] [facility] [address]        Dear ,    We are writing to inform you of your test results.    Your COVID 19 results return back negative .   Feel free to reach out to us with any concern .     Resulted Orders   Symptomatic COVID-19 Virus (Coronavirus) by PCR   Result Value Ref Range    COVID-19 Virus PCR to [facility] - Source Nasopharyngeal     COVID-19 Virus PCR to [facility] - Result Not Detected       Comment:      Collection of multiple specimens from the same patient may be necessary to   detect the virus. The possibility of a false negative should be considered if   the patient's recent exposure or clinical presentation suggests 2019 nCOV   infection and diagnostic tests for other causes of illness are negative.   Repeat testing may be considered in this setting.  Patient sample was heat inactivated and amplified using the HDPCR SARS-CoV-2   assay ([employer] The HDPCRTM SARS-CoV-2 assay is a reverse   transcription real-time polymerase chain reaction (qRT-PCR) test intended for   the qualitative detection of nucleic acid  from SARS-CoV-2 in human nasopharyngeal swabs, oropharyngeal swabs, anterior   nasal swabs, mid-turbinate nasal swabs as well as nasal aspirate, nasal wash,   and bronchoalveolar lavage (BAL) specimens from individuals who are suspected   of COVID-19 by their healthcare provider.  A negative result does not rule out the presence of real-time PCR inhibitors   in the sp ecimen or COVID-19 RNA in concentrations below the limit of detection   of the assay. The possibility of a false negative should be considered if the   patients recent exposure or clinical presentation suggests COVID-19.   Additional testing or repeat testing requires consultation with the   laboratory.  Nasopharyngeal specimen is the preferred choice for swab-based SARS CoV2   testing. When collection of a nasopharyngeal swab is not possible the    following are acceptable alternatives:  an oropharyngeal (OP) specimen collected by a healthcare professional, or a   nasal mid-turbinate (NMT) swab collected by a healthcare professional or by   onsite self-collection (using a flocked tapered swab), or an anterior nares   specimen collected by a healthcare professional or by onsite self-collection   (using a round foam swab). (Centers for Disease Control)  Testing performed by [facility] ([facility]) [address] [facility] [address]  The test performance characteristics were determined by [facility]. It has not been   cleared or approved by the FDA.  The laboratory is regulated under the Clinical Laboratory Improvement   Amendments of 1988 (CLIA-88) as qualified to perform high-complexity testing.   This test is used for clinical purposes. It should not be regarded as   investigational or for research.         If you have any questions or concerns, please call the clinic at the number listed above.       Sincerely,      Claudia Meraz MD

## 2021-01-29 NOTE — PROGRESS NOTES
Assessment & Plan     Diarrhea, unspecified type  - Symptomatic COVID-19 Virus (Coronavirus) by PCR  Loss of smell  Fatigue, unspecified type    --Experiencing symptoms since 5 days .    Feeling better today , no diarrhea , no fever .  Has rapid test and self test 5 days ago which were negative.    Patient denies any fever today, patient denies any shortness of breath.  Patient describes she is able to keep fluids down, has noticed improvement in her diarrhea.    Patient symptoms are suggestive of  -Viral illness, will proceed with COVID-19 testing.  -  Patient to continue with hydration.  Recommend to continue with increased fluid intake .  Currently no worrisome features .  No Shortness of breath or chest pain   Recommend to contact with any new symptoms .    Return in about 2 months (around 3/29/2021) for Routine preventive.    Claudia Meraz MD  [facility] is a 35 year old who presents to clinic today for the following health issues     HPI         Concern for COVID-19  About how many days ago did these symptoms start? 5 days  Is this your first visit for this illness? Yes  In the 14 days before your symptoms started, have you had close contact with someone with COVID-19 (Coronavirus)? No  Do you have a fever or chills? Yes, the highest temperature was 102.9 on Sunday night  Are you having new or worsening difficulty breathing? No  Do you have new or worsening cough? No  Have you had any new or unexplained body aches? No    Have you experienced any of the following NEW symptoms?    Headache: YES    Sore throat: No    Loss of taste or smell: YES    Chest pain: No    Diarrhea: YES    Rash: No  What treatments have you tried? pepto bismal. preperation H, tylenol  Who do you live with?  2 kids  Are you, or a household member, a healthcare worker or a ? No  Do you live in a nursing home, group home, or shelter? No  Do you have a way to get  food/medications if quarantined? Yes           Denies any chest pain or shortness of breath     Review of Systems   Constitutional: Positive for fatigue and fever.   Respiratory: Negative for cough and shortness of breath.    Gastrointestinal: Positive for diarrhea and nausea. Negative for abdominal pain.   Endocrine: Negative for cold intolerance and heat intolerance.            Objective    /87   Pulse 94   Temp 98.5  F (36.9  C) (Oral)   Resp 16   SpO2 100%   There is no height or weight on file to calculate BMI.  Physical Exam  Vitals signs and nursing note reviewed.   Constitutional:       Appearance: Normal appearance.   HENT:      Nose: Nose normal.   Neck:      Musculoskeletal: Normal range of motion.   Cardiovascular:      Rate and Rhythm: Normal rate.   Pulmonary:      Effort: Pulmonary effort is normal.   Abdominal:      General: Abdomen is flat.      Palpations: Abdomen is soft.   Musculoskeletal: Normal range of motion.   Skin:     General: Skin is warm.   Neurological:      General: No focal deficit present.      Mental Status: She is alert.   Psychiatric:         Mood and Affect: Mood normal.

## 2021-04-21 ENCOUNTER — TRANSFERRED RECORDS (OUTPATIENT)
Dept: HEALTH INFORMATION MANAGEMENT | Facility: CLINIC | Age: 36
End: 2021-04-21

## 2021-04-30 ENCOUNTER — TRANSFERRED RECORDS (OUTPATIENT)
Dept: HEALTH INFORMATION MANAGEMENT | Facility: CLINIC | Age: 36
End: 2021-04-30

## 2021-05-03 ENCOUNTER — TRANSFERRED RECORDS (OUTPATIENT)
Dept: HEALTH INFORMATION MANAGEMENT | Facility: CLINIC | Age: 36
End: 2021-05-03

## 2021-05-31 ENCOUNTER — RECORDS - HEALTHEAST (OUTPATIENT)
Dept: ADMINISTRATIVE | Facility: CLINIC | Age: 36
End: 2021-05-31

## 2021-06-16 ENCOUNTER — TRANSFERRED RECORDS (OUTPATIENT)
Dept: HEALTH INFORMATION MANAGEMENT | Facility: CLINIC | Age: 36
End: 2021-06-16

## 2021-07-30 ENCOUNTER — TRANSFERRED RECORDS (OUTPATIENT)
Dept: HEALTH INFORMATION MANAGEMENT | Facility: CLINIC | Age: 36
End: 2021-07-30

## 2021-08-04 ENCOUNTER — TRANSFERRED RECORDS (OUTPATIENT)
Dept: HEALTH INFORMATION MANAGEMENT | Facility: CLINIC | Age: 36
End: 2021-08-04

## 2021-09-02 ASSESSMENT — PATIENT HEALTH QUESTIONNAIRE - PHQ9
10. IF YOU CHECKED OFF ANY PROBLEMS, HOW DIFFICULT HAVE THESE PROBLEMS MADE IT FOR YOU TO DO YOUR WORK, TAKE CARE OF THINGS AT HOME, OR GET ALONG WITH OTHER PEOPLE: SOMEWHAT DIFFICULT
SUM OF ALL RESPONSES TO PHQ QUESTIONS 1-9: 14
SUM OF ALL RESPONSES TO PHQ QUESTIONS 1-9: 14

## 2021-09-03 ASSESSMENT — PATIENT HEALTH QUESTIONNAIRE - PHQ9: SUM OF ALL RESPONSES TO PHQ QUESTIONS 1-9: 14

## 2021-09-04 ENCOUNTER — HEALTH MAINTENANCE LETTER (OUTPATIENT)
Age: 36
End: 2021-09-04

## 2021-09-07 ENCOUNTER — OFFICE VISIT (OUTPATIENT)
Dept: FAMILY MEDICINE | Facility: CLINIC | Age: 36
End: 2021-09-07
Payer: COMMERCIAL

## 2021-09-07 VITALS
HEART RATE: 90 BPM | SYSTOLIC BLOOD PRESSURE: 110 MMHG | DIASTOLIC BLOOD PRESSURE: 70 MMHG | HEIGHT: 62 IN | WEIGHT: 191 LBS | RESPIRATION RATE: 12 BRPM | TEMPERATURE: 98.3 F | BODY MASS INDEX: 35.15 KG/M2

## 2021-09-07 DIAGNOSIS — Z01.818 PREOP GENERAL PHYSICAL EXAM: ICD-10-CM

## 2021-09-07 LAB
ERYTHROCYTE [DISTWIDTH] IN BLOOD BY AUTOMATED COUNT: 14.1 % (ref 10–15)
HCT VFR BLD AUTO: 38.2 % (ref 35–47)
HGB BLD-MCNC: 12.6 G/DL (ref 11.7–15.7)
MCH RBC QN AUTO: 26.4 PG (ref 26.5–33)
MCHC RBC AUTO-ENTMCNC: 33 G/DL (ref 31.5–36.5)
MCV RBC AUTO: 80 FL (ref 78–100)
PLATELET # BLD AUTO: 417 10E3/UL (ref 150–450)
RBC # BLD AUTO: 4.77 10E6/UL (ref 3.8–5.2)
WBC # BLD AUTO: 9 10E3/UL (ref 4–11)

## 2021-09-07 PROCEDURE — 99214 OFFICE O/P EST MOD 30 MIN: CPT | Performed by: FAMILY MEDICINE

## 2021-09-07 PROCEDURE — 85027 COMPLETE CBC AUTOMATED: CPT | Performed by: FAMILY MEDICINE

## 2021-09-07 PROCEDURE — 36415 COLL VENOUS BLD VENIPUNCTURE: CPT | Performed by: FAMILY MEDICINE

## 2021-09-07 ASSESSMENT — ENCOUNTER SYMPTOMS
SHORTNESS OF BREATH: 0
CHEST TIGHTNESS: 0
FEVER: 0
COUGH: 0
ABDOMINAL PAIN: 0
CHOKING: 0
FATIGUE: 0

## 2021-09-07 ASSESSMENT — MIFFLIN-ST. JEOR: SCORE: 1509.62

## 2021-09-07 NOTE — PROGRESS NOTES
[facility]  [address] [facility] [place] [address]  Phone: [phone]  Primary Provider: No Ref-Primary, Physician        PREOPERATIVE EVALUATION:  Today's date: 9/7/2021    Dot Ramirez is a 36 year old female who presents for a preoperative evaluation.    Surgical Information:  Surgery/Procedure: RT foot/leg surgery for right heel pain   Surgery Location: [facility]  Surgeon: Dr Gibson  Surgery Date: 9/8/21  Time of Surgery: 11am  Where patient plans to recover: At home with family  Fax number for surgical facility:     Type of Anesthesia Anticipated: General      Assessment & Plan     The proposed surgical procedure is considered LOW risk.    Preop general physical exam  -Patient is undergoing low risk surgery on her right foot and right leg for right heel pain.  -Patient physical activity greater than 4 METS  -Low perioperative cardiac risk  --Patient is optimized for procedure.    - CBC with platelets         Risks and Recommendations:  The patient has the following additional risks and recommendations for perioperative complications:   - No identified additional risk factors other than previously addressed    - patient will hold aspirin , multivitamin , ibuprofen     - possibility of Sleep apnea ( Snoring never been tested for sleep apnea     RECOMMENDATION:  APPROVAL GIVEN to proceed with proposed procedure, without further diagnostic evaluation.      Subjective     HPI related to upcoming procedure:   Undergoing low risk procedure   Physical activity greater than 4 METS   Low perioperative cardiac risk .      Preop Questions 9/2/2021   1. Have you ever had a heart attack or stroke? No   2. Have you ever had surgery on your heart or blood vessels, such as a stent placement, a coronary artery bypass, or surgery on an artery in your head, neck, heart, or legs? No   3. Do you have chest pain with activity? No   4. Do you have a history of  heart failure? No   5. Do  you currently have a cold, bronchitis or symptoms of other infection? No   6. Do you have a cough, shortness of breath, or wheezing? No   7. Do you or anyone in your family have previous history of blood clots? No   8. Do you or does anyone in your family have a serious bleeding problem such as prolonged bleeding following surgeries or cuts? No   9. Have you ever had problems with anemia or been told to take iron pills? YES -   10. Have you had any abnormal blood loss such as black, tarry or bloody stools, or abnormal vaginal bleeding? No   11. Have you ever had a blood transfusion? No   12. Are you willing to have a blood transfusion if it is medically needed before, during, or after your surgery? Yes   13. Have you or any of your relatives ever had problems with anesthesia? No   14. Do you have sleep apnea, excessive snoring or daytime drowsiness? No   15. Do you have any artifical heart valves or other implanted medical devices like a pacemaker, defibrillator, or continuous glucose monitor? No   16. Do you have artificial joints? No   17. Are you allergic to latex? No   18. Is there any chance that you may be pregnant? No       Health Care Directive:  Patient does not have a Health Care Directive or Living Will:       Review of Systems   Constitutional: Negative for fatigue and fever.   HENT: Negative for congestion and dental problem.    Respiratory: Negative for cough, choking, chest tightness and shortness of breath.    Gastrointestinal: Negative for abdominal pain.         Patient Active Problem List    Diagnosis Date Noted     Right foot pain 09/12/2018     Priority: Medium     Moderate major depression (H) 06/11/2018     Priority: Medium     ELIANA (generalized anxiety disorder) 03/09/2016     Priority: Medium     Restless legs syndrome (RLS) 03/09/2016     Priority: Medium     Family history of diabetes mellitus 07/21/2014     Priority: Medium     Vitamin D deficiency 07/18/2011     Priority: Medium      Past  "Medical History:   Diagnosis Date     Abnormal Pap smear of cervix 2010    see problem list     Anxiety      Moderate major depression (H) 2014    onset with fetal loss     Past Surgical History:   Procedure Laterality Date     CERCLAGE CERVICAL N/A 2015    Procedure: CERCLAGE CERVICAL;  Surgeon: Damion Nayak MD;  Location: [facility]     CERCLAGE CERVICAL N/A 2016    Procedure: CERCLAGE CERVICAL;  Surgeon: Damion Nayak MD;  Location: [facility]      SECTION N/A 2015    Procedure:  SECTION;  Surgeon: Jamison Florian MD;  Location: [facility]      SECTION N/A 3/6/2017    Procedure:  SECTION;  Surgeon: Jamison Florian MD;  Location: [facility] OR     Cystectomy hand  2016     EXCISE MASS FINGER Left 2016    Procedure: Excision of cystic mass, left ring finger. Surgeon:  Rosales Jones MD  Location: [facility]     EXTRACTION(S) DENTAL  2009     No current outpatient medications on file.       No Known Allergies     Social History     Tobacco Use     Smoking status: Never Smoker     Smokeless tobacco: Never Used   Substance Use Topics     Alcohol use: No     Alcohol/week: 0.0 standard drinks       History   Drug Use No         Objective     /70 (Cuff Size: Adult Large)   Pulse 90   Temp 98.3  F (36.8  C) (Oral)   Resp 12   Ht 1.575 m (5' 2\")   Wt 86.6 kg (191 lb)   BMI 34.93 kg/m      Physical Exam  Vitals and nursing note reviewed.   Constitutional:       Appearance: Normal appearance.   HENT:      Head: Normocephalic.      Nose: Nose normal.      Mouth/Throat:      Mouth: Mucous membranes are moist.   Cardiovascular:      Rate and Rhythm: Normal rate and regular rhythm.      Pulses: Normal pulses.      Heart sounds: Normal heart sounds.   Pulmonary:      Effort: Pulmonary effort is normal.      Breath sounds: Normal breath sounds.   Abdominal:      General: Abdomen is flat.   Neurological:      General: No focal deficit present. "      Mental Status: She is alert.         Diagnostics:  CBC - pending .    No EKG required for low risk surgery (cataract, skin procedure, breast biopsy, etc).    Revised Cardiac Risk Index (RCRI):  The patient has the following serious cardiovascular risks for perioperative complications:   - No serious cardiac risks = 0 points     RCRI Interpretation: 0 points: Class I (very low risk - 0.4% complication rate)    - recommend preventive visit and follow up PCP .       Signed Electronically by: Claudia Meraz MD  Copy of this evaluation report is provided to requesting physician.

## 2021-09-07 NOTE — PATIENT INSTRUCTIONS

## 2021-09-24 ENCOUNTER — TRANSFERRED RECORDS (OUTPATIENT)
Dept: HEALTH INFORMATION MANAGEMENT | Facility: CLINIC | Age: 36
End: 2021-09-24

## 2021-10-19 PROBLEM — F32.9 MAJOR DEPRESSION: Status: ACTIVE | Noted: 2018-06-11

## 2021-11-01 ENCOUNTER — TRANSFERRED RECORDS (OUTPATIENT)
Dept: HEALTH INFORMATION MANAGEMENT | Facility: CLINIC | Age: 36
End: 2021-11-01

## 2021-12-22 ENCOUNTER — E-VISIT (OUTPATIENT)
Dept: URGENT CARE | Facility: URGENT CARE | Age: 36
End: 2021-12-22

## 2021-12-22 DIAGNOSIS — Z20.822 CLOSE EXPOSURE TO 2019 NOVEL CORONAVIRUS: Primary | ICD-10-CM

## 2021-12-22 PROCEDURE — 99421 OL DIG E/M SVC 5-10 MIN: CPT | Performed by: PHYSICIAN ASSISTANT

## 2021-12-22 NOTE — PATIENT INSTRUCTIONS
"  Dear Dot Ramirez,    Based on your exposure to COVID-19 (coronavirus), we would like to test you for this virus. I have placed an order for this test.The best time for testing is 5-7 days after the exposure.    How to schedule:  Go to your [employer] home page and scroll down to the section that says  You have an appointment that needs to be scheduled  and click the large green button that says  Schedule Now  and follow the steps to find the next available opening.     If you are unable to complete these [employer] scheduling steps, please call [phone] to schedule your testing.     Return to work/school/ guidance:   For people with high risk exposures outside the home    Please let your workplace manager and staffing office know when your quarantine ends.     We can not give you an exact date as it depends on the information below. You can calculate this on your own or work with your manager/staffing office to calculate this. (For example if you were exposed on 10/4, you would have to quarantine for 14 full days. That would be through 10/18. You could return on 10/19.)    Quarantine Guidelines:  Patients (\"contacts\") who have been in close prolonged contact of an infected person(s) (within six feet for at least 15 minutes within a 24 hour period), and remain asymptomatic should enter quarantine based on the following options:    14-day quarantine period (this remains the CDC recommendation for the greatest protection against spread of COVID-19) OR    Minimum 7-day quarantine with negative RT-PCR test collected on day 5 or later OR    10-day quarantine with no test  Quarantine Guideline exceptions are as follows:    People who have been fully vaccinated do not need to quarantine if the exposure was at least 2 weeks after the last vaccination. This includes vaccinated health care workers.    Not fully vaccinated and unvaccinated Individuals who work in health care, congregate care, or congregate living " should be off work for 14 days from their last date of exposure. Community activities for this group can be resumed based on options above. Fully vaccinated individuals in this group do not need to quarantine from work after exposure.    Not fully vaccinated and unvaccinated people whose high-risk exposure was a household member should always quarantine for 14 days from their last date of exposure. Fully vaccinated people in this category do not need to quarantine.    Not fully vaccinated or unvaccinated residents of congregate care and congregate living settings should always quarantine for 14 days from their last date of exposure. Fully vaccinated residents do not need to quarantine.  Note: If you have ongoing exposure to the covid positive person, this quarantine period may be more than 14 days. (For example, if you are continued to be exposed to your child who tested positive and cannot isolate from them, then the quarantine of 7-14 days can't start until your child is no longer contagious. This is typically 10 days from onset of the child's symptoms. So the total duration may be 17-24 days in this case.)    You should continue symptom monitoring until day 14 post-exposure. If you develop signs or symptoms of COVID-19, isolate and get tested (even if you have been tested already).    How to quarantine:   Stay home and away from others. Don't go to school or anywhere else. Generally quarantine means staying home from work but there are some exceptions to this. Please contact your workplace.  No hugging, kissing or shaking hands.  Don't let anyone visit.  Cover your mouth and nose with a mask, tissue or washcloth to avoid spreading germs.  Wash your hands and face often. Use soap and water.    What are the symptoms of COVID-19?  The most common symptoms are cough, fever and trouble breathing. Less common symptoms include headache, body aches, fatigue (feeling very tired), chills, sore throat, stuffy or runny nose,  diarrhea (loose poop), loss of taste or smell, belly pain, and nausea or vomiting (feeling sick to your stomach or throwing up).  After 14 days, if you have still don't have symptoms, you likely don't have this virus.  If you develop symptoms, follow these guidelines.  If you're normally healthy: Please start another eVisit.  If you have a serious health problem (like cancer, heart failure, an organ transplant or kidney disease): Call your specialty clinic. Let them know that you might have COVID-19.    Where can I get more information?  [facility] - About COVID-19: [email or web address]  CDC - What to Do If You're Sick: www.cdc.gov/coronavirus/2019-ncov/about/steps-when-sick.html  CDC - Ending Home Isolation: www.cdc.gov/coronavirus/2019-ncov/hcp/disposition-in-home-patients.html  CDC - Caring for Someone: www.cdc.gov/coronavirus/2019-ncov/if-you-are-sick/care-for-someone.html  [facility] clinical trials (COVID-19 research studies): clinicalaffairs.[facility]-clinical-trials  Below are the COVID-19 hotlines at the [place] [facility] ([facility]). Interpreters are available.  For health questions: Call 405-255-1309 or 1-667.930.5778 (7 a.m. to 7 p.m.)  For questions about schools and childcare: Call 835-366-2156 or 1-961.481.1119 (7 a.m. to 7 p.m.)        December 22, 2021  RE:  Dot Ramirez                                                                                                                   [address] [facility] [address]      To whom it may concern:    I evaluated Dot Ramirez on December 22, 2021. Dot Ramirez should be excused from work/school.    They should let their workplace manager and staffing office know when their quarantine ends.    We can not give an exact date as it depends on the information below. They can calculate this on their own or work with their manager/staffing office to calculate this. (For example if they were  "exposed on 10/04, they would have to quarantine for 14 full days. That would be through 10/18. They could return on 10/19.)    Quarantine Guidelines:    Patients (\"contacts\") who have been in close prolonged contact of an infected person(s) (within six feet for at least 15 minutes within a 24 hour period) and remain asymptomatic should enter quarantine based on the following options:      14-day quarantine period (this remains the CDC recommendation for the greatest protection against spread of COVID-19) OR    Minimum 7-day quarantine with negative RT-PCR test collected on day 5 or later OR    10-day quarantine with no test   Quarantine Guideline exceptions are as follows:    People who have been fully vaccinated do not need to quarantine if the exposure was at least 2 weeks after the last vaccination. This includes vaccinated health care workers.    Not fully vaccinated and unvaccinated Individuals who work in health care, congregate care, or congregate living should be off work for 14 days from their last date of exposure. Community activities for this group can be resumed based on options above. Fully vaccinated individuals in this group do not need to quarantine from work after exposure.    Not fully vaccinated and unvaccinated people whose high-risk exposure was a household member should always quarantine for 14 days from their last date of exposure. Fully vaccinated people in this category do not need to quarantine.    Not fully vaccinated or unvaccinated residents of congregate care and congregate living settings should always quarantine for 14 days from their last date of exposure. Fully vaccinated residents do not need to quarantine.    Note: If there is ongoing exposure to the covid positive person, this quarantine period may be longer than 14 days. (For example, if they are continually exposed to their child, who tested positive and cannot isolate from them, then the quarantine of 7-14 days can't start " until their child is no longer contagious. This is typically 10 days from onset to the child's symptoms. So the total duration may be 17-24 days in this case.)    Dot Ramirez should continue symptom monitoring until day 14 post-exposure. If they develop signs or symptoms of COVID-19, they should isolate and get tested (even if they have been tested already).    Sincerely,  Juan Manuel Bhakta PA-C

## 2021-12-27 ENCOUNTER — LAB (OUTPATIENT)
Dept: URGENT CARE | Facility: URGENT CARE | Age: 36
End: 2021-12-27
Attending: PHYSICIAN ASSISTANT
Payer: COMMERCIAL

## 2021-12-27 DIAGNOSIS — Z20.822 CLOSE EXPOSURE TO 2019 NOVEL CORONAVIRUS: ICD-10-CM

## 2021-12-27 LAB — SARS-COV-2 RNA RESP QL NAA+PROBE: POSITIVE

## 2021-12-27 PROCEDURE — U0003 INFECTIOUS AGENT DETECTION BY NUCLEIC ACID (DNA OR RNA); SEVERE ACUTE RESPIRATORY SYNDROME CORONAVIRUS 2 (SARS-COV-2) (CORONAVIRUS DISEASE [COVID-19]), AMPLIFIED PROBE TECHNIQUE, MAKING USE OF HIGH THROUGHPUT TECHNOLOGIES AS DESCRIBED BY CMS-2020-01-R: HCPCS

## 2021-12-27 PROCEDURE — U0005 INFEC AGEN DETEC AMPLI PROBE: HCPCS

## 2022-01-13 ENCOUNTER — TELEPHONE (OUTPATIENT)
Dept: FAMILY MEDICINE | Facility: CLINIC | Age: 37
End: 2022-01-13
Payer: COMMERCIAL

## 2022-01-13 NOTE — TELEPHONE ENCOUNTER
Patient requesting letter for return to work. Please review and advise.     Message from patient:     From: Dot Ro Ashley   Sent: 1/6/2022   5:45 PM CST   To: Walt Escobedo   Subject: Letter to Return to Work                           Topic: Release of Medical Records Question.      My manager requested I needed to quarantine for 14 days past positive covid test results. I tested positive on Monday, December 27th, 2021, so 14th day is Sunday, January 9th, 2022. Full return to work would be Monday, January 10th, 2022.       I had to take a SHERIDAN from [employer] in [place]. The [employer] is requesting I get a Letter to return to work from a Health Care Provider. With or without any restrictions noted.       May I please get a letter?      Please let me know if you need any further information from me.      Dot Ramirez         E-visit to discuss?     Yao PATEL RN

## 2022-01-13 NOTE — LETTER
[facility]  [address] [facility] [address] [phone]          January 16, 2022    RE:  Dot Ramirez                                                                                                                                                       [address] [facility] [address]            To whom it may concern:    Dot Ramirez is under my professional care for Covid diagnosis .    Patient is able to return to work on 1/10 /22  With no restrictions .          Sincerely,  Claudia Meraz MD.

## 2022-01-17 NOTE — TELEPHONE ENCOUNTER
Called and spoke with patient. Patient has no current symptoms. Patient has not had any symptoms since 12/27/21. Patient received letter via [employer], no need to mail to patient. No further questions or concerns at this time.     Yao PATEL RN

## 2022-01-17 NOTE — TELEPHONE ENCOUNTER
Letter printed on my chart .  Please call patient and document no current symptoms .    Thanks   Claudia Meraz MD.

## 2022-02-19 ENCOUNTER — HEALTH MAINTENANCE LETTER (OUTPATIENT)
Age: 37
End: 2022-02-19

## 2022-04-07 ENCOUNTER — NURSE TRIAGE (OUTPATIENT)
Dept: FAMILY MEDICINE | Facility: CLINIC | Age: 37
End: 2022-04-07
Payer: COMMERCIAL

## 2022-04-07 NOTE — TELEPHONE ENCOUNTER
"    Reason for Disposition    Sinus congestion as part of a cold, present < 10 days    Additional Information    Negative: Sounds like a life-threatening emergency to the triager    Negative: SEVERE headache and has fever    Negative: Patient sounds very sick or weak to the triager    Negative: SEVERE sinus pain    Negative: Severe headache    Negative: Redness or swelling on the cheek, forehead, or around the eye    Negative: Fever > 103 F (39.4 C)    Negative: Fever > 101 F (38.3 C) and over 60 years of age    Negative: Fever > 100.0 F (37.8 C) and has diabetes mellitus or a weak immune system (e.g., HIV positive, cancer chemotherapy, organ transplant, splenectomy, chronic steroids)    Negative: Fever > 100.0 F (37.8 C) and bedridden (e.g., nursing home patient, stroke, chronic illness, recovering from surgery)    Negative: Fever present > 3 days (72 hours)    Negative: Fever returns after gone for over 24 hours and symptoms worse or not improved    Negative: Sinus pain (not just congestion) and fever    Negative: Earache    Negative: Sinus congestion (pressure, fullness) present > 10 days    Negative: Nasal discharge present > 10 days    Negative: Using nasal washes and pain medicine > 24 hours and sinus pain (lower forehead, cheekbone, or eye) persists    Negative: Lots of coughing    Negative: Patient wants to be seen    Answer Assessment - Initial Assessment Questions  1. LOCATION: \"Where does it hurt?\"       Friday bridge of nose, under eyes, during the night jaw swelled up and locked up  2. ONSET: \"When did the sinus pain start?\"  (e.g., hours, days)       Friday, jaw pain and locked during the night  3. SEVERITY: \"How bad is the pain?\"   (Scale 1-10; mild, moderate or severe)    - MILD (1-3): doesn't interfere with normal activities     - MODERATE (4-7): interferes with normal activities (e.g., work or school) or awakens from sleep    - SEVERE (8-10): excruciating pain and patient unable to do any normal " "activities         Sinus pain- mild, jaw pain severe during the night  4. RECURRENT SYMPTOM: \"Have you ever had sinus problems before?\" If so, ask: \"When was the last time?\" and \"What happened that time?\"       Not for years  5. NASAL CONGESTION: \"Is the nose blocked?\" If so, ask, \"Can you open it or must you breathe through the mouth?\"      Can breath through nose  6. NASAL DISCHARGE: \"Do you have discharge from your nose?\" If so ask, \"What color?\"      clear  7. FEVER: \"Do you have a fever?\" If so, ask: \"What is it, how was it measured, and when did it start?\"       no  8. OTHER SYMPTOMS: \"Do you have any other symptoms?\" (e.g., sore throat, cough, earache, difficulty breathing)      Jaw swollen, painful and locked up at 4 am, occas cough  9. PREGNANCY: \"Is there any chance you are pregnant?\" \"When was your last menstrual period?\"      n/a    Protocols used: SINUS PAIN OR CONGESTION-A-OH      "

## 2022-05-08 ENCOUNTER — OFFICE VISIT (OUTPATIENT)
Dept: URGENT CARE | Facility: URGENT CARE | Age: 37
End: 2022-05-08
Payer: COMMERCIAL

## 2022-05-08 ENCOUNTER — NURSE TRIAGE (OUTPATIENT)
Dept: NURSING | Facility: CLINIC | Age: 37
End: 2022-05-08
Payer: COMMERCIAL

## 2022-05-08 VITALS
TEMPERATURE: 98.5 F | OXYGEN SATURATION: 99 % | DIASTOLIC BLOOD PRESSURE: 90 MMHG | HEART RATE: 94 BPM | SYSTOLIC BLOOD PRESSURE: 126 MMHG

## 2022-05-08 DIAGNOSIS — S91.331A PUNCTURE WOUND OF RIGHT FOOT, INITIAL ENCOUNTER: Primary | ICD-10-CM

## 2022-05-08 PROCEDURE — 99213 OFFICE O/P EST LOW 20 MIN: CPT | Performed by: FAMILY MEDICINE

## 2022-05-08 RX ORDER — CIPROFLOXACIN 250 MG/1
250 TABLET, FILM COATED ORAL 2 TIMES DAILY
Qty: 10 TABLET | Refills: 0 | Status: SHIPPED | OUTPATIENT
Start: 2022-05-08 | End: 2022-05-13

## 2022-05-08 RX ORDER — CEPHALEXIN 500 MG/1
500 CAPSULE ORAL 3 TIMES DAILY
Qty: 15 CAPSULE | Refills: 0 | Status: SHIPPED | OUTPATIENT
Start: 2022-05-08 | End: 2022-05-13

## 2022-05-08 ASSESSMENT — PAIN SCALES - GENERAL: PAINLEVEL: SEVERE PAIN (7)

## 2022-05-08 NOTE — PROGRESS NOTES
SUBJECTIVE:   Dot Ramirez is a 36 year old female presenting with a chief complaint of pain at the plantar surface of the right foot (between the second and third MTP heads) .  Onset of symptoms was today around 1 pm today      Patient was at her mother's house, and there was a sewing needle in the carpet..  Patient's right bare foot accidentally stepped onto the needle, and the entire 1 inch needed entered the foot (between the second and third MTP heads).  Patient removed the needle successfully after 5 times.  She applied alcohol and Neosporin and patient also took Tylenol.      Her last Tetanus booster was received on 2016.      Severity:  Pain ratin out of 10.  The pain is worse with weight bearing.      Past Medical History:   Diagnosis Date     Abnormal Pap smear of cervix 2010    see problem list     Anxiety      Moderate major depression (H) 2014    onset with fetal loss     No current outpatient medications on file.     Social History     Tobacco Use     Smoking status: Never Smoker     Smokeless tobacco: Never Used   Substance Use Topics     Alcohol use: No     Alcohol/week: 0.0 standard drinks       ROS:  CONSTITUTIONAL:negative for fevers.    MUSCULOSKELETAL:  Positive for pain at the right foot.      OBJECTIVE:  BP (!) 126/90   Pulse 94   Temp 98.5  F (36.9  C) (Tympanic)   LMP  (LMP Unknown)   SpO2 99%   Breastfeeding No   GENERAL APPEARANCE: healthy, alert and no distress  Extremities: plantar surface of the right foot shows no edema/erythema/ecchymosis/hematoma.  There is pain over the puncture wound site between the second and third MTP joints.    GAIT:  Patient has pain when bearing weight onto the right foot.    NEURO: Normal strength and tone, sensory exam grossly normal,  normal speech and mentation    ASSESSMENT:  Puncture wound of the right foot.      PLAN:  Start the antibiotics tonight.      Take Ibuprofen, tylenol for the pain.      To heal the wound,  place warmth onto the painful areas of the right foot for 20 minutes at a time, every 2 hours while awake.      Keep the wound clean and dry for the next 24 hours.  You can gradually get the wound wet afterwards.      Cover the wound with a Band-Aid.      Use crutches until you can bear weight onto the right foot without much pain.  (Patient has a pair of crutches at home.)    Follow up right away  if you develop fevers/spreading redness from the wound/worsening pain.      Follow up if not better one week.      Outpatient Rx:  Cephalexin to cover to skin organisms such as Staph aureus.   Outpatient Rx:  Ciprofloxacin to cover for Pseudomonas      Dhaval Ng MD

## 2022-05-08 NOTE — TELEPHONE ENCOUNTER
"  Nurse Triage SBAR    Is this a 2nd Level Triage? NO    Situation: Patient called to report she stepped on a sewing needle a few hours ago and is experiencing pain.    Background: :Patient's last tetanus booster was 12/26/16.    Assessment: Patient reports the sewing needle had been on the floor at her mother's house.  The entire 1\" needle was stuck in the bottom of her foot below the 2nd and 3rd toe.  She was able to remove the needle successfully. She denies swelling, redness, numbness, or tingling.  She reports 7/10 pain after acetaminophen radiating 2\" up leg.  Patient reports pain bad enough that she is unable to bear weight on her right foot.  She cleaned with alcohol, applied neosporin, and covered with a band-aid.    Protocol Recommended Disposition:   See HCP Within 4 Hours (Or PCP Triage)  No [facility] PCP, advised to go to .  Patient reports she will go to [facility].    Tabitha Pugh RN  05/08/22 5:56 PM  [facility] Nurse Advisor    COVID 19 Nurse Triage Plan/Patient Instructions    Please be aware that novel coronavirus (COVID-19) may be circulating in the community. If you develop symptoms such as fever, cough, or SOB or if you have concerns about the presence of another infection including coronavirus (COVID-19), please contact your health care provider or visit [email or web address].     Disposition/Instructions    In-Person Visit with provider recommended. Reference Visit Selection Guide.    Thank you for taking steps to prevent the spread of this virus.  o Limit your contact with others.  o Wear a simple mask to cover your cough.  o Wash your hands well and often.    Resources    [facility]: About COVID-19: [email or web address]    CDC: What to Do If You're Sick: www.cdc.gov/coronavirus/2019-ncov/about/steps-when-sick.html    CDC: Ending Home Isolation: www.cdc.gov/coronavirus/2019-ncov/hcp/disposition-in-home-patients.html     CDC: Caring for " Someone: www.cdc.gov/coronavirus/2019-ncov/if-you-are-sick/care-for-someone.html     [facility]: Interim Guidance for Hospital Discharge to Home: www.health.[facility].[place].us/diseases/coronavirus/hcp/hospdischarge.pdf    [facility] clinical trials (COVID-19 research studies): clinicalaffairs.[facility]/umn-clinical-trials     Below are the COVID-19 hotlines at the [place] Department of Health ([facility]). Interpreters are available.   o For health questions: Call 722-344-9798 or 1-100.107.8399 (7 a.m. to 7 p.m.)  o For questions about schools and childcare: Call 512-737-0701 or 1-596.897.7505 (7 a.m. to 7 p.m.)       Reason for Disposition    [1] Puncture wound of foot AND [2] hurts too much to walk on it (i.e., unable to bear weight, severe limp)    Additional Information    Puncture wound of foot    Negative: [1] Puncture wound of head, neck, chest, back, or abdomen AND [2] sounds life-threatening to the triager    Negative: Shock suspected (e.g., cold/pale/clammy skin, too weak to stand, low BP, rapid pulse)    Negative: Sounds like a life-threatening emergency to the triager    Negative: [1] Caused by a needlestick or other sharp object AND [2] possible exposure to another person's body fluids    Negative: Caused by an animal bite    Negative: Caused by a human bite    Negative: Caused by a marine animal sting or bite    Negative: Skin is cut or scraped, not punctured    Negative: Puncture wound of eye or eyelid    Negative: Foreign body is still in the skin (e.g., splinter, sliver, fishhook)    Negative: [1] Puncture wound of head, neck, chest, abdomen, or overlying a joint AND [2] could be deep    Negative: High pressure injection injury (e.g., from grease gun or paint gun, usually work-related)    Negative: Sounds like a serious injury to the triager    Negative: [1] SEVERE pain AND [2] not improved 2 hours after pain medicine    Protocols used: PUNCTURE WOUND-A-AH, FOOT AND ANKLE INJURY-A-AH

## 2022-05-08 NOTE — LETTER
[facility] [place]  [address]  [address]  [place] [id number] [phone]      May 8, 2022    RE:  Dot Ramirez                                                                                                                                                       [address] [address]            To whom it may concern:    Dot Ramirez is under my professional care at the [facility] on May 8, 2022.  She has severe pain on the sole of the right foot from a recent accidental needle puncture.  As a result, she will have to use crutches while at work until she can bear weight onto the right foot without much pain.  Please allow her to sit while working as a  at [employer] starting on May 9, 2022.   These work restrictions are to be in effect from May 9 to May 23, 2022.            Sincerely,        Dhaval Ng MD    [facility]

## 2022-05-09 NOTE — PATIENT INSTRUCTIONS
Start the antibiotics tonight.      Take Ibuprofen, tylenol for the pain.      To heal the wound, place warmth onto the painful areas of the right foot for 20 minutes at a time, every 2 hours while awake.      Keep the wound clean and dry for the next 24 hours.  You can gradually get the wound wet afterwards.      Cover the wound with a Band-Aid.      Use crutches until you can bear weight onto the right foot without much pain.      Follow up right away  if you develop fevers/spreading redness from the wound/worsening pain.      Follow up if not better one week.

## 2022-05-15 ENCOUNTER — APPOINTMENT (OUTPATIENT)
Dept: CT IMAGING | Facility: CLINIC | Age: 37
DRG: 759 | End: 2022-05-15
Attending: EMERGENCY MEDICINE
Payer: COMMERCIAL

## 2022-05-15 ENCOUNTER — NURSE TRIAGE (OUTPATIENT)
Dept: NURSING | Facility: CLINIC | Age: 37
End: 2022-05-15
Payer: COMMERCIAL

## 2022-05-15 ENCOUNTER — HOSPITAL ENCOUNTER (INPATIENT)
Facility: CLINIC | Age: 37
LOS: 2 days | Discharge: HOME | DRG: 759 | End: 2022-05-18
Attending: EMERGENCY MEDICINE | Admitting: OBSTETRICS & GYNECOLOGY
Payer: COMMERCIAL

## 2022-05-15 ENCOUNTER — APPOINTMENT (OUTPATIENT)
Dept: ULTRASOUND IMAGING | Facility: CLINIC | Age: 37
DRG: 759 | End: 2022-05-15
Attending: EMERGENCY MEDICINE
Payer: COMMERCIAL

## 2022-05-15 DIAGNOSIS — N73.0 PID (ACUTE PELVIC INFLAMMATORY DISEASE): ICD-10-CM

## 2022-05-15 DIAGNOSIS — N70.93 TOA (TUBO-OVARIAN ABSCESS): ICD-10-CM

## 2022-05-15 LAB
ALBUMIN SERPL-MCNC: 3.5 G/DL (ref 3.4–5)
ALBUMIN UR-MCNC: NEGATIVE MG/DL
ALP SERPL-CCNC: 70 U/L (ref 40–150)
ALT SERPL W P-5'-P-CCNC: 27 U/L (ref 0–50)
ANION GAP SERPL CALCULATED.3IONS-SCNC: 5 MMOL/L (ref 3–14)
APPEARANCE UR: CLEAR
AST SERPL W P-5'-P-CCNC: 18 U/L (ref 0–45)
BASOPHILS # BLD AUTO: 0 10E3/UL (ref 0–0.2)
BASOPHILS NFR BLD AUTO: 0 %
BILIRUB DIRECT SERPL-MCNC: <0.1 MG/DL (ref 0–0.2)
BILIRUB SERPL-MCNC: 0.3 MG/DL (ref 0.2–1.3)
BILIRUB UR QL STRIP: NEGATIVE
BUN SERPL-MCNC: 13 MG/DL (ref 7–30)
CALCIUM SERPL-MCNC: 8.7 MG/DL (ref 8.5–10.1)
CHLORIDE BLD-SCNC: 106 MMOL/L (ref 94–109)
CO2 SERPL-SCNC: 28 MMOL/L (ref 20–32)
COLOR UR AUTO: NORMAL
CREAT SERPL-MCNC: 0.56 MG/DL (ref 0.52–1.04)
EOSINOPHIL # BLD AUTO: 0.2 10E3/UL (ref 0–0.7)
EOSINOPHIL NFR BLD AUTO: 2 %
ERYTHROCYTE [DISTWIDTH] IN BLOOD BY AUTOMATED COUNT: 14.1 % (ref 10–15)
GFR SERPL CREATININE-BSD FRML MDRD: >90 ML/MIN/1.73M2
GLUCOSE BLD-MCNC: 101 MG/DL (ref 70–99)
GLUCOSE UR STRIP-MCNC: NEGATIVE MG/DL
HCG UR QL: NEGATIVE
HCT VFR BLD AUTO: 36.9 % (ref 35–47)
HGB BLD-MCNC: 11.6 G/DL (ref 11.7–15.7)
HGB UR QL STRIP: NEGATIVE
IMM GRANULOCYTES # BLD: 0.1 10E3/UL
IMM GRANULOCYTES NFR BLD: 0 %
KETONES UR STRIP-MCNC: NEGATIVE MG/DL
LEUKOCYTE ESTERASE UR QL STRIP: NEGATIVE
LIPASE SERPL-CCNC: 128 U/L (ref 73–393)
LYMPHOCYTES # BLD AUTO: 3.2 10E3/UL (ref 0.8–5.3)
LYMPHOCYTES NFR BLD AUTO: 22 %
MCH RBC QN AUTO: 25.1 PG (ref 26.5–33)
MCHC RBC AUTO-ENTMCNC: 31.4 G/DL (ref 31.5–36.5)
MCV RBC AUTO: 80 FL (ref 78–100)
MONOCYTES # BLD AUTO: 0.9 10E3/UL (ref 0–1.3)
MONOCYTES NFR BLD AUTO: 6 %
NEUTROPHILS # BLD AUTO: 10.2 10E3/UL (ref 1.6–8.3)
NEUTROPHILS NFR BLD AUTO: 70 %
NITRATE UR QL: NEGATIVE
NRBC # BLD AUTO: 0 10E3/UL
NRBC BLD AUTO-RTO: 0 /100
PH UR STRIP: 6.5 [PH] (ref 5–7)
PLATELET # BLD AUTO: 369 10E3/UL (ref 150–450)
POTASSIUM BLD-SCNC: 3.7 MMOL/L (ref 3.4–5.3)
PROT SERPL-MCNC: 7.3 G/DL (ref 6.8–8.8)
RBC # BLD AUTO: 4.63 10E6/UL (ref 3.8–5.2)
RBC URINE: <1 /HPF
SODIUM SERPL-SCNC: 139 MMOL/L (ref 133–144)
SP GR UR STRIP: 1.01 (ref 1–1.03)
SQUAMOUS EPITHELIAL: 1 /HPF
UROBILINOGEN UR STRIP-MCNC: NORMAL MG/DL
WBC # BLD AUTO: 14.6 10E3/UL (ref 4–11)
WBC URINE: 1 /HPF

## 2022-05-15 PROCEDURE — 250N000009 HC RX 250: Performed by: EMERGENCY MEDICINE

## 2022-05-15 PROCEDURE — 96375 TX/PRO/DX INJ NEW DRUG ADDON: CPT | Mod: 59

## 2022-05-15 PROCEDURE — 82248 BILIRUBIN DIRECT: CPT | Performed by: EMERGENCY MEDICINE

## 2022-05-15 PROCEDURE — 250N000011 HC RX IP 250 OP 636: Performed by: EMERGENCY MEDICINE

## 2022-05-15 PROCEDURE — 99285 EMERGENCY DEPT VISIT HI MDM: CPT | Mod: 25

## 2022-05-15 PROCEDURE — 87591 N.GONORRHOEAE DNA AMP PROB: CPT | Performed by: EMERGENCY MEDICINE

## 2022-05-15 PROCEDURE — 81025 URINE PREGNANCY TEST: CPT | Performed by: EMERGENCY MEDICINE

## 2022-05-15 PROCEDURE — 80053 COMPREHEN METABOLIC PANEL: CPT | Performed by: EMERGENCY MEDICINE

## 2022-05-15 PROCEDURE — 83690 ASSAY OF LIPASE: CPT | Performed by: EMERGENCY MEDICINE

## 2022-05-15 PROCEDURE — 85025 COMPLETE CBC W/AUTO DIFF WBC: CPT | Performed by: EMERGENCY MEDICINE

## 2022-05-15 PROCEDURE — 81001 URINALYSIS AUTO W/SCOPE: CPT | Performed by: EMERGENCY MEDICINE

## 2022-05-15 PROCEDURE — 74177 CT ABD & PELVIS W/CONTRAST: CPT

## 2022-05-15 PROCEDURE — 96361 HYDRATE IV INFUSION ADD-ON: CPT

## 2022-05-15 PROCEDURE — 76830 TRANSVAGINAL US NON-OB: CPT

## 2022-05-15 PROCEDURE — 87210 SMEAR WET MOUNT SALINE/INK: CPT | Performed by: EMERGENCY MEDICINE

## 2022-05-15 PROCEDURE — 36415 COLL VENOUS BLD VENIPUNCTURE: CPT | Performed by: EMERGENCY MEDICINE

## 2022-05-15 PROCEDURE — 87491 CHLMYD TRACH DNA AMP PROBE: CPT | Performed by: EMERGENCY MEDICINE

## 2022-05-15 PROCEDURE — 258N000003 HC RX IP 258 OP 636: Performed by: EMERGENCY MEDICINE

## 2022-05-15 RX ORDER — ONDANSETRON 2 MG/ML
4 INJECTION INTRAMUSCULAR; INTRAVENOUS EVERY 30 MIN PRN
Status: DISCONTINUED | OUTPATIENT
Start: 2022-05-15 | End: 2022-05-18 | Stop reason: HOSPADM

## 2022-05-15 RX ORDER — KETOROLAC TROMETHAMINE 15 MG/ML
10 INJECTION, SOLUTION INTRAMUSCULAR; INTRAVENOUS ONCE
Status: COMPLETED | OUTPATIENT
Start: 2022-05-15 | End: 2022-05-15

## 2022-05-15 RX ORDER — IOPAMIDOL 755 MG/ML
500 INJECTION, SOLUTION INTRAVASCULAR ONCE
Status: COMPLETED | OUTPATIENT
Start: 2022-05-15 | End: 2022-05-15

## 2022-05-15 RX ADMIN — IOPAMIDOL 98 ML: 755 INJECTION, SOLUTION INTRAVENOUS at 22:01

## 2022-05-15 RX ADMIN — SODIUM CHLORIDE 1000 ML: 9 INJECTION, SOLUTION INTRAVENOUS at 21:15

## 2022-05-15 RX ADMIN — SODIUM CHLORIDE 64 ML: 9 INJECTION, SOLUTION INTRAVENOUS at 22:01

## 2022-05-15 RX ADMIN — KETOROLAC TROMETHAMINE 10 MG: 15 INJECTION, SOLUTION INTRAMUSCULAR; INTRAVENOUS at 21:15

## 2022-05-15 RX ADMIN — ONDANSETRON 4 MG: 2 INJECTION INTRAMUSCULAR; INTRAVENOUS at 21:17

## 2022-05-15 ASSESSMENT — ENCOUNTER SYMPTOMS
CONSTIPATION: 0
COUGH: 0
VOMITING: 0
NAUSEA: 1
DIARRHEA: 0
DYSURIA: 0
ABDOMINAL PAIN: 1

## 2022-05-15 NOTE — ED TRIAGE NOTES
Pt comes in for RLQ abd pain that started last night. Pt states the pain is intermittent but has gotten worse throughout the day. Pain worse with coughing. Pain better with holding pressure. Pt was nauseated this morning, no vomiting. Pt finished course of antibiotics yesterday. Pt has had loose stools but this is not new.

## 2022-05-15 NOTE — TELEPHONE ENCOUNTER
"Patient calls with concerns regarding pain in her right lower abdomen. Pain started in the middle of the night. Pain is mild and constant but is more severe with movement and coughing. Patient currently rates her pain a 8-9 on pain scale. She has had nausea, no vomiting. Patient denies any urinary concerns.    Go to ED now per protocol. Patient verbalizes understanding and denies further questions or concerns.    Zoila See RN  [facility] Nurse Advisors      Reason for Disposition    [1] SEVERE pain (e.g., excruciating) AND [2] present > 1 hour    Additional Information    Negative: Shock suspected (e.g., cold/pale/clammy skin, too weak to stand, low BP, rapid pulse)    Negative: Difficult to awaken or acting confused (e.g., disoriented, slurred speech)    Negative: Passed out (i.e., lost consciousness, collapsed and was not responding)    Negative: Sounds like a life-threatening emergency to the triager    Negative: Chest pain    Negative: Pain is mainly in upper abdomen  (if needed ask: \"is it mainly above the belly button?\")    Negative: Followed an abdomen (stomach) injury    Negative: [1] Abdominal pain AND [2] pregnant < 20 weeks    Negative: [1] Abdominal pain AND [2] pregnant > 20 weeks    Negative: [1] Abdominal pain AND [2] postpartum (from 0 to 6 weeks after delivery)    Protocols used: ABDOMINAL PAIN - FEMALE-A-AH    COVID 19 Nurse Triage Plan/Patient Instructions    Please be aware that novel coronavirus (COVID-19) may be circulating in the community. If you develop symptoms such as fever, cough, or SOB or if you have concerns about the presence of another infection including coronavirus (COVID-19), please contact your health care provider or visit [email or web address].     Disposition/Instructions    ED Visit recommended. Follow protocol based instructions.     Bring Your Own Device:  Please also bring your smart device(s) (smart phones, tablets, laptops) and their charging cables " for your personal use and to communicate with your care team during your visit.    Thank you for taking steps to prevent the spread of this virus.  o Limit your contact with others.  o Wear a simple mask to cover your cough.  o Wash your hands well and often.    Resources    [facility]: About COVID-19: [email or web address]    CDC: What to Do If You're Sick: www.cdc.gov/coronavirus/2019-ncov/about/steps-when-sick.html    CDC: Ending Home Isolation: www.cdc.gov/coronavirus/2019-ncov/hcp/disposition-in-home-patients.html     CDC: Caring for Someone: www.cdc.gov/coronavirus/2019-ncov/if-you-are-sick/care-for-someone.html     [facility]: Interim Guidance for Hospital Discharge to Home: www.[facility].mn.us/diseases/coronavirus/hcp/hospdischarge.pdf    [facility] clinical trials (COVID-19 research studies): clinicalaffairs.[facility]-clinical-trials     Below are the COVID-19 hotlines at the [place] Department of Health ([facility]). Interpreters are available.   o For health questions: Call 790-390-7031 or 1-603.406.9495 (7 a.m. to 7 p.m.)  o For questions about schools and childcare: Call 953-064-7433 or 1-766.584.5985 (7 a.m. to 7 p.m.)

## 2022-05-16 PROBLEM — N70.93 TOA (TUBO-OVARIAN ABSCESS): Status: ACTIVE | Noted: 2022-05-16

## 2022-05-16 PROBLEM — N73.0 PID (ACUTE PELVIC INFLAMMATORY DISEASE): Status: ACTIVE | Noted: 2022-05-16

## 2022-05-16 LAB
BASOPHILS # BLD AUTO: 0 10E3/UL (ref 0–0.2)
BASOPHILS NFR BLD AUTO: 0 %
C TRACH DNA SPEC QL NAA+PROBE: NEGATIVE
CLUE CELLS: NORMAL
EOSINOPHIL # BLD AUTO: 0.2 10E3/UL (ref 0–0.7)
EOSINOPHIL NFR BLD AUTO: 2 %
ERYTHROCYTE [DISTWIDTH] IN BLOOD BY AUTOMATED COUNT: 14.1 % (ref 10–15)
HCO3 BLDV-SCNC: 26 MMOL/L (ref 21–28)
HCT VFR BLD AUTO: 34.3 % (ref 35–47)
HGB BLD-MCNC: 10.5 G/DL (ref 11.7–15.7)
IMM GRANULOCYTES # BLD: 0.1 10E3/UL
IMM GRANULOCYTES NFR BLD: 1 %
LACTATE BLD-SCNC: 0.4 MMOL/L
LYMPHOCYTES # BLD AUTO: 3.1 10E3/UL (ref 0.8–5.3)
LYMPHOCYTES NFR BLD AUTO: 29 %
MCH RBC QN AUTO: 24.8 PG (ref 26.5–33)
MCHC RBC AUTO-ENTMCNC: 30.6 G/DL (ref 31.5–36.5)
MCV RBC AUTO: 81 FL (ref 78–100)
MONOCYTES # BLD AUTO: 0.9 10E3/UL (ref 0–1.3)
MONOCYTES NFR BLD AUTO: 8 %
N GONORRHOEA DNA SPEC QL NAA+PROBE: NEGATIVE
NEUTROPHILS # BLD AUTO: 6.3 10E3/UL (ref 1.6–8.3)
NEUTROPHILS NFR BLD AUTO: 60 %
NRBC # BLD AUTO: 0 10E3/UL
NRBC BLD AUTO-RTO: 0 /100
PCO2 BLDV: 47 MM HG (ref 40–50)
PH BLDV: 7.35 [PH] (ref 7.32–7.43)
PLATELET # BLD AUTO: 307 10E3/UL (ref 150–450)
PO2 BLDV: 24 MM HG (ref 25–47)
RBC # BLD AUTO: 4.23 10E6/UL (ref 3.8–5.2)
SAO2 % BLDV: 40 % (ref 94–100)
SARS-COV-2 RNA RESP QL NAA+PROBE: NEGATIVE
TRICHOMONAS, WET PREP: NORMAL
WBC # BLD AUTO: 10.6 10E3/UL (ref 4–11)
WBC'S/HIGH POWER FIELD, WET PREP: NORMAL
YEAST, WET PREP: NORMAL

## 2022-05-16 PROCEDURE — 36415 COLL VENOUS BLD VENIPUNCTURE: CPT | Performed by: OBSTETRICS & GYNECOLOGY

## 2022-05-16 PROCEDURE — 96365 THER/PROPH/DIAG IV INF INIT: CPT

## 2022-05-16 PROCEDURE — 87040 BLOOD CULTURE FOR BACTERIA: CPT | Performed by: EMERGENCY MEDICINE

## 2022-05-16 PROCEDURE — U0003 INFECTIOUS AGENT DETECTION BY NUCLEIC ACID (DNA OR RNA); SEVERE ACUTE RESPIRATORY SYNDROME CORONAVIRUS 2 (SARS-COV-2) (CORONAVIRUS DISEASE [COVID-19]), AMPLIFIED PROBE TECHNIQUE, MAKING USE OF HIGH THROUGHPUT TECHNOLOGIES AS DESCRIBED BY CMS-2020-01-R: HCPCS | Performed by: EMERGENCY MEDICINE

## 2022-05-16 PROCEDURE — 250N000011 HC RX IP 250 OP 636: Performed by: OBSTETRICS & GYNECOLOGY

## 2022-05-16 PROCEDURE — 85025 COMPLETE CBC W/AUTO DIFF WBC: CPT | Performed by: OBSTETRICS & GYNECOLOGY

## 2022-05-16 PROCEDURE — C9803 HOPD COVID-19 SPEC COLLECT: HCPCS

## 2022-05-16 PROCEDURE — 87086 URINE CULTURE/COLONY COUNT: CPT | Performed by: OBSTETRICS & GYNECOLOGY

## 2022-05-16 PROCEDURE — 250N000013 HC RX MED GY IP 250 OP 250 PS 637: Performed by: EMERGENCY MEDICINE

## 2022-05-16 PROCEDURE — 36415 COLL VENOUS BLD VENIPUNCTURE: CPT | Performed by: EMERGENCY MEDICINE

## 2022-05-16 PROCEDURE — 96375 TX/PRO/DX INJ NEW DRUG ADDON: CPT

## 2022-05-16 PROCEDURE — 250N000011 HC RX IP 250 OP 636: Performed by: EMERGENCY MEDICINE

## 2022-05-16 PROCEDURE — 82803 BLOOD GASES ANY COMBINATION: CPT

## 2022-05-16 PROCEDURE — 83605 ASSAY OF LACTIC ACID: CPT

## 2022-05-16 PROCEDURE — 250N000013 HC RX MED GY IP 250 OP 250 PS 637: Performed by: OBSTETRICS & GYNECOLOGY

## 2022-05-16 PROCEDURE — 120N000004 HC R&B MS OVERFLOW

## 2022-05-16 PROCEDURE — 258N000003 HC RX IP 258 OP 636: Performed by: OBSTETRICS & GYNECOLOGY

## 2022-05-16 RX ORDER — CEFOXITIN 2 G/1
2 INJECTION, POWDER, FOR SOLUTION INTRAVENOUS EVERY 6 HOURS
Status: DISCONTINUED | OUTPATIENT
Start: 2022-05-16 | End: 2022-05-18

## 2022-05-16 RX ORDER — METRONIDAZOLE 500 MG/1
500 TABLET ORAL ONCE
Status: COMPLETED | OUTPATIENT
Start: 2022-05-16 | End: 2022-05-16

## 2022-05-16 RX ORDER — IBUPROFEN 200 MG
400 TABLET ORAL EVERY 8 HOURS PRN
COMMUNITY
End: 2024-03-25

## 2022-05-16 RX ORDER — METRONIDAZOLE 500 MG/1
500 TABLET ORAL 2 TIMES DAILY
Status: DISCONTINUED | OUTPATIENT
Start: 2022-05-16 | End: 2022-05-18 | Stop reason: HOSPADM

## 2022-05-16 RX ORDER — FAMOTIDINE 10 MG
10 TABLET ORAL DAILY PRN
Status: DISCONTINUED | OUTPATIENT
Start: 2022-05-16 | End: 2022-05-18 | Stop reason: HOSPADM

## 2022-05-16 RX ORDER — HYDROMORPHONE HCL IN WATER/PF 6 MG/30 ML
0.2 PATIENT CONTROLLED ANALGESIA SYRINGE INTRAVENOUS
Status: DISCONTINUED | OUTPATIENT
Start: 2022-05-16 | End: 2022-05-18 | Stop reason: HOSPADM

## 2022-05-16 RX ORDER — SODIUM CHLORIDE, SODIUM LACTATE, POTASSIUM CHLORIDE, CALCIUM CHLORIDE 600; 310; 30; 20 MG/100ML; MG/100ML; MG/100ML; MG/100ML
INJECTION, SOLUTION INTRAVENOUS CONTINUOUS
Status: DISCONTINUED | OUTPATIENT
Start: 2022-05-16 | End: 2022-05-18 | Stop reason: HOSPADM

## 2022-05-16 RX ORDER — MULTIPLE VITAMINS W/ MINERALS TAB 9MG-400MCG
1 TAB ORAL DAILY
COMMUNITY
End: 2024-02-02

## 2022-05-16 RX ORDER — HYDROMORPHONE HYDROCHLORIDE 1 MG/ML
0.5 INJECTION, SOLUTION INTRAMUSCULAR; INTRAVENOUS; SUBCUTANEOUS
Status: DISCONTINUED | OUTPATIENT
Start: 2022-05-16 | End: 2022-05-17

## 2022-05-16 RX ORDER — ACETAMINOPHEN 325 MG/1
650 TABLET ORAL EVERY 6 HOURS PRN
COMMUNITY
End: 2024-03-18

## 2022-05-16 RX ORDER — ONDANSETRON 2 MG/ML
4 INJECTION INTRAMUSCULAR; INTRAVENOUS EVERY 6 HOURS PRN
Status: DISCONTINUED | OUTPATIENT
Start: 2022-05-16 | End: 2022-05-18 | Stop reason: HOSPADM

## 2022-05-16 RX ORDER — OXYCODONE HYDROCHLORIDE 5 MG/1
5 TABLET ORAL EVERY 6 HOURS PRN
Status: DISCONTINUED | OUTPATIENT
Start: 2022-05-16 | End: 2022-05-18 | Stop reason: HOSPADM

## 2022-05-16 RX ORDER — ACETAMINOPHEN 325 MG/1
975 TABLET ORAL EVERY 6 HOURS PRN
Status: DISCONTINUED | OUTPATIENT
Start: 2022-05-16 | End: 2022-05-18 | Stop reason: HOSPADM

## 2022-05-16 RX ORDER — CEFOXITIN 2 G/1
2 INJECTION, POWDER, FOR SOLUTION INTRAVENOUS ONCE
Status: COMPLETED | OUTPATIENT
Start: 2022-05-16 | End: 2022-05-16

## 2022-05-16 RX ORDER — DOXYCYCLINE 100 MG/1
100 CAPSULE ORAL EVERY 12 HOURS SCHEDULED
Status: DISCONTINUED | OUTPATIENT
Start: 2022-05-16 | End: 2022-05-18 | Stop reason: HOSPADM

## 2022-05-16 RX ORDER — DOXYCYCLINE 100 MG/1
100 CAPSULE ORAL ONCE
Status: COMPLETED | OUTPATIENT
Start: 2022-05-16 | End: 2022-05-16

## 2022-05-16 RX ORDER — PROCHLORPERAZINE 25 MG
25 SUPPOSITORY, RECTAL RECTAL EVERY 12 HOURS PRN
Status: DISCONTINUED | OUTPATIENT
Start: 2022-05-16 | End: 2022-05-18 | Stop reason: HOSPADM

## 2022-05-16 RX ORDER — IBUPROFEN 600 MG/1
600 TABLET, FILM COATED ORAL EVERY 6 HOURS PRN
Status: DISCONTINUED | OUTPATIENT
Start: 2022-05-16 | End: 2022-05-18 | Stop reason: HOSPADM

## 2022-05-16 RX ORDER — PROCHLORPERAZINE MALEATE 5 MG
10 TABLET ORAL EVERY 6 HOURS PRN
Status: DISCONTINUED | OUTPATIENT
Start: 2022-05-16 | End: 2022-05-18 | Stop reason: HOSPADM

## 2022-05-16 RX ADMIN — DOXYCYCLINE HYCLATE 100 MG: 100 CAPSULE ORAL at 00:57

## 2022-05-16 RX ADMIN — DOXYCYCLINE HYCLATE 100 MG: 100 CAPSULE ORAL at 09:29

## 2022-05-16 RX ADMIN — CEFOXITIN SODIUM 2 G: 2 POWDER, FOR SOLUTION INTRAVENOUS at 12:17

## 2022-05-16 RX ADMIN — IBUPROFEN 600 MG: 600 TABLET ORAL at 12:10

## 2022-05-16 RX ADMIN — METRONIDAZOLE 500 MG: 500 TABLET ORAL at 21:28

## 2022-05-16 RX ADMIN — HYDROMORPHONE HYDROCHLORIDE 0.5 MG: 1 INJECTION, SOLUTION INTRAMUSCULAR; INTRAVENOUS; SUBCUTANEOUS at 00:53

## 2022-05-16 RX ADMIN — CEFOXITIN SODIUM 2 G: 2 POWDER, FOR SOLUTION INTRAVENOUS at 06:30

## 2022-05-16 RX ADMIN — SODIUM CHLORIDE, POTASSIUM CHLORIDE, SODIUM LACTATE AND CALCIUM CHLORIDE: 600; 310; 30; 20 INJECTION, SOLUTION INTRAVENOUS at 11:24

## 2022-05-16 RX ADMIN — CEFOXITIN SODIUM 2 G: 2 POWDER, FOR SOLUTION INTRAVENOUS at 00:54

## 2022-05-16 RX ADMIN — METRONIDAZOLE 500 MG: 500 TABLET ORAL at 00:57

## 2022-05-16 RX ADMIN — SODIUM CHLORIDE, POTASSIUM CHLORIDE, SODIUM LACTATE AND CALCIUM CHLORIDE: 600; 310; 30; 20 INJECTION, SOLUTION INTRAVENOUS at 02:15

## 2022-05-16 RX ADMIN — ONDANSETRON 4 MG: 2 INJECTION INTRAMUSCULAR; INTRAVENOUS at 09:29

## 2022-05-16 RX ADMIN — DOXYCYCLINE HYCLATE 100 MG: 100 CAPSULE ORAL at 21:28

## 2022-05-16 RX ADMIN — IBUPROFEN 600 MG: 600 TABLET ORAL at 06:01

## 2022-05-16 RX ADMIN — SODIUM CHLORIDE, POTASSIUM CHLORIDE, SODIUM LACTATE AND CALCIUM CHLORIDE: 600; 310; 30; 20 INJECTION, SOLUTION INTRAVENOUS at 21:28

## 2022-05-16 RX ADMIN — METRONIDAZOLE 500 MG: 500 TABLET ORAL at 09:29

## 2022-05-16 RX ADMIN — CEFOXITIN SODIUM 2 G: 2 POWDER, FOR SOLUTION INTRAVENOUS at 18:45

## 2022-05-16 RX ADMIN — ACETAMINOPHEN 975 MG: 325 TABLET, FILM COATED ORAL at 09:27

## 2022-05-16 RX ADMIN — PROCHLORPERAZINE EDISYLATE 10 MG: 5 INJECTION INTRAMUSCULAR; INTRAVENOUS at 12:10

## 2022-05-16 ASSESSMENT — ACTIVITIES OF DAILY LIVING (ADL)
ADLS_ACUITY_SCORE: 22
ADLS_ACUITY_SCORE: 37
ADLS_ACUITY_SCORE: 22

## 2022-05-16 NOTE — ED NOTES
[facility]  ED Nurse Handoff Report    Dot Ramirez is a 36 year old female   ED Chief complaint: Abdominal Pain  . ED Diagnosis:   Final diagnoses:   PID (acute pelvic inflammatory disease)   TOA (tubo-ovarian abscess)     Allergies: No Known Allergies    Code Status: Full Code  Activity level - Baseline/Home:  Independent. Activity Level - Current:   Independent. Lift room needed: No. Bariatric: No   Needed: No   Isolation: No. Infection: Not Applicable.     Vital Signs:   Vitals:    05/15/22 2200 05/15/22 2230 05/15/22 2330 05/16/22 0100   BP:  131/88 124/85 (!) 142/92   Pulse:  94 93 100   Resp:       Temp:       TempSrc:       SpO2: 100% 98% 100% 99%   Weight:       Height:           Cardiac Rhythm:  ,      Pain level:    Patient confused: No. Patient Falls Risk: No.   Elimination Status: Has voided   Patient Report - Initial Complaint:   18:07 ED Triage Notes Filed Pt comes in for RLQ abd pain that started last night. Pt states the pain is intermittent but has gotten worse throughout the day. Pain worse with coughing. Pain better with holding pressure. Pt was nauseated this morning, no vomiting. Pt finished course of antibiotics yesterday. Pt has had loose stools but this is not new.      . Focused Assessment:    21:10 Gastrointestinal Gastrointestinal - Gastrointestinal WDL: .WDL except; all  GI Signs/Symptoms: abdominal discomfort (RLQ starting yesterday. Pain 9/10 at the greatest. Rating 7/10 at this time. )       Tests Performed: US, CT, Labs. Abnormal Results:   Labs Ordered and Resulted from Time of ED Arrival to Time of ED Departure   BASIC METABOLIC PANEL - Abnormal       Result Value    Sodium 139      Potassium 3.7      Chloride 106      Carbon Dioxide (CO2) 28      Anion Gap 5      Urea Nitrogen 13      Creatinine 0.56      Calcium 8.7      Glucose 101 (*)     GFR Estimate >90     CBC WITH PLATELETS AND DIFFERENTIAL - Abnormal    WBC Count 14.6 (*)     RBC Count 4.63       Hemoglobin 11.6 (*)     Hematocrit 36.9      MCV 80      MCH 25.1 (*)     MCHC 31.4 (*)     RDW 14.1      Platelet Count 369      % Neutrophils 70      % Lymphocytes 22      % Monocytes 6      % Eosinophils 2      % Basophils 0      % Immature Granulocytes 0      NRBCs per 100 WBC 0      Absolute Neutrophils 10.2 (*)     Absolute Lymphocytes 3.2      Absolute Monocytes 0.9      Absolute Eosinophils 0.2      Absolute Basophils 0.0      Absolute Immature Granulocytes 0.1      Absolute NRBCs 0.0     ISTAT GASES LACTATE VENOUS POCT - Abnormal    Lactic Acid POCT 0.4      Bicarbonate Venous POCT 26      O2 Sat, Venous POCT 40 (*)     pCO2V Venous POCT 47      pH Venous POCT 7.35      pO2 Venous POCT 24 (*)    ROUTINE UA WITH MICROSCOPIC REFLEX TO CULTURE - Normal    Color Urine Light Yellow      Appearance Urine Clear      Glucose Urine Negative      Bilirubin Urine Negative      Ketones Urine Negative      Specific Gravity Urine 1.015      Blood Urine Negative      pH Urine 6.5      Protein Albumin Urine Negative      Urobilinogen Urine Normal      Nitrite Urine Negative      Leukocyte Esterase Urine Negative      RBC Urine <1      WBC Urine 1      Squamous Epithelials Urine 1     HCG QUALITATIVE URINE - Normal    hCG Urine Qualitative Negative     HEPATIC FUNCTION PANEL - Normal    Bilirubin Total 0.3      Bilirubin Direct <0.1      Protein Total 7.3      Albumin 3.5      Alkaline Phosphatase 70      AST 18      ALT 27     LIPASE - Normal    Lipase 128     WET PREPARATION - Normal    Trichomonas Absent      Yeast Absent      Clue Cells Absent      WBCs/high power field None     COVID-19 VIRUS (CORONAVIRUS) BY PCR   CHLAMYDIA TRACHOMATIS PCR   NEISSERIA GONORRHOEAE PCR   BLOOD CULTURE   BLOOD CULTURE     US Pelvic Complete w Transvaginal & Abd/Pel Duplex Limited   Final Result   IMPRESSION:     1.  Complex right ovarian cyst measuring 2.3 cm with trace hydrosalpinx. Consider tubo-ovarian abscess in the appropriate  clinical context, and consider follow-up ultrasound to document resolution.      2.  No evidence of ovarian torsion.               CT Abdomen Pelvis w Contrast   Final Result   IMPRESSION:    1.  Complex cystic change involving the right adnexa with possible hydrosalpinx. Soft tissue haziness in the right adnexa. Consider tubo-ovarian abscess complex. Largest dominant cyst measures 2.9 cm.       2.  No appendicitis.      3.  Fatty liver.        .   Treatments provided: See MAR  Family Comments: Pt in contact with family  OBS brochure/video discussed/provided to patient:  N/A  ED Medications:   Medications   ondansetron (ZOFRAN) injection 4 mg (4 mg Intravenous Given 5/15/22 2117)   HYDROmorphone (PF) (DILAUDID) injection 0.5 mg (0.5 mg Intravenous Given 5/16/22 0053)   cefOXitin (MEFOXIN) 2 g vial to attach to  mL bag (2 g Intravenous New Bag 5/16/22 0054)   0.9% sodium chloride BOLUS (0 mLs Intravenous Stopped 5/15/22 2215)   ketorolac (TORADOL) injection 10 mg (10 mg Intravenous Given 5/15/22 2115)   Sodium Chloride for CT Scan Flush Use (64 mLs Intravenous Given 5/15/22 2201)   iopamidol (ISOVUE-370) solution 500 mL (98 mLs Intravenous Given 5/15/22 2201)   metroNIDAZOLE (FLAGYL) tablet 500 mg (500 mg Oral Given 5/16/22 0057)   doxycycline hyclate (VIBRAMYCIN) capsule 100 mg (100 mg Oral Given 5/16/22 0057)     Drips infusing:  No  For the majority of the shift, the patient's behavior Green.     Sepsis treatment initiated: No     Patient tested for COVID 19 prior to admission: YES    ED Nurse Name/Phone Number: Kacie Quintana RN,   1:03 AM  RECEIVING UNIT ED HANDOFF REVIEW    Above ED Nurse Handoff Report was reviewed: Yes  Reviewed by: Mariana Harley RN on May 16, 2022 at 1:59 AM

## 2022-05-16 NOTE — PLAN OF CARE
Goal Outcome Evaluation:    Plan of Care Reviewed With: patient     Vital Signs: VSS. Afebrile.  Pain/Comfort: Minimal abdominal pain, rated as 0-2/10.  Pt had a bad headache this morning, rated as 8/10.  Tylenol, Motrin and ice packs used.  Headache now gone.  Assessment: WDL. Bowel sounds active.  Diet: Nausea this morning, with headache.  No emesis. Zofran and compazine given.  Tolerated diet later this afternoon noon and evening.  Output: Voiding pale yellow urine.  Activity/Ambulation: Up in room independently.  Social:  here to visit.

## 2022-05-16 NOTE — PROGRESS NOTES
Having RLQ pain rated 4-2. Described pain as sharp, aching , and pain increased with cough or movement.  No nausea. Void 150 ml yellow urine. Tolerated water and crackers. Woke with a headache over right eye. Motrin and ice pack given. B/P 137/100. Has a frequent dry cough. O2 SAT's 98%. Afebrile. On triple antibiotics. IV infusing at 125 ml/hr. AM lab draw pending. BCX pending. Wet prep results pending.

## 2022-05-16 NOTE — PHARMACY-ADMISSION MEDICATION HISTORY
Admission medication history interview status for this patient is complete. See [facility] admission navigator for allergy information, prior to admission medications and immunization status.     Medication history interview source(s):Patient and spouse  Medication history resources (including written lists, pill bottles, clinic record):None  Primary pharmacy:----    Changes made to PTA medication list:  Added: multivitamin, tylenol, ibuprofen  Deleted: ---  Changed: ---    Actions taken by pharmacist (provider contacted, etc):None     Additional medication history information:pt completed 5 day courses of keflex and cipro from 5/8-5/13/22    Medication reconciliation/reorder completed by provider prior to medication history? No, sticky note left for MD      For patients on insulin therapy:N    Prior to Admission medications    Medication Sig Last Dose Taking? Auth Provider   acetaminophen (TYLENOL) 325 MG tablet Take 650 mg by mouth every 6 hours as needed for mild pain 5/15/2022 at Unknown time Yes Unknown, Entered By History   ibuprofen (ADVIL/MOTRIN) 200 MG tablet Take 400 mg by mouth every 8 hours as needed for mild pain Past Week at Unknown time Yes Unknown, Entered By History   multivitamin w/minerals (MULTI-VITAMIN) tablet Take 1 tablet by mouth daily 5/15/2022 at Unknown time Yes Unknown, Entered By History

## 2022-05-16 NOTE — PROGRESS NOTES
GYN progress    Subjective:  Headache this morning, patient reports this is more bothersome than her RLQ pain at this time.  Patient received Zofran this morning for some nausea.  Bladder function normal.  No fever or chills noted.  She reports dark yellow urine this morning, strong odor.     Objective:  Temperature 97.7 degrees F, /98   Pulse 89   RR  16   SPO2 96%    Patient resting supine reporting headache  Moderate tenderness to palpation in the RLQ  Extremities normal, no leg or calf pain    Labs from this morning:  WBC  10.6    (down from 14.6 last evening)                                           Hemoglobin 10.5   (11.6 on admission prior to IV hydration)                                             Labs from admission:      Covid 19 negative                                           Wet prep negative                                           Chlamydia, GC pending    Impression and plan:   RLQ pain, concern for TOA: complex area in RLQ, normal appearing appendix on ct   -Continue intravenous Mefoxin, oral Doxycycline and oral Flagyl for a full 48 hours  -GC and  Chlamydia PCR pending  -blood cultures pending  -modest decline in WBC overnight  -afebrile, continue to monitor  - consider discharge after transitioning to all oral antibiotics Wednesday  if clinical status continues to improve.  -recommend follow up pelvic ultrasound 2-3 weeks after discharge for follow up of the right adnexal cystic mass and mild hydrosalpinx.         -   -darker yellow urine with strong odor this morning  Will order a midstream clean catch urine culture     Pain;  -headache this morning, oral Tylenol ordered, oral Dilaudid if necessary.      FEN/GI:   -general diet as tolerated, zofran and pepsid prn  - ml/hr     CV:   -mild range blood pressures, likely related to pain response.  Will continue to monitor and treat severe range pressures  (>160 systolic and >105 diastolic).       RESP; no acute issues     DVT PPX:  ambulation      The plan of care was discussed with the patient and her partner, who expressed understanding and agreement with the plan of care.

## 2022-05-16 NOTE — ED PROVIDER NOTES
"  History   Chief Complaint:  Abdominal Pain       The history is provided by the patient.      Dot Ramirez is a 36 year old female who presents with abdominal pain. The patient reports having worsening pain in the right lower quadrant for the past 24 hours. The pain was initially intermittent, but is now more constant. She describes pain as achy, noting it is worse with coughing. The patient also reports nausea. She denies vomiting, dysuria, diarrhea, constipation, vaginal bleeding or discharge, or consistent coughing.      Review of Systems   Respiratory: Negative for cough.    Gastrointestinal: Positive for abdominal pain and nausea. Negative for constipation, diarrhea and vomiting.   Genitourinary: Negative for dysuria, vaginal bleeding and vaginal discharge.   All other systems reviewed and are negative.        Allergies:  No Known Allergies    Medications:  The patient is currently on no regular medications.     Past Medical History:     Abnormal pap smear of cervix  Anxiety  Moderate major depression    Past Surgical History:    Cerclage cervical x2   section x2  Excise mass, finger  Dental extraction    Family History:    Family history negative  Neurologic disorder  Prostate cancer  Diabetes  Alzheimer's disease    Social History:  Patient unaccompanied  PCP: No Ref-Primary, Physician     Physical Exam     Patient Vitals for the past 24 hrs:   BP Temp Temp src Pulse Resp SpO2 Height Weight   22 0100 (!) 142/92 -- -- 100 -- 99 % -- --   05/15/22 2330 124/85 -- -- 93 -- 100 % -- --   05/15/22 2230 131/88 -- -- 94 -- 98 % -- --   05/15/22 2200 -- -- -- -- -- 100 % -- --   05/15/22 2145 -- -- -- -- -- 100 % -- --   05/15/22 2130 (!) 137/92 -- -- 94 -- 100 % -- --   05/15/22 2115 (!) 131 -- -- 96 -- 100 % -- --   05/15/22 2114 (!) 131/ -- -- 96 -- 99 % -- --   05/15/22 2108 (!) 150/104 -- -- 101 -- -- -- --   05/15/22 1808 (!) 159/103 98  F (36.7  C) Temporal 105 16 99 % 1.575 m (5' 2\") " 87.7 kg (193 lb 5.5 oz)       Physical Exam  Constitutional:  Oriented to person, place, and time. Appears well-developed.      Minor distress due to pain.   HENT:   Head:    Normocephalic.   Mouth/Throat:   Oropharynx is clear and moist.   Eyes:    EOM are normal. Pupils are equal, round, and reactive to light.   Neck:    Neck supple.   Cardiovascular:  Normal rate, regular rhythm and normal heart sounds.      Exam reveals no gallop and no friction rub.       No murmur heard.  Pulmonary/Chest:  Effort normal and breath sounds normal.      No respiratory distress. No wheezes. No rales.   Abdominal:   Soft. No distension. RLQ tenderness with guarding and rebound.   :    White mild discharge noted. Positive CMT is elicited. No adnexal tenderness.    Musculoskeletal:  Normal range of motion.   Neurological:   Alert and oriented to person, place, and time.           Moves all 4 extremities spontaneously    Skin:    No rash noted. No pallor.    Emergency Department Course   ECG  No results found for this or any previous visit.    Imaging:  US Pelvic Complete w Transvaginal & Abd/Pel Duplex Limited   Final Result   IMPRESSION:     1.  Complex right ovarian cyst measuring 2.3 cm with trace hydrosalpinx. Consider tubo-ovarian abscess in the appropriate clinical context, and consider follow-up ultrasound to document resolution.      2.  No evidence of ovarian torsion.               CT Abdomen Pelvis w Contrast   Final Result   IMPRESSION:    1.  Complex cystic change involving the right adnexa with possible hydrosalpinx. Soft tissue haziness in the right adnexa. Consider tubo-ovarian abscess complex. Largest dominant cyst measures 2.9 cm.       2.  No appendicitis.      3.  Fatty liver.      Report per radiology    Laboratory:  Labs Ordered and Resulted from Time of ED Arrival to Time of ED Departure   BASIC METABOLIC PANEL - Abnormal       Result Value    Sodium 139      Potassium 3.7      Chloride 106      Carbon Dioxide  (CO2) 28      Anion Gap 5      Urea Nitrogen 13      Creatinine 0.56      Calcium 8.7      Glucose 101 (*)     GFR Estimate >90     CBC WITH PLATELETS AND DIFFERENTIAL - Abnormal    WBC Count 14.6 (*)     RBC Count 4.63      Hemoglobin 11.6 (*)     Hematocrit 36.9      MCV 80      MCH 25.1 (*)     MCHC 31.4 (*)     RDW 14.1      Platelet Count 369      % Neutrophils 70      % Lymphocytes 22      % Monocytes 6      % Eosinophils 2      % Basophils 0      % Immature Granulocytes 0      NRBCs per 100 WBC 0      Absolute Neutrophils 10.2 (*)     Absolute Lymphocytes 3.2      Absolute Monocytes 0.9      Absolute Eosinophils 0.2      Absolute Basophils 0.0      Absolute Immature Granulocytes 0.1      Absolute NRBCs 0.0     ISTAT GASES LACTATE VENOUS POCT - Abnormal    Lactic Acid POCT 0.4      Bicarbonate Venous POCT 26      O2 Sat, Venous POCT 40 (*)     pCO2V Venous POCT 47      pH Venous POCT 7.35      pO2 Venous POCT 24 (*)    ROUTINE UA WITH MICROSCOPIC REFLEX TO CULTURE - Normal    Color Urine Light Yellow      Appearance Urine Clear      Glucose Urine Negative      Bilirubin Urine Negative      Ketones Urine Negative      Specific Gravity Urine 1.015      Blood Urine Negative      pH Urine 6.5      Protein Albumin Urine Negative      Urobilinogen Urine Normal      Nitrite Urine Negative      Leukocyte Esterase Urine Negative      RBC Urine <1      WBC Urine 1      Squamous Epithelials Urine 1     HCG QUALITATIVE URINE - Normal    hCG Urine Qualitative Negative     HEPATIC FUNCTION PANEL - Normal    Bilirubin Total 0.3      Bilirubin Direct <0.1      Protein Total 7.3      Albumin 3.5      Alkaline Phosphatase 70      AST 18      ALT 27     LIPASE - Normal    Lipase 128     COVID-19 VIRUS (CORONAVIRUS) BY PCR - Normal    SARS CoV2 PCR Negative     WET PREPARATION - Normal    Trichomonas Absent      Yeast Absent      Clue Cells Absent      WBCs/high power field None     CHLAMYDIA TRACHOMATIS PCR   NEISSERIA  GONORRHOEAE PCR   BLOOD CULTURE   BLOOD CULTURE        Emergency Department Course:     Reviewed:  I reviewed nursing notes, vitals, past medical history and Care Everywhere    Assessments/Consults:  ED Course as of 05/16/22 0144   Sun May 15, 2022   2122 Obtained history and examined the patient as noted above.    2340 I rechecked the patient and explained findings.    Mon May 16, 2022   0029 I consulted with Dr. Mota, OB GYN, regarding the patient's history and presentation in the emergency department today.     0044 I rechecked and updated the patient at this time.       Interventions:  2115 NS 1 L IV   2115 Toradol 10 mg IV  2117 Zofran 4 mg IV  0053 Dilaudid 0.5 mg IV  0054 Mefoxin 2 g IV  0057 Flagyl 500 mg Oral  0057 Vibramycin 100 mg Oral    Disposition:  The patient was admitted to the hospital under the care of Dr. Mota.     Impression & Plan     Medical Decision Making:  Dot Ramirez is a 36 year old female who presents with right lower quadrant abdominal pain. Differential includes ectopic pregnancy, ovarian cyst, ovarian torsion, PID, renal colic, acute appendicitis , or other causes. Workup thus far shows a cystic mass in the right ovary with hydrosalpinx. She does have PID on exam clinically and ultrasound suggests tubo-ovarian abscess. I did discuss the case with OB GYN that does agree with this. Requested antibiotics and further monitoring, and to reassess if surgical management is needed.     Diagnosis:    ICD-10-CM    1. PID (acute pelvic inflammatory disease)  N73.0    2. TOA (tubo-ovarian abscess)  N70.93      Scribe Disclosure:  I, Charlie Glez, am serving as a scribe at 9:19 PM on 5/15/2022 to document services personally performed by Dileep Andino MD based on my observations and the provider's statements to me.   I, Tameka Chandra, am serving as a scribe at 10:17 PM on 5/15/2022 to document services personally performed by Dileep Andino MD based on my observations and the  provider's statements to me.         Dileep Andino MD  05/16/22 0454

## 2022-05-16 NOTE — H&P
[facility]    History and Physical  Obstetrics and Gynecology     Date of Admission:  5/15/2022    Assessment & Plan   Dot Ramirez is a 36 year old female who presents with abdominal pain, concern for TOA on imaging.     RLQ pain, concern for TOA: complex area in RLQ, normal appearing appendix on ct   -cefoxitin, doxy and flagyl ordered  -pending cultures  -normal lactic acid in ER  -elevated WBC on arrival, rpt in am and daily   -afebrile, continue to monitor    Pain;  -dilaudid/oxy prn  -ibuprofen/tylenol for mild pain    FEN/GI:   -general diet as tolerated, zofran and pepsid prn  - ml/hr    CV:   -mild range pressures, suspect due to pain, continue to monitor    RESP; no acute issues    DVT PPX: ambulation     DISPO: admit for antibiotics and pain management.     Donna Mota MD  2022 1:45 AM        Chief Complaint   Abdominal pain     History is obtained from the chart and ER report     History of Present Illness   Dot Ramirez is a 36 year old female who presents with abdominal pain. Pain is in the RLQ and is progressive. She has nausea with this.     Past Medical History    I have reviewed this patient's medical history and updated it with pertinent information if needed.   Past Medical History:   Diagnosis Date     Abnormal Pap smear of cervix 2010    see problem list     Anxiety      Moderate major depression (H) 2014    onset with fetal loss       Past Surgical History   I have reviewed this patient's surgical history and updated it with pertinent information if needed.  Past Surgical History:   Procedure Laterality Date     CERCLAGE CERVICAL N/A 2015    Procedure: CERCLAGE CERVICAL;  Surgeon: Damion Nayak MD;  Location: [facility] L+D     CERCLAGE CERVICAL N/A 2016    Procedure: CERCLAGE CERVICAL;  Surgeon: Damion Nayak MD;  Location: [facility] L+D      SECTION N/A 2015    Procedure:  SECTION;  Surgeon: Chiqui  Jamison MCKEON MD;  Location: [facility] L+D      SECTION N/A 3/6/2017    Procedure:  SECTION;  Surgeon: Jamison Florian MD;  Location: [facility] OR     Cystectomy hand  2016     EXCISE MASS FINGER Left 2016    Procedure: Excision of cystic mass, left ring finger. Surgeon:  Rosales Jones MD  [facility]     EXTRACTION(S) DENTAL  2009       Prior to Admission Medications   None     Allergies   No Known Allergies    Social History   I have reviewed this patient's social history and updated it with pertinent information if needed. Dot Ramirez  reports that she has never smoked. She has never used smokeless tobacco. She reports that she does not drink alcohol and does not use drugs.    Family History   I have reviewed this patient's family history and updated it with pertinent information if needed.   Family History   Problem Relation Age of Onset     Family History Negative Mother         born      Neurologic Disorder Father         born  [place] Tooth     Prostate Cancer Father      Diabetes Maternal Grandmother          99yo Type II     Alzheimer Disease Maternal Grandfather 89         94yo      Family History Negative Paternal Grandmother              Family History Negative Paternal Grandfather              Family History Negative Brother         1/2 brother Hemochromatosis       Physical Exam    Patient Vitals for the past 24 hrs:   BP Temp Temp src Pulse Resp SpO2 Height Weight   22 0100 (!) 142/92 -- -- 100 -- 99 % -- --   05/15/22 2330 124/85 -- -- 93 -- 100 % -- --   05/15/22 2230 131/88 -- -- 94 -- 98 % -- --   05/15/22 2200 -- -- -- -- -- 100 % -- --   05/15/22 2145 -- -- -- -- -- 100 % -- --   05/15/22 2130 (!) 137/92 -- -- 94 -- 100 % -- --   05/15/22 2115 (!) 131/ -- -- 96 -- 100 % -- --   05/15/22 2114 (!) 131/ -- -- 96 -- 99 % -- --   05/15/22 2108 (!) 150/104 -- -- 101 -- -- -- --   05/15/22 180 (!)  "159/103 98  F (36.7  C) Temporal 105 16 99 % 1.575 m (5' 2\") 87.7 kg (193 lb 5.5 oz)       Exam deferred until am, please see admitting ER physician exam for more details     Data   Results for orders placed or performed during the hospital encounter of 05/15/22 (from the past 24 hour(s))   UA with Microscopic reflex to Culture    Specimen: Urine, Midstream   Result Value Ref Range    Color Urine Light Yellow Colorless, Straw, Light Yellow, Yellow    Appearance Urine Clear Clear    Glucose Urine Negative Negative mg/dL    Bilirubin Urine Negative Negative    Ketones Urine Negative Negative mg/dL    Specific Gravity Urine 1.015 1.003 - 1.035    Blood Urine Negative Negative    pH Urine 6.5 5.0 - 7.0    Protein Albumin Urine Negative Negative mg/dL    Urobilinogen Urine Normal Normal, 2.0 mg/dL    Nitrite Urine Negative Negative    Leukocyte Esterase Urine Negative Negative    RBC Urine <1 <=2 /HPF    WBC Urine 1 <=5 /HPF    Squamous Epithelials Urine 1 <=1 /HPF    Narrative    Urine Culture not indicated   HCG qualitative urine   Result Value Ref Range    hCG Urine Qualitative Negative Negative   CBC + differential    Narrative    The following orders were created for panel order CBC + differential.  Procedure                               Abnormality         Status                     ---------                               -----------         ------                     CBC with platelets and d...[[id number]]  Abnormal            Final result                 Please view results for these tests on the individual orders.   Basic metabolic panel (BMP)   Result Value Ref Range    Sodium 139 133 - 144 mmol/L    Potassium 3.7 3.4 - 5.3 mmol/L    Chloride 106 94 - 109 mmol/L    Carbon Dioxide (CO2) 28 20 - 32 mmol/L    Anion Gap 5 3 - 14 mmol/L    Urea Nitrogen 13 7 - 30 mg/dL    Creatinine 0.56 0.52 - 1.04 mg/dL    Calcium 8.7 8.5 - 10.1 mg/dL    Glucose 101 (H) 70 - 99 mg/dL    GFR Estimate >90 >60 mL/min/1.73m2   Hepatic " panel   Result Value Ref Range    Bilirubin Total 0.3 0.2 - 1.3 mg/dL    Bilirubin Direct <0.1 0.0 - 0.2 mg/dL    Protein Total 7.3 6.8 - 8.8 g/dL    Albumin 3.5 3.4 - 5.0 g/dL    Alkaline Phosphatase 70 40 - 150 U/L    AST 18 0 - 45 U/L    ALT 27 0 - 50 U/L   Lipase   Result Value Ref Range    Lipase 128 73 - 393 U/L   CBC with platelets and differential   Result Value Ref Range    WBC Count 14.6 (H) 4.0 - 11.0 10e3/uL    RBC Count 4.63 3.80 - 5.20 10e6/uL    Hemoglobin 11.6 (L) 11.7 - 15.7 g/dL    Hematocrit 36.9 35.0 - 47.0 %    MCV 80 78 - 100 fL    MCH 25.1 (L) 26.5 - 33.0 pg    MCHC 31.4 (L) 31.5 - 36.5 g/dL    RDW 14.1 10.0 - 15.0 %    Platelet Count 369 150 - 450 10e3/uL    % Neutrophils 70 %    % Lymphocytes 22 %    % Monocytes 6 %    % Eosinophils 2 %    % Basophils 0 %    % Immature Granulocytes 0 %    NRBCs per 100 WBC 0 <1 /100    Absolute Neutrophils 10.2 (H) 1.6 - 8.3 10e3/uL    Absolute Lymphocytes 3.2 0.8 - 5.3 10e3/uL    Absolute Monocytes 0.9 0.0 - 1.3 10e3/uL    Absolute Eosinophils 0.2 0.0 - 0.7 10e3/uL    Absolute Basophils 0.0 0.0 - 0.2 10e3/uL    Absolute Immature Granulocytes 0.1 <=0.4 10e3/uL    Absolute NRBCs 0.0 10e3/uL   CT Abdomen Pelvis w Contrast    Narrative    EXAM: CT ABDOMEN PELVIS W CONTRAST  LOCATION: [facility]  DATE/TIME: 5/15/2022 10:00 PM    INDICATION: RLQ abdominal pain, appendicitis suspected (Age >= 14y).  COMPARISON: None.  TECHNIQUE: CT scan of the abdomen and pelvis was performed following injection of IV contrast. Multiplanar reformats were obtained. Dose reduction techniques were used.  CONTRAST: 98 mL Isovue-370.    FINDINGS:   LOWER CHEST: Normal.    HEPATOBILIARY: Fatty liver.    PANCREAS: Normal.    SPLEEN: Normal.    ADRENAL GLANDS: Normal.    KIDNEYS/BLADDER: Normal.    BOWEL: Normal appendix.    LYMPH NODES: Normal.    VASCULATURE: Unremarkable.    PELVIC ORGANS: Complex ill-defined cystic change around the right ovary with possible  hydrosalpinx. There is some soft tissue haziness. Largest dominant region of cyst measures 2.9 cm. Consider tubo-ovarian abscess complex.    MUSCULOSKELETAL: Normal.      Impression    IMPRESSION:   1.  Complex cystic change involving the right adnexa with possible hydrosalpinx. Soft tissue haziness in the right adnexa. Consider tubo-ovarian abscess complex. Largest dominant cyst measures 2.9 cm.     2.  No appendicitis.    3.  Fatty liver.   US Pelvic Complete w Transvaginal & Abd/Pel Duplex Limited    Narrative    EXAM: US PELVIS COMPLETE W TRANSVAGINAL AND DOPPLER LIMITED  LOCATION: [facility]  DATE/TIME: 5/15/2022 10:40 PM    INDICATION: Pelvic pain.  COMPARISON: CT abdomen/pelvis 05/15/2020  TECHNIQUE: Transabdominal scans were performed. Endovaginal ultrasound was performed to better visualize the adnexa. Color flow with spectral Doppler and waveform analysis performed.    FINDINGS:    UTERUS: 7.7 x 5.2 x 4.3 cm. Normal in size and position with no masses.    ENDOMETRIUM: 11 mm. Normal smooth endometrium.    RIGHT OVARY: 4.6 x 3.8 x 3.1 cm. Right ovarian cystic structure with thickened walls and hypoechoic material, measuring 2.3 x 2.0 x 1.5 cm. Trace fluid in the right fallopian tube. Arterial and venous duplex flow identified in the ovary.    LEFT OVARY: 2.7 x 1.8 x 1.7 cm. Normal with arterial and venous duplex flow identified.    Trace pelvic free fluid.      Impression    IMPRESSION:    1.  Complex right ovarian cyst measuring 2.3 cm with trace hydrosalpinx. Consider tubo-ovarian abscess in the appropriate clinical context, and consider follow-up ultrasound to document resolution.    2.  No evidence of ovarian torsion.         Wet prep    Specimen: Vagina; Swab   Result Value Ref Range    Trichomonas Absent Absent    Yeast Absent Absent    Clue Cells Absent Absent    WBCs/high power field None None   Asymptomatic COVID-19 Virus (Coronavirus) by PCR Nasopharyngeal    Specimen:  Nasopharyngeal; Swab   Result Value Ref Range    SARS CoV2 PCR Negative Negative    Narrative    Testing was performed using the Xpert Xpress SARS-CoV-2 Assay on the   Cepheid Gene-Xpert Instrument Systems. Additional information about   this Emergency Use Authorization (EUA) assay can be found via the Lab   Guide. This test should be ordered for the detection of SARS-CoV-2 in   individuals who meet SARS-CoV-2 clinical and/or epidemiological   criteria. Test performance is unknown in asymptomatic patients. This   test is for in vitro diagnostic use under the FDA EUA for   laboratories certified under CLIA to perform high complexity testing.   This test has not been FDA cleared or approved. A negative result   does not rule out the presence of PCR inhibitors in the specimen or   target RNA in concentration below the limit of detection for the   assay. The possibility of a false negative should be considered if   the patient's recent exposure or clinical presentation suggests   COVID-19. This test was validated by the [facility]. This laboratory is certified under the Clinical Laboratory Improvement Amendments of 1988 (CLIA-88) as qualified to perform high complexity laboratory testing.     iStat Gases (lactate) venous, POCT   Result Value Ref Range    Lactic Acid POCT 0.4 <=2.0 mmol/L    Bicarbonate Venous POCT 26 21 - 28 mmol/L    O2 Sat, Venous POCT 40 (L) 94 - 100 %    pCO2V Venous POCT 47 40 - 50 mm Hg    pH Venous POCT 7.35 7.32 - 7.43    pO2 Venous POCT 24 (L) 25 - 47 mm Hg

## 2022-05-17 LAB
BACTERIA UR CULT: NO GROWTH
BASOPHILS # BLD AUTO: 0 10E3/UL (ref 0–0.2)
BASOPHILS NFR BLD AUTO: 1 %
EOSINOPHIL # BLD AUTO: 0.2 10E3/UL (ref 0–0.7)
EOSINOPHIL NFR BLD AUTO: 3 %
ERYTHROCYTE [DISTWIDTH] IN BLOOD BY AUTOMATED COUNT: 14.3 % (ref 10–15)
HCT VFR BLD AUTO: 31.2 % (ref 35–47)
HGB BLD-MCNC: 9.6 G/DL (ref 11.7–15.7)
IMM GRANULOCYTES # BLD: 0 10E3/UL
IMM GRANULOCYTES NFR BLD: 0 %
LYMPHOCYTES # BLD AUTO: 2.1 10E3/UL (ref 0.8–5.3)
LYMPHOCYTES NFR BLD AUTO: 26 %
MCH RBC QN AUTO: 24.9 PG (ref 26.5–33)
MCHC RBC AUTO-ENTMCNC: 30.8 G/DL (ref 31.5–36.5)
MCV RBC AUTO: 81 FL (ref 78–100)
MONOCYTES # BLD AUTO: 0.6 10E3/UL (ref 0–1.3)
MONOCYTES NFR BLD AUTO: 8 %
NEUTROPHILS # BLD AUTO: 5.1 10E3/UL (ref 1.6–8.3)
NEUTROPHILS NFR BLD AUTO: 62 %
NRBC # BLD AUTO: 0 10E3/UL
NRBC BLD AUTO-RTO: 0 /100
PLATELET # BLD AUTO: 306 10E3/UL (ref 150–450)
RBC # BLD AUTO: 3.85 10E6/UL (ref 3.8–5.2)
WBC # BLD AUTO: 8 10E3/UL (ref 4–11)

## 2022-05-17 PROCEDURE — 258N000003 HC RX IP 258 OP 636: Performed by: OBSTETRICS & GYNECOLOGY

## 2022-05-17 PROCEDURE — 85025 COMPLETE CBC W/AUTO DIFF WBC: CPT | Performed by: OBSTETRICS & GYNECOLOGY

## 2022-05-17 PROCEDURE — 250N000013 HC RX MED GY IP 250 OP 250 PS 637: Performed by: OBSTETRICS & GYNECOLOGY

## 2022-05-17 PROCEDURE — 120N000004 HC R&B MS OVERFLOW

## 2022-05-17 PROCEDURE — 36415 COLL VENOUS BLD VENIPUNCTURE: CPT | Performed by: OBSTETRICS & GYNECOLOGY

## 2022-05-17 PROCEDURE — 250N000011 HC RX IP 250 OP 636: Performed by: OBSTETRICS & GYNECOLOGY

## 2022-05-17 RX ORDER — LACTOBACILLUS RHAMNOSUS GG 10B CELL
1 CAPSULE ORAL 2 TIMES DAILY
Status: DISCONTINUED | OUTPATIENT
Start: 2022-05-17 | End: 2022-05-18 | Stop reason: HOSPADM

## 2022-05-17 RX ADMIN — METRONIDAZOLE 500 MG: 500 TABLET ORAL at 10:18

## 2022-05-17 RX ADMIN — CEFOXITIN SODIUM 2 G: 2 POWDER, FOR SOLUTION INTRAVENOUS at 00:26

## 2022-05-17 RX ADMIN — DOXYCYCLINE HYCLATE 100 MG: 100 CAPSULE ORAL at 20:12

## 2022-05-17 RX ADMIN — Medication 1 CAPSULE: at 22:05

## 2022-05-17 RX ADMIN — ACETAMINOPHEN 975 MG: 325 TABLET, FILM COATED ORAL at 10:21

## 2022-05-17 RX ADMIN — METRONIDAZOLE 500 MG: 500 TABLET ORAL at 20:12

## 2022-05-17 RX ADMIN — IBUPROFEN 600 MG: 600 TABLET ORAL at 15:03

## 2022-05-17 RX ADMIN — DOXYCYCLINE HYCLATE 100 MG: 100 CAPSULE ORAL at 10:18

## 2022-05-17 RX ADMIN — SODIUM CHLORIDE, POTASSIUM CHLORIDE, SODIUM LACTATE AND CALCIUM CHLORIDE: 600; 310; 30; 20 INJECTION, SOLUTION INTRAVENOUS at 16:27

## 2022-05-17 RX ADMIN — CEFOXITIN SODIUM 2 G: 2 POWDER, FOR SOLUTION INTRAVENOUS at 06:02

## 2022-05-17 RX ADMIN — CEFOXITIN SODIUM 2 G: 2 POWDER, FOR SOLUTION INTRAVENOUS at 12:38

## 2022-05-17 RX ADMIN — CEFOXITIN SODIUM 2 G: 2 POWDER, FOR SOLUTION INTRAVENOUS at 18:39

## 2022-05-17 RX ADMIN — ACETAMINOPHEN 975 MG: 325 TABLET, FILM COATED ORAL at 16:24

## 2022-05-17 RX ADMIN — SODIUM CHLORIDE, POTASSIUM CHLORIDE, SODIUM LACTATE AND CALCIUM CHLORIDE: 600; 310; 30; 20 INJECTION, SOLUTION INTRAVENOUS at 06:02

## 2022-05-17 ASSESSMENT — ACTIVITIES OF DAILY LIVING (ADL)
ADLS_ACUITY_SCORE: 22

## 2022-05-17 NOTE — PLAN OF CARE
Goal Outcome Evaluation:    Plan of Care Reviewed With: patient     Vital Signs: VSS. Afebrile.  Pain/Comfort: Some lower abdominal pain, well managed with tylenol and motrin  Assessment: WDL  Diet: Tolerating regular diet  Output: Voiding   Activity/Ambulation: Up in room independently.  Showered.  Social:  here to visit  Plan: Continue antibiotics, possible discharge home tomorrow.

## 2022-05-17 NOTE — PLAN OF CARE
Patient had a good shift. Abdominal pain requiring Tylenol and Ibuprofen through out shift. Diarrhea this evening (on multiple antibiotics). Probiotic to start tonight.   Hgb 9.6, no signs of bleeding noted.     Plan is to hopefully discharge home tomorrow on oral antibiotics

## 2022-05-17 NOTE — PLAN OF CARE
Goal Outcome Evaluation:       Vital Signs: WNL. Patient afebrile.   Pain/Comfort: patient rating pain as a 2-4/10. Pain medications offered but patient declining at this time. Patient encouraged to call RN if patient starts having increased pain. Patient stated an understanding.   Assessment: WNL.  PIV site c/d/i with IVF infusing per MAR.   Diet: patient tolerating PO intake. Fluids encouraged as tolerated.   Output: patient voiding independently.   Activity/Ambulation: patient up independently in room. Ambulation promoted as tolerated.   Social: patient calm and cooperative.   Plan: pain control. Maintain PIV. IVF. IV ABX. Monitor I&O. Encourage PO intake as tolerated. Will continue to monitor and will notify service with any questions or concerns.

## 2022-05-17 NOTE — PROGRESS NOTES
"GYN Progress Note  5/17/2022  8:20 AM    S:  Pt feeling better today with HA and nausea resolved.  Still with RLQ pain, although this is better as well.  Normal BM this AM.  No issues with urination.  Eating well.      O:  /78   Pulse 87   Temp 98.7  F (37.1  C) (Oral)   Resp 16   Ht 1.575 m (5' 2\")   Wt 83.4 kg (183 lb 12.8 oz)   LMP 04/25/2022 (Exact Date)   SpO2 98%   BMI 33.62 kg/m    Gen - comfortable, NAD  Abd - tender RLQ, soft, no rebound or guarding  Ext - NT, no edema    Labs: WBC 8  Hgb 9.6  Blood cultures neg to date  UC pending  GC/Chlam neg  covid neg        A/P:  35 yo HD 2 admitted with   RLQ pain, concern for TOA: complex area in RLQ, normal appearing appendix on CT    -Continue intravenous Mefoxin, oral Doxycycline and oral Flagyl for a full 48 hours (until tomorrow AM).  If clinically doing well at that time, can stop IV antibiotics and discharge home with oral antibiotics to complete 14 days.  -GC and  Chlamydia PCR neg  -blood cultures pending, neg to date  -WBC now normal  -afebrile, continue to monitor  - plan discharge after transitioning to all oral antibiotics Wednesday  if clinical status continues to improve.  -recommend follow up pelvic ultrasound 2-3 weeks after discharge for follow up of the right adnexal cystic mass and mild hydrosalpinx.            -   UC pending     Pain;  - managed with tylenol, ibuprofen.  Has oxycodone and dilaudid available PRN     FEN/GI:   -general diet as tolerated, zofran and pepsid prn  - ml/hr     CV:   -mild range blood pressures on admission, likely related to pain response.  Normal now. Will continue to monitor.     RESP; no acute issues     DVT PPX: ambulation      The plan of care was discussed with the patient and her partner, who expressed understanding and agreement with the plan of care.  Discussed staying off work this week and returning to work next week.      Emmie Resendiz MD    "

## 2022-05-18 VITALS
HEART RATE: 87 BPM | RESPIRATION RATE: 16 BRPM | DIASTOLIC BLOOD PRESSURE: 80 MMHG | TEMPERATURE: 98.5 F | SYSTOLIC BLOOD PRESSURE: 135 MMHG | OXYGEN SATURATION: 99 % | WEIGHT: 183.8 LBS | BODY MASS INDEX: 33.82 KG/M2 | HEIGHT: 62 IN

## 2022-05-18 LAB
BASOPHILS # BLD AUTO: 0 10E3/UL (ref 0–0.2)
BASOPHILS NFR BLD AUTO: 1 %
EOSINOPHIL # BLD AUTO: 0.2 10E3/UL (ref 0–0.7)
EOSINOPHIL NFR BLD AUTO: 3 %
ERYTHROCYTE [DISTWIDTH] IN BLOOD BY AUTOMATED COUNT: 14.3 % (ref 10–15)
HCT VFR BLD AUTO: 32.3 % (ref 35–47)
HGB BLD-MCNC: 10.1 G/DL (ref 11.7–15.7)
IMM GRANULOCYTES # BLD: 0 10E3/UL
IMM GRANULOCYTES NFR BLD: 1 %
LYMPHOCYTES # BLD AUTO: 2.3 10E3/UL (ref 0.8–5.3)
LYMPHOCYTES NFR BLD AUTO: 27 %
MCH RBC QN AUTO: 24.8 PG (ref 26.5–33)
MCHC RBC AUTO-ENTMCNC: 31.3 G/DL (ref 31.5–36.5)
MCV RBC AUTO: 79 FL (ref 78–100)
MONOCYTES # BLD AUTO: 0.6 10E3/UL (ref 0–1.3)
MONOCYTES NFR BLD AUTO: 7 %
NEUTROPHILS # BLD AUTO: 5.5 10E3/UL (ref 1.6–8.3)
NEUTROPHILS NFR BLD AUTO: 61 %
NRBC # BLD AUTO: 0 10E3/UL
NRBC BLD AUTO-RTO: 0 /100
PLATELET # BLD AUTO: 324 10E3/UL (ref 150–450)
RBC # BLD AUTO: 4.08 10E6/UL (ref 3.8–5.2)
WBC # BLD AUTO: 8.7 10E3/UL (ref 4–11)

## 2022-05-18 PROCEDURE — 85004 AUTOMATED DIFF WBC COUNT: CPT | Performed by: OBSTETRICS & GYNECOLOGY

## 2022-05-18 PROCEDURE — 250N000011 HC RX IP 250 OP 636: Performed by: OBSTETRICS & GYNECOLOGY

## 2022-05-18 PROCEDURE — 250N000013 HC RX MED GY IP 250 OP 250 PS 637: Performed by: OBSTETRICS & GYNECOLOGY

## 2022-05-18 PROCEDURE — 36415 COLL VENOUS BLD VENIPUNCTURE: CPT | Performed by: OBSTETRICS & GYNECOLOGY

## 2022-05-18 PROCEDURE — 258N000003 HC RX IP 258 OP 636: Performed by: OBSTETRICS & GYNECOLOGY

## 2022-05-18 RX ORDER — IBUPROFEN 600 MG/1
600 TABLET, FILM COATED ORAL EVERY 6 HOURS PRN
COMMUNITY
Start: 2022-05-18 | End: 2024-04-22

## 2022-05-18 RX ORDER — DOXYCYCLINE 100 MG/1
100 CAPSULE ORAL EVERY 12 HOURS
Qty: 23 CAPSULE | Refills: 0 | Status: SHIPPED | OUTPATIENT
Start: 2022-05-18 | End: 2022-05-30

## 2022-05-18 RX ORDER — ACETAMINOPHEN 325 MG/1
975 TABLET ORAL EVERY 6 HOURS PRN
COMMUNITY
Start: 2022-05-18 | End: 2024-04-22

## 2022-05-18 RX ORDER — METRONIDAZOLE 500 MG/1
500 TABLET ORAL 2 TIMES DAILY
Qty: 23 TABLET | Refills: 0 | Status: SHIPPED | OUTPATIENT
Start: 2022-05-18 | End: 2023-06-22

## 2022-05-18 RX ADMIN — SODIUM CHLORIDE, POTASSIUM CHLORIDE, SODIUM LACTATE AND CALCIUM CHLORIDE: 600; 310; 30; 20 INJECTION, SOLUTION INTRAVENOUS at 01:18

## 2022-05-18 RX ADMIN — ACETAMINOPHEN 975 MG: 325 TABLET, FILM COATED ORAL at 00:14

## 2022-05-18 RX ADMIN — IBUPROFEN 600 MG: 600 TABLET ORAL at 09:53

## 2022-05-18 RX ADMIN — DOXYCYCLINE HYCLATE 100 MG: 100 CAPSULE ORAL at 08:23

## 2022-05-18 RX ADMIN — ACETAMINOPHEN 975 MG: 325 TABLET, FILM COATED ORAL at 08:23

## 2022-05-18 RX ADMIN — CEFOXITIN SODIUM 2 G: 2 POWDER, FOR SOLUTION INTRAVENOUS at 00:10

## 2022-05-18 RX ADMIN — METRONIDAZOLE 500 MG: 500 TABLET ORAL at 08:23

## 2022-05-18 RX ADMIN — Medication 1 CAPSULE: at 11:58

## 2022-05-18 RX ADMIN — CEFOXITIN SODIUM 2 G: 2 POWDER, FOR SOLUTION INTRAVENOUS at 06:23

## 2022-05-18 ASSESSMENT — ACTIVITIES OF DAILY LIVING (ADL)
ADLS_ACUITY_SCORE: 22

## 2022-05-18 NOTE — PLAN OF CARE
Goal Outcome Evaluation:    Plan of Care Reviewed With: patient, spouse     Vital Signs: VSS. Afebrile.  Pain/Comfort: Continues to have lower abdominal pain, worsening with diarrhea. Taking tylenol and motrin.  Assessment: WDL  Diet: Regular diet  Output: Voiding. Loose stool x1  Activity/Ambulation: Up in room independently  Social:  at bedside  Plan: Discharge instructions given. Home meds given and explained. Questions answered. Discharge home with .

## 2022-05-18 NOTE — PROGRESS NOTES
"GYN Progress Note  5/18/2022  8:23 AM    S:  Slightly more tender this AM, has not had tylenol yet. Feels like she might have nitesh too active yesterday. Started having diarrhea overnight. Still tolerating PO. Ambulating.  No issues with urination.      O:  /80 (BP Location: Right arm)   Pulse 87   Temp 98.5  F (36.9  C) (Oral)   Resp 16   Ht 1.575 m (5' 2\")   Wt 83.4 kg (183 lb 12.8 oz)   LMP 04/25/2022 (Exact Date)   SpO2 99%   BMI 33.62 kg/m    Gen - comfortable, NAD  Abd - tender RLQ, soft, no rebound or guarding  Ext - NT, no edema    Labs: WBC 8  Hgb 9.6  Blood cultures neg to date  UC no growth  GC/Chlam neg  covid neg    A/P:  37 yo HD#3 admitted with RLQ pain, concern for TOA based on complex area in RLQ, normal appearing appendix on CT.    PID/TOA:  - More uncomfortable this AM, remains afebrile, +diarrhea.Given tylenol and will recheck after breakfast.   - Planning to discharge home today, will continue oral Doxycycline and oral Flagyl on discharge to complete 14 days.  -GC and Chlamydia PCR neg  -blood cultures pending, neg to date  -WBC now normal, last follow up pending  -afebrile, continue to monitor  -recommend follow up pelvic ultrasound 2-3 weeks after discharge for follow up of the right adnexal cystic mass and mild hydrosalpinx.      -   UC negative     Pain;  - managed with tylenol, ibuprofen.  Has oxycodone and dilaudid available PRN but has not needed     FEN/GI:   -general diet as tolerated, zofran and pepsid prn  - diarrhea overnight, started on probiotic  - ml/hr    - Heme:   Anemia, Hgb 9.6. Recommended daily iron      CV:   -mild range blood pressures on admission, likely related to pain response. Will continue to monitor.     DVT PPX: ambulation      The plan of care was discussed with the patient who expressed understanding and agreement with the plan of care.     Latonya Perez MD      "

## 2022-05-18 NOTE — PLAN OF CARE
Goal Outcome Evaluation:        Vital Signs: WNL. Patient afebrile.   Pain/Comfort: patient rating pain as a 4-6/10. Heat applied with minimal relief. PRN Tylenol given per MAR. Patient stating some relief. Patient stating no additional interventions needed at this time. Patient encouraged to call RN if patient starts having increased pain. Patient stated an understanding.   Assessment: WNL. PIV site c/d/i with IVF infusing per MAR.   Diet: patient tolerating PO intake. Fluids encouraged.   Output: patient voiding independently.   Activity/Ambulation: patient up independently in room.   Social: patient calm and cooperative.   Plan: pain control. Maintain PIV. IVF. IV ABX. Monitor I&O. Possible discharge in the morning.

## 2022-05-21 LAB
BACTERIA BLD CULT: NO GROWTH
BACTERIA BLD CULT: NO GROWTH

## 2022-05-25 NOTE — DISCHARGE SUMMARY
Gynecology Discharge Summary    Dot Ramirez    YOB: 1985  MRN# [id number]   Age: 36 year old         Date of Admission:  5/15/2022  Date of Discharge:  5/18/2022  Admitting Physician:  Donna Mota MD  Discharge Physician:  Latonya Perez MD  Discharging Service:  Gynecology     Primary Provider: No Ref-Primary, Physician          Admission Diagnoses:   Abdominal Pain  Complex right adnexal cyst          Discharge Diagnosis:   Same  Possible PID         Discharge Disposition:   Discharged to home           Procedures:   No procedures performed during this admission          Medications Prior to Admission:     No medications prior to admission.             Discharge Medications:     Discharge Medication List as of 5/18/2022 10:57 AM      START taking these medications    Details   !! acetaminophen (TYLENOL) 325 MG tablet Take 3 tablets (975 mg) by mouth every 6 hours as needed for mild pain or fever, OTC      doxycycline hyclate (VIBRAMYCIN) 100 MG capsule Take 1 capsule (100 mg) by mouth every 12 hours for 12 days, Disp-23 capsule, R-0, E-Prescribe      !! ibuprofen (ADVIL/MOTRIN) 600 MG tablet Take 1 tablet (600 mg) by mouth every 6 hours as needed for moderate pain, OTC      metroNIDAZOLE (FLAGYL) 500 MG tablet Take 1 tablet (500 mg) by mouth 2 times daily, Disp-23 tablet, R-0, E-Prescribe       !! - Potential duplicate medications found. Please discuss with provider.      CONTINUE these medications which have NOT CHANGED    Details   !! acetaminophen (TYLENOL) 325 MG tablet Take 650 mg by mouth every 6 hours as needed for mild pain, Historical      !! ibuprofen (ADVIL/MOTRIN) 200 MG tablet Take 400 mg by mouth every 8 hours as needed for mild pain, Historical      multivitamin w/minerals (MULTI-VITAMIN) tablet Take 1 tablet by mouth daily, Historical       !! - Potential duplicate medications found. Please discuss with provider.                Consultations:   No consultations were  requested during this admission             Brief History of Illness:   35 yo F who presented to the ER with abdominal pain.  Worsening in the RLQ for the 24 hours prior to admission.  Constant aching noted. Findings were notable for complex right ovarian cyst measuring 2.3 cm with trace hydrosalpinx.  No appendicitis on CT. WBC was mildly elevated at 14.6.          Hospital Course:   She was admitted to the hospital for pain control and IV antibiotics for presumed PID/possible TOA.  She received Cefoxitin, doxycycline and flagyl. After 48 hours she was noted to have improved pain control and WBC was normalized. She remained afebrile throughout her stay.  She was discharged home to complete 14 days of doxycycline and flagyl. She was instructed to follow up in 2-3 weeks for a repeat ultrasound.          Discharge Instructions and Follow-Up:   Discharge diet: Regular   Discharge activity: Activity as tolerated   Discharge follow-up: Follow up with GYN in 2-3 weeks   Other instructions: None      Latonya Perez MD  12:51 PM 5/25/2022

## 2022-06-13 ENCOUNTER — HOSPITAL ENCOUNTER (EMERGENCY)
Facility: CLINIC | Age: 37
Discharge: HOME | End: 2022-06-13
Attending: EMERGENCY MEDICINE | Admitting: EMERGENCY MEDICINE
Payer: COMMERCIAL

## 2022-06-13 ENCOUNTER — APPOINTMENT (OUTPATIENT)
Dept: CT IMAGING | Facility: CLINIC | Age: 37
End: 2022-06-13
Attending: EMERGENCY MEDICINE
Payer: COMMERCIAL

## 2022-06-13 VITALS
TEMPERATURE: 98.2 F | DIASTOLIC BLOOD PRESSURE: 82 MMHG | SYSTOLIC BLOOD PRESSURE: 116 MMHG | OXYGEN SATURATION: 97 % | HEART RATE: 86 BPM | RESPIRATION RATE: 20 BRPM

## 2022-06-13 DIAGNOSIS — R10.9 FLANK PAIN: ICD-10-CM

## 2022-06-13 DIAGNOSIS — R10.9 ABDOMINAL PAIN OF UNKNOWN CAUSE: ICD-10-CM

## 2022-06-13 LAB
ALBUMIN UR-MCNC: NEGATIVE MG/DL
ANION GAP SERPL CALCULATED.3IONS-SCNC: 1 MMOL/L (ref 3–14)
APPEARANCE UR: CLEAR
BACTERIA #/AREA URNS HPF: ABNORMAL /HPF
BASOPHILS # BLD AUTO: 0 10E3/UL (ref 0–0.2)
BASOPHILS NFR BLD AUTO: 0 %
BILIRUB UR QL STRIP: NEGATIVE
BUN SERPL-MCNC: 7 MG/DL (ref 7–30)
CALCIUM SERPL-MCNC: 8.9 MG/DL (ref 8.5–10.1)
CHLORIDE BLD-SCNC: 106 MMOL/L (ref 94–109)
CO2 SERPL-SCNC: 29 MMOL/L (ref 20–32)
COLOR UR AUTO: ABNORMAL
CREAT SERPL-MCNC: 0.61 MG/DL (ref 0.52–1.04)
EOSINOPHIL # BLD AUTO: 0.3 10E3/UL (ref 0–0.7)
EOSINOPHIL NFR BLD AUTO: 3 %
ERYTHROCYTE [DISTWIDTH] IN BLOOD BY AUTOMATED COUNT: 15.5 % (ref 10–15)
GFR SERPL CREATININE-BSD FRML MDRD: >90 ML/MIN/1.73M2
GLUCOSE BLD-MCNC: 101 MG/DL (ref 70–99)
GLUCOSE UR STRIP-MCNC: NEGATIVE MG/DL
HCG SER QL IA.RAPID: NEGATIVE
HCT VFR BLD AUTO: 40.1 % (ref 35–47)
HGB BLD-MCNC: 12.4 G/DL (ref 11.7–15.7)
HGB UR QL STRIP: NEGATIVE
IMM GRANULOCYTES # BLD: 0.1 10E3/UL
IMM GRANULOCYTES NFR BLD: 1 %
KETONES UR STRIP-MCNC: NEGATIVE MG/DL
LEUKOCYTE ESTERASE UR QL STRIP: NEGATIVE
LYMPHOCYTES # BLD AUTO: 2.9 10E3/UL (ref 0.8–5.3)
LYMPHOCYTES NFR BLD AUTO: 26 %
MCH RBC QN AUTO: 25.1 PG (ref 26.5–33)
MCHC RBC AUTO-ENTMCNC: 30.9 G/DL (ref 31.5–36.5)
MCV RBC AUTO: 81 FL (ref 78–100)
MONOCYTES # BLD AUTO: 0.7 10E3/UL (ref 0–1.3)
MONOCYTES NFR BLD AUTO: 6 %
NEUTROPHILS # BLD AUTO: 7.2 10E3/UL (ref 1.6–8.3)
NEUTROPHILS NFR BLD AUTO: 64 %
NITRATE UR QL: NEGATIVE
NRBC # BLD AUTO: 0 10E3/UL
NRBC BLD AUTO-RTO: 0 /100
PH UR STRIP: 6 [PH] (ref 5–7)
PLATELET # BLD AUTO: 362 10E3/UL (ref 150–450)
POTASSIUM BLD-SCNC: 3.9 MMOL/L (ref 3.4–5.3)
RBC # BLD AUTO: 4.94 10E6/UL (ref 3.8–5.2)
RBC URINE: 1 /HPF
SODIUM SERPL-SCNC: 136 MMOL/L (ref 133–144)
SP GR UR STRIP: 1 (ref 1–1.03)
SQUAMOUS EPITHELIAL: <1 /HPF
UROBILINOGEN UR STRIP-MCNC: NORMAL MG/DL
WBC # BLD AUTO: 11.2 10E3/UL (ref 4–11)
WBC URINE: <1 /HPF

## 2022-06-13 PROCEDURE — 81001 URINALYSIS AUTO W/SCOPE: CPT | Performed by: EMERGENCY MEDICINE

## 2022-06-13 PROCEDURE — 85014 HEMATOCRIT: CPT | Performed by: EMERGENCY MEDICINE

## 2022-06-13 PROCEDURE — 36415 COLL VENOUS BLD VENIPUNCTURE: CPT | Performed by: EMERGENCY MEDICINE

## 2022-06-13 PROCEDURE — 250N000009 HC RX 250: Performed by: EMERGENCY MEDICINE

## 2022-06-13 PROCEDURE — 80048 BASIC METABOLIC PNL TOTAL CA: CPT | Performed by: EMERGENCY MEDICINE

## 2022-06-13 PROCEDURE — 84702 CHORIONIC GONADOTROPIN TEST: CPT

## 2022-06-13 PROCEDURE — 250N000011 HC RX IP 250 OP 636: Performed by: EMERGENCY MEDICINE

## 2022-06-13 PROCEDURE — 74177 CT ABD & PELVIS W/CONTRAST: CPT

## 2022-06-13 PROCEDURE — 99285 EMERGENCY DEPT VISIT HI MDM: CPT | Mod: 25

## 2022-06-13 RX ORDER — IOPAMIDOL 755 MG/ML
500 INJECTION, SOLUTION INTRAVASCULAR ONCE
Status: COMPLETED | OUTPATIENT
Start: 2022-06-13 | End: 2022-06-13

## 2022-06-13 RX ADMIN — SODIUM CHLORIDE 61 ML: 9 INJECTION, SOLUTION INTRAVENOUS at 18:35

## 2022-06-13 RX ADMIN — IOPAMIDOL 84 ML: 755 INJECTION, SOLUTION INTRAVENOUS at 18:35

## 2022-06-13 ASSESSMENT — ENCOUNTER SYMPTOMS
DYSURIA: 0
FREQUENCY: 0
DIFFICULTY URINATING: 0
HEMATURIA: 0
FEVER: 0
VOMITING: 0
BACK PAIN: 1
CHILLS: 0
NAUSEA: 0
ABDOMINAL PAIN: 1
SHORTNESS OF BREATH: 0
FLANK PAIN: 1

## 2022-06-13 NOTE — ED TRIAGE NOTES
Cyst in right ovary. Was admitted for it about a month ago. Now having left sided abdominal/pelvic pain. Denies N/V/D.

## 2022-06-13 NOTE — ED PROVIDER NOTES
History   Chief Complaint:  Pelvic  and Back Pain       HPI   Dot Ramirez is a 36 year old female who presents with pelvic pain on her left side that began two nights ago as she was laying in bed. She was not exerting herself during the onset of the pain. She describes the pain as a  severe pressure that shoots down her left leg and radiates to her left lower back. History of ovarian cysts on the right side diagnosed by a pelvic ultrasound (see results below). The patient was admitted for this secondary to concern for tubo-ovarian abscess. She had a recent CT done on 6/3 which reportedly showed her known right ovarian cyst but resolved infection. She notes the current pain is similar to the pain she experienced when having this cyst. She still experiences some pain on her right side and at times she experiences pain in both sides at once. Denies nausea, vomiting, fever, chills, shortness of breath, and chest pain. She has no recent urinary changes or vaginal bleeding and discharge.  No history of kidney stones or recent back injury. No possibility of pregnancy.       Review of Systems   Constitutional: Negative for chills and fever.   Respiratory: Negative for shortness of breath.    Cardiovascular: Negative for chest pain.   Gastrointestinal: Positive for abdominal pain. Negative for nausea and vomiting.   Genitourinary: Positive for flank pain. Negative for decreased urine volume, difficulty urinating, dysuria, frequency, hematuria, urgency, vaginal bleeding and vaginal discharge.   Musculoskeletal: Positive for back pain.   All other systems reviewed and are negative.    Allergies:  No Known Allergies    Medications:  The patient is not currently taking any prescribed medications.    Past Medical History:     Generalized anxiety disorder  Restless leg syndrome  Depression  Acute pelvic inflammatory disease  Tubo-ovarian abscess      Past Surgical History:    [place] cervical x2   section  x2  Cystectomy of the hand  Excise finger mass, left ring finger  Dental extractions     Family History:    Father: Charcot Rosemary Tooth, prostate cancer  Mother: Diabetes    Social History:  Patient presents to the ED alone.    with two children  Never smoker    Physical Exam     Patient Vitals for the past 24 hrs:   BP Temp Temp src Pulse Resp SpO2   06/13/22 1935 116/82 -- -- 86 -- 97 %   06/13/22 1815 -- -- -- -- -- 96 %   06/13/22 1800 125/88 -- -- 100 -- 97 %   06/13/22 1727 (!) 168/104 98.2  F (36.8  C) Temporal 87 20 100 %       Physical Exam  General: Adult female sitting upright  Eyes: PERRL, Conjunctive within normal limits.  No scleral icterus.  ENT: Moist mucous membranes, oropharynx clear.   CV: Normal S1S2, no murmur, rub or gallop. Regular rate and rhythm  Resp: Clear to auscultation bilaterally, no wheezes, rales or rhonchi. Normal respiratory effort.  GI: Abdomen is soft and nondistended.  Diffuse lower abdominal tenderness to palpation.  Referred pain to the right lower quadrant with palpation of the right upper quadrant.  No palpable masses. No rebound or guarding.  No CVA tenderness to percussion.  MSK: No significant back tenderness to palpation.  No edema. Normal active range of motion.  Skin: Warm and dry. No rashes or lesions or ecchymoses on visible skin.  Neuro: Alert and oriented. Responds appropriately to all questions and commands. No focal findings appreciated. Normal muscle tone.  Psych: Normal mood and affect. Pleasant.    Emergency Department Course     Imaging:  CT Abdomen Pelvis w Contrast   Final Result   IMPRESSION:       1.  Resolution of the inflammatory stranding and complex right adnexal cyst present 05/15/2022. Both adnexa now have normal CT appearance.   2.  No focal inflammatory process in the abdomen or pelvis.        Report per radiology    Laboratory:  Labs Ordered and Resulted from Time of ED Arrival to Time of ED Departure   BASIC METABOLIC PANEL - Abnormal        Result Value    Sodium 136      Potassium 3.9      Chloride 106      Carbon Dioxide (CO2) 29      Anion Gap 1 (*)     Urea Nitrogen 7      Creatinine 0.61      Calcium 8.9      Glucose 101 (*)     GFR Estimate >90     ROUTINE UA WITH MICROSCOPIC REFLEX TO CULTURE - Abnormal    Color Urine Straw      Appearance Urine Clear      Glucose Urine Negative      Bilirubin Urine Negative      Ketones Urine Negative      Specific Gravity Urine 1.005      Blood Urine Negative      pH Urine 6.0      Protein Albumin Urine Negative      Urobilinogen Urine Normal      Nitrite Urine Negative      Leukocyte Esterase Urine Negative      Bacteria Urine Few (*)     RBC Urine 1      WBC Urine <1      Squamous Epithelials Urine <1     CBC WITH PLATELETS AND DIFFERENTIAL - Abnormal    WBC Count 11.2 (*)     RBC Count 4.94      Hemoglobin 12.4      Hematocrit 40.1      MCV 81      MCH 25.1 (*)     MCHC 30.9 (*)     RDW 15.5 (*)     Platelet Count 362      % Neutrophils 64      % Lymphocytes 26      % Monocytes 6      % Eosinophils 3      % Basophils 0      % Immature Granulocytes 1      NRBCs per 100 WBC 0      Absolute Neutrophils 7.2      Absolute Lymphocytes 2.9      Absolute Monocytes 0.7      Absolute Eosinophils 0.3      Absolute Basophils 0.0      Absolute Immature Granulocytes 0.1      Absolute NRBCs 0.0     ISTAT HCG QUALITATIVE PREGNANCY POCT - Normal    HCG Qualitative POCT Negative          Emergency Department Course:         Reviewed:  I reviewed nursing notes, vitals, past medical history and Care Everywhere    Assessments:  1800 I obtained history and examined the patient as noted above.  The patient declines pain medications.    1924 I rechecked the patient and explained findings.  I discussed plan.  She feels comfortable with the plan.  All questions were answered prior to discharge.    Disposition:  The patient was discharged to home.     Impression & Plan     Medical Decision Making:  Dot Ramirez is a 36 year  old female who presents with abdominal pain referring to her back  on her left side.  She looks overall well and without a concerning etiology of abdominal pain.  Her recent admission for right TOA was reviewed as well as recent imaging.  A broad differential diagnosis was of course considered including incomplete resolution of past problem with spread.  Musculoskeletal cause with referred pain to the abdomen was also considered.  Despite thorough evaluation, the workup in the ED is at this point negative.  No etiology for the patients pain is found at this point and my suspicion of an intraabdominal catastrophe or other worrisome etiology is very low. CT and lab work are reassuring with slight leukocytosis discussed.  She is afebrile nontoxic in appearance.  She has not required any pain medications here.  Do not believe at this time the patient quires admission or further emergent evaluation.  She has an appointment tomorrow with her gynecologist.  Patient is hemodynamically stable in ED. she understands that as the cause of her symptoms is not clear she should return immediately to the emergency department worsening.  Return for fevers greater than 102, increasing pain, other new symptoms develop.  Abdominal pain handout given.  Questions were answered.  She felt comfortable this plan was discharged home in stable condition.      Diagnosis:    ICD-10-CM    1. Abdominal pain of unknown cause  R10.9    2. Flank pain  R10.9        Scribe Disclosure:  I, Tomasa Main, am serving as a scribe at 5:44 PM on 6/13/2022 to document services personally performed by Aixa Kim MD based on my observations and the provider's statements to me.     I, David Patel, am serving as a scribe  at 7:46 PM on 6/13/2022 to document services personally performed by Aixa Kim MD based on my observations and the provider's statements to me.              Aixa Kim MD  06/13/22 1958

## 2022-09-21 ENCOUNTER — LAB REQUISITION (OUTPATIENT)
Dept: LAB | Facility: CLINIC | Age: 37
End: 2022-09-21
Payer: COMMERCIAL

## 2022-09-21 DIAGNOSIS — R10.2 PELVIC AND PERINEAL PAIN: ICD-10-CM

## 2022-09-21 PROCEDURE — 87491 CHLMYD TRACH DNA AMP PROBE: CPT | Mod: ORL | Performed by: OBSTETRICS & GYNECOLOGY

## 2022-09-22 LAB
C TRACH DNA SPEC QL PROBE+SIG AMP: NEGATIVE
N GONORRHOEA DNA SPEC QL NAA+PROBE: NEGATIVE

## 2022-10-22 ENCOUNTER — HEALTH MAINTENANCE LETTER (OUTPATIENT)
Age: 37
End: 2022-10-22

## 2023-04-01 ENCOUNTER — HEALTH MAINTENANCE LETTER (OUTPATIENT)
Age: 38
End: 2023-04-01

## 2023-05-18 ENCOUNTER — TELEPHONE (OUTPATIENT)
Dept: FAMILY MEDICINE | Facility: CLINIC | Age: 38
End: 2023-05-18

## 2023-05-18 NOTE — TELEPHONE ENCOUNTER
Summary:    Patient is due/failing the following:   PHQ9    Reviewed:    [] CARE EVERYWHERE  [] LAST OV NOTE   [] FYI TAB  [] MYCHART ACTIVE?  [] LAST PANEL ENCOUNTER  [] FUTURE APPTS  [] IMMUNIZATIONS  [] Media Tab            Action needed:   Patient needs to do PHQ9.    Type of outreach:    Sent MyChart message.                                                                               Kira Viera/SOLEDAD  [place]---[facility]

## 2023-06-20 ENCOUNTER — TELEPHONE (OUTPATIENT)
Dept: OTHER | Facility: CLINIC | Age: 38
End: 2023-06-20

## 2023-06-20 ENCOUNTER — OFFICE VISIT (OUTPATIENT)
Dept: URGENT CARE | Facility: URGENT CARE | Age: 38
End: 2023-06-20
Payer: COMMERCIAL

## 2023-06-20 VITALS
SYSTOLIC BLOOD PRESSURE: 140 MMHG | RESPIRATION RATE: 16 BRPM | OXYGEN SATURATION: 100 % | HEART RATE: 91 BPM | DIASTOLIC BLOOD PRESSURE: 98 MMHG | TEMPERATURE: 97.7 F

## 2023-06-20 DIAGNOSIS — M26.69 JAW CLAUDICATION: ICD-10-CM

## 2023-06-20 DIAGNOSIS — R51.9 TEMPORAL PAIN: Primary | ICD-10-CM

## 2023-06-20 LAB
ALBUMIN SERPL BCG-MCNC: 3.9 G/DL (ref 3.5–5.2)
ALP SERPL-CCNC: 84 U/L (ref 35–104)
ALT SERPL W P-5'-P-CCNC: 12 U/L (ref 0–50)
ANION GAP SERPL CALCULATED.3IONS-SCNC: 11 MMOL/L (ref 7–15)
AST SERPL W P-5'-P-CCNC: 20 U/L (ref 0–45)
BASOPHILS # BLD AUTO: 0 10E3/UL (ref 0–0.2)
BASOPHILS NFR BLD AUTO: 0 %
BILIRUB SERPL-MCNC: 0.3 MG/DL
BUN SERPL-MCNC: 8.7 MG/DL (ref 6–20)
CALCIUM SERPL-MCNC: 9.2 MG/DL (ref 8.6–10)
CHLORIDE SERPL-SCNC: 102 MMOL/L (ref 98–107)
CREAT SERPL-MCNC: 0.7 MG/DL (ref 0.51–0.95)
CRP SERPL-MCNC: 9.63 MG/L
DEPRECATED HCO3 PLAS-SCNC: 25 MMOL/L (ref 22–29)
EOSINOPHIL # BLD AUTO: 0.1 10E3/UL (ref 0–0.7)
EOSINOPHIL NFR BLD AUTO: 1 %
ERYTHROCYTE [DISTWIDTH] IN BLOOD BY AUTOMATED COUNT: 13.3 % (ref 10–15)
ERYTHROCYTE [SEDIMENTATION RATE] IN BLOOD BY WESTERGREN METHOD: 15 MM/HR (ref 0–20)
GFR SERPL CREATININE-BSD FRML MDRD: >90 ML/MIN/1.73M2
GLUCOSE SERPL-MCNC: 102 MG/DL (ref 70–99)
HCT VFR BLD AUTO: 39.1 % (ref 35–47)
HGB BLD-MCNC: 13.4 G/DL (ref 11.7–15.7)
IMM GRANULOCYTES # BLD: 0 10E3/UL
IMM GRANULOCYTES NFR BLD: 0 %
LYMPHOCYTES # BLD AUTO: 2.1 10E3/UL (ref 0.8–5.3)
LYMPHOCYTES NFR BLD AUTO: 17 %
MCH RBC QN AUTO: 28.1 PG (ref 26.5–33)
MCHC RBC AUTO-ENTMCNC: 34.3 G/DL (ref 31.5–36.5)
MCV RBC AUTO: 82 FL (ref 78–100)
MONOCYTES # BLD AUTO: 0.5 10E3/UL (ref 0–1.3)
MONOCYTES NFR BLD AUTO: 4 %
NEUTROPHILS # BLD AUTO: 10 10E3/UL (ref 1.6–8.3)
NEUTROPHILS NFR BLD AUTO: 78 %
PLATELET # BLD AUTO: 398 10E3/UL (ref 150–450)
POTASSIUM SERPL-SCNC: 4.9 MMOL/L (ref 3.4–5.3)
PROT SERPL-MCNC: 7.1 G/DL (ref 6.4–8.3)
RBC # BLD AUTO: 4.77 10E6/UL (ref 3.8–5.2)
SODIUM SERPL-SCNC: 138 MMOL/L (ref 136–145)
WBC # BLD AUTO: 12.8 10E3/UL (ref 4–11)

## 2023-06-20 PROCEDURE — 99215 OFFICE O/P EST HI 40 MIN: CPT | Performed by: PHYSICIAN ASSISTANT

## 2023-06-20 PROCEDURE — 85025 COMPLETE CBC W/AUTO DIFF WBC: CPT | Performed by: PHYSICIAN ASSISTANT

## 2023-06-20 PROCEDURE — 36415 COLL VENOUS BLD VENIPUNCTURE: CPT | Performed by: PHYSICIAN ASSISTANT

## 2023-06-20 PROCEDURE — 80053 COMPREHEN METABOLIC PANEL: CPT | Performed by: PHYSICIAN ASSISTANT

## 2023-06-20 PROCEDURE — 85652 RBC SED RATE AUTOMATED: CPT | Performed by: PHYSICIAN ASSISTANT

## 2023-06-20 PROCEDURE — 86140 C-REACTIVE PROTEIN: CPT | Performed by: PHYSICIAN ASSISTANT

## 2023-06-20 RX ORDER — NORGESTIMATE AND ETHINYL ESTRADIOL 0.25-0.035
KIT ORAL
COMMUNITY
Start: 2023-06-19 | End: 2024-01-09

## 2023-06-20 RX ORDER — PREDNISONE 20 MG/1
60 TABLET ORAL DAILY
Qty: 42 TABLET | Refills: 0 | Status: SHIPPED | OUTPATIENT
Start: 2023-06-20 | End: 2023-07-05

## 2023-06-20 NOTE — TELEPHONE ENCOUNTER
Future Appointments   Date Time Provider Department Center   6/22/2023  9:30 AM Juno Kruger MD [facility]

## 2023-06-20 NOTE — PROGRESS NOTES
Assessment/Plan:    Unremarkable neuro exam.  No evidence of infectious process currently- no swelling, erythema, pt is afebrile. She does have tenderness over temporal arteries. Between temporal pain/tenderness, jaw claudication/pain, and fatigue- symptoms are highly suspicious for temporal arteritis. ESR normal but CRP elevated. Pt has mild leukocytosis (suspect due to inflammation rather than infectious process). CMP normal.   Will start pt on high dose prednisone and placed urgent referral to vascular medicine for consideration of temporal artery biopsy. Advised she f/u with PCP for recheck if unable to get in with vascular medicine in the next 1-2 weeks, and go to ER for any worsening symptoms.    See patient instructions below.    At the end of the encounter, I discussed results, diagnosis, medications. Discussed red flags for immediate return to clinic/ER, as well as indications for follow up if no improvement. Patient understood and agreed to plan. Patient was stable for discharge.    40 minutes was spent preparing for the visit and reviewing the patient's chart; getting a history and performing an exam; counseling and providing education to the patient, family, or caregiver; ordering medicines, tests, or procedures; communicating with other healthcare professionals; documenting information in the medical record; interpreting results and sharing that information with the patient, family, or caregiver; and care coordination.      ICD-10-CM    1. Temporal pain  R51.9 ESR: Erythrocyte sedimentation rate     CRP, inflammation     CBC with platelets and differential     Comprehensive metabolic panel (BMP + Alb, Alk Phos, ALT, AST, Total. Bili, TP)     Vascular Medicine Referral     ESR: Erythrocyte sedimentation rate     CRP, inflammation     CBC with platelets and differential     Comprehensive metabolic panel (BMP + Alb, Alk Phos, ALT, AST, Total. Bili, TP)     predniSONE (DELTASONE) 20 MG tablet      2. Jaw  "claudication  M26.69 Vascular Medicine Referral     predniSONE (DELTASONE) 20 MG tablet            Return in about 1 week (around 6/27/2023) for follow up with vascular medicine (or primary care if there is a delay getting in with vascular med).    HUI Haddad, [place]  [facility]  -----------------------------------------------------------------------------------------------------------------------------------------------------    HPI:  Dot Ramirez is a 37 year old female who presents for evaluation of intermittent temporal pain and jaw pain/claudication 6 days ago. She notes that 6 days ago she woke up in the middle of the night with severe pain in the R temporal region that felt like someone was \"slicing her open and she was bleeding\". This went away after about 1 day, then 2 days later she had bilateral jaw swelling, pain, and difficulty opening her mouth for a few hours. Those symptoms then resolved. She reports the jaw pain went from the preauricular area down the entire side of her jaw and into her neck bilaterally. No erythema or gum swelling/pain, purulent discharge in the mouth, or pain in the ears themselves. Today, she woke up with pain in the L temporal region that felt just like the pain she had on the R side 6 days ago. It is still present now but improved. On the first day of symptoms she did briefly feel like she had blurry vision in both eyes (difficulty reading far away text), but denies any loss of vision and currently her vision is normal. She also reports fatigue during the last 6 days.  No hx of similar symptoms previously, no hx of autoimmune disorders.  Patient reports no fever/chills, arthralgias, joint swelling, numbness/tingling, weakness, speech changes, facial droop, chest pain, shortness of breath,  nausea, vomiting,rash, or any other symptoms.     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   HENT:      Head:      Jaw: There is normal jaw occlusion. No trismus, tenderness or swelling.      Comments: Mild tenderness over temporal arteries bilaterally but no pulsating, erythema, or rashes     Right Ear: Tympanic membrane and external ear normal.      Left Ear: Tympanic membrane and external ear normal.      Mouth/Throat:      Mouth: Mucous membranes are moist.      Pharynx: Oropharynx is clear.   Cardiovascular:      Rate and Rhythm: Normal rate and regular rhythm.      Pulses:           Radial pulses are 2+ on the right side and 2+ on the left side.      Heart sounds: Normal heart sounds.   Pulmonary:      Effort: Pulmonary effort is normal.   Neurological:      Mental Status: She is alert.      GCS: GCS eye subscore is 4. GCS verbal subscore is 5. GCS motor subscore is 6.      Cranial Nerves: Cranial nerves 2-12 are intact.      Sensory: Sensation is intact.      Motor: Motor function is intact.      Coordination: Coordination is intact.      Gait: Gait is intact.         Labs/Imaging:  Results for orders placed or performed in visit on 06/20/23 (from the past 24 hour(s))   ESR: Erythrocyte sedimentation rate   Result Value Ref Range    Erythrocyte Sedimentation Rate 15 0 - 20 mm/hr   CRP, inflammation   Result Value Ref Range    CRP Inflammation 9.63 (H) <5.00 mg/L   CBC with platelets and differential    Narrative    The following orders were created for panel order CBC with platelets and differential.  Procedure                               Abnormality         Status                     ---------                               -----------         ------                     CBC with platelets and d...[[id number]]  Abnormal            Final result                  Please view results for these tests on the individual orders.   Comprehensive metabolic panel (BMP + Alb, Alk Phos, ALT, AST, Total. Bili, TP)   Result Value Ref Range    Sodium 138 136 - 145 mmol/L    Potassium 4.9 3.4 - 5.3 mmol/L    Chloride 102 98 - 107 mmol/L    Carbon Dioxide (CO2) 25 22 - 29 mmol/L    Anion Gap 11 7 - 15 mmol/L    Urea Nitrogen 8.7 6.0 - 20.0 mg/dL    Creatinine 0.70 0.51 - 0.95 mg/dL    Calcium 9.2 8.6 - 10.0 mg/dL    Glucose 102 (H) 70 - 99 mg/dL    Alkaline Phosphatase 84 35 - 104 U/L    AST 20 0 - 45 U/L    ALT 12 0 - 50 U/L    Protein Total 7.1 6.4 - 8.3 g/dL    Albumin 3.9 3.5 - 5.2 g/dL    Bilirubin Total 0.3 <=1.2 mg/dL    GFR Estimate >90 >60 mL/min/1.73m2   CBC with platelets and differential   Result Value Ref Range    WBC Count 12.8 (H) 4.0 - 11.0 10e3/uL    RBC Count 4.77 3.80 - 5.20 10e6/uL    Hemoglobin 13.4 11.7 - 15.7 g/dL    Hematocrit 39.1 35.0 - 47.0 %    MCV 82 78 - 100 fL    MCH 28.1 26.5 - 33.0 pg    MCHC 34.3 31.5 - 36.5 g/dL    RDW 13.3 10.0 - 15.0 %    Platelet Count 398 150 - 450 10e3/uL    % Neutrophils 78 %    % Lymphocytes 17 %    % Monocytes 4 %    % Eosinophils 1 %    % Basophils 0 %    % Immature Granulocytes 0 %    Absolute Neutrophils 10.0 (H) 1.6 - 8.3 10e3/uL    Absolute Lymphocytes 2.1 0.8 - 5.3 10e3/uL    Absolute Monocytes 0.5 0.0 - 1.3 10e3/uL    Absolute Eosinophils 0.1 0.0 - 0.7 10e3/uL    Absolute Basophils 0.0 0.0 - 0.2 10e3/uL    Absolute Immature Granulocytes 0.0 <=0.4 10e3/uL     No results found for this or any previous visit (from the past 24 hour(s)).        Patient Instructions   Go to ER for any visual changes, numbness, weakness, or severe headache.   If unable to see vascular medicine in next 1-2 weeks, follow up with primary care provider.

## 2023-06-20 NOTE — TELEPHONE ENCOUNTER
LM asking Dot to call to schedule.  I am holing a spot on Dr. Kruger's  for this patient on 06/22/23.  If patient does not call back today, delete hold.

## 2023-06-20 NOTE — TELEPHONE ENCOUNTER
Appt Note:  Referred to [facility] by Sheyla Howard PA-C for temporal pain, jaw claudication    Pt needs to be scheduled for telephone consult with Dr. Kruger or Dr. Kruger.  Will route to scheduling to coordinate an appointment ASAP.    AISSATOU StonerN, RN  [facility] [place]

## 2023-06-20 NOTE — PATIENT INSTRUCTIONS
Go to ER for any visual changes, numbness, weakness, or severe headache.   If unable to see vascular medicine in next 1-2 weeks, follow up with primary care provider.

## 2023-06-22 ENCOUNTER — VIRTUAL VISIT (OUTPATIENT)
Dept: OTHER | Facility: CLINIC | Age: 38
End: 2023-06-22
Attending: SURGERY
Payer: COMMERCIAL

## 2023-06-22 DIAGNOSIS — M31.6 TEMPORAL ARTERITIS (H): Primary | ICD-10-CM

## 2023-06-22 PROCEDURE — 99202 OFFICE O/P NEW SF 15 MIN: CPT | Mod: 93 | Performed by: SURGERY

## 2023-06-22 NOTE — PROGRESS NOTES
Dot is a 37 year old who is being evaluated via a billable telephone visit.      What phone number would you like to be contacted at? [phone]   How would you like to obtain your AVS? Krishan Villa MA

## 2023-06-22 NOTE — PROGRESS NOTES
[facility]    Dot Ramirez is referred to talk to me today on a telephone consult for jaw pain.  Patient was seen on 6/20/2023 by Sheyla Howard PA-C.  They have noticed tenderness over the temporal arteries.  This was also associated with jaw claudication and fatigue.  Concerned about temporal arteritis.      This began approximately 6 days previously and much more in the right side.  This improved after 1 day but then occurred 2 days later involving both sides.    6/20/2023 laboratory: ESR= 15   CRP= 9.63 (normal<5.0)   K= 4.9   SCr= 0.7L    WBC=12.8    She was started on prednisone 60 mg daily on 6/20/2023.    PMH: Medications: Prednisone, BCP   No underlying medical problems.    TELEPHONE CONSULT: I spoke  With Dot Ramirez on the phone this morning from our office after reviewing her physician notes.  Since being on prednisone the last several days she feels slightly better but still ongoing issues with soreness.  No visual changes at all.  No history of arthritis or other inflammatory conditions.    With these findings would recommend bilateral temporal artery excisional biopsies.  Discussed with her.  This will be performed under local anesthetic with sedation as an outpatient [facility] and will try to have this done this coming week.  She will continue on her prednisone that she is already initiated.    20 minutes with patient today including chart review.  Phone call completed at 0920 hrs.    Juno Kruger MD   This note was created using Dragon voice recognition software which may result in transcription errors.

## 2023-06-23 ENCOUNTER — TELEPHONE (OUTPATIENT)
Dept: OTHER | Facility: CLINIC | Age: 38
End: 2023-06-23

## 2023-06-23 NOTE — TELEPHONE ENCOUNTER
CASE REQUEST: BILATERAL TEMPORAL ARTERY BIOPSY    CASE ID: [id number]    Spoke with patient and she would like ASAP-no dates to avoid.

## 2023-06-26 NOTE — TELEPHONE ENCOUNTER
Spoke with Dot last Friday and told her I had her scheduled for 06/30/23 but I received a message from a patient that was debating canceling a surgery on 06/27/23.  I called Dot today and let her know that patient is going to keep their surgery time so she will keep her 06/30/23 surgery date.    Will send my chart message with instructions.

## 2023-06-29 ENCOUNTER — TELEPHONE (OUTPATIENT)
Dept: OTHER | Facility: CLINIC | Age: 38
End: 2023-06-29

## 2023-06-29 RX ORDER — CEFAZOLIN SODIUM/WATER 2 G/20 ML
2 SYRINGE (ML) INTRAVENOUS
Status: CANCELLED | OUTPATIENT
Start: 2023-06-29

## 2023-06-29 NOTE — TELEPHONE ENCOUNTER
[place] [facility]    I called Dot Ro Ashley about her bilateral Temporal artery biopsies tomorrow.    I left a message.       Juno Kruger MD

## 2023-06-30 ENCOUNTER — HOSPITAL ENCOUNTER (OUTPATIENT)
Facility: CLINIC | Age: 38
Discharge: HOME | End: 2023-06-30
Attending: SURGERY | Admitting: SURGERY
Payer: COMMERCIAL

## 2023-06-30 ENCOUNTER — APPOINTMENT (OUTPATIENT)
Dept: SURGERY | Facility: PHYSICIAN GROUP | Age: 38
End: 2023-06-30
Payer: COMMERCIAL

## 2023-06-30 VITALS
TEMPERATURE: 96.4 F | BODY MASS INDEX: 33.4 KG/M2 | HEIGHT: 63 IN | WEIGHT: 188.5 LBS | DIASTOLIC BLOOD PRESSURE: 94 MMHG | HEART RATE: 79 BPM | RESPIRATION RATE: 16 BRPM | SYSTOLIC BLOOD PRESSURE: 161 MMHG | OXYGEN SATURATION: 95 %

## 2023-06-30 DIAGNOSIS — G89.18 ACUTE POST-OPERATIVE PAIN: Primary | ICD-10-CM

## 2023-06-30 PROCEDURE — 272N000001 HC OR GENERAL SUPPLY STERILE: Performed by: SURGERY

## 2023-06-30 PROCEDURE — 360N000075 HC SURGERY LEVEL 2, PER MIN: Performed by: SURGERY

## 2023-06-30 PROCEDURE — 88305 TISSUE EXAM BY PATHOLOGIST: CPT | Mod: TC | Performed by: SURGERY

## 2023-06-30 PROCEDURE — 37609 LIGATION/BX TEMPORAL ARTERY: CPT | Mod: 50 | Performed by: SURGERY

## 2023-06-30 PROCEDURE — 999N000141 HC STATISTIC PRE-PROCEDURE NURSING ASSESSMENT: Performed by: SURGERY

## 2023-06-30 PROCEDURE — 250N000009 HC RX 250: Performed by: SURGERY

## 2023-06-30 PROCEDURE — 710N000012 HC RECOVERY PHASE 2, PER MINUTE: Performed by: SURGERY

## 2023-06-30 PROCEDURE — 88313 SPECIAL STAINS GROUP 2: CPT | Mod: 26 | Performed by: PATHOLOGY

## 2023-06-30 PROCEDURE — 88305 TISSUE EXAM BY PATHOLOGIST: CPT | Mod: 26 | Performed by: PATHOLOGY

## 2023-06-30 RX ORDER — HYDROCODONE BITARTRATE AND ACETAMINOPHEN 5; 325 MG/1; MG/1
1 TABLET ORAL
Status: CANCELLED | OUTPATIENT
Start: 2023-06-30

## 2023-06-30 RX ORDER — HYDROCODONE BITARTRATE AND ACETAMINOPHEN 5; 325 MG/1; MG/1
1-2 TABLET ORAL EVERY 4 HOURS PRN
Qty: 5 TABLET | Refills: 0 | Status: SHIPPED | OUTPATIENT
Start: 2023-06-30 | End: 2023-07-11

## 2023-06-30 RX ORDER — BUPIVACAINE HYDROCHLORIDE 5 MG/ML
INJECTION, SOLUTION PERINEURAL PRN
Status: DISCONTINUED | OUTPATIENT
Start: 2023-06-30 | End: 2023-06-30 | Stop reason: HOSPADM

## 2023-06-30 ASSESSMENT — ACTIVITIES OF DAILY LIVING (ADL)
ADLS_ACUITY_SCORE: 37
ADLS_ACUITY_SCORE: 37

## 2023-06-30 NOTE — DISCHARGE INSTRUCTIONS
[facility]  Discharge Instructions: Post-Operative Excision of Temporal Arteries    ACTIVITY  Take frequent, short walks and increase your activity gradually.    Avoid strenuous physical activity or heavy lifting greater than 15-20 lbs. for 1-2 weeks.  You may climb stairs.  You may drive without restrictions when you are not using any prescription pain medication and feel comfortable in a car.  You may return to work/school when you are comfortable without any prescription pain medication.    WOUND CARE  You have skin glue on your incisions.  It will peel up and fall off on its own but usually takes 10-14 days.  Do not pick at it.  Do not apply any lotions, creams, or ointments to your incision(s).  A ridge under your incision(s) is normal and will gradually resolve.    DIET  Start with liquids, then gradually resume your regular diet as tolerated.   Drink plenty of liquids to stay hydrated.    PAIN  Expect some tenderness and discomfort at the incision site(s).  Use the prescribed pain medication at your discretion.  Expect gradual resolution of your pain over several days.  You may take ibuprofen with food (unless you have been told not to) or acetaminophen/Tylenol instead of or in addition to your prescribed pain medication.  However, if you are taking Norco or Percocet, do not take any additional acetaminophen/Tylenol.  Do not drink alcohol or drive while you are taking pain medications.  You may apply ice to your incisions in 20 minute intervals as needed for the next 48 hours.  After that time, consider switching to heat if you prefer.    EXPECTATIONS  If you use the pain medication be aware that it may cause constipation.  Limit use when possible.  Take an over the counter or prescribed stool softener/stimulant, such as Colace or Senna, 1-2 times a day with plenty of water.  You may take a mild over the counter laxative, such as Miralax or a suppository, as needed.    You may  discontinue these medications once you are having regular bowel movements and/or are no longer taking your narcotic pain medication.    FOLLOW UP  Dr. Kruger will call you with the pathology results (usually in 3-4 business days).  Likely no follow up in clinic will be needed, however, please call us at [phone] and ask to speak with our nurse if any concerns.    We are located at [address] [place] [address].    CALL OUR OFFICE -[phone] IF YOU HAVE:   Chills or fever above 101 F.  Increased redness, warmth, or drainage at your incisions.  Significant bleeding.  Pain not relieved by your pain medication or rest.  Increasing pain after the first 48 hours.  Any other concerns or questions.             Revised December 2021

## 2023-06-30 NOTE — OP NOTE
Procedure Date: 06/30/2023    PREOPERATIVE DIAGNOSIS:  Presumptive giant cell arteritis.    POSTOPERATIVE DIAGNOSIS:  Presumptive giant cell arteritis.    PROCEDURES PERFORMED:    1.  Right temporal artery excisional biopsy.  2.  Left temporal artery excisional biopsy.    SURGEON:  Juno Kruger MD    ASSISTANT:  Giorgio Pozo PA-C.    ANESTHESIA:  Local.    PREOPERATIVE MEDICATIONS:  None.    INDICATIONS:  A 37-year-old patient has had bilateral temporal headaches.  Inflammatory markers are elevated.  She is presently on prednisone 60 mg with some improvement of her symptoms.  We requested to perform bilateral temporal artery biopsy that was discussed on a video visit and again this morning.  She comes to the operating room today under informed consent.    DESCRIPTION OF PROCEDURE:  The patient was brought to the operating room, placed supine in a sitting position.  Both sides burns were shaved and we could easily palpate the temporal artery pulse just anterior to the ear and this was marked.    LEFT TEMPORAL ARTERY BIOPSY:  The area was prepped with Betadine.  Sterile drapes were applied.  Timeout was called.  Lidocaine 1% was injected in field block fashion.  At 2.5 cm vertical incision was just made anterior to the upper portion of the ear.  Dissection was carried with electrocautery until we identified a very tortuous and small temporal artery.  This did not appear to be inflamed.  This was dissected free under loupe magnification proximally and distally, ligating 2 small side branch between 4-0 silk suture.  The artery was then ligated proximally and distally with 4-0 silk suture and transected and sent to pathology.  Wounds were infiltrated with 0.5% Marcaine.  Subcutaneous tissue was closed with interrupted 3-0 Vicryl and the skin with 5-0 Monocryl in subcuticular fashion followed by surgical adhesive.    RIGHT TEMPORAL ARTERY EXCISIONAL BIOPSY:  We then prepped the right side of the temporal area/ear.   Sterile drapes were reapplied to the site.  Again, 1% lidocaine was injected in field block fashion.  A similar vertical incision was made.  Dissection was carried down with electrocautery to identify this small tortuous, but clinically disease free temporal artery.  Several branches again divided between 4-0 silk suture.  Proximal and distal 4-0 silk sutures were placed and the specimen was excised and sent to pathology for permanent evaluation.  Wound was infiltrated with 0.5% Marcaine for post-analgesia, 3-0 Vicryl was used for the deep sutures and the skin with 5-0 Monocryl in a subcuticular fashion  followed by surgical adhesive.    The patient tolerated both procedures well.    ESTIMATED BLOOD LOSS:  Less than 2 mL.    COMPLICATIONS:  None.    The patient was discharged to home.  She will continue on her prednisone 60 mg daily.  We will call her with the pathology results in approximately 48 hours.    Juno Kruger MD        D: 2023   T: 2023   MT: eren    Name:     ALBERTO MOYA  MRN:      [id number]        Account:        [id number]   :      1985           Procedure Date: 2023     Document: [id number]

## 2023-06-30 NOTE — BRIEF OP NOTE
[facility]    Brief Operative Note    Pre-operative diagnosis: Temporal arteritis (H) [M31.6]  Post-operative diagnosis Possible Temporal Arteritis    Procedure: Procedure(s):  TEMPORAL ARTERY BIOPSY-BILATERAL     Surgeon: Surgeon(s) and Role:     * Juno Kruger MD - Primary     * Carlos Pozo PA-C - Assisting   Carol Castillo DNP Student - Assisting    Anesthesia: Local   Estimated Blood Loss: Less than 10 ml    Drains: None  Specimens:   ID Type Source Tests Collected by Time Destination   1 : LEFT TEMPORAL ARTERY  Biopsy Artery, Temporal, Left SURGICAL PATHOLOGY EXAM Juno Kruger MD 6/30/2023  9:25 AM    2 : RIGHT TEMPORAL ARTERY Biopsy Artery, Temporal, Right SURGICAL PATHOLOGY EXAM Juno Kruger MD 6/30/2023 10:04 AM      Findings:   None.  Complications: None.  Implants: * No implants in log *      Carlos Pozo PA-C  [phone]

## 2023-07-02 ENCOUNTER — TELEPHONE (OUTPATIENT)
Dept: OTHER | Facility: CLINIC | Age: 38
End: 2023-07-02

## 2023-07-02 NOTE — TELEPHONE ENCOUNTER
[place] [facility]    I called Dot Ramirez about the bilateral temporal artery biopsies on 6/30/2023.  She has some minimal discomfort but otherwise doing well and back at work today.  She continues on prednisone.  Pathology is not yet available I will call her with results tomorrow.    Juno Kruger MD

## 2023-07-03 ENCOUNTER — MYC MEDICAL ADVICE (OUTPATIENT)
Dept: OTHER | Facility: CLINIC | Age: 38
End: 2023-07-03

## 2023-07-03 DIAGNOSIS — M26.69 JAW CLAUDICATION: ICD-10-CM

## 2023-07-03 DIAGNOSIS — R51.9 TEMPORAL PAIN: ICD-10-CM

## 2023-07-05 ENCOUNTER — TELEPHONE (OUTPATIENT)
Dept: OTHER | Facility: CLINIC | Age: 38
End: 2023-07-05

## 2023-07-05 RX ORDER — PREDNISONE 20 MG/1
40 TABLET ORAL DAILY
Qty: 10 TABLET | Refills: 0 | Status: SHIPPED | OUTPATIENT
Start: 2023-07-05 | End: 2023-07-11

## 2023-07-05 NOTE — TELEPHONE ENCOUNTER
[place] [facility]    I spoke with Dot Starr Ashley about her TA biopsy--still pending [facility].    She was on Prednisone 60 mg daily but ran out 2 days ago.    I will refill 40 mg pending final Path.    Juno Kruger MD

## 2023-07-05 NOTE — TELEPHONE ENCOUNTER
Routing to Dr. Kruger to review patient MyChart message.    Medication pended.    Zeinab Clark, AISSATOUN, RN-[facility]

## 2023-07-06 ENCOUNTER — TELEPHONE (OUTPATIENT)
Dept: OTHER | Facility: CLINIC | Age: 38
End: 2023-07-06

## 2023-07-06 LAB
PATH REPORT.COMMENTS IMP SPEC: NORMAL
PATH REPORT.FINAL DX SPEC: NORMAL
PATH REPORT.GROSS SPEC: NORMAL
PATH REPORT.MICROSCOPIC SPEC OTHER STN: NORMAL
PATH REPORT.RELEVANT HX SPEC: NORMAL
PHOTO IMAGE: NORMAL

## 2023-07-06 NOTE — RESULT ENCOUNTER NOTE
Notified patient of normal results.  See epic telephone call                           Wm Saskia VELEZ

## 2023-07-06 NOTE — TELEPHONE ENCOUNTER
[place] [facility]    I called Dot Ramirez about her temporal artery biopsies.  These were negative for giant cell arteritis.  She had been started on prednisone by urgent care and does not have a primary care physician.  As documented I gave her a refill of the prednisone and told her to take 40 mg.    With these findings I see no reason for her to continue high-dose steroids.  It may be difficult for her to get into a new primary care physician.  We discussed how to wean off this medication over the next week which should be safe for her to do.        Juno Kruger MD

## 2023-07-07 ENCOUNTER — TELEPHONE (OUTPATIENT)
Dept: SCHEDULING | Facility: CLINIC | Age: 38
End: 2023-07-07

## 2023-07-07 NOTE — TELEPHONE ENCOUNTER
Patient calls writer back, scheduled follow up appointment from Vascular.  She is still having jaw pain, was seen at Vascular to rule out temporal arteritis, please see their notes.  Pt still taking Prednisone, is in large amount of pain when off Prednisone, has a Rx for Norco given to her from hospital that she refuses to even get filled.  Pt wanted to be seen for follow-up then willing to get established with a new PCP.  Follow up scheduled   Appointments in Next Year    Jul 11, 2023  9:00 AM  (Arrive by 8:40 AM)  Provider Visit with Susan Rachele Haase, APRN CNP  [facility] ([facility] ) [phone]

## 2023-07-07 NOTE — TELEPHONE ENCOUNTER
Writer called left VM to call clinic. Pt was seen recently and seen in Vascular for this issue as well according to chart review.

## 2023-07-07 NOTE — TELEPHONE ENCOUNTER
Reason for Call:  Appointment Request    Patient requesting this type of appt:  Follow up to vascular testing- having facial pain    Requested provider: any provider at [place]    Reason patient unable to be scheduled: Not within requested timeframe    When does patient want to be seen/preferred time: 3-7 days    Comments:     Could we send this information to you in [facility] or would you prefer to receive a phone call?:   Patient would prefer a phone call   Okay to leave a detailed message?: Yes at Cell number on file:    Telephone Information:   Mobile [phone]       Call taken on 7/7/2023 at 11:43 AM by Michelle MCKEON

## 2023-07-11 ENCOUNTER — HOSPITAL ENCOUNTER (EMERGENCY)
Facility: CLINIC | Age: 38
Discharge: HOME | End: 2023-07-11
Attending: EMERGENCY MEDICINE | Admitting: EMERGENCY MEDICINE
Payer: COMMERCIAL

## 2023-07-11 ENCOUNTER — APPOINTMENT (OUTPATIENT)
Dept: CT IMAGING | Facility: CLINIC | Age: 38
End: 2023-07-11
Attending: EMERGENCY MEDICINE
Payer: COMMERCIAL

## 2023-07-11 ENCOUNTER — OFFICE VISIT (OUTPATIENT)
Dept: FAMILY MEDICINE | Facility: CLINIC | Age: 38
End: 2023-07-11
Payer: COMMERCIAL

## 2023-07-11 VITALS
RESPIRATION RATE: 20 BRPM | HEART RATE: 86 BPM | WEIGHT: 186.07 LBS | SYSTOLIC BLOOD PRESSURE: 153 MMHG | TEMPERATURE: 97.7 F | OXYGEN SATURATION: 97 % | HEIGHT: 62 IN | BODY MASS INDEX: 34.24 KG/M2 | DIASTOLIC BLOOD PRESSURE: 111 MMHG

## 2023-07-11 VITALS
BODY MASS INDEX: 34.41 KG/M2 | SYSTOLIC BLOOD PRESSURE: 161 MMHG | HEIGHT: 62 IN | WEIGHT: 187 LBS | TEMPERATURE: 98.3 F | RESPIRATION RATE: 18 BRPM | OXYGEN SATURATION: 99 % | DIASTOLIC BLOOD PRESSURE: 104 MMHG | HEART RATE: 117 BPM

## 2023-07-11 DIAGNOSIS — M26.623 BILATERAL TEMPOROMANDIBULAR JOINT PAIN: ICD-10-CM

## 2023-07-11 DIAGNOSIS — R68.84 JAW PAIN: ICD-10-CM

## 2023-07-11 DIAGNOSIS — R03.0 ELEVATED BP WITHOUT DIAGNOSIS OF HYPERTENSION: ICD-10-CM

## 2023-07-11 DIAGNOSIS — M54.2 CERVICAL PAIN (NECK): Primary | ICD-10-CM

## 2023-07-11 LAB
ALBUMIN SERPL BCG-MCNC: 4 G/DL (ref 3.5–5.2)
ALP SERPL-CCNC: 71 U/L (ref 35–104)
ALT SERPL W P-5'-P-CCNC: 17 U/L (ref 0–50)
ANION GAP SERPL CALCULATED.3IONS-SCNC: 13 MMOL/L (ref 7–15)
AST SERPL W P-5'-P-CCNC: 17 U/L (ref 0–45)
ATRIAL RATE - MUSE: 97 BPM
BASOPHILS # BLD AUTO: 0.1 10E3/UL (ref 0–0.2)
BASOPHILS NFR BLD AUTO: 0 %
BILIRUB SERPL-MCNC: 0.4 MG/DL
BUN SERPL-MCNC: 13.8 MG/DL (ref 6–20)
CALCIUM SERPL-MCNC: 9 MG/DL (ref 8.6–10)
CHLORIDE SERPL-SCNC: 97 MMOL/L (ref 98–107)
CREAT SERPL-MCNC: 0.72 MG/DL (ref 0.51–0.95)
CRP SERPL-MCNC: 12.05 MG/L
DEPRECATED HCO3 PLAS-SCNC: 25 MMOL/L (ref 22–29)
DIASTOLIC BLOOD PRESSURE - MUSE: NORMAL MMHG
EOSINOPHIL # BLD AUTO: 0.1 10E3/UL (ref 0–0.7)
EOSINOPHIL NFR BLD AUTO: 1 %
ERYTHROCYTE [DISTWIDTH] IN BLOOD BY AUTOMATED COUNT: 13.7 % (ref 10–15)
ERYTHROCYTE [SEDIMENTATION RATE] IN BLOOD BY WESTERGREN METHOD: 7 MM/HR (ref 0–20)
GFR SERPL CREATININE-BSD FRML MDRD: >90 ML/MIN/1.73M2
GLUCOSE SERPL-MCNC: 156 MG/DL (ref 70–99)
HCT VFR BLD AUTO: 43.8 % (ref 35–47)
HGB BLD-MCNC: 14.3 G/DL (ref 11.7–15.7)
IMM GRANULOCYTES # BLD: 0.2 10E3/UL
IMM GRANULOCYTES NFR BLD: 1 %
INTERPRETATION ECG - MUSE: NORMAL
LYMPHOCYTES # BLD AUTO: 2.7 10E3/UL (ref 0.8–5.3)
LYMPHOCYTES NFR BLD AUTO: 19 %
MCH RBC QN AUTO: 28.1 PG (ref 26.5–33)
MCHC RBC AUTO-ENTMCNC: 32.6 G/DL (ref 31.5–36.5)
MCV RBC AUTO: 86 FL (ref 78–100)
MONOCYTES # BLD AUTO: 0.7 10E3/UL (ref 0–1.3)
MONOCYTES NFR BLD AUTO: 5 %
NEUTROPHILS # BLD AUTO: 10.9 10E3/UL (ref 1.6–8.3)
NEUTROPHILS NFR BLD AUTO: 74 %
NRBC # BLD AUTO: 0 10E3/UL
NRBC BLD AUTO-RTO: 0 /100
P AXIS - MUSE: 47 DEGREES
PLATELET # BLD AUTO: 336 10E3/UL (ref 150–450)
POTASSIUM SERPL-SCNC: 4.5 MMOL/L (ref 3.4–5.3)
PR INTERVAL - MUSE: 140 MS
PROT SERPL-MCNC: 6.9 G/DL (ref 6.4–8.3)
QRS DURATION - MUSE: 70 MS
QT - MUSE: 340 MS
QTC - MUSE: 431 MS
R AXIS - MUSE: 41 DEGREES
RBC # BLD AUTO: 5.08 10E6/UL (ref 3.8–5.2)
SODIUM SERPL-SCNC: 135 MMOL/L (ref 136–145)
SYSTOLIC BLOOD PRESSURE - MUSE: NORMAL MMHG
T AXIS - MUSE: 14 DEGREES
TROPONIN T SERPL HS-MCNC: <6 NG/L
VENTRICULAR RATE- MUSE: 97 BPM
WBC # BLD AUTO: 14.6 10E3/UL (ref 4–11)

## 2023-07-11 PROCEDURE — 96374 THER/PROPH/DIAG INJ IV PUSH: CPT | Mod: 59

## 2023-07-11 PROCEDURE — 99285 EMERGENCY DEPT VISIT HI MDM: CPT | Mod: 25

## 2023-07-11 PROCEDURE — 70491 CT SOFT TISSUE NECK W/DYE: CPT

## 2023-07-11 PROCEDURE — 80053 COMPREHEN METABOLIC PANEL: CPT | Performed by: EMERGENCY MEDICINE

## 2023-07-11 PROCEDURE — 93005 ELECTROCARDIOGRAM TRACING: CPT

## 2023-07-11 PROCEDURE — 84484 ASSAY OF TROPONIN QUANT: CPT | Performed by: EMERGENCY MEDICINE

## 2023-07-11 PROCEDURE — 93000 ELECTROCARDIOGRAM COMPLETE: CPT | Performed by: NURSE PRACTITIONER

## 2023-07-11 PROCEDURE — 36415 COLL VENOUS BLD VENIPUNCTURE: CPT | Performed by: EMERGENCY MEDICINE

## 2023-07-11 PROCEDURE — 85025 COMPLETE CBC W/AUTO DIFF WBC: CPT | Performed by: EMERGENCY MEDICINE

## 2023-07-11 PROCEDURE — 86140 C-REACTIVE PROTEIN: CPT | Performed by: EMERGENCY MEDICINE

## 2023-07-11 PROCEDURE — 250N000011 HC RX IP 250 OP 636: Performed by: EMERGENCY MEDICINE

## 2023-07-11 PROCEDURE — 85652 RBC SED RATE AUTOMATED: CPT | Performed by: EMERGENCY MEDICINE

## 2023-07-11 PROCEDURE — 99214 OFFICE O/P EST MOD 30 MIN: CPT | Performed by: NURSE PRACTITIONER

## 2023-07-11 PROCEDURE — 250N000011 HC RX IP 250 OP 636: Mod: JZ | Performed by: EMERGENCY MEDICINE

## 2023-07-11 RX ORDER — KETOROLAC TROMETHAMINE 15 MG/ML
15 INJECTION, SOLUTION INTRAMUSCULAR; INTRAVENOUS ONCE
Status: COMPLETED | OUTPATIENT
Start: 2023-07-11 | End: 2023-07-11

## 2023-07-11 RX ORDER — CYCLOBENZAPRINE HCL 10 MG
5-10 TABLET ORAL 3 TIMES DAILY PRN
Qty: 20 TABLET | Refills: 0 | Status: SHIPPED | OUTPATIENT
Start: 2023-07-11 | End: 2023-07-18

## 2023-07-11 RX ORDER — IOPAMIDOL 755 MG/ML
100 INJECTION, SOLUTION INTRAVASCULAR ONCE
Status: COMPLETED | OUTPATIENT
Start: 2023-07-11 | End: 2023-07-11

## 2023-07-11 RX ADMIN — KETOROLAC TROMETHAMINE 15 MG: 15 INJECTION, SOLUTION INTRAMUSCULAR; INTRAVENOUS at 12:37

## 2023-07-11 RX ADMIN — IOPAMIDOL 100 ML: 755 INJECTION, SOLUTION INTRAVENOUS at 12:49

## 2023-07-11 ASSESSMENT — PATIENT HEALTH QUESTIONNAIRE - PHQ9
SUM OF ALL RESPONSES TO PHQ QUESTIONS 1-9: 7
10. IF YOU CHECKED OFF ANY PROBLEMS, HOW DIFFICULT HAVE THESE PROBLEMS MADE IT FOR YOU TO DO YOUR WORK, TAKE CARE OF THINGS AT HOME, OR GET ALONG WITH OTHER PEOPLE: SOMEWHAT DIFFICULT
SUM OF ALL RESPONSES TO PHQ QUESTIONS 1-9: 7

## 2023-07-11 ASSESSMENT — ACTIVITIES OF DAILY LIVING (ADL): ADLS_ACUITY_SCORE: 37

## 2023-07-11 ASSESSMENT — PAIN SCALES - GENERAL: PAINLEVEL: NO PAIN (0)

## 2023-07-11 NOTE — ED TRIAGE NOTES
Pain from eyes into ears, down jaw and into the back.   Pt has been dealing with similar symptoms since June. Had Steriod treatment with some relief. Temporal artery Biopsy completed and was negative.   At appt for follow up today and pain started again and neck started swelling. Was told CRP were high previously. Clinic recommending MRI, repeat CRP.

## 2023-07-11 NOTE — ED PROVIDER NOTES
"  History     Chief Complaint:  Facial Pain     HPI   Dot Ramirez is a 37 year old female with a history of jaw claudication who presents with facial pain. The patient states that she has been dealing with pain that goes from her eye to her ears and then down her neck into her back. She states that it is like a slicing feeling through her face. She also originally had some swelling in her face which went away with Advil. She also briefly had a moment where she could not see a menu on the wall. This originally happened a few weeks ago and has been dealing with these episodes on and off. She had a bilateral temporal artery biopsy for this pain which was negative. She went into urgent care today when her jaw somewhat locked up and she was sent to the ED for what she states was an MRI. She denies a fever.       Independent Historian:   None - Patient Only    Review of External Notes:   Reviewed urgent care notes.        Medications:    The patient is currently on no regular medications.    Past Medical History:    Jaw claudication  Depression  Ovarian abscess  Vitamin D deficiency  ELIANA  RLS  PID    Past Surgical History:    Temporal artery biopsy  Cervical cerclage  Hand cystectomy  Dental extraction  C section     Physical Exam     Patient Vitals for the past 24 hrs:   BP Temp Temp src Pulse Resp SpO2 Height Weight   07/11/23 1026 (!) 159/119 97.7  F (36.5  C) Temporal 86 20 97 % 1.575 m (5' 2\") 84.4 kg (186 lb 1.1 oz)        Physical Exam  Head:              The scalp, face, and head appear normal  Eyes:               Conjunctivae are normal  ENT:                The nose is normal                          Pinnae are normal                          Mild trismus. Pain to bilateral TMJs and SCM/scalene musculature.  Neck:              Trachea midline  CV:                  Normal rate  Resp:              No respiratory distress   Musc:              Normal muscular tone  Skin:               No rash or lesions " noted  Neuro: Speech is normal and fluent. Face is symmetric. Moving all extremities well.        Emergency Department Course   ECG  ECG results from 07/11/23   EKG 12 lead     Value    Systolic Blood Pressure     Diastolic Blood Pressure     Ventricular Rate 97    Atrial Rate 97    AR Interval 140    QRS Duration 70        QTc 431    P Axis 47    R AXIS 41    T Axis 14    Interpretation ECG      Sinus rhythm  Septal infarct , age undetermined  Abnormal ECG  No previous ECGs available         Imaging:  Soft tissue neck CT w contrast   Final Result   IMPRESSION: Unremarkable neck CT.      RONAK WKONG MD            SYSTEM ID:  [id number]         Report per radiology    Laboratory:  Labs Ordered and Resulted from Time of ED Arrival to Time of ED Departure   COMPREHENSIVE METABOLIC PANEL - Abnormal       Result Value    Sodium 135 (*)     Potassium 4.5      Chloride 97 (*)     Carbon Dioxide (CO2) 25      Anion Gap 13      Urea Nitrogen 13.8      Creatinine 0.72      Calcium 9.0      Glucose 156 (*)     Alkaline Phosphatase 71      AST 17      ALT 17      Protein Total 6.9      Albumin 4.0      Bilirubin Total 0.4      GFR Estimate >90     CRP INFLAMMATION - Abnormal    CRP Inflammation 12.05 (*)    CBC WITH PLATELETS AND DIFFERENTIAL - Abnormal    WBC Count 14.6 (*)     RBC Count 5.08      Hemoglobin 14.3      Hematocrit 43.8      MCV 86      MCH 28.1      MCHC 32.6      RDW 13.7      Platelet Count 336      % Neutrophils 74      % Lymphocytes 19      % Monocytes 5      % Eosinophils 1      % Basophils 0      % Immature Granulocytes 1      NRBCs per 100 WBC 0      Absolute Neutrophils 10.9 (*)     Absolute Lymphocytes 2.7      Absolute Monocytes 0.7      Absolute Eosinophils 0.1      Absolute Basophils 0.1      Absolute Immature Granulocytes 0.2      Absolute NRBCs 0.0     TROPONIN T, HIGH SENSITIVITY - Normal    Troponin T, High Sensitivity <6     ERYTHROCYTE SEDIMENTATION RATE AUTO - Normal     Erythrocyte Sedimentation Rate 7        Emergency Department Course & Assessments:    Interventions:  Medications   ketorolac (TORADOL) injection 15 mg (15 mg Intravenous $Given 7/11/23 1237)   iopamidol (ISOVUE-370) solution 100 mL (100 mLs Intravenous $Given 7/11/23 1249)   sodium chloride (PF) 0.9% PF flush 65 mL (65 mLs Intravenous $Given 7/11/23 1249)        Assessments:  1228 Obtained the patients history and performed initial exam    Independent Interpretation (X-rays, CTs, rhythm strip):  none    Social Determinants of Health affecting care:   None    Disposition:  The patient was discharged to home.     Impression & Plan      Medical Decision Making:  Patient presents with bilateral headache, face and jaw pain.  She has pain over her face and neck musculature.  Also has pain over bilateral TMJ.  She was previously evaluated for temporal arteritis and work-up was negative.  She has minimal CRP elevation.  ESR is normal.  No evidence of referred cardiac pain with normal EKG and undetectable troponin.  Chewing is painful as well as moving her neck.  Pain appears to be musculoskeletal in nature.  Considered potential rheumatologic or neurologic pathologies, but less likely given history and exam.  I will prescribe Flexeril to assist with pain relief.  She was already on steroids with some relief.  Long-term steroids is not a good long-term option.  I suspect pain is most likely due to TMJ disorder.  On further discussion, she does report that her dentist told her that her TMJ does click.  I encouraged her to follow-up with dentist and she was also referred to primary care as she currently has none. She is in stable condition at the time of discharge, indications for return to the ED were discussed as well as follow up. All questions were answered and she is in agreement with the plan.      Diagnosis:    ICD-10-CM    1. Bilateral temporomandibular joint pain  M26.623 Primary Care Referral           Discharge  Medications:  New Prescriptions    CYCLOBENZAPRINE (FLEXERIL) 10 MG TABLET    Take 0.5-1 tablets (5-10 mg) by mouth 3 times daily as needed (jaw/neck pain)        Scribe Disclosure:  I, Dimaserrol Velez, am serving as a scribe at 2:14 PM on 7/11/2023 to document services personally performed by Ace Rowland MD based on my observations and the provider's statements to me.        Ace Rowland MD  07/13/23 0831

## 2023-07-11 NOTE — PROGRESS NOTES
Assessment & Plan     Cervical pain (neck), trigeminal nerve pain:  Unclear etiology, pain starts along temporal/jaw area, radiates to the neck, today pain is located along the left side, at other times pain is located along the right.  Episodes of pain have increased since stopped prednisone on 7/9/2023.       Jaw pain; left, obtained EKG with normal results.    - EKG 12-lead complete w/read - Clinics    Elevated BP without diagnosis of hypertension: BP upon admission to clinic 132/84, within 5 min of admission reported that she was starting to have left sided jaw/neck pain, BP increased to 170/100    Will have patient go to the ED for further evaluation due to severity of pain and unknown cause of pain. Patient in agreement with plan.  Report called to ED.    Susan Haase, APRN CNP  [facility]    Earle Chris is a 37 year old, presenting for the following health issues:  RECHECK (Post vascular appt and headaches and jaw pain. )        7/11/2023     8:46 AM   Additional Questions   Roomed by Laura GARCIA     History of Present Illness       Reason for visit:  I was seen at urgent care 6/20 due to head & Jaw pain. Was referred to start prednisone asap and see Vascular. Had a double Biopsy, results came back negative. Told to follow up with a PCP. I don't currently have one. But still having pains.    She eats 0-1 servings of fruits and vegetables daily.She consumes 2 sweetened beverage(s) daily.She exercises with enough effort to increase her heart rate 9 or less minutes per day.  She exercises with enough effort to increase her heart rate 3 or less days per week. She is missing 1 dose(s) of medications per week.  She is not taking prescribed medications regularly due to remembering to take.    Today's PHQ-9         PHQ-9 Total Score: 7    PHQ-9 Q9 Thoughts of better off dead/self-harm past 2 weeks :   Not at all    How difficult have these problems made it for you to do your work, take  "care of things at home, or get along with other people: Somewhat difficult    Woke up suddenly on 6/15 in the middle of the night with pain in the right temporal region that felt like someone was \"slicing her open and she was bleeding\". This went away after about 1 day, then 2 days later she had bilateral jaw swelling, pain, and difficulty opening her mouth for a few hours. Those symptoms then resolved. She reports the jaw pain went from the preauricular area down the entire side of her jaw and into her neck bilaterally. No erythema or gum swelling/pain, purulent discharge in the mouth, or pain in the ears themselves. She woke up on 6/20 with the same symptoms and was seen in , she was placed on prednisone with the possible diagnosis of temporal arteritis, a bilateral temporal artery biopsy was completed on 6/30 with negative results.  She has since weaned off of prednisone, last dose on 7/9.    She presents today with sharp pain on the left side of her jaw, pain radiates into the neck, very painful to open mouth, reports intermittent blurry vision, no loss of vision.       Review of Systems   CONSTITUTIONAL: NEGATIVE for fever, chills, change in weight  ENT/MOUTH: NEGATIVE for ear, mouth and throat problems  RESP: NEGATIVE for significant cough or SOB  CV: NEGATIVE for chest pain, palpitations or peripheral edema  MUSCULOSKELETAL: see HPI  NEURO: NEGATIVE for weakness, dizziness or paresthesias      Objective    /84 (BP Location: Right arm, Patient Position: Sitting, Cuff Size: Adult Regular)   Pulse 117   Temp 98.3  F (36.8  C) (Oral)   Resp 18   Ht 1.575 m (5' 2\")   Wt 84.8 kg (187 lb)   LMP  (LMP Unknown)   SpO2 99%   BMI 34.20 kg/m    Body mass index is 34.2 kg/m .  Physical Exam   GENERAL: healthy, alert holding lower jaw and left side of face  HENT: ear canals and TM's normal, nose and mouth without ulcers or lesions. Biopsy sites clean, dry intact.  Tenderness along bilateral jaw line, " trismus present.   NECK: no adenopathy, no asymmetry, masses, or scars and thyroid normal to palpation  RESP: lungs clear to auscultation - no rales, rhonchi or wheezes  CV: regular rate and rhythm, normal S1 S2, no S3 or S4, no murmur, click or rub,   MENTAL STATUS:  Alert, oriented x3.  Speech fluent with normal naming. CRANIAL NERVES:  Pupils are equal, round, reactive to light.  Extraocular movements full.  Visual fields full.  Facial sensation, movement normal.  Palate moves symmetrically.  Tongue midline.  Sternocleidomastoid and trapezius strength intact.  Pain with flexion, hyperextension, lateral rotation,       NEUROLOGIC:  Motor 5/5.  Reflexes 2/4.  Good finger-nose-finger, romberg negative.  Normal heel to toe gait without ataxia.   PSYCH: mentation appears normal, affect normal/bright

## 2023-07-11 NOTE — ED NOTES
PIT/Triage Evaluation    Patient presented with facial pain. The patient states that she has been dealing with pain that goes from her eye to her ears and then down her neck into her back. She states that it is like a slicing feeling through her face. She also originally had some swelling in her face which went away with Advil. She also briefly had a moment where she could not see a menu on the wall. This originally happened a few weeks ago and has been dealing with these episodes on and off. She had a bilateral temporal artery biopsy for this pain which was negative. She went into urgent care today when her jaw somewhat locked up and she was sent to the ED for what she states was an MRI. She denies a fever.     Exam is notable for:    Head:  The scalp, face, and head appear normal  Eyes:  Conjunctivae are normal  ENT:    The nose is normal    Pinnae are normal    Mild trismus. Pain to bilateral TMJs and SCM/scalene musculature.  Neck:  Trachea midline  CV:  Normal rate  Resp:  No respiratory distress   Musc:  Normal muscular tone  Skin:  No rash or lesions noted  Neuro: Speech is normal and fluent. Face is symmetric. Moving all extremities well.         Appropriate interventions for symptom management were initiated if applicable.  Appropriate diagnostic tests were initiated if indicated.    Important information for subsequent clinician:  Neg temp art biopsy. TMJ? Will check CT/labs.     I briefly evaluated the patient and developed an initial plan of care. I discussed this plan and explained that this brief interaction does not constitute a full evaluation. Patient/family understands that they should wait to be fully evaluated and discuss any test results with another clinician prior to leaving the hospital.       Ace Rowland MD  07/11/23 [id number]

## 2023-09-17 ENCOUNTER — OFFICE VISIT (OUTPATIENT)
Dept: URGENT CARE | Facility: URGENT CARE | Age: 38
End: 2023-09-17
Payer: COMMERCIAL

## 2023-09-17 VITALS
TEMPERATURE: 98.1 F | HEART RATE: 102 BPM | SYSTOLIC BLOOD PRESSURE: 158 MMHG | DIASTOLIC BLOOD PRESSURE: 96 MMHG | OXYGEN SATURATION: 98 %

## 2023-09-17 DIAGNOSIS — J02.9 ACUTE SORE THROAT: ICD-10-CM

## 2023-09-17 DIAGNOSIS — J02.0 STREP THROAT: Primary | ICD-10-CM

## 2023-09-17 LAB — DEPRECATED S PYO AG THROAT QL EIA: POSITIVE

## 2023-09-17 PROCEDURE — 99213 OFFICE O/P EST LOW 20 MIN: CPT | Performed by: PHYSICIAN ASSISTANT

## 2023-09-17 PROCEDURE — 87880 STREP A ASSAY W/OPTIC: CPT

## 2023-09-17 RX ORDER — PENICILLIN V POTASSIUM 500 MG/1
500 TABLET, FILM COATED ORAL 2 TIMES DAILY
Qty: 20 TABLET | Refills: 0 | Status: SHIPPED | OUTPATIENT
Start: 2023-09-17 | End: 2023-09-27

## 2023-09-17 NOTE — LETTER
September 17, 2023      Dot Ramirez  [address] [address] [place] [address]        To Whom It May Concern:    Dot Ramirez  was seen on 9/17/2023  Please excuse her absence from work tomorrow 9/18 due to acute medical illness.    Dx: Strep throat    Sincerely,        Kimberly Sheth PA-C

## 2023-09-17 NOTE — PROGRESS NOTES
Assessment & Plan     Strep throat  Penicillin Rx. Tylenol or motrin prn fever. Discard old toothbrush. Follow up if any worsening symptoms. Patient agrees.    - penicillin V (VEETID) 500 MG tablet  Dispense: 20 tablet; Refill: 0    Acute sore throat  RST is positive today.  Please see treatment above.  - Streptococcus A Rapid Scr w Reflx to PCR      Return in about 1 week (around 9/24/2023) for Symptoms failing to improve.    Kimberly Sheth PA-C  [facility] [place] Chris is a 38 year old female who presents to clinic today for the following health issues:  Chief Complaint   Patient presents with    Urgent Care    Pharyngitis     Sx started last night with a fever and sore throat. Pt states she was at a wedding and a lot of people had strep     HPI    Patient is presenting to urgent care today with complaints of sore throat and fever.  Onset of symptoms last night.  Exposure to strep.      Review of Systems  Constitutional, HEENT, cardiovascular, pulmonary, GI, , musculoskeletal, neuro, skin, endocrine and psych systems are negative, except as otherwise noted.      Objective    BP (!) 158/96   Pulse 102   Temp 98.1  F (36.7  C) (Oral)   LMP 09/13/2023   SpO2 98%   Physical Exam   GENERAL: healthy, alert and no distress  HENT: ear canals and TM's normal, mouth without ulcers or lesions, mild posterior pharynx erythema, uvula is midline  RESP: lungs clear to auscultation - no rales, rhonchi or wheezes  CV: regular rate and rhythm, normal S1 S2  MS: no gross musculoskeletal defects noted, no edema    Results for orders placed or performed in visit on 09/17/23 (from the past 24 hour(s))   Streptococcus A Rapid Scr w Reflx to PCR    Specimen: Throat; Swab   Result Value Ref Range    Group A Strep antigen Positive (A) Negative

## 2023-10-11 ENCOUNTER — TELEPHONE (OUTPATIENT)
Dept: URGENT CARE | Facility: URGENT CARE | Age: 38
End: 2023-10-11

## 2023-10-11 NOTE — TELEPHONE ENCOUNTER
Patient Quality Outreach    Patient is due for the following:   Physical Preventive Adult Physical    Next Steps:   Schedule a Adult Preventative    Type of outreach:    Sent letter.      Questions for provider review:               Sherine Chandler, CMA

## 2023-10-25 ENCOUNTER — OFFICE VISIT (OUTPATIENT)
Dept: URGENT CARE | Facility: URGENT CARE | Age: 38
End: 2023-10-25
Payer: COMMERCIAL

## 2023-10-25 VITALS
RESPIRATION RATE: 14 BRPM | SYSTOLIC BLOOD PRESSURE: 118 MMHG | WEIGHT: 185 LBS | DIASTOLIC BLOOD PRESSURE: 74 MMHG | HEART RATE: 95 BPM | OXYGEN SATURATION: 98 % | TEMPERATURE: 98.2 F | BODY MASS INDEX: 33.84 KG/M2

## 2023-10-25 DIAGNOSIS — R07.0 THROAT PAIN: Primary | ICD-10-CM

## 2023-10-25 LAB
DEPRECATED S PYO AG THROAT QL EIA: NEGATIVE
GROUP A STREP BY PCR: NOT DETECTED

## 2023-10-25 PROCEDURE — 87651 STREP A DNA AMP PROBE: CPT | Performed by: PHYSICIAN ASSISTANT

## 2023-10-25 PROCEDURE — 99213 OFFICE O/P EST LOW 20 MIN: CPT | Performed by: PHYSICIAN ASSISTANT

## 2023-10-25 NOTE — PROGRESS NOTES
Assessment & Plan     Throat pain  Rapid strep is negative today.  Throat culture is pending.  No evidence of peritonsillar abscess.  Supportive care measures advised: Tylenol, Motrin, throat lozenges, salt water gargles.  We will communicate any positive finding on the throat culture result.  Follow-up if any worsening symptoms.  Patient understands and agrees with the plan.    - Streptococcus A Rapid Screen w/Reflex to PCR - Clinic Collect  - Group A Streptococcus PCR Throat Swab     Return in about 1 week (around 11/1/2023) for Symptoms failing to improve.    Kimberly Sheth PA-C  [facility] [place] Chris is a 38 year old female who presents to clinic today for the following health issues:  Chief Complaint   Patient presents with    Sick     ST, some drainage x 2 days -- took nothing today     HPI    Patient is presenting to urgent care today with a complaint of sore throat.  Onset of symptoms 2 days ago.  No cough, no fever.  Treatment tried: Tylenol/Motrin.  No known sick contacts.      Review of Systems  Constitutional, HEENT, cardiovascular, pulmonary, GI, , musculoskeletal, neuro, skin, endocrine and psych systems are negative, except as otherwise noted.      Objective    /74 (BP Location: Right arm, Patient Position: Chair, Cuff Size: Adult Regular)   Pulse 95   Temp 98.2  F (36.8  C) (Oral)   Resp 14   Wt 83.9 kg (185 lb)   LMP 10/15/2023 (Exact Date)   SpO2 98%   BMI 33.84 kg/m    Physical Exam   GENERAL: healthy, alert and no distress  HENT: ear canals and TM's normal,  mouth without ulcers or lesions, throat is moist and pink, uvula is midline, tonsils 2+ with slight exudate on the left tonsil  RESP: lungs clear to auscultation - no rales, rhonchi or wheezes  CV: regular rate and rhythm, normal S1 S2  MS: no gross musculoskeletal defects noted, no edema    Results for orders placed or performed in visit on 10/25/23 (from the past 24 hour(s))    Streptococcus A Rapid Screen w/Reflex to PCR - Clinic Collect    Specimen: Throat; Swab   Result Value Ref Range    Group A Strep antigen Negative Negative

## 2023-12-12 ENCOUNTER — TRANSFERRED RECORDS (OUTPATIENT)
Dept: HEALTH INFORMATION MANAGEMENT | Facility: CLINIC | Age: 38
End: 2023-12-12

## 2023-12-14 ENCOUNTER — APPOINTMENT (OUTPATIENT)
Dept: CT IMAGING | Facility: CLINIC | Age: 38
End: 2023-12-14
Attending: EMERGENCY MEDICINE
Payer: COMMERCIAL

## 2023-12-14 ENCOUNTER — HOSPITAL ENCOUNTER (EMERGENCY)
Facility: CLINIC | Age: 38
Discharge: HOME | End: 2023-12-14
Attending: EMERGENCY MEDICINE | Admitting: EMERGENCY MEDICINE
Payer: COMMERCIAL

## 2023-12-14 ENCOUNTER — NURSE TRIAGE (OUTPATIENT)
Dept: NURSING | Facility: CLINIC | Age: 38
End: 2023-12-14

## 2023-12-14 VITALS
SYSTOLIC BLOOD PRESSURE: 156 MMHG | OXYGEN SATURATION: 100 % | WEIGHT: 194.67 LBS | DIASTOLIC BLOOD PRESSURE: 104 MMHG | RESPIRATION RATE: 18 BRPM | BODY MASS INDEX: 35.6 KG/M2 | TEMPERATURE: 98 F | HEART RATE: 78 BPM

## 2023-12-14 DIAGNOSIS — I10 HYPERTENSION, UNSPECIFIED TYPE: ICD-10-CM

## 2023-12-14 DIAGNOSIS — G50.0 TRIGEMINAL NEURALGIA: ICD-10-CM

## 2023-12-14 LAB
ANION GAP SERPL CALCULATED.3IONS-SCNC: 11 MMOL/L (ref 7–15)
BASOPHILS # BLD AUTO: 0.1 10E3/UL (ref 0–0.2)
BASOPHILS NFR BLD AUTO: 0 %
BUN SERPL-MCNC: 11.2 MG/DL (ref 6–20)
CALCIUM SERPL-MCNC: 8.2 MG/DL (ref 8.6–10)
CHLORIDE SERPL-SCNC: 105 MMOL/L (ref 98–107)
CREAT SERPL-MCNC: 0.67 MG/DL (ref 0.51–0.95)
DEPRECATED HCO3 PLAS-SCNC: 26 MMOL/L (ref 22–29)
EGFRCR SERPLBLD CKD-EPI 2021: >90 ML/MIN/1.73M2
EOSINOPHIL # BLD AUTO: 0.1 10E3/UL (ref 0–0.7)
EOSINOPHIL NFR BLD AUTO: 1 %
ERYTHROCYTE [DISTWIDTH] IN BLOOD BY AUTOMATED COUNT: 13.6 % (ref 10–15)
GLUCOSE SERPL-MCNC: 99 MG/DL (ref 70–99)
HCG UR QL: NEGATIVE
HCT VFR BLD AUTO: 36 % (ref 35–47)
HGB BLD-MCNC: 12 G/DL (ref 11.7–15.7)
HOLD SPECIMEN: NORMAL
HOLD SPECIMEN: NORMAL
IMM GRANULOCYTES # BLD: 0 10E3/UL
IMM GRANULOCYTES NFR BLD: 0 %
LYMPHOCYTES # BLD AUTO: 3.3 10E3/UL (ref 0.8–5.3)
LYMPHOCYTES NFR BLD AUTO: 29 %
MCH RBC QN AUTO: 27.3 PG (ref 26.5–33)
MCHC RBC AUTO-ENTMCNC: 33.3 G/DL (ref 31.5–36.5)
MCV RBC AUTO: 82 FL (ref 78–100)
MONOCYTES # BLD AUTO: 0.8 10E3/UL (ref 0–1.3)
MONOCYTES NFR BLD AUTO: 7 %
NEUTROPHILS # BLD AUTO: 7.3 10E3/UL (ref 1.6–8.3)
NEUTROPHILS NFR BLD AUTO: 63 %
NRBC # BLD AUTO: 0 10E3/UL
NRBC BLD AUTO-RTO: 0 /100
PLATELET # BLD AUTO: 396 10E3/UL (ref 150–450)
POTASSIUM SERPL-SCNC: 3.7 MMOL/L (ref 3.4–5.3)
RBC # BLD AUTO: 4.39 10E6/UL (ref 3.8–5.2)
SODIUM SERPL-SCNC: 142 MMOL/L (ref 135–145)
TROPONIN T SERPL HS-MCNC: <6 NG/L
WBC # BLD AUTO: 11.6 10E3/UL (ref 4–11)

## 2023-12-14 PROCEDURE — 93005 ELECTROCARDIOGRAM TRACING: CPT

## 2023-12-14 PROCEDURE — 250N000009 HC RX 250: Performed by: EMERGENCY MEDICINE

## 2023-12-14 PROCEDURE — 70496 CT ANGIOGRAPHY HEAD: CPT

## 2023-12-14 PROCEDURE — 85025 COMPLETE CBC W/AUTO DIFF WBC: CPT | Performed by: EMERGENCY MEDICINE

## 2023-12-14 PROCEDURE — 99291 CRITICAL CARE FIRST HOUR: CPT | Mod: 25

## 2023-12-14 PROCEDURE — 70498 CT ANGIOGRAPHY NECK: CPT

## 2023-12-14 PROCEDURE — 81025 URINE PREGNANCY TEST: CPT | Performed by: EMERGENCY MEDICINE

## 2023-12-14 PROCEDURE — 80048 BASIC METABOLIC PNL TOTAL CA: CPT | Performed by: EMERGENCY MEDICINE

## 2023-12-14 PROCEDURE — 250N000011 HC RX IP 250 OP 636: Performed by: EMERGENCY MEDICINE

## 2023-12-14 PROCEDURE — 70450 CT HEAD/BRAIN W/O DYE: CPT

## 2023-12-14 PROCEDURE — 36415 COLL VENOUS BLD VENIPUNCTURE: CPT | Performed by: EMERGENCY MEDICINE

## 2023-12-14 PROCEDURE — 84484 ASSAY OF TROPONIN QUANT: CPT | Performed by: EMERGENCY MEDICINE

## 2023-12-14 RX ORDER — DEXAMETHASONE SODIUM PHOSPHATE 10 MG/ML
10 INJECTION, SOLUTION INTRAMUSCULAR; INTRAVENOUS ONCE
Status: DISCONTINUED | OUTPATIENT
Start: 2023-12-14 | End: 2023-12-14

## 2023-12-14 RX ORDER — DIPHENHYDRAMINE HYDROCHLORIDE 50 MG/ML
25 INJECTION INTRAMUSCULAR; INTRAVENOUS ONCE
Status: DISCONTINUED | OUTPATIENT
Start: 2023-12-14 | End: 2023-12-14

## 2023-12-14 RX ORDER — METOCLOPRAMIDE HYDROCHLORIDE 5 MG/ML
10 INJECTION INTRAMUSCULAR; INTRAVENOUS ONCE
Status: DISCONTINUED | OUTPATIENT
Start: 2023-12-14 | End: 2023-12-14

## 2023-12-14 RX ORDER — IOPAMIDOL 755 MG/ML
500 INJECTION, SOLUTION INTRAVASCULAR ONCE
Status: COMPLETED | OUTPATIENT
Start: 2023-12-14 | End: 2023-12-14

## 2023-12-14 RX ORDER — LISINOPRIL 5 MG/1
5 TABLET ORAL DAILY
Qty: 30 TABLET | Refills: 0 | Status: SHIPPED | OUTPATIENT
Start: 2023-12-14 | End: 2024-01-09

## 2023-12-14 RX ORDER — CARBAMAZEPINE 100 MG/1
100 TABLET, EXTENDED RELEASE ORAL 2 TIMES DAILY
Qty: 60 TABLET | Refills: 0 | Status: SHIPPED | OUTPATIENT
Start: 2023-12-14 | End: 2024-01-09

## 2023-12-14 RX ADMIN — IOPAMIDOL 67 ML: 755 INJECTION, SOLUTION INTRAVENOUS at 14:18

## 2023-12-14 RX ADMIN — SODIUM CHLORIDE 80 ML: 9 INJECTION, SOLUTION INTRAVENOUS at 14:18

## 2023-12-14 ASSESSMENT — ACTIVITIES OF DAILY LIVING (ADL)
ADLS_ACUITY_SCORE: 37
ADLS_ACUITY_SCORE: 37

## 2023-12-14 NOTE — TELEPHONE ENCOUNTER
"Patient calling.    Reports that she has been dealing with \"head problems\" since June.    She mentions that her symptoms are complex, and there have been times where she started to experience sharp right eye pain, that went down to her jaw, couldn't open her eyes and her jaw was locked.   She had some tests and a biopsy done which was negative. Was suggested that she get an MRI.    She reports on Tuesday 12/12, she had excruciating pain and went to [facility]. She was sent to ER for her symptoms. She had a CT done which was negative. She was given pain meds and suggested to follow up with PCP and neuro.    She is currently experiencing pain in the back of her head, neck, and jaw. And reports that she has a BP of 154/113 that she took about 15 minutes prior to call. She mentions that her BP has historically been high and she checks it occasionally. She is not taking any BP meds. She expresses frustration that no one can tell her what is causing her symptoms.    Disposition: Go to ED/[facility] (or to office with PCP approval). Patient does not have a PCP currently. Referred to ED    Jeanette Maguire RN on 12/14/2023 at 10:49 AM     Reason for Disposition   New-onset jaw pain of unknown cause AND at least one cardiac risk factor (e.g., 45 years or older, diabetes, high cholesterol, hypertension, obesity, smoker or strong family history of heart disease)   Systolic BP >= 180 OR Diastolic >= 110   Patient sounds very sick or weak to the triager    Additional Information   Negative: Shock suspected (e.g., cold/pale/clammy skin, too weak to stand, low BP, rapid pulse)   Negative: Similar pain previously and it was from 'heart attack'   Negative: Similar pain previously and it was from 'angina' and not relieved by nitroglycerin   Negative: Sounds like a life-threatening emergency to the triager   Negative: Difficulty breathing or unusual sweating (e.g., sweating without exertion)   Negative: Can't close the mouth fully " "(i.e., \"mouth is locked open\", patient will have difficulty talking)   Negative: Fever and localized red rash   Negative: Fever and area of face is swollen   Negative: Sounds like a life-threatening emergency to the triager   Negative: Pregnant > 20 weeks or postpartum (< 6 weeks after delivery) and new hand or face swelling   Negative: Pregnant > 20 weeks and BP > 140/90   Negative: Systolic BP >= 160 OR Diastolic >= 100, and any cardiac or neurologic symptoms (e.g., chest pain, difficulty breathing, unsteady gait, blurred vision)   Negative: Patient sounds very sick or weak to the triager   Negative: BP Systolic BP >= 140 OR Diastolic >= 90 and postpartum (from 0 to 6 weeks after delivery)   Negative: Systolic BP >= 180 OR Diastolic >= 110, and missed most recent dose of blood pressure medication   Negative: Shock suspected (e.g., cold/pale/clammy skin, too weak to stand, low BP, rapid pulse)   Negative: Similar pain previously and it was from 'heart attack'   Negative: Similar pain previously from 'angina' and not relieved by nitroglycerin   Negative: Difficult to awaken or acting confused (e.g., disoriented, slurred speech)   Negative: Sounds like a life-threatening emergency to the triager   Negative: Difficulty breathing or unusual sweating (e.g., sweating without exertion)   Negative: Chest pain lasting longer than 5 minutes   Negative: Stiff neck (can't touch chin to chest) and has headache   Negative: Stiff neck (can't touch chin to chest) and fever   Negative: Weakness of an arm or hand   Negative: Problems with bowel or bladder control    Protocols used: Face Pain-A-OH, High Blood Pressure-A-OH, Neck Pain or Kcrcpejqh-P-VI    "

## 2023-12-14 NOTE — ED PROVIDER NOTES
History     Chief Complaint:  No chief complaint on file.       HPI   Dot Ramirez is a 38 year old female who presents to the ED for hypertension. Patient reports having excruciating jaw pain on Tuesday. Called for a follow-up and was told to come in to the ED for further evaluation. States her blood pressure has been high occasionally. She is not taking any BP medication. Patient reports on Tuesday,  brought her to [place] urgent care, only to treat the pain. A CT scan done which was negative. States she was sent home with a ton of steroids to take and to call for a follow-up. Patient states this jaw pain isn't new to her. Since June she has been dealing with head problems. She had an episode back in August but was out of town so she was unable to go the ER. Patient is concerned and just wants to know what is causing her symptoms.       Independent Historian:    Patient     Review of External Notes:  I reviewed the ED provider note from 07/20/23. Facial pain and hypertension.   I reviewed telephone encounter from today. Went to [facility] on 12/12/ and sent to the ER.       Allergies:  No Known Allergies     Physical Exam   Patient Vitals for the past 24 hrs:   BP Temp Temp src Pulse Resp SpO2 Weight   12/14/23 1300 (!) 156/104 -- -- 78 -- 100 % --   12/14/23 1245 (!) 162/107 -- -- 76 -- 100 % --   12/14/23 1230 (!) 170/103 -- -- 80 -- 100 % --   12/14/23 1215 (!) 165/97 -- -- 77 -- 99 % --   12/14/23 1200 (!) 206/130 -- -- -- -- -- --   12/14/23 1126 (!) 197/115 98  F (36.7  C) Oral 86 18 99 % 88.3 kg (194 lb 10.7 oz)        Physical Exam  Constitutional: Vital signs reviewed as above.   HENT:    Head: No external signs of trauma noted.   Eyes: Pupils are equal, round, and reactive to light. No papilledema noted on limited ED exam.  Cardiovascular: Normal rate, regular rhythm, normal heart sounds and intact distal pulses.    Pulmonary/Chest: Effort normal and breath sounds normal. No  respiratory distress. No wheezes noted.   Gastrointestinal: Soft. There is no tenderness. There is no rebound.   Musculoskeletal:   No deformities appreciated   No edema noted  Neurological:    Patient is alert and oriented to person, place, and time.    Speech is fluent, cognition is normal.   CN 2-12 intact (PERRL, EOMI, symmetric smile, equal eye squeeze and forehead raise, normal and equal sensation to bilateral forehead/cheek/chin, grossly equal hearing B/L, midline tongue protrusion with nl side-to-side movement, normal shoulder shrug).    RUE strength 5/5: , finger abd, wrist flex/ext, elbow flex/ext.    LUE strength 5/5: , finger abd, wrist flex/ext, elbow flex/ext.    RLE strength 5/5: ankle flex/ext, knee flex/ext, hip flex.    LLE strength 5/5: ankle flex/ext, knee flex/ext, hip flex.    Sensation equal in all 4 extremities.    No arm drift.     Cerebellar: Normal rapid alternating movements     ( finger-nose-finger, rapid pronation/supination, hand rolling)    Normal heel-to-shin   Normal gait as observed in the ED  Skin: Skin is warm and dry.   Psychiatric: The patient appears calm    Emergency Department Course   ECG  ECG results from 12/14/23   EKG 12-lead, tracing only     Value    Systolic Blood Pressure     Diastolic Blood Pressure     Ventricular Rate 77    Atrial Rate 77    VA Interval 144    QRS Duration 78        QTc 423    P Axis 51    R AXIS 52    T Axis 25    Interpretation ECG      Sinus rhythm  Normal ECG  When compared with ECG of 11-JUL-2023 10:35,  No significant change was found  Interpreted by me at 1142           Laboratory: Imaging:   Labs Ordered and Resulted from Time of ED Arrival to Time of ED Departure   BASIC METABOLIC PANEL - Abnormal       Result Value    Sodium 142      Potassium 3.7      Chloride 105      Carbon Dioxide (CO2) 26      Anion Gap 11      Urea Nitrogen 11.2      Creatinine 0.67      GFR Estimate >90      Calcium 8.2 (*)     Glucose 99     CBC  WITH PLATELETS AND DIFFERENTIAL - Abnormal    WBC Count 11.6 (*)     RBC Count 4.39      Hemoglobin 12.0      Hematocrit 36.0      MCV 82      MCH 27.3      MCHC 33.3      RDW 13.6      Platelet Count 396      % Neutrophils 63      % Lymphocytes 29      % Monocytes 7      % Eosinophils 1      % Basophils 0      % Immature Granulocytes 0      NRBCs per 100 WBC 0      Absolute Neutrophils 7.3      Absolute Lymphocytes 3.3      Absolute Monocytes 0.8      Absolute Eosinophils 0.1      Absolute Basophils 0.1      Absolute Immature Granulocytes 0.0      Absolute NRBCs 0.0     TROPONIN T, HIGH SENSITIVITY - Normal    Troponin T, High Sensitivity <6     HCG QUALITATIVE URINE - Normal    hCG Urine Qualitative Negative       CTA Head Neck with Contrast   Preliminary Result   IMPRESSION:    CTA Head:    1. No evidence of large vessel occlusion or high-grade stenosis.   2. The transverse sinuses appear slightly narrow, nonspecific. Further   workup could be performed if there is concern for idiopathic   intracranial hypertension.       CTA Neck:    1. No evidence of large vessel occlusion or high-grade stenosis.       Head CT w/o contrast   Preliminary Result   IMPRESSION: No acute intracranial abnormality.                 Procedures       Emergency Department Course & Assessments:         Interventions:  Medications   metoclopramide (REGLAN) injection 10 mg (has no administration in time range)   diphenhydrAMINE (BENADRYL) injection 25 mg (has no administration in time range)   dexAMETHasone PF (DECADRON) injection 10 mg (has no administration in time range)   CT Scan Flush (80 mLs Intravenous $Given 12/14/23 1418)   iopamidol (ISOVUE-370) solution 500 mL (67 mLs Intravenous $Given 12/14/23 1418)        Assessments, Independent Interpretation, Consult/Discussion of ManagementTests:  ED Course as of 12/14/23 1526   u Dec 14, 2023   1153 I obtained history and examined the patient as noted above.        Social Determinants of  Health affecting care:  None    Disposition:  The patient was discharged to home.     Impression & Plan    CMS Diagnoses: None    Code Status: Prior    Medical Decision Making:  This 38-year-old female patient presents the ED due to hypertension as well as right jaw/facial pain and headache.  Please see the HPI and exam for specifics.  The patient notes she has been seen several times in the last 6 months for similar symptoms.  Her blood pressure has been elevated each of these visits but it does not sound like she was able to see her primary care clinic or be placed on antihypertensive medication.  Additionally, her symptoms today could be consistent with trigeminal neuralgia.  She was given medications in the ED for symptom control, underwent laboratory studies and imaging with results as above (fortunately no intracranial hemorrhage or vascular occlusion) and ultimately I felt she could be safely discharged with a prescription for lisinopril as well as carbamazepine.      I recommended primary care follow-up as well as neurology follow-up.  I have placed referral orders for both of these.  The patient should return with new or worsening symptoms.    Critical Care:  None.    Diagnosis:    ICD-10-CM    1. Hypertension, unspecified type  I10 Primary Care Referral     Adult Neurology  Referral      2. Trigeminal neuralgia  G50.0 Primary Care Referral     Adult Neurology  Referral           Discharge Medications:  New Prescriptions    CARBAMAZEPINE (TEGRETOL XR) 100 MG 12 HR TABLET    Take 1 tablet (100 mg) by mouth 2 times daily for 30 days    LISINOPRIL (ZESTRIL) 5 MG TABLET    Take 1 tablet (5 mg) by mouth daily for 30 days        Scribe Disclosure:  Sara DOVER, am serving as a scribe at 12:12 PM on 12/14/2023 to document services personally performed by Henri Sin DO based on my observations and the provider's statements to me.    12/14/2023   Henri Sin DO           January, Henri Zambrano,   12/14/23 1527

## 2023-12-14 NOTE — DISCHARGE INSTRUCTIONS
"  What do you do next:   Continue your home medications unless we have specifically changed them  A blood pressure medication called \"lisinopril\" was prescribed today.  Takes as directed.  Medication called carbamazepine (Tegretol) was prescribed today.  Use this as directed and I am hopeful this will help improve the facial pain that you are feeling.  It is possible that this medication can interfere with birth control (and make it less effective ) so please take that into account.  You can use over-the-counter acetaminophen (Tylenol ) and ibuprofen for fever or pain control as applicable to your visit today.  Acetaminophen (Tylenol): Take 500 to 1000 mg by mouth every 6 hours as needed for fever or pain.  Do not take more than 4000 total milligrams of acetaminophen-containing products in a 24-hour timeframe.  Ibuprofen: Take 600 milligrams by mouth every 6-8 hours as needed for fever or pain.  Take this with food or milk to avoid stomach upset.  Follow up as indicated below    When do you return: If you have uncontrollable pain, intractable vomiting, loss of vision, focal numbness/weakness of your face/arms/legs, trouble walking or speech, or any other symptoms that concern you, please return to the ED for reevaluation.    Thank you for allowing us to care for you today.    "

## 2023-12-14 NOTE — ED TRIAGE NOTES
Pt reports that she was seen in the ED this wk due to headache, jaw pain and they noted BP elevated at this time, PT reports that she called for follow up appt today and they told pt to come back in due to BP being in the 150's systolic, pt reports mild neck pain at this time, No CP or SOB. VSS and ABC's intact BEFAST negative

## 2023-12-15 LAB
ATRIAL RATE - MUSE: 77 BPM
DIASTOLIC BLOOD PRESSURE - MUSE: NORMAL MMHG
INTERPRETATION ECG - MUSE: NORMAL
P AXIS - MUSE: 51 DEGREES
PR INTERVAL - MUSE: 144 MS
QRS DURATION - MUSE: 78 MS
QT - MUSE: 374 MS
QTC - MUSE: 423 MS
R AXIS - MUSE: 52 DEGREES
SYSTOLIC BLOOD PRESSURE - MUSE: NORMAL MMHG
T AXIS - MUSE: 25 DEGREES
VENTRICULAR RATE- MUSE: 77 BPM

## 2023-12-26 ASSESSMENT — PATIENT HEALTH QUESTIONNAIRE - PHQ9
10. IF YOU CHECKED OFF ANY PROBLEMS, HOW DIFFICULT HAVE THESE PROBLEMS MADE IT FOR YOU TO DO YOUR WORK, TAKE CARE OF THINGS AT HOME, OR GET ALONG WITH OTHER PEOPLE: SOMEWHAT DIFFICULT
SUM OF ALL RESPONSES TO PHQ QUESTIONS 1-9: 3
SUM OF ALL RESPONSES TO PHQ QUESTIONS 1-9: 3

## 2023-12-27 ENCOUNTER — OFFICE VISIT (OUTPATIENT)
Dept: FAMILY MEDICINE | Facility: CLINIC | Age: 38
End: 2023-12-27
Payer: COMMERCIAL

## 2023-12-27 VITALS
WEIGHT: 190 LBS | SYSTOLIC BLOOD PRESSURE: 111 MMHG | HEIGHT: 62 IN | HEART RATE: 86 BPM | TEMPERATURE: 98 F | DIASTOLIC BLOOD PRESSURE: 78 MMHG | BODY MASS INDEX: 34.96 KG/M2 | RESPIRATION RATE: 16 BRPM | OXYGEN SATURATION: 99 %

## 2023-12-27 DIAGNOSIS — G50.0 TRIGEMINAL NEURALGIA: Primary | ICD-10-CM

## 2023-12-27 DIAGNOSIS — I10 BENIGN ESSENTIAL HYPERTENSION: ICD-10-CM

## 2023-12-27 PROBLEM — M79.671 RIGHT FOOT PAIN: Status: RESOLVED | Noted: 2018-09-12 | Resolved: 2023-12-27

## 2023-12-27 LAB
ALBUMIN SERPL BCG-MCNC: 4.3 G/DL (ref 3.5–5.2)
ALBUMIN UR-MCNC: NEGATIVE MG/DL
ALP SERPL-CCNC: 75 U/L (ref 40–150)
ALT SERPL W P-5'-P-CCNC: 15 U/L (ref 0–50)
ANION GAP SERPL CALCULATED.3IONS-SCNC: 12 MMOL/L (ref 7–15)
APPEARANCE UR: CLEAR
AST SERPL W P-5'-P-CCNC: 21 U/L (ref 0–45)
BASOPHILS # BLD AUTO: 0 10E3/UL (ref 0–0.2)
BASOPHILS NFR BLD AUTO: 0 %
BILIRUB SERPL-MCNC: <0.2 MG/DL
BILIRUB UR QL STRIP: NEGATIVE
BUN SERPL-MCNC: 11.3 MG/DL (ref 6–20)
CALCIUM SERPL-MCNC: 9.2 MG/DL (ref 8.6–10)
CHLORIDE SERPL-SCNC: 102 MMOL/L (ref 98–107)
COLOR UR AUTO: YELLOW
CREAT SERPL-MCNC: 0.64 MG/DL (ref 0.51–0.95)
DEPRECATED HCO3 PLAS-SCNC: 25 MMOL/L (ref 22–29)
EGFRCR SERPLBLD CKD-EPI 2021: >90 ML/MIN/1.73M2
EOSINOPHIL # BLD AUTO: 0.3 10E3/UL (ref 0–0.7)
EOSINOPHIL NFR BLD AUTO: 4 %
ERYTHROCYTE [DISTWIDTH] IN BLOOD BY AUTOMATED COUNT: 13.4 % (ref 10–15)
GLUCOSE SERPL-MCNC: 110 MG/DL (ref 70–99)
GLUCOSE UR STRIP-MCNC: NEGATIVE MG/DL
HCT VFR BLD AUTO: 38.7 % (ref 35–47)
HGB BLD-MCNC: 12.8 G/DL (ref 11.7–15.7)
HGB UR QL STRIP: NEGATIVE
IMM GRANULOCYTES # BLD: 0 10E3/UL
IMM GRANULOCYTES NFR BLD: 0 %
KETONES UR STRIP-MCNC: NEGATIVE MG/DL
LEUKOCYTE ESTERASE UR QL STRIP: NEGATIVE
LYMPHOCYTES # BLD AUTO: 2.2 10E3/UL (ref 0.8–5.3)
LYMPHOCYTES NFR BLD AUTO: 24 %
MCH RBC QN AUTO: 27.1 PG (ref 26.5–33)
MCHC RBC AUTO-ENTMCNC: 33.1 G/DL (ref 31.5–36.5)
MCV RBC AUTO: 82 FL (ref 78–100)
MONOCYTES # BLD AUTO: 0.7 10E3/UL (ref 0–1.3)
MONOCYTES NFR BLD AUTO: 8 %
NEUTROPHILS # BLD AUTO: 5.7 10E3/UL (ref 1.6–8.3)
NEUTROPHILS NFR BLD AUTO: 64 %
NITRATE UR QL: NEGATIVE
PH UR STRIP: 5.5 [PH] (ref 5–7)
PLATELET # BLD AUTO: 348 10E3/UL (ref 150–450)
POTASSIUM SERPL-SCNC: 4.2 MMOL/L (ref 3.4–5.3)
PROT SERPL-MCNC: 7 G/DL (ref 6.4–8.3)
RBC # BLD AUTO: 4.73 10E6/UL (ref 3.8–5.2)
SODIUM SERPL-SCNC: 139 MMOL/L (ref 135–145)
SP GR UR STRIP: >=1.03 (ref 1–1.03)
TSH SERPL DL<=0.005 MIU/L-ACNC: 1.71 UIU/ML (ref 0.3–4.2)
UROBILINOGEN UR STRIP-ACNC: 0.2 E.U./DL
WBC # BLD AUTO: 8.9 10E3/UL (ref 4–11)

## 2023-12-27 PROCEDURE — 81003 URINALYSIS AUTO W/O SCOPE: CPT | Performed by: STUDENT IN AN ORGANIZED HEALTH CARE EDUCATION/TRAINING PROGRAM

## 2023-12-27 PROCEDURE — 85025 COMPLETE CBC W/AUTO DIFF WBC: CPT | Performed by: STUDENT IN AN ORGANIZED HEALTH CARE EDUCATION/TRAINING PROGRAM

## 2023-12-27 PROCEDURE — 84443 ASSAY THYROID STIM HORMONE: CPT | Performed by: STUDENT IN AN ORGANIZED HEALTH CARE EDUCATION/TRAINING PROGRAM

## 2023-12-27 PROCEDURE — 80053 COMPREHEN METABOLIC PANEL: CPT | Performed by: STUDENT IN AN ORGANIZED HEALTH CARE EDUCATION/TRAINING PROGRAM

## 2023-12-27 PROCEDURE — 99214 OFFICE O/P EST MOD 30 MIN: CPT | Performed by: STUDENT IN AN ORGANIZED HEALTH CARE EDUCATION/TRAINING PROGRAM

## 2023-12-27 PROCEDURE — 36415 COLL VENOUS BLD VENIPUNCTURE: CPT | Performed by: STUDENT IN AN ORGANIZED HEALTH CARE EDUCATION/TRAINING PROGRAM

## 2023-12-27 PROCEDURE — 80061 LIPID PANEL: CPT | Performed by: STUDENT IN AN ORGANIZED HEALTH CARE EDUCATION/TRAINING PROGRAM

## 2023-12-27 NOTE — PROGRESS NOTES
"  Assessment & Plan     Trigeminal neuralgia  Episodes generally better controlled now with initiation of carbamazepine, 100mg BID. Reviewed ED lab/imaging. Plan to obtain carbamazepine lab monitoring, MRI brain, see Neurology for ongoing management.  - MR Brain w/o & w Contrast  - CBC with platelets and differential  - Comprehensive metabolic panel (BMP + Alb, Alk Phos, ALT, AST, Total. Bili, TP)    Benign essential hypertension  New dx of HTN, started on 5mg lisinopril with good control. Will obtain baseline labwork.  - TSH with free T4 reflex  - UA reflex to Microscopic - lab collect  - Comprehensive metabolic panel (BMP + Alb, Alk Phos, ALT, AST, Total. Bili, TP)  - Lipid panel (nonfasting)    BMI:   Estimated body mass index is 34.75 kg/m  as calculated from the following:    Height as of this encounter: 1.575 m (5' 2\").    Weight as of this encounter: 86.2 kg (190 lb).     Follow up in 3 months for annual physical    Derrickángel Nunez MD  [facility]  12/28/2023    Earle Chris is a 38 year old, presenting for the following health issues:  ER F/U        12/27/2023     9:08 AM   Additional Questions   Roomed by liv     HPI     ED/UC Followup:    Facility:  [facility]  Date of visit: 12/14/2023  Reason for visit: Hypertension, trigeminal neuralgia  Current Status: improving    Facility:  [facility]  Date of visit: 12/12/2023  Reason for visit: head pain, migraine, vomiting  Current Status: improving    Problems have been a lot more minor since ED visit.  Had an episode the day before xmas sunshine but did forget to take the carbamazapine a couple days prior to the incident.    Episodes when severe are debilitating.    Pain causing vomiting, inability to walk due to pain (not due to dizziness/vertigo)  Feels like someone is cutting her from eye to ear, then travels down to the jaw.   Has had episodes rotating between sides of the face. But never at the same time.  Takes carbamazepine twice " "daily.  No side effects thus far.    Episodes all began about 6 months ago.  Had blurry vision (Not sure if from one eye or both) one day prior to significant headache with locked and swollen jaw so much that she couldn't open her mouth.   Has had 3-4 major episodes that have brought her to the ED.   Minor episodes last couple days.   No double vision, vertigo/dizziness, no facial droop, no dysarthria, dysphagia. No hearing loss.   No numbness/tingling/weakness of extremities.  Vomits due to pain. Has some phonophobia. Feeling better now with carbamaepine.    Was started on the lisinopril but took a good week for the blood pressure to normalize.  Normal BPs at home recently though.  136/87 right away in the morning. Not always in pain when checking blood pressures  Family hx of HTN in father starting in 20's. Mom also on HTN medication.        Objective    /78   Pulse 86   Temp 98  F (36.7  C) (Tympanic)   Resp 16   Ht 1.575 m (5' 2\")   Wt 86.2 kg (190 lb)   LMP 12/05/2023 (Exact Date)   SpO2 99%   BMI 34.75 kg/m    Body mass index is 34.75 kg/m .    Physical Exam   GENERAL: healthy, alert and no distress  HEAD: Normocephalic, atraumatic.   EYES: PERRL. Normal conjunctivae, sclera.   ENT: Normal EAC and TMs bilaterally. Normal oropharynx.   NECK: Supple. No lymphadenopathy appreciated.   RESP: lungs clear to auscultation - no rales, rhonchi or wheezes  CV: regular rate and rhythm, normal S1 S2, no murmur, click, rub or gallop. No peripheral swelling noted.   ABDOMEN: soft, no TTP x4 quadrants. No hepatomegaly or masses appreciated. BS normactive.  MSK: no gross musculoskeletal defects noted.  SKIN: no suspicious lesions or rashes.  EXT: Warm and well perfused.   NEURO: CNII-XII grossly intact. No focal deficits.  PSYCH: Groomed, dressed appropriately for weather. Normal mood with consistent affect.   "

## 2023-12-28 LAB
CHOLEST SERPL-MCNC: 178 MG/DL
HDLC SERPL-MCNC: 81 MG/DL
LDLC SERPL CALC-MCNC: 79 MG/DL
NONHDLC SERPL-MCNC: 97 MG/DL
TRIGL SERPL-MCNC: 92 MG/DL

## 2024-01-05 ENCOUNTER — HOSPITAL ENCOUNTER (OUTPATIENT)
Dept: MRI IMAGING | Facility: CLINIC | Age: 39
Discharge: HOME | End: 2024-01-05
Attending: STUDENT IN AN ORGANIZED HEALTH CARE EDUCATION/TRAINING PROGRAM | Admitting: STUDENT IN AN ORGANIZED HEALTH CARE EDUCATION/TRAINING PROGRAM
Payer: COMMERCIAL

## 2024-01-05 DIAGNOSIS — G50.0 TRIGEMINAL NEURALGIA: ICD-10-CM

## 2024-01-05 PROCEDURE — 255N000002 HC RX 255 OP 636: Performed by: STUDENT IN AN ORGANIZED HEALTH CARE EDUCATION/TRAINING PROGRAM

## 2024-01-05 PROCEDURE — A9585 GADOBUTROL INJECTION: HCPCS | Performed by: STUDENT IN AN ORGANIZED HEALTH CARE EDUCATION/TRAINING PROGRAM

## 2024-01-05 PROCEDURE — 70553 MRI BRAIN STEM W/O & W/DYE: CPT

## 2024-01-05 RX ORDER — GADOBUTROL 604.72 MG/ML
9 INJECTION INTRAVENOUS ONCE
Status: COMPLETED | OUTPATIENT
Start: 2024-01-05 | End: 2024-01-05

## 2024-01-05 RX ADMIN — GADOBUTROL 9 ML: 604.72 INJECTION INTRAVENOUS at 10:11

## 2024-01-08 ENCOUNTER — MYC MEDICAL ADVICE (OUTPATIENT)
Dept: FAMILY MEDICINE | Facility: CLINIC | Age: 39
End: 2024-01-08
Payer: COMMERCIAL

## 2024-01-08 DIAGNOSIS — G50.0 TRIGEMINAL NEURALGIA: Primary | ICD-10-CM

## 2024-01-08 DIAGNOSIS — I10 BENIGN ESSENTIAL HYPERTENSION: ICD-10-CM

## 2024-01-09 RX ORDER — LISINOPRIL 5 MG/1
5 TABLET ORAL DAILY
Qty: 90 TABLET | Refills: 3 | Status: SHIPPED | OUTPATIENT
Start: 2024-01-09 | End: 2024-03-15

## 2024-01-09 RX ORDER — CARBAMAZEPINE 100 MG/1
100 TABLET, EXTENDED RELEASE ORAL 2 TIMES DAILY
Qty: 180 TABLET | Refills: 0 | Status: SHIPPED | OUTPATIENT
Start: 2024-01-09 | End: 2024-03-25

## 2024-02-02 ENCOUNTER — APPOINTMENT (OUTPATIENT)
Dept: LAB | Facility: CLINIC | Age: 39
End: 2024-02-02
Payer: COMMERCIAL

## 2024-02-02 ENCOUNTER — TELEPHONE (OUTPATIENT)
Dept: PEDIATRICS | Facility: CLINIC | Age: 39
End: 2024-02-02

## 2024-02-02 ENCOUNTER — VIRTUAL VISIT (OUTPATIENT)
Dept: PEDIATRICS | Facility: CLINIC | Age: 39
End: 2024-02-02
Payer: COMMERCIAL

## 2024-02-02 DIAGNOSIS — J02.0 STREP THROAT: Primary | ICD-10-CM

## 2024-02-02 DIAGNOSIS — J02.9 SORE THROAT: Primary | ICD-10-CM

## 2024-02-02 LAB — DEPRECATED S PYO AG THROAT QL EIA: POSITIVE

## 2024-02-02 PROCEDURE — 87880 STREP A ASSAY W/OPTIC: CPT | Mod: 95 | Performed by: INTERNAL MEDICINE

## 2024-02-02 PROCEDURE — 99213 OFFICE O/P EST LOW 20 MIN: CPT | Mod: 95 | Performed by: INTERNAL MEDICINE

## 2024-02-02 RX ORDER — PENICILLIN V POTASSIUM 500 MG/1
500 TABLET, FILM COATED ORAL 2 TIMES DAILY
Qty: 20 TABLET | Refills: 0 | Status: SHIPPED | OUTPATIENT
Start: 2024-02-02 | End: 2024-02-21

## 2024-02-02 NOTE — TELEPHONE ENCOUNTER
Call patient. She is POSITIVE for strep. I have sent antibiotics to her pharmacy.   Edgar Sampson PA-C     show

## 2024-02-02 NOTE — PROGRESS NOTES
"Dot is a 38 year old who is being evaluated via a billable video visit.      How would you like to obtain your AVS? MyChart  If the video visit is dropped, the invitation should be resent by: Text to cell phone: [phone]  Will anyone else be joining your video visit? No          Assessment & Plan     (J02.9) Sore throat  (primary encounter diagnosis)  Comment: exposure to strep this week (daughter).  Has mild sore throat as of this morning, no other symptoms. Needs strep test.   Plan: Streptococcus A Rapid Screen w/Reflex to PCR -         Clinic Collect                    BMI  Estimated body mass index is 34.75 kg/m  as calculated from the following:    Height as of 12/27/23: 1.575 m (5' 2\").    Weight as of 12/27/23: 86.2 kg (190 lb).   Weight management plan: not discussed          Subjective   Dot is a 38 year old, presenting for the following health issues:  No chief complaint on file.    HPI     Her daughter tested positive for strep on Tuesday. Today she woke up with a sore throat. No other symptoms.                 Objective           Vitals:  No vitals were obtained today due to virtual visit.    Physical Exam   GENERAL: alert and no distress  EYES: Eyes grossly normal to inspection.  No discharge or erythema, or obvious scleral/conjunctival abnormalities.  RESP: No audible wheeze, cough, or visible cyanosis.    SKIN: Visible skin clear. No significant rash, abnormal pigmentation or lesions.  NEURO: Cranial nerves grossly intact.  Mentation and speech appropriate for age.  PSYCH: Appropriate affect, tone, and pace of words          Video-Visit Details    Type of service:  Video Visit     Originating Location (pt. Location): Home    Distant Location (provider location):  On-site  Platform used for Video Visit: Krishna  Signed Electronically by: Amy Hood MD    "

## 2024-02-21 ENCOUNTER — OFFICE VISIT (OUTPATIENT)
Dept: FAMILY MEDICINE | Facility: CLINIC | Age: 39
End: 2024-02-21
Payer: COMMERCIAL

## 2024-02-21 ENCOUNTER — MYC MEDICAL ADVICE (OUTPATIENT)
Dept: FAMILY MEDICINE | Facility: CLINIC | Age: 39
End: 2024-02-21

## 2024-02-21 VITALS
RESPIRATION RATE: 16 BRPM | TEMPERATURE: 98 F | OXYGEN SATURATION: 100 % | WEIGHT: 191 LBS | HEART RATE: 101 BPM | DIASTOLIC BLOOD PRESSURE: 100 MMHG | BODY MASS INDEX: 35.15 KG/M2 | SYSTOLIC BLOOD PRESSURE: 158 MMHG | HEIGHT: 62 IN

## 2024-02-21 DIAGNOSIS — Z00.00 ROUTINE GENERAL MEDICAL EXAMINATION AT A HEALTH CARE FACILITY: Primary | ICD-10-CM

## 2024-02-21 DIAGNOSIS — G50.0 TRIGEMINAL NEURALGIA: ICD-10-CM

## 2024-02-21 DIAGNOSIS — Z11.59 NEED FOR HEPATITIS C SCREENING TEST: ICD-10-CM

## 2024-02-21 DIAGNOSIS — I10 BENIGN ESSENTIAL HYPERTENSION: ICD-10-CM

## 2024-02-21 DIAGNOSIS — F41.1 GAD (GENERALIZED ANXIETY DISORDER): ICD-10-CM

## 2024-02-21 DIAGNOSIS — R10.2 PELVIC PAIN IN FEMALE: ICD-10-CM

## 2024-02-21 DIAGNOSIS — Z87.42 HX OF OVARIAN CYST: ICD-10-CM

## 2024-02-21 DIAGNOSIS — Z12.4 CERVICAL CANCER SCREENING: ICD-10-CM

## 2024-02-21 DIAGNOSIS — F32.9 MAJOR DEPRESSIVE DISORDER, REMISSION STATUS UNSPECIFIED, UNSPECIFIED WHETHER RECURRENT: ICD-10-CM

## 2024-02-21 DIAGNOSIS — Z83.3 FHX: DIABETES MELLITUS: ICD-10-CM

## 2024-02-21 PROCEDURE — 99395 PREV VISIT EST AGE 18-39: CPT | Performed by: STUDENT IN AN ORGANIZED HEALTH CARE EDUCATION/TRAINING PROGRAM

## 2024-02-21 PROCEDURE — G0145 SCR C/V CYTO,THINLAYER,RESCR: HCPCS | Performed by: STUDENT IN AN ORGANIZED HEALTH CARE EDUCATION/TRAINING PROGRAM

## 2024-02-21 PROCEDURE — 87624 HPV HI-RISK TYP POOLED RSLT: CPT | Performed by: STUDENT IN AN ORGANIZED HEALTH CARE EDUCATION/TRAINING PROGRAM

## 2024-02-21 PROCEDURE — 99214 OFFICE O/P EST MOD 30 MIN: CPT | Mod: 25 | Performed by: STUDENT IN AN ORGANIZED HEALTH CARE EDUCATION/TRAINING PROGRAM

## 2024-02-21 RX ORDER — LISINOPRIL 10 MG/1
10 TABLET ORAL DAILY
Qty: 30 TABLET | Refills: 0 | Status: SHIPPED | OUTPATIENT
Start: 2024-02-21 | End: 2024-03-21

## 2024-02-21 RX ORDER — CARBAMAZEPINE 200 MG/1
200 CAPSULE, EXTENDED RELEASE ORAL 2 TIMES DAILY
Qty: 60 CAPSULE | Refills: 0 | Status: SHIPPED | OUTPATIENT
Start: 2024-02-21 | End: 2024-03-25

## 2024-02-21 SDOH — HEALTH STABILITY: PHYSICAL HEALTH: ON AVERAGE, HOW MANY MINUTES DO YOU ENGAGE IN EXERCISE AT THIS LEVEL?: 30 MIN

## 2024-02-21 SDOH — HEALTH STABILITY: PHYSICAL HEALTH: ON AVERAGE, HOW MANY DAYS PER WEEK DO YOU ENGAGE IN MODERATE TO STRENUOUS EXERCISE (LIKE A BRISK WALK)?: 3 DAYS

## 2024-02-21 ASSESSMENT — PATIENT HEALTH QUESTIONNAIRE - PHQ9
10. IF YOU CHECKED OFF ANY PROBLEMS, HOW DIFFICULT HAVE THESE PROBLEMS MADE IT FOR YOU TO DO YOUR WORK, TAKE CARE OF THINGS AT HOME, OR GET ALONG WITH OTHER PEOPLE: SOMEWHAT DIFFICULT
SUM OF ALL RESPONSES TO PHQ QUESTIONS 1-9: 2
SUM OF ALL RESPONSES TO PHQ QUESTIONS 1-9: 2

## 2024-02-21 ASSESSMENT — SOCIAL DETERMINANTS OF HEALTH (SDOH): HOW OFTEN DO YOU GET TOGETHER WITH FRIENDS OR RELATIVES?: ONCE A WEEK

## 2024-02-21 ASSESSMENT — ANXIETY QUESTIONNAIRES
8. IF YOU CHECKED OFF ANY PROBLEMS, HOW DIFFICULT HAVE THESE MADE IT FOR YOU TO DO YOUR WORK, TAKE CARE OF THINGS AT HOME, OR GET ALONG WITH OTHER PEOPLE?: SOMEWHAT DIFFICULT
7. FEELING AFRAID AS IF SOMETHING AWFUL MIGHT HAPPEN: SEVERAL DAYS
6. BECOMING EASILY ANNOYED OR IRRITABLE: SEVERAL DAYS
3. WORRYING TOO MUCH ABOUT DIFFERENT THINGS: SEVERAL DAYS
IF YOU CHECKED OFF ANY PROBLEMS ON THIS QUESTIONNAIRE, HOW DIFFICULT HAVE THESE PROBLEMS MADE IT FOR YOU TO DO YOUR WORK, TAKE CARE OF THINGS AT HOME, OR GET ALONG WITH OTHER PEOPLE: SOMEWHAT DIFFICULT
GAD7 TOTAL SCORE: 6
GAD7 TOTAL SCORE: 6
1. FEELING NERVOUS, ANXIOUS, OR ON EDGE: SEVERAL DAYS
4. TROUBLE RELAXING: SEVERAL DAYS
2. NOT BEING ABLE TO STOP OR CONTROL WORRYING: SEVERAL DAYS
5. BEING SO RESTLESS THAT IT IS HARD TO SIT STILL: NOT AT ALL
GAD7 TOTAL SCORE: 6
7. FEELING AFRAID AS IF SOMETHING AWFUL MIGHT HAPPEN: SEVERAL DAYS

## 2024-02-21 NOTE — PROGRESS NOTES
Ongoing SW/CM Assessment/Plan of Care Note     See SW/CM flowsheets for goals and other objective data.    Patient/Family discharge goal (s):  Goal #1: Communication facilitated  Goal #2: Transportation arranged or issues addressed       PT Recommendation:       OT Recommendation:       SLP Recommendation:       Disposition:  Planned Discharge Destination: Home/apartment with Services/Support    Progress note:   Patient with aspiration pneumonia. Continues on IV antibiotics. COVID test negative. Afebrile and WBC decreasing. ID consulted.  Patient has had EGD with esophageal dilation and PEG tube placement today by GI. Dietician consulted for home feedings.   Contacted patient representative/wife, Lissy Da Silva at phone # [phone].to discuss discharge planning. Wife anticipate plan to buy own tube feedings. Reports patient has had PEG tube in past and is comfortable in management. Declines need for home care services.          Preventive Care Visit  [facility]  Derrick Nunez MD, Family Practice  Feb 21, 2024    Assessment & Plan     Routine general medical examination at a health care facility  Need for hepatitis C screening test  Cervical cancer screening  Reviewed recent lipid, WNL. Due for hepC screening, ordered. Due for pap smear, obtained. Discussed vaccination recommendations.  - Hepatitis C Screen Reflex to HCV RNA Quant and Genotype  - Pap Screen with HPV - recommended age 30 - 65 years  - HPV Hold (Lab Only)    FHx: diabetes mellitus  Elevated blood sugars in recent past along with family hx of DM2. A1c ordered.  - Hemoglobin A1c    Benign essential hypertension  BP uncontrolled on 5mg lisinopril. Plan to increase to 10mg. Follow up in 2 weeks for nurse only BP check and BMP. Of note, patient is not on reliable method of birth control (does not use condoms). Discussed known teratogenicity of lisinopril and should refrain from sexual intercourse until BC started, see below.  - lisinopril (ZESTRIL) 10 MG tablet  Dispense: 30 tablet; Refill: 0    Trigeminal neuralgia  Endorsing ongoing symptoms but decreased in severity since starting carbamazepine. Plan to increase from 100mg BID to 200mg BID. Has upcoming Neurology appt already scheduled. Of  note, also can be associated with teratogenic effects. Discussed refraining from sexual activity until BC started, as below.  - carBAMazepine (CARBATROL) 200 MG 12 hr capsule  Dispense: 60 capsule; Refill: 0    Hx of ovarian cyst  Pelvic pain in female  Hx of recurrent, painful ovarian cysts per patient. Was following with Ob/gyn and on continuous OCP but this was discontinued as patient did not follow up. Completed BEL today. No contraindications for OCP but will need to monitor BP closely. Plan to fill OCP today.    Major depressive disorder, remission status unspecified, unspecified whether recurrent  ELIANA (generalized anxiety disorder)  PHQ of 2, ELIANA of 6 today.  Did not have adequate time to discuss, consider follow up visit if patient would like to further discuss.    Counseling  Appropriate preventive services were discussed with this patient.  Checklist reviewing preventive services available has been given to the patient.    ADDENDUM:  Plan was to restart OCP, however very high drug-drug interaction is present with carbamazepine that decreases efficacy of OCP. Unfortunately as OCP is to assist with recurrent, painful ovarian cyst, other BC options less ideal and may increase recurrence of cysts. Due to high risk of teratogenicity of both lisinopril and carbamazepine, plan to stop carbamazepine, transition to lamotrigine (while on OCP) OR gabapentin until able to see Neurology. Did attempt to call on 2/25/24 but no answer. Will have nurses reach out on Monday to discuss options.    Follow up in 2 weeks for repeat BP and BMP    Derrick Nunez MD  [facility]  2/25/2024    Earle Chris is a 38 year old, presenting for the following:  Physical (Not fasting, due pap) and Menstrual Problem (Would like to have periods stopped)        2/21/2024     2:54 PM   Additional Questions   Roomed by kb        HPI    Trigeminal neuralgia  Is pretty bad at taking medications.   Maybe missing 2 doses per week.   Does have some breakthrough symptoms.  Still getting headaches but they haven't been as severe as in the past.    HTN  Newly diagnosed in 2023 and prescribed through emergency department  Has been taking lisinopril and tolerating well.  Of note, she is NOT currently on BC and is not using condoms.  Has used pull-out method and this has successfully prevented pregnancy for the past 20 years (until they desired a child) per patient.    BC  Has been taking continuous birth control to help control recurrent ovarian cysts  Was receiving this through OB/gyn but they stopped prescription as she hasn't followed up in some time.  No migraines with aura  Non  smoker  No hx of DVT or family hx        2/21/2024   General Health   How would you rate your overall physical health? (!) POOR   Feel stress (tense, anxious, or unable to sleep) Very much   (!) STRESS CONCERN      2/21/2024   Nutrition   Three or more servings of calcium each day? (!) NO   Diet: Regular (no restrictions)   How many servings of fruit and vegetables per day? (!) 0-1   How many sweetened beverages each day? (!) 3         2/21/2024   Exercise   Days per week of moderate/strenous exercise 3 days   Average minutes spent exercising at this level 30 min         2/21/2024   Social Factors   Frequency of gathering with friends or relatives Once a week   Worry food won't last until get money to buy more No   Food not last or not have enough money for food? No   Do you have housing?  Yes   Are you worried about losing your housing? No   Lack of transportation? No   Unable to get utilities (heat,electricity)? No         2/21/2024   Dental   Dentist two times every year? Yes         2/21/2024   TB Screening   Were you born outside of [place]?  No     Today's PHQ-9 Score:       2/21/2024     2:46 PM   PHQ-9 SCORE   PHQ-9 Total Score MyChart 2 (Minimal depression)   PHQ-9 Total Score 2         2/21/2024   Substance Use   Alcohol more than 3/day or more than 7/wk No   Do you use any other substances recreationally? No     Social History     Tobacco Use    Smoking status: Never     Passive exposure: Never    Smokeless tobacco: Never   Vaping Use    Vaping Use: Never used   Substance Use Topics    Alcohol use: Yes     Comment: very very rare    Drug use: No         2/21/2024   Breast Cancer Screening   Family history of breast, colon, or ovarian cancer? Yes         2/21/2024   LAST FHS-7 RESULTS   1st degree relative breast or ovarian cancer Unknown   Any relative bilateral breast cancer Unknown   Any male have breast cancer No   Any woman have breast and ovarian cancer No   Any woman with breast cancer before 50yrs Yes  "  2 or more relatives with breast and/or ovarian cancer Unknown   2 or more relatives with breast and/or bowel cancer Unknown     Maternal aunts - 4 out of 6 with BC but no hx of BC in mother.  Paternal aunts - 2 out of 3 with BC  She is not sure if they have undergone genetic testing    Mammogram Screening - Patient under 40 years of age: Routine Mammogram Screening not recommended.         2/21/2024   STI Screening   New sexual partner(s) since last STI/HIV test? No     History of abnormal Pap smear: One test a LONG time ago with abnormality. Was just told to repeat in one year. Then everything has been normal since that time. - Age 30-65 PAP every 5 years with negative HPV co-testing recommended        Latest Ref Rng & Units 6/11/2018     9:55 AM 6/11/2018     9:40 AM 10/10/2014    12:00 AM   PAP / HPV   PAP (Historical)  NIL   NIL    HPV 16 DNA NEG^Negative  Negative     HPV 18 DNA NEG^Negative  Negative     Other HR HPV NEG^Negative  Negative           2/21/2024   Contraception/Family Planning   Questions about contraception or family planning No     Reviewed and updated as needed this visit by Provider   Tobacco  Allergies    Med Hx  Surg Hx  Fam Hx               Objective    Exam  BP (!) 158/100   Pulse 101   Temp 98  F (36.7  C)   Resp 16   Ht 1.575 m (5' 2\")   Wt 86.6 kg (191 lb)   LMP 01/29/2024   SpO2 100%   BMI 34.93 kg/m     Estimated body mass index is 34.93 kg/m  as calculated from the following:    Height as of this encounter: 1.575 m (5' 2\").    Weight as of this encounter: 86.6 kg (191 lb).    Physical Exam  GENERAL: healthy, alert and no distress  HEAD: Normocephalic, atraumatic.   EYES: PERRL. Normal conjunctivae, sclera.   ENT: Normal EAC and TMs bilaterally. Normal oropharynx.   NECK: Supple. No lymphadenopathy appreciated. Trachea midline. Thyroid not enlarged, not TTP.  RESP: lungs clear to auscultation - no rales, rhonchi or wheezes  CV: regular rate and rhythm, normal S1 S2, no " murmur, click, rub or gallop.  No peripheral swelling noted.   ABDOMEN: soft, no TTP x4 quadrants. No hepatomegaly or masses appreciated. BS normactive.  MSK: no gross musculoskeletal defects noted.  SKIN: no suspicious lesions or rashes.  EXT: Warm and well perfused.   NEURO: CNII-XII grossly intact. No focal deficits.  PSYCH: Groomed, dressed appropriately for weather. Normal mood with consistent affect.     Signed Electronically by: Derrick Nunez MD

## 2024-02-21 NOTE — PATIENT INSTRUCTIONS
How to Check Your Blood Pressure at Home    Make sure your blood pressure cuff is validated (produces accurate readings). Go to validateBP.org for a list of blood pressure cuffs.    Take your blood pressure 2 times right after you wake up (5 minutes after resting and 5 minutes between readings) and 2 times right before going to bed for a total of 4 readings in one day.  Make sure the readings are before you take your medications, smoke and after you empty your bladder.   Sit with good back support and feet flat on the floor. Have your blood pressure cuff resting at your heart level.    Write these numbers down for the next 3 days and MyChart me with the results.          Preventive Care Advice   This is general advice given by our system to help you stay healthy. However, your care team may have specific advice just for you. Please talk to your care team about your preventive care needs.  Nutrition  Eat 5 or more servings of fruits and vegetables each day.  Try wheat bread, brown rice and whole grain pasta (instead of white bread, rice, and pasta).  Get enough calcium and vitamin D. Check the label on foods and aim for 100% of the RDA (recommended daily allowance).  Lifestyle  Exercise at least 150 minutes each week  (30 minutes a day, 5 days a week).  Do muscle strengthening activities 2 days a week. These help control your weight and prevent disease.  No smoking.  Wear sunscreen to prevent skin cancer.  Have a dental exam and cleaning every 6 months.  Yearly exams  See your health care team every year to talk about:  Any changes in your health.  Any medicines your care team has prescribed.  Preventive care, family planning, and ways to prevent chronic diseases.  Shots (vaccines)   HPV shots (up to age 26), if you've never had them before.  Hepatitis B shots (up to age 59), if you've never had them before.  COVID-19 shot: Get this shot when it's due.  Flu shot: Get a flu shot every year.  Tetanus shot: Get a  tetanus shot every 10 years.  Pneumococcal, hepatitis A, and RSV shots: Ask your care team if you need these based on your risk.  Shingles shot (for age 50 and up)  General health tests  Diabetes screening:  Starting at age 35, Get screened for diabetes at least every 3 years.  If you are younger than age 35, ask your care team if you should be screened for diabetes.  Cholesterol test: At age 39, start having a cholesterol test every 5 years, or more often if advised.  Bone density scan (DEXA): At age 50, ask your care team if you should have this scan for osteoporosis (brittle bones).  Hepatitis C: Get tested at least once in your life.  STIs (sexually transmitted infections)  Before age 24: Ask your care team if you should be screened for STIs.  After age 24: Get screened for STIs if you're at risk. You are at risk for STIs (including HIV) if:  You are sexually active with more than one person.  You don't use condoms every time.  You or a partner was diagnosed with a sexually transmitted infection.  If you are at risk for HIV, ask about PrEP medicine to prevent HIV.  Get tested for HIV at least once in your life, whether you are at risk for HIV or not.  Cancer screening tests  Cervical cancer screening: If you have a cervix, begin getting regular cervical cancer screening tests starting at age 21.  Breast cancer scan (mammogram): If you've ever had breasts, begin having regular mammograms starting at age 40. This is a scan to check for breast cancer.  Colon cancer screening: It is important to start screening for colon cancer at age 45.  Have a colonoscopy test every 10 years (or more often if you're at risk) Or, ask your provider about stool tests like a FIT test every year or Cologuard test every 3 years.  To learn more about your testing options, visit:   https://www.[employer]/669262.pdf.  For help making a decision, visit:   https://bit.ly/ai92058.  Prostate cancer screening test: If you have a prostate, ask  your care team if a prostate cancer screening test (PSA) at age 55 is right for you.  Lung cancer screening: If you are a current or former smoker ages 50 to 80, ask your care team if ongoing lung cancer screenings are right for you.  For informational purposes only. Not to replace the advice of your health care provider. Copyright   2023 [facility]. All rights reserved. Clinically reviewed by the [facility] Program. [employer] 930863 - REV 01/24.    Learning About Stress  What is stress?     Stress is your body's response to a hard situation. Your body can have a physical, emotional, or mental response. Stress is a fact of life for most people, and it affects everyone differently. What causes stress for you may not be stressful for someone else.  A lot of things can cause stress. You may feel stress when you go on a job interview, take a test, or run a race. This kind of short-term stress is normal and even useful. It can help you if you need to work hard or react quickly. For example, stress can help you finish an important job on time.  Long-term stress is caused by ongoing stressful situations or events. Examples of long-term stress include long-term health problems, ongoing problems at work, or conflicts in your family. Long-term stress can harm your health.  How does stress affect your health?  When you are stressed, your body responds as though you are in danger. It makes hormones that speed up your heart, make you breathe faster, and give you a burst of energy. This is called the fight-or-flight stress response. If the stress is over quickly, your body goes back to normal and no harm is done.  But if stress happens too often or lasts too long, it can have bad effects. Long-term stress can make you more likely to get sick, and it can make symptoms of some diseases worse. If you tense up when you are stressed, you may develop neck, shoulder, or low back pain. Stress is linked  to high blood pressure and heart disease.  Stress also harms your emotional health. It can make you escamilla, tense, or depressed. Your relationships may suffer, and you may not do well at work or school.  What can you do to manage stress?  You can try these things to help manage stress:   Do something active. Exercise or activity can help reduce stress. Walking is a great way to get started. Even everyday activities such as housecleaning or yard work can help.  Try yoga or alberto chi. These techniques combine exercise and meditation. You may need some training at first to learn them.  Do something you enjoy. For example, listen to music or go to a movie. Practice your hobby or do volunteer work.  Meditate. This can help you relax, because you are not worrying about what happened before or what may happen in the future.  Do guided imagery. Imagine yourself in any setting that helps you feel calm. You can use online videos, books, or a teacher to guide you.  Do breathing exercises. For example:  From a standing position, bend forward from the waist with your knees slightly bent. Let your arms dangle close to the floor.  Breathe in slowly and deeply as you return to a standing position. Roll up slowly and lift your head last.  Hold your breath for just a few seconds in the standing position.  Breathe out slowly and bend forward from the waist.  Let your feelings out. Talk, laugh, cry, and express anger when you need to. Talking with supportive friends or family, a counselor, or a meaghan leader about your feelings is a healthy way to relieve stress. Avoid discussing your feelings with people who make you feel worse.  Write. It may help to write about things that are bothering you. This helps you find out how much stress you feel and what is causing it. When you know this, you can find better ways to cope.  What can you do to prevent stress?  You might try some of these things to help prevent stress:  Manage your time. This  "helps you find time to do the things you want and need to do.  Get enough sleep. Your body recovers from the stresses of the day while you are sleeping.  Get support. Your family, friends, and community can make a difference in how you experience stress.  Limit your news feed. Avoid or limit time on social media or news that may make you feel stressed.  Do something active. Exercise or activity can help reduce stress. Walking is a great way to get started.  Where can you learn more?  Go to https://www.cVidya.net/patiented  Enter N032 in the search box to learn more about \"Learning About Stress.\"  Current as of: February 26, 2023               Content Version: 13.8    8621-7544 [employer].   Care instructions adapted under license by your healthcare professional. If you have questions about a medical condition or this instruction, always ask your healthcare professional. [employer] disclaims any warranty or liability for your use of this information.      "

## 2024-02-25 ENCOUNTER — TELEPHONE (OUTPATIENT)
Dept: FAMILY MEDICINE | Facility: CLINIC | Age: 39
End: 2024-02-25
Payer: COMMERCIAL

## 2024-02-25 RX ORDER — NORETHINDRONE ACETATE AND ETHINYL ESTRADIOL 1; 5 MG/1; UG/1
1 TABLET ORAL DAILY
COMMUNITY
End: 2024-04-22

## 2024-02-25 NOTE — TELEPHONE ENCOUNTER
Hi Triage,    I tried to give this patient a call but she did not answer.    Can you please give her a call to relay the following information:    I was trying to refill her OCP (Sprintec) for recurrent painful ovarian cysts but unfortunately the carbamazepine can interact with this medication making it insufficient to prevent pregnancy.    Her lisinopril and carbamazepine medications are both known teratogens (meaning that they can induce fetal abnormalities) and should not be given without a reliable form of BC. For this reason, we have a couple of options. I would like to still provide the OCP Sprintec as this will be most effective for her ovarian cysts, however, we should stop the carbamazepine and transition to a different medication called lamotrigine instead which can also help with trigeminal neuralgia.  Please make sure that she still establishes with her Neurologist for ongoing maintenance.     She should refrain from any unprotected sex until she has been on the OCP for at least 7 days.     Please route back to me after information given so I can prescribe OCP and lamotrigine (after discussing r/b/se as well).    Thanks,    Derrick Nunez MD  [facility] [place]  2/25/2024

## 2024-02-25 NOTE — TELEPHONE ENCOUNTER
Noted.    Sent separate Triage message to discuss starting OCP. See encounter for more details.    Derrick Nunez MD  [facility] [place]  2/25/2024

## 2024-02-26 NOTE — TELEPHONE ENCOUNTER
Called patient and left voicemail to call back and ask to speak to any triage nurse.       Claudia Krishnamurthy RN

## 2024-02-27 ENCOUNTER — MYC MEDICAL ADVICE (OUTPATIENT)
Dept: FAMILY MEDICINE | Facility: CLINIC | Age: 39
End: 2024-02-27
Payer: COMMERCIAL

## 2024-02-27 LAB
BKR LAB AP GYN ADEQUACY: NORMAL
BKR LAB AP GYN INTERPRETATION: NORMAL
BKR LAB AP HPV REFLEX: NORMAL
BKR LAB AP PREVIOUS ABNORMAL: NORMAL
PATH REPORT.COMMENTS IMP SPEC: NORMAL
PATH REPORT.COMMENTS IMP SPEC: NORMAL
PATH REPORT.RELEVANT HX SPEC: NORMAL

## 2024-02-27 NOTE — TELEPHONE ENCOUNTER
#2    Called patient and left voicemail to call back and ask to speak to any triage nurse.     MCM sent.      Claudia Krishnamurthy RN

## 2024-02-28 NOTE — TELEPHONE ENCOUNTER
Pt has read [facility].  Last read by Dot Ramirez at  5:28 PM on 2/27/2024.     Claudia Krishnamurthy RN

## 2024-02-29 LAB
HUMAN PAPILLOMA VIRUS 16 DNA: NEGATIVE
HUMAN PAPILLOMA VIRUS 18 DNA: NEGATIVE
HUMAN PAPILLOMA VIRUS FINAL DIAGNOSIS: NORMAL
HUMAN PAPILLOMA VIRUS OTHER HR: NEGATIVE

## 2024-03-02 ENCOUNTER — HOSPITAL ENCOUNTER (EMERGENCY)
Facility: CLINIC | Age: 39
Discharge: HOME | End: 2024-03-03
Attending: EMERGENCY MEDICINE | Admitting: EMERGENCY MEDICINE
Payer: COMMERCIAL

## 2024-03-02 DIAGNOSIS — R07.9 CHEST PAIN, UNSPECIFIED TYPE: ICD-10-CM

## 2024-03-02 DIAGNOSIS — R03.0 ELEVATED BLOOD PRESSURE READING: ICD-10-CM

## 2024-03-02 LAB
ANION GAP SERPL CALCULATED.3IONS-SCNC: 11 MMOL/L (ref 7–15)
BASOPHILS # BLD AUTO: 0 10E3/UL (ref 0–0.2)
BASOPHILS NFR BLD AUTO: 0 %
BUN SERPL-MCNC: 9.6 MG/DL (ref 6–20)
CALCIUM SERPL-MCNC: 9.1 MG/DL (ref 8.6–10)
CHLORIDE SERPL-SCNC: 103 MMOL/L (ref 98–107)
CREAT SERPL-MCNC: 0.66 MG/DL (ref 0.51–0.95)
DEPRECATED HCO3 PLAS-SCNC: 26 MMOL/L (ref 22–29)
EGFRCR SERPLBLD CKD-EPI 2021: >90 ML/MIN/1.73M2
EOSINOPHIL # BLD AUTO: 0.1 10E3/UL (ref 0–0.7)
EOSINOPHIL NFR BLD AUTO: 1 %
ERYTHROCYTE [DISTWIDTH] IN BLOOD BY AUTOMATED COUNT: 13.2 % (ref 10–15)
GLUCOSE SERPL-MCNC: 117 MG/DL (ref 70–99)
HCT VFR BLD AUTO: 36 % (ref 35–47)
HGB BLD-MCNC: 12.1 G/DL (ref 11.7–15.7)
IMM GRANULOCYTES # BLD: 0 10E3/UL
IMM GRANULOCYTES NFR BLD: 0 %
LYMPHOCYTES # BLD AUTO: 4.2 10E3/UL (ref 0.8–5.3)
LYMPHOCYTES NFR BLD AUTO: 37 %
MCH RBC QN AUTO: 26.8 PG (ref 26.5–33)
MCHC RBC AUTO-ENTMCNC: 33.6 G/DL (ref 31.5–36.5)
MCV RBC AUTO: 80 FL (ref 78–100)
MONOCYTES # BLD AUTO: 0.8 10E3/UL (ref 0–1.3)
MONOCYTES NFR BLD AUTO: 7 %
NEUTROPHILS # BLD AUTO: 6 10E3/UL (ref 1.6–8.3)
NEUTROPHILS NFR BLD AUTO: 55 %
NRBC # BLD AUTO: 0 10E3/UL
NRBC BLD AUTO-RTO: 0 /100
PLATELET # BLD AUTO: 427 10E3/UL (ref 150–450)
POTASSIUM SERPL-SCNC: 3.6 MMOL/L (ref 3.4–5.3)
RBC # BLD AUTO: 4.52 10E6/UL (ref 3.8–5.2)
SODIUM SERPL-SCNC: 140 MMOL/L (ref 135–145)
TROPONIN T SERPL HS-MCNC: 6 NG/L
WBC # BLD AUTO: 11.2 10E3/UL (ref 4–11)

## 2024-03-02 PROCEDURE — 36415 COLL VENOUS BLD VENIPUNCTURE: CPT | Performed by: EMERGENCY MEDICINE

## 2024-03-02 PROCEDURE — 96374 THER/PROPH/DIAG INJ IV PUSH: CPT

## 2024-03-02 PROCEDURE — 85025 COMPLETE CBC W/AUTO DIFF WBC: CPT | Performed by: EMERGENCY MEDICINE

## 2024-03-02 PROCEDURE — 80048 BASIC METABOLIC PNL TOTAL CA: CPT | Performed by: EMERGENCY MEDICINE

## 2024-03-02 PROCEDURE — 84484 ASSAY OF TROPONIN QUANT: CPT | Performed by: EMERGENCY MEDICINE

## 2024-03-02 PROCEDURE — 250N000011 HC RX IP 250 OP 636: Performed by: EMERGENCY MEDICINE

## 2024-03-02 PROCEDURE — 93005 ELECTROCARDIOGRAM TRACING: CPT

## 2024-03-02 PROCEDURE — 99285 EMERGENCY DEPT VISIT HI MDM: CPT | Mod: 25

## 2024-03-02 RX ORDER — KETOROLAC TROMETHAMINE 15 MG/ML
15 INJECTION, SOLUTION INTRAMUSCULAR; INTRAVENOUS ONCE
Status: COMPLETED | OUTPATIENT
Start: 2024-03-02 | End: 2024-03-02

## 2024-03-02 RX ADMIN — KETOROLAC TROMETHAMINE 15 MG: 15 INJECTION, SOLUTION INTRAMUSCULAR; INTRAVENOUS at 23:38

## 2024-03-02 ASSESSMENT — ACTIVITIES OF DAILY LIVING (ADL): ADLS_ACUITY_SCORE: 37

## 2024-03-02 ASSESSMENT — COLUMBIA-SUICIDE SEVERITY RATING SCALE - C-SSRS
2. HAVE YOU ACTUALLY HAD ANY THOUGHTS OF KILLING YOURSELF IN THE PAST MONTH?: NO
6. HAVE YOU EVER DONE ANYTHING, STARTED TO DO ANYTHING, OR PREPARED TO DO ANYTHING TO END YOUR LIFE?: NO
1. IN THE PAST MONTH, HAVE YOU WISHED YOU WERE DEAD OR WISHED YOU COULD GO TO SLEEP AND NOT WAKE UP?: NO

## 2024-03-03 ENCOUNTER — APPOINTMENT (OUTPATIENT)
Dept: GENERAL RADIOLOGY | Facility: CLINIC | Age: 39
End: 2024-03-03
Attending: EMERGENCY MEDICINE
Payer: COMMERCIAL

## 2024-03-03 VITALS
DIASTOLIC BLOOD PRESSURE: 91 MMHG | WEIGHT: 190 LBS | BODY MASS INDEX: 34.96 KG/M2 | RESPIRATION RATE: 20 BRPM | SYSTOLIC BLOOD PRESSURE: 134 MMHG | HEART RATE: 90 BPM | HEIGHT: 62 IN | OXYGEN SATURATION: 96 % | TEMPERATURE: 98 F

## 2024-03-03 LAB
D DIMER PPP FEU-MCNC: 0.37 UG/ML FEU (ref 0–0.5)
HOLD SPECIMEN: NORMAL
HOLD SPECIMEN: NORMAL
TROPONIN T SERPL HS-MCNC: <6 NG/L

## 2024-03-03 PROCEDURE — 85379 FIBRIN DEGRADATION QUANT: CPT | Performed by: EMERGENCY MEDICINE

## 2024-03-03 PROCEDURE — 71046 X-RAY EXAM CHEST 2 VIEWS: CPT

## 2024-03-03 PROCEDURE — 84484 ASSAY OF TROPONIN QUANT: CPT | Performed by: EMERGENCY MEDICINE

## 2024-03-03 PROCEDURE — 36415 COLL VENOUS BLD VENIPUNCTURE: CPT | Performed by: EMERGENCY MEDICINE

## 2024-03-03 ASSESSMENT — ACTIVITIES OF DAILY LIVING (ADL)
ADLS_ACUITY_SCORE: 37
ADLS_ACUITY_SCORE: 37

## 2024-03-03 NOTE — ED PROVIDER NOTES
"  History     Chief Complaint:  Chest Pain       HPI   Dot Ramirez is a 38 year old female with a history of hypertension who presents with chest pain. The patient reports that she developed abrupt onset severe right sided chest pain after she lifted her right arm. She notes that the pain has radiated to her right arm tonight around 2100 and has not settled in to the middle of the chest. She states that she has been having hypertension concerns and she checked her blood pressure and it was 163/105. She then took an extra dose of Lisinopril and rechecked her blood pressure after a 10 minutes and it was 174/111. She denies any fever, cough, congestion, neck pain, back pain and abdominal pain. No recent illness. No history of blood clots. No vision and speech changes. She notes that she hasn't been on OCPs since last .  Denies pregnancy.  Does do lifting and pushing for work.  No trauma.     Independent historian:   Patient, as per HPI.     Review of External notes  PCP visit - lisinopril for HTN 10 mg    Medications:    Carbatrol  Tegretol xr  Lisinopril   Femhrt      Past Medical History:    Anxiety   Benign essential hypertension   Depressive disorder   Jaw claudication   Moderate major depression   Acute pelvic inflammatory disease       Past Surgical History:    Biopsy artery temporal   Cerclage cervical    section   Excise mass finger   Extraction dental      Physical Exam   Patient Vitals for the past 24 hrs:   BP Temp Temp src Pulse Resp SpO2 Height Weight   24 -- -- -- 92 -- 100 % -- --   24 (!) 147/104 -- -- 97 -- -- -- --   24 -- 98  F (36.7  C) Oral -- 20 -- 1.575 m (5' 2\") 86.2 kg (190 lb)   24 -- -- -- -- -- 100 % -- --   24 -- -- -- -- -- 99 % -- --   24 -- -- -- -- -- 96 % -- --   24 (!) 170/104 -- -- 91 -- -- -- --        Physical Exam    Gen: alert  Neck: normal ROM  CV: RRR, 2+ distal pulses in all 4 " extremities  Chest: exquisite tenderness over the chest wall with reproduction of her symptoms  Pulm: breath sounds equal, lungs clear  Abd: Soft, nontender  Back: no evidence of injury  MSK: no lower extremity edema, no calf tenderness  Skin: no rash  Neuro: alert, appropriate conversation and interaction      Emergency Department Course   ECG:  ECG results from 03/02/24   EKG 12-lead, tracing only     Value    Systolic Blood Pressure     Diastolic Blood Pressure     Ventricular Rate 86    Atrial Rate 86    AR Interval 152    QRS Duration 80        QTc 442    P Axis 46    R AXIS 35    T Axis 16    Interpretation ECG      Sinus rhythm  Normal ECG  When compared with ECG of 14-DEC-2023 11:38,  No significant change was found         Imaging:  Chest XR,  PA & LAT   Final Result   IMPRESSION: Negative chest.             Laboratory:  Labs Ordered and Resulted from Time of ED Arrival to Time of ED Departure   BASIC METABOLIC PANEL - Abnormal       Result Value    Sodium 140      Potassium 3.6      Chloride 103      Carbon Dioxide (CO2) 26      Anion Gap 11      Urea Nitrogen 9.6      Creatinine 0.66      GFR Estimate >90      Calcium 9.1      Glucose 117 (*)    CBC WITH PLATELETS AND DIFFERENTIAL - Abnormal    WBC Count 11.2 (*)     RBC Count 4.52      Hemoglobin 12.1      Hematocrit 36.0      MCV 80      MCH 26.8      MCHC 33.6      RDW 13.2      Platelet Count 427      % Neutrophils 55      % Lymphocytes 37      % Monocytes 7      % Eosinophils 1      % Basophils 0      % Immature Granulocytes 0      NRBCs per 100 WBC 0      Absolute Neutrophils 6.0      Absolute Lymphocytes 4.2      Absolute Monocytes 0.8      Absolute Eosinophils 0.1      Absolute Basophils 0.0      Absolute Immature Granulocytes 0.0      Absolute NRBCs 0.0     TROPONIN T, HIGH SENSITIVITY - Normal    Troponin T, High Sensitivity 6     D DIMER QUANTITATIVE          ED Course as of 03/02/24 2348   Sat Mar 02, 2024   2300 I have evaluated the  patient.          Interventions:  Medications   ketorolac (TORADOL) injection 15 mg (15 mg Intravenous $Given 3/2/24 8215)        Impression & Plan    Independent Interpretation:  CXR- no infiltrate or effusion, normal mediastinum    Medical Decision Making:  The patient presents for chest pain.  An evaluation for life threatening cause of chest pain was undertaken.  Ddx included ACS, unstable angina, PTX, pneumonia, aortic dissection, PE.  The history and presenting symptoms do not suggest unstable angina.  EKG is nonischemic and troponin is negative.  CXR was negative for evidence of PTX or PNA.  Symptoms are not suggestive of infectious process.  Mediastinum is not widened on CXR and symptoms and history is not suggestive of aortic dissection.  Presentation not suggestive of PE based on risk factors and vital signs and ddimer neg.  I suspect MSK chest pain given reproducible nature of the pain.  I recommended tylenol and ibuprofen PRN for pain.  Return for worsening symptoms  I feel that ED evaluation has excluded acute, dangerous cause of CP and patient is safe for discharge.  Discussed signs and symptoms for which to return and the need for close outpatient follow up.    Instructions to pt:  Continue to monitor your blood pressure at home.  Check blood pressure 2x per day- morning and afternoon or evening.  Keep log of your blood pressures and take this to your primary care doctor.  Continue your blood pressure mediation as prescribed.    Take ibuprofen 600 mg 3x per day as needed.  This will provide both pain control and fight against inflammation.  Take tylenol 650 mg every 4-6 hours as needed.  Do not take more than 4000mg tylenol in 24 hours.      Disposition:  Discharge    Diagnosis:    ICD-10-CM    1. Chest pain, unspecified type  R07.9       2. Elevated blood pressure reading  R03.0            Discharge Medications: no new Rx  New Prescriptions    No medications on file          Scribe Disclosure:  I,  Fallon Kimble, am serving as a scribe at 11:48 PM on 3/2/2024 to document services personally performed by Marleny Pa based on my observations and the provider's statements to me.    Marleny Pa  March 2, 2024         Marleny Pa MD  03/03/24 0309

## 2024-03-03 NOTE — DISCHARGE INSTRUCTIONS
Continue to monitor your blood pressure at home.  Check blood pressure 2x per day- morning and afternoon or evening.  Keep log of your blood pressures and take this to your primary care doctor.  Continue your blood pressure mediation as prescribed.    Take ibuprofen 600 mg 3x per day as needed.  This will provide both pain control and fight against inflammation.  Take tylenol 650 mg every 4-6 hours as needed.  Do not take more than 4000mg tylenol in 24 hours.

## 2024-03-03 NOTE — ED TRIAGE NOTES
Pt BIBA from home for Midsternal sharp chest pain which radiated into her right arm. Pt has hx of htn and normally takes 10mg of lisinorpil daily. Took additional 5mg around 2100 whichdid not have effect. Pt denies trauma. EMS gave nitroglycerin x3 and 100mcg of fentanyl without relief. Also gave 324 Aspirin. Currently on menses. Pt complaining of increased pain with deep breaths and movement

## 2024-03-04 LAB
ATRIAL RATE - MUSE: 86 BPM
DIASTOLIC BLOOD PRESSURE - MUSE: NORMAL MMHG
INTERPRETATION ECG - MUSE: NORMAL
P AXIS - MUSE: 46 DEGREES
PR INTERVAL - MUSE: 152 MS
QRS DURATION - MUSE: 80 MS
QT - MUSE: 370 MS
QTC - MUSE: 442 MS
R AXIS - MUSE: 35 DEGREES
SYSTOLIC BLOOD PRESSURE - MUSE: NORMAL MMHG
T AXIS - MUSE: 16 DEGREES
VENTRICULAR RATE- MUSE: 86 BPM

## 2024-03-06 ENCOUNTER — ALLIED HEALTH/NURSE VISIT (OUTPATIENT)
Dept: FAMILY MEDICINE | Facility: CLINIC | Age: 39
End: 2024-03-06
Payer: COMMERCIAL

## 2024-03-06 ENCOUNTER — LAB (OUTPATIENT)
Dept: LAB | Facility: CLINIC | Age: 39
End: 2024-03-06
Payer: COMMERCIAL

## 2024-03-06 VITALS — SYSTOLIC BLOOD PRESSURE: 157 MMHG | DIASTOLIC BLOOD PRESSURE: 102 MMHG

## 2024-03-06 DIAGNOSIS — I10 BENIGN ESSENTIAL HYPERTENSION: ICD-10-CM

## 2024-03-06 DIAGNOSIS — Z11.59 NEED FOR HEPATITIS C SCREENING TEST: ICD-10-CM

## 2024-03-06 DIAGNOSIS — Z83.3 FHX: DIABETES MELLITUS: ICD-10-CM

## 2024-03-06 DIAGNOSIS — Z01.30 BP CHECK: Primary | ICD-10-CM

## 2024-03-06 LAB — HBA1C MFR BLD: 5.8 % (ref 0–5.6)

## 2024-03-06 PROCEDURE — 99207 PR NO CHARGE NURSE ONLY: CPT

## 2024-03-06 PROCEDURE — 83036 HEMOGLOBIN GLYCOSYLATED A1C: CPT

## 2024-03-06 PROCEDURE — 86803 HEPATITIS C AB TEST: CPT

## 2024-03-06 PROCEDURE — 36415 COLL VENOUS BLD VENIPUNCTURE: CPT

## 2024-03-06 PROCEDURE — 80048 BASIC METABOLIC PNL TOTAL CA: CPT

## 2024-03-06 NOTE — TELEPHONE ENCOUNTER
Discussed message from Derrick Nunez MD with patient while in clinic today for BP check. Pt agreeable to changing from carbamazepine to lamotrigine.     Pt does have neurology appointment on 3/18. Please advise patient of instructions in changing from carbamazepine to lamotrigine via MyChart versus a phone call per pt request.     Gabriella Anna RN on 3/6/2024 at 9:52 AM

## 2024-03-06 NOTE — PROGRESS NOTES
Dot Ramirez is a 38 year old patient who comes in today for a Blood Pressure check.  Initial BP:  LMP 03/02/2024      Data Unavailable  Disposition: BP elevated.  Triage RN notified, patient asked to wait         3/6/2024 9:07 AM 3/6/2024 9:17 AM   /101 144/97   BP Location Right arm Right arm   Patient Position Sitting Sitting   Cuff Size Adult Large Adult Large

## 2024-03-06 NOTE — PROGRESS NOTES
Dot Ramirez is a 38 year old year old patient who comes in today for a Blood Pressure check because of medication change.  Vital Signs as repeated by /102  Patient is taking medication as prescribed-lisinopril 10mg daily  Patient is tolerating medications well.  Patient is monitoring Blood Pressure at home.  Average readings if yes are 135-145/95s  Current complaints: intermittent palpable chest pain-seen in ER on 3/3, cardiac work up negative, MSK pain per ER doctor.  Disposition:  huddled with provider Derrick Nunez MD-who will review chart and make adjustments to BP meds. Will follow up with patient via [facility] with plan.     Patient was given an opportunity to ask questions and at this time denies further questions. Patient verbalizes understanding, and is agreeable to the discussed plan. Advised patient if there are any questions, to reach out for further assistance. Patient agrees to follow-up as needed.     Gabriella Anna RN, BSN  [facility]     03/06/2024 at 9:47 AM

## 2024-03-07 PROBLEM — R73.03 PREDIABETES: Status: ACTIVE | Noted: 2024-03-07

## 2024-03-07 LAB
ANION GAP SERPL CALCULATED.3IONS-SCNC: 10 MMOL/L (ref 7–15)
BUN SERPL-MCNC: 13.6 MG/DL (ref 6–20)
CALCIUM SERPL-MCNC: 9.1 MG/DL (ref 8.6–10)
CHLORIDE SERPL-SCNC: 104 MMOL/L (ref 98–107)
CREAT SERPL-MCNC: 0.71 MG/DL (ref 0.51–0.95)
DEPRECATED HCO3 PLAS-SCNC: 24 MMOL/L (ref 22–29)
EGFRCR SERPLBLD CKD-EPI 2021: >90 ML/MIN/1.73M2
GLUCOSE SERPL-MCNC: 98 MG/DL (ref 70–99)
HCV AB SERPL QL IA: NONREACTIVE
POTASSIUM SERPL-SCNC: 5 MMOL/L (ref 3.4–5.3)
SODIUM SERPL-SCNC: 138 MMOL/L (ref 135–145)

## 2024-03-08 ENCOUNTER — MYC MEDICAL ADVICE (OUTPATIENT)
Dept: FAMILY MEDICINE | Facility: CLINIC | Age: 39
End: 2024-03-08
Payer: COMMERCIAL

## 2024-03-08 NOTE — TELEPHONE ENCOUNTER
See [facility] and advise.  See also Nurse only BP check on 3/6/24.    Gunjan White RN, BSN  [facility]

## 2024-03-11 NOTE — TELEPHONE ENCOUNTER
See [employer] message thread dated 2/27/24 regarding carbamazepine and OCP Sprintec.    Michelle Barakat RN on 3/11/2024 at 1:23 PM

## 2024-03-11 NOTE — TELEPHONE ENCOUNTER
What does she do for work?     Let's have her increase lisinopril to 20mg daily and follow up in office with me for repeat blood pressure and BMP test along with follow up ED visit.    Thanks,    Derrick Nunez MD  [facility]  3/11/2024

## 2024-03-12 NOTE — TELEPHONE ENCOUNTER
Pt called and scheduled an ED follow up as well as a med check due to a conversation with her provider yesterday.    Jeanette Carreon

## 2024-03-12 NOTE — TELEPHONE ENCOUNTER
Called patient to discuss plan moving forward.    As she is so close to upcoming Neurology appt on 3/18/24, plan to continue carbamazepine until then with plans for them to consider best alternative medication that is compatible with OCP. Understands teratogenic risks of current medications (carbamazepine and lisinopril) and recommendation to completely avoid unprotected sex until able to start OCP.     Would start OCP now but with uncontrolled BP, plan to await appt on Friday to repeat BP.    Derrick Nunez MD  [facility]  3/12/2024

## 2024-03-12 NOTE — PROGRESS NOTES
Plan made to increase lisinopril to 20mg daily. Schedule follow up ED visit.    Derrick Nunez MD  [facility]  3/12/2024

## 2024-03-15 ENCOUNTER — OFFICE VISIT (OUTPATIENT)
Dept: FAMILY MEDICINE | Facility: CLINIC | Age: 39
End: 2024-03-15
Payer: COMMERCIAL

## 2024-03-15 VITALS
OXYGEN SATURATION: 99 % | SYSTOLIC BLOOD PRESSURE: 148 MMHG | HEIGHT: 62 IN | TEMPERATURE: 98 F | DIASTOLIC BLOOD PRESSURE: 92 MMHG | BODY MASS INDEX: 32.94 KG/M2 | RESPIRATION RATE: 16 BRPM | HEART RATE: 82 BPM | WEIGHT: 179 LBS

## 2024-03-15 DIAGNOSIS — M94.0 COSTOCHONDRITIS: Primary | ICD-10-CM

## 2024-03-15 DIAGNOSIS — I10 BENIGN ESSENTIAL HYPERTENSION: ICD-10-CM

## 2024-03-15 PROCEDURE — 99214 OFFICE O/P EST MOD 30 MIN: CPT | Performed by: STUDENT IN AN ORGANIZED HEALTH CARE EDUCATION/TRAINING PROGRAM

## 2024-03-15 ASSESSMENT — PAIN SCALES - GENERAL: PAINLEVEL: MILD PAIN (3)

## 2024-03-15 NOTE — PROGRESS NOTES
Assessment & Plan     Costochondritis  Follow up ED visit for chest pain. Labs, imaging including CBC, BMP, d-dimer, troponin x2, CXR and EKG unremarkable. Thought to be MSK and discharged. Exam today consistent with costochondritis. Discussed conservative measures.     Benign essential hypertension  Recently increased lisinopril from 10 to 20mg daily just 4 days ago. BP is downtrending. Plan to follow up in 2 weeks for BP recheck and BMP.     Follow up in 2 weeks as already scheduled    30 minutes spent on pre-charting, visit and documentation    Derrick Nunez MD  [facility] [place]  3/15/2024      Earle Chris is a 38 year old, presenting for the following health issues:  Hospital F/U      3/15/2024    10:40 AM   Additional Questions   Roomed by juan ballard   Accompanied by self         3/15/2024    10:40 AM   Patient Reported Additional Medications   Patient reports taking the following new medications na     HPI     ED/UC Followup:    Facility:  [facility]  Date of visit: 3/2/24  Reason for visit: Chest pain   Current Status: Minor chest pain still     BP  20 mg of lisinopril, last increased on Monday, four days ago.  Lowest number was today (lower than it has been in the past couple of weeks)    Chest pain  Focally mid-sternum region  No clue how long its been going on.   Couldn't even tell me if it has been years.   Knows that she feels when her BP is high, her chest pain is worse.  Pushing on mid sternal region makes it feel better.  Certain positions make it feel worse.  Has not tried NSAIDs yet.    No worsening with food.  Doesn't feel like depression/anxiety provoking this.        Objective    LMP 03/02/2024   There is no height or weight on file to calculate BMI.    Physical Exam   GENERAL: healthy, alert and no distress  HEAD: Normocephalic, atraumatic.   EYES: Normal conjunctivae, sclera.   RESP: lungs clear to auscultation - no rales, rhonchi or wheezes  CV: Exquisite reproduction of chest  pain over R mid chest, L mid chest. Regular rate and rhythm, normal S1 S2, no murmur, click, rub or gallop.    ABDOMEN: soft, no TTP x4 quadrants. No hepatomegaly or masses appreciated. BS normactive.  MSK: no gross musculoskeletal defects noted.  SKIN: no suspicious lesions or rashes.  EXT: Warm and well perfused.  NEURO: CNII-XII grossly intact. No focal deficits.  PSYCH: Groomed, dressed appropriately for weather. Normal mood with consistent affect.     Signed Electronically by: Derrick Nunez MD

## 2024-03-18 ENCOUNTER — OFFICE VISIT (OUTPATIENT)
Dept: NEUROLOGY | Facility: CLINIC | Age: 39
End: 2024-03-18
Attending: EMERGENCY MEDICINE
Payer: COMMERCIAL

## 2024-03-18 VITALS — HEART RATE: 76 BPM | SYSTOLIC BLOOD PRESSURE: 143 MMHG | DIASTOLIC BLOOD PRESSURE: 94 MMHG

## 2024-03-18 DIAGNOSIS — G50.0 TRIGEMINAL NEURALGIA: ICD-10-CM

## 2024-03-18 DIAGNOSIS — I10 HYPERTENSION, UNSPECIFIED TYPE: ICD-10-CM

## 2024-03-18 PROCEDURE — 99417 PROLNG OP E/M EACH 15 MIN: CPT | Performed by: PSYCHIATRY & NEUROLOGY

## 2024-03-18 PROCEDURE — 99205 OFFICE O/P NEW HI 60 MIN: CPT | Performed by: PSYCHIATRY & NEUROLOGY

## 2024-03-18 NOTE — PROGRESS NOTES
[facility]   Headache Neurology Consult  March 18, 2024     Dot Ramirez MRN# [id number]   YOB: 1985 Age: 38 year old     Requesting provider: Henri Sin DO          Assessment and Recommendations:     Dot Ramirez is a 38 year old female who presents for evaluation of trigeminal neuralgia. She has continuous, sharp facial pain associated with side switching (never bilateral) and no triggers with autonomic features (unilateral on the side of the pain lacrimation and facial and forehead sweating) which lasts 24 hours and remits for >24 hours. The absence of episodic side locked nature rules out trigeminal neuralgia. Could consider in a patient with MS with bilateral presentation, but she does not have evidence to support this diagnosis. Her symptom are most consistent with probable hemicrania continua, remitting subtype diagnosis, but need to rule out secondary causes. Cannot be fully diagnosed without an indomethacin trial. Would not recommend indomethacin trial at this time due to her ongoing hypertension, which has already been difficult to treat, if it is better controlled would then be reconsidered    Pain does have migrainous features. She is currently on carbamazepine and we discussed that we will obtain MR Brain with Orbits to rule out carotid cavernous fistula (may not be seen on routine MRI) and CT chest to rule out a pancoast tumor. If these secondary causes are not present, will recommend taper off carbamazepine and proceeding with the following plan.    Headache treatment plan:  -Would recommend gabapentin 300mg three times daily  -If not tolerated, could consider topiramate 25mg, titrated to 100mg bedtime.  Alternately, melatonin, Emgality, verapamil and onabotulinum toxin A injections could be trialed    I will see her back after imaging results.     Total time spent today was 82 minutes in chart review, history, exam and  counseling.      Li Montemayor MD  Neurology            Chief Complaint:     Chief Complaint   Patient presents with    Consult For     Headache/Migraine  PCP wants Neurology's opinion on Carbamazepine prior to switching it     Trigeminal Neuralgia per referral placed       History is obtained from the patient and medical record.      Dot Ramirez is a 38 year old female who last [place] and she had not gone on any activities. She awoke with shooting pain in the right eye/temporal region. She requested Tylenol  (never takes meds) and every 4 hours was taking it. Sat and Sunday were fine and then she awoke with eyes to ears and downwards and her jaw was locked. She had pain radiating from the left temple eye to ear.    She has giant cell arteritis and had double temporal biopsy, negative. Pain resolved after high dosed steroids 3-4 weeks. Pain restarted at the same severity 2 months later. Then pain recurred again on vacation and lasted for 1 month. During this time was having weekly episodes. It resolved again after and in November it started again. It does switch sides, it is never both sides at the same time.     Starts at the temple to the ear and then involves the jaw and radiates to the bilateral jaw.  It feels like someone was cutting her open, sharp. It would start and radiate down, lasting through into the evening until bedtime. She will awaken the next day and gone. There are no jabs of sharp shooting pain, it is continuous. She has phonophobia and possible photophobia (she shuts her eyes), she wants to lie down during the worst of the pain. She is uncertain whether there is any pulsatile sensation. Sometimes there is tinnitus. Activity will worsen it. There is nausea and vomiting with it. She will touch her face more.  She does have eye watering only on the left side, no conjunctival injection. Uncertain if eye ptosis or flushing. No eye edema. There is unilateral sweating on  the side of the pain. No congestion or rhinorrhea. No gritty sensation.    She has a good sense that carbamazepine is helpful. She wasn't on any medications after steroids so there was a remission period of 2 months after those. There has also been relief from midol complete.    She has had hypertension up to 200 in the past and has had chest pain which was diagnosed as costochondritis (reproducible on chest wall palpation). She has had a cyst causing fallopian tube obstruction leading to sepsis and this is the reason for PCP wanting to start birth control. This interacts with carbamazepine.    She would get headaches in youth frequently but not migraine. Mother with migraines.            Past Medical History:     Past Medical History:   Diagnosis Date    Abnormal Pap smear of cervix 2010    see problem list    Anxiety     Benign essential hypertension 2024    Depressive disorder 20yrs    When I was 16yrs I had my first episodes    Jaw claudication     Moderate major depression (H) 2014    onset with fetal loss    PID (acute pelvic inflammatory disease) 2022    Temporal pain               Past Surgical History:     Past Surgical History:   Procedure Laterality Date    BIOPSY ARTERY TEMPORAL Bilateral 2023    Procedure: TEMPORAL ARTERY BIOPSY-BILATERAL;  Surgeon: Jnuo Kruger MD;  Location: [facility] OR    CERCLAGE CERVICAL N/A 2015    Procedure: CERCLAGE CERVICAL;  Surgeon: Damion Nayak MD;  Location: [facility]    CERCLAGE CERVICAL N/A 2016    Procedure: CERCLAGE CERVICAL;  Surgeon: Damion Nayak MD;  Location: [facility]     SECTION N/A 2015    Procedure:  SECTION;  Surgeon: Jamison Florian MD;  Location: [facility] L[facility]     SECTION N/A 3/6/2017    Procedure:  SECTION;  Surgeon: Jamison Florian MD;  Location: [facility] OR    Cystectomy hand  2016    EXCISE MASS FINGER Left 2016    Procedure: Excision of cystic mass, left ring finger.  Surgeon:  Rosales Jones MD  Location: [facility]    EXTRACTION(S) DENTAL  11/2009             Social History:     Social History     Socioeconomic History    Marital status:      Spouse name: Ankit    Number of children: Not on file    Years of education: Not on file    Highest education level: Not on file   Occupational History    Occupation:    Tobacco Use    Smoking status: Never     Passive exposure: Childhood    Smokeless tobacco: Never   Vaping Use    Vaping Use: Never used   Substance and Sexual Activity    Alcohol use: Yes     Comment: very very rare    Drug use: No    Sexual activity: Yes     Partners: Male     Birth control/protection: Pull-out method, Condom   Other Topics Concern    Parent/sibling w/ CABG, MI or angioplasty before 65F 55M? No   Social History Narrative    Not on file     Social Determinants of Health     Financial Resource Strain: Low Risk  (2/21/2024)    Financial Resource Strain     Within the past 12 months, have you or your family members you live with been unable to get utilities (heat, electricity) when it was really needed?: No   Food Insecurity: Low Risk  (2/21/2024)    Food Insecurity     Within the past 12 months, did you worry that your food would run out before you got money to buy more?: No     Within the past 12 months, did the food you bought just not last and you didn t have money to get more?: No   Transportation Needs: Low Risk  (2/21/2024)    Transportation Needs     Within the past 12 months, has lack of transportation kept you from medical appointments, getting your medicines, non-medical meetings or appointments, work, or from getting things that you need?: No   Physical Activity: Insufficiently Active (2/21/2024)    Exercise Vital Sign     Days of Exercise per Week: 3 days     Minutes of Exercise per Session: 30 min   Stress: Stress Concern Present (2/21/2024)    [employer] [place] [employer] - Occupational Stress  Questionnaire     Feeling of Stress : Very much   Social Connections: Unknown (2024)    Social Connection and Isolation Panel [NHANES]     Frequency of Communication with Friends and Family: Not on file     Frequency of Social Gatherings with Friends and Family: Once a week     Attends Tenriism Services: Not on file     Active Member of Clubs or Organizations: Not on file     Attends Club or Organization Meetings: Not on file     Marital Status: Not on file   Interpersonal Safety: Low Risk  (2024)    Interpersonal Safety     Do you feel physically and emotionally safe where you currently live?: Yes     Within the past 12 months, have you been hit, slapped, kicked or otherwise physically hurt by someone?: No     Within the past 12 months, have you been humiliated or emotionally abused in other ways by your partner or ex-partner?: No   Housing Stability: Low Risk  (2024)    Housing Stability     Do you have housing? : Yes     Are you worried about losing your housing?: No             Family History:     Family History   Problem Relation Age of Onset    Family History Negative Mother         born     Hypertension Mother     Neurologic Disorder Father         born 1948 [address]    Prostate Cancer Father     Hypertension Father     Diabetes Maternal Grandmother          97yo Type II    Alzheimer Disease Maternal Grandfather 89         94yo     Family History Negative Paternal Grandmother             Family History Negative Paternal Grandfather             Family History Negative Brother         1/2 brother Hemochromatosis   Mother with migraine          Allergies:    No Known Allergies          Medications:   Acute headache medications:  Midol complete- completely aborts headaches  Exedrine - no benefit    acetaminophen (TYLENOL) 325 MG tablet, Take 3 tablets (975 mg) by mouth every 6 hours as needed for mild pain or fever, Disp: , Rfl: - number of days per  month?    ibuprofen (ADVIL/MOTRIN) 600 MG tablet, Take 1 tablet (600 mg) by mouth every 6 hours as needed for moderate pain (Patient taking differently: Take 600 mg by mouth every 6 hours as needed for moderate pain Duplicate. Patient takes OTC), Disp: , Rfl:  - number of days per month?    Preventative headache medications:    carBAMazepine (CARBATROL) 200 MG 12 hr capsule, Take 1 capsule (200 mg) by mouth 2 times daily, Disp: 60 capsule, Rfl: 0  Gabapentin 300mg for restless legs, at night, and too drowsy with 2 pills, too drowsy with 2 tabs in morning and no other side effects     Current Outpatient Medications:     lisinopril (ZESTRIL) 10 MG tablet, Take 1 tablet (10 mg) by mouth daily (Patient taking differently: Take 10 mg by mouth daily Pt taking 20 mg), Disp: 30 tablet, Rfl: 0    norethindrone-ethinyl estradiol (FEMHRT 1/5) 1-5 MG-MCG tablet, Take 1 tablet by mouth daily (Patient not taking: Reported on 3/18/2024), Disp: , Rfl:           Physical Exam:   [facility] 03/02/2024    Physical Exam:   Neurologic:   Mental Status Exam: Alert, awake and oriented to situation. No dysarthria. Speech of normal fluency.   Cranial Nerves: Fundoscopic exam with clear disc margins bilaterally. PERRLA, EOMs intact, no nystagmus, facial movements symmetric, facial sensation intact to light touch, hearing intact to conversation, trapezius and SCMs 5/5 bilaterally, tongue midline and fully mobile. No tongue atrophy.    Motor: Normal tone in all four extremities, no atrophy. 5/5 strength bilaterally in shoulder abduction, elbow extensors and flexors, wrist extensors and flexors, hip flexors, knee extensors and flexors, dorsi- and plantarflexion. No tremors or abnormal movements noted.   Sensory: Sensation intact to pinprick and vibration sensation on arms and legs bilaterally.    Coordination: Finger-nose-finger intact bilaterally.  Rapidly alternating movements intact bilaterally in the upper extremities.  Normal finger tapping  bilaterally.  Intact heel-shin bilaterally. Normal Romberg.   Reflexes: 2+ and symmetric in triceps, biceps, brachioradialis, patellar, Achilles, and plantars downgoing bilaterally.   Gait: Normal gait.  Able to toe and heel walk.  Tandem gait normal.  Head: Normocephalic, atraumatic. No radiating pain with palpation over the supraorbital notches, occipital nerves.    Eyes: No conjunctival injection, no scleral icterus.           Data:     Narrative & Impression   MRI OF THE BRAIN WITHOUT AND WITH CONTRAST;  MRI OF THE SKULL BASE WITHOUT AND WITH CONTRAST 1/5/2024 11:13 AM      HISTORY: Trigeminal neuralgia.      COMPARISON: None      TECHNIQUE: Axial diffusion-weighted with ADC map, T2-weighted,  turboFLAIR and T1-weighted images of the brain and axial T1-weighted  and coronal T2-weighted with fat saturation images centered on the  basal cisterns were obtained without intravenous contrast. Following  intravenous administration of IV gadolinium (9 mL Gadavist), axial  turboFLAIR images of the brain and axial T1-weighted with fat  saturation and coronal T1-weighted images, centered on the skull base  and basal cisterns, were obtained.     FINDINGS: The ventricles and basal cisterns are within normal limits  in configuration. Gray-white differentiation of the brain is normal.  There is no midline shift.  There are no extra-axial fluid  collections.  There is no evidence for stroke or acute intracranial  hemorrhage.  There is no abnormal contrast enhancement in the brain or  its coverings.     The cisternal segments of the 5th cranial nerves bilaterally are  unremarkable with no evidence for intrinsic abnormality or extrinsic  compression. Meckel's caves bilaterally are within normal limits. The  bilateral aspects of the cavernous sinus are unremarkable. There is no  evidence for abnormality of the infratemporal fossa or orbits on  either side.     There is no sinusitis or mastoiditis.                                                                       IMPRESSION:   1. No evidence for abnormality of any portion of the 5th cranial nerve  on either side.  2. Normal brain MRI.     ARMAND HEATH MD      CT ANGIOGRAM OF THE HEAD AND NECK WITH CONTRAST  12/14/2023 2:34 PM      HISTORY: Right-sided headache.     TECHNIQUE:  CT angiography with an injection of 67 mL [id number]-[id number] IV  with scans through the head and neck. Images were transferred to a  separate 3-D workstation where multiplanar reformations and 3-D images  were created. Estimates of carotid stenoses are made relative to the  distal internal carotid artery diameters except as noted. Radiation  dose for this scan was reduced using automated exposure control,  adjustment of the mA and/or kV according to patient size, or iterative  reconstruction technique.     COMPARISON: None.      CT ANGIOGRAM HEAD FINDINGS:  No evidence of large vessel occlusion,  high-grade stenosis, aneurysm, or high-flow vascular malformation. The  transverse sinuses appear slightly narrow, left more conspicuous than  right.     CT ANGIOGRAM NECK FINDINGS:  No evidence of large vessel occlusion,  high-grade stenosis, or dissection. Common origin of the  brachiocephalic and left common carotid arteries.      INCIDENTAL FINDINGS: Presumed atelectasis at the lung apices.                                                                       IMPRESSION:   CTA Head:   1. No evidence of large vessel occlusion or high-grade stenosis.  2. The transverse sinuses appear slightly narrow, nonspecific. Further  workup could be performed if there is concern for idiopathic  intracranial hypertension.      CTA Neck:   1. No evidence of large vessel occlusion or high-grade stenosis.      CHEYENNE ESPARZA MD     -------------------------------------------------------------------------------------------------    Narrative & Impression   CT SCAN OF THE HEAD WITHOUT CONTRAST December 14, 2023 2:33 PM      HISTORY: Right-sided  headache.     TECHNIQUE: Axial images of the head and coronal reformations without  IV contrast material. Radiation dose for this scan was reduced using  automated exposure control, adjustment of the mA and/or kV according  to patient size, or iterative reconstruction technique.     COMPARISON: None.     FINDINGS: No evidence of hemorrhage, mass, or hydrocephalus.  Parenchyma within normal limits. No acute osseous adenopathy.                                                                      IMPRESSION: No acute intracranial abnormality.     CHEYENNE ESPARZA MD

## 2024-03-18 NOTE — LETTER
3/18/2024         RE: Dot Ramirez  [address] [facility] [address]        Dear Colleague,    Thank you for referring your patient, Dot Ramirez, to the [facility]. Please see a copy of my visit note below.    [facility]   Headache Neurology Consult  March 18, 2024     Dot Ramirez MRN# [id number]   YOB: 1985 Age: 38 year old     Requesting provider: Henri Sin DO          Assessment and Recommendations:     Dot Ramirez is a 38 year old female who presents for evaluation of trigeminal neuralgia. She has continuous, sharp facial pain associated with side switching (never bilateral) and no triggers with autonomic features (unilateral on the side of the pain lacrimation and facial and forehead sweating) which lasts 24 hours and remits for >24 hours. The absence of episodic side locked nature rules out trigeminal neuralgia. Could consider in a patient with MS with bilateral presentation, but she does not have evidence to support this diagnosis. Her symptom are most consistent with probable hemicrania continua, remitting subtype diagnosis, but need to rule out secondary causes. Cannot be fully diagnosed without an indomethacin trial. Would not recommend indomethacin trial at this time due to her ongoing hypertension, which has already been difficult to treat, if it is better controlled would then be reconsidered    Pain does have migrainous features. She is currently on carbamazepine and we discussed that we will obtain MR Brain with Orbits to rule out carotid cavernous fistula (may not be seen on routine MRI) and CT chest to rule out a pancoast tumor. If these secondary causes are not present, will recommend taper off carbamazepine and proceeding with the following plan.    Headache treatment plan:  -Would recommend gabapentin 300mg three times daily  -If not tolerated, could  consider topiramate 25mg, titrated to 100mg bedtime.  Alternately, melatonin, Emgality, verapamil and onabotulinum toxin A injections could be trialed    I will see her back after imaging results.     Total time spent today was 82 minutes in chart review, history, exam and counseling.      Li Montemayor MD  Neurology            Chief Complaint:     Chief Complaint   Patient presents with     Consult For     Headache/Migraine  PCP wants Neurology's opinion on Carbamazepine prior to switching it     Trigeminal Neuralgia per referral placed       History is obtained from the patient and medical record.      Dot Ramirez is a 38 year old female who last [place] and she had not gone on any activities. She awoke with shooting pain in the right eye/temporal region. She requested Tylenol  (never takes meds) and every 4 hours was taking it. Sat and Sunday were fine and then she awoke with eyes to ears and downwards and her jaw was locked. She had pain radiating from the left temple eye to ear.    She has giant cell arteritis and had double temporal biopsy, negative. Pain resolved after high dosed steroids 3-4 weeks. Pain restarted at the same severity 2 months later. Then pain recurred again on vacation and lasted for 1 month. During this time was having weekly episodes. It resolved again after and in November it started again. It does switch sides, it is never both sides at the same time.     Starts at the temple to the ear and then involves the jaw and radiates to the bilateral jaw.  It feels like someone was cutting her open, sharp. It would start and radiate down, lasting through into the evening until bedtime. She will awaken the next day and gone. There are no jabs of sharp shooting pain, it is continuous. She has phonophobia and possible photophobia (she shuts her eyes), she wants to lie down during the worst of the pain. She is uncertain whether there is any pulsatile sensation. Sometimes  there is tinnitus. Activity will worsen it. There is nausea and vomiting with it. She will touch her face more.  She does have eye watering only on the left side, no conjunctival injection. Uncertain if eye ptosis or flushing. No eye edema. There is unilateral sweating on the side of the pain. No congestion or rhinorrhea. No gritty sensation.    She has a good sense that carbamazepine is helpful. She wasn't on any medications after steroids so there was a remission period of 2 months after those. There has also been relief from midol complete.    She has had hypertension up to 200 in the past and has had chest pain which was diagnosed as costochondritis (reproducible on chest wall palpation). She has had a cyst causing fallopian tube obstruction leading to sepsis and this is the reason for PCP wanting to start birth control. This interacts with carbamazepine.    She would get headaches in youth frequently but not migraine. Mother with migraines.            Past Medical History:     Past Medical History:   Diagnosis Date     Abnormal Pap smear of cervix 2010    see problem list     Anxiety      Benign essential hypertension 2024     Depressive disorder 20yrs    When I was 16yrs I had my first episodes     Jaw claudication      Moderate major depression (H) 2014    onset with fetal loss     PID (acute pelvic inflammatory disease) 2022     Temporal pain               Past Surgical History:     Past Surgical History:   Procedure Laterality Date     BIOPSY ARTERY TEMPORAL Bilateral 2023    Procedure: TEMPORAL ARTERY BIOPSY-BILATERAL;  Surgeon: Juno Kruger MD;  Location: [facility] OR     CERCLAGE CERVICAL N/A 2015    Procedure: CERCLAGE CERVICAL;  Surgeon: Damion Nayak MD;  Location: [facility] L[facility]     CERCLAGE CERVICAL N/A 2016    Procedure: CERCLAGE CERVICAL;  Surgeon: Damion Nayak MD;  Location: [facility]      SECTION N/A 2015    Procedure:  SECTION;  Surgeon:  Jamison Florian MD;  Location: [facility] L+[facility]      SECTION N/A 3/6/2017    Procedure:  SECTION;  Surgeon: Jamison Florian MD;  Location: [facility] OR     Cystectomy hand  2016     EXCISE MASS FINGER Left 2016    Procedure: Excision of cystic mass, left ring finger. Surgeon:  Rosales Jones MD  Location: [facility]     EXTRACTION(S) DENTAL  2009             Social History:     Social History     Socioeconomic History     Marital status:      Spouse name: Ankit     Number of children: Not on file     Years of education: Not on file     Highest education level: Not on file   Occupational History     Occupation:    Tobacco Use     Smoking status: Never     Passive exposure: Childhood     Smokeless tobacco: Never   Vaping Use     Vaping Use: Never used   Substance and Sexual Activity     Alcohol use: Yes     Comment: very very rare     Drug use: No     Sexual activity: Yes     Partners: Male     Birth control/protection: Pull-out method, Condom   Other Topics Concern     Parent/sibling w/ CABG, MI or angioplasty before 65F 55M? No   Social History Narrative     Not on file     Social Determinants of Health     Financial Resource Strain: Low Risk  (2024)    Financial Resource Strain      Within the past 12 months, have you or your family members you live with been unable to get utilities (heat, electricity) when it was really needed?: No   Food Insecurity: Low Risk  (2024)    Food Insecurity      Within the past 12 months, did you worry that your food would run out before you got money to buy more?: No      Within the past 12 months, did the food you bought just not last and you didn t have money to get more?: No   Transportation Needs: Low Risk  (2024)    Transportation Needs      Within the past 12 months, has lack of transportation kept you from medical appointments, getting your medicines, non-medical meetings or appointments, work, or from getting  things that you need?: No   Physical Activity: Insufficiently Active (2024)    Exercise Vital Sign      Days of Exercise per Week: 3 days      Minutes of Exercise per Session: 30 min   Stress: Stress Concern Present (2024)    [employer] [employer] - Occupational Stress Questionnaire      Feeling of Stress : Very much   Social Connections: Unknown (2024)    Social Connection and Isolation Panel [NHANES]      Frequency of Communication with Friends and Family: Not on file      Frequency of Social Gatherings with Friends and Family: Once a week      Attends [employer] Services: Not on file      Active Member of Clubs or Organizations: Not on file      Attends Club or Organization Meetings: Not on file      Marital Status: Not on file   Interpersonal Safety: Low Risk  (2024)    Interpersonal Safety      Do you feel physically and emotionally safe where you currently live?: Yes      Within the past 12 months, have you been hit, slapped, kicked or otherwise physically hurt by someone?: No      Within the past 12 months, have you been humiliated or emotionally abused in other ways by your partner or ex-partner?: No   Housing Stability: Low Risk  (2024)    Housing Stability      Do you have housing? : Yes      Are you worried about losing your housing?: No             Family History:     Family History   Problem Relation Age of Onset     Family History Negative Mother         born      Hypertension Mother      Neurologic Disorder Father         born [address]     Prostate Cancer Father      Hypertension Father      Diabetes Maternal Grandmother          97yo Type II     Alzheimer Disease Maternal Grandfather 89         94yo      Family History Negative Paternal Grandmother              Family History Negative Paternal Grandfather              Family History Negative Brother         1/2 brother Hemochromatosis   Mother with  migraine          Allergies:    No Known Allergies          Medications:   Acute headache medications:  Midol complete- completely aborts headaches  Exedrine - no benefit     acetaminophen (TYLENOL) 325 MG tablet, Take 3 tablets (975 mg) by mouth every 6 hours as needed for mild pain or fever, Disp: , Rfl: - number of days per month?     ibuprofen (ADVIL/MOTRIN) 600 MG tablet, Take 1 tablet (600 mg) by mouth every 6 hours as needed for moderate pain (Patient taking differently: Take 600 mg by mouth every 6 hours as needed for moderate pain Duplicate. Patient takes OTC), Disp: , Rfl:  - number of days per month?    Preventative headache medications:     carBAMazepine (CARBATROL) 200 MG 12 hr capsule, Take 1 capsule (200 mg) by mouth 2 times daily, Disp: 60 capsule, Rfl: 0  Gabapentin 300mg for restless legs, at night, and too drowsy with 2 pills, too drowsy with 2 tabs in morning and no other side effects     Current Outpatient Medications:      lisinopril (ZESTRIL) 10 MG tablet, Take 1 tablet (10 mg) by mouth daily (Patient taking differently: Take 10 mg by mouth daily Pt taking 20 mg), Disp: 30 tablet, Rfl: 0     norethindrone-ethinyl estradiol (FEMHRT 1/5) 1-5 MG-MCG tablet, Take 1 tablet by mouth daily (Patient not taking: Reported on 3/18/2024), Disp: , Rfl:           Physical Exam:   [facility] 03/02/2024    Physical Exam:   Neurologic:   Mental Status Exam: Alert, awake and oriented to situation. No dysarthria. Speech of normal fluency.   Cranial Nerves: Fundoscopic exam with clear disc margins bilaterally. PERRLA, EOMs intact, no nystagmus, facial movements symmetric, facial sensation intact to light touch, hearing intact to conversation, trapezius and SCMs 5/5 bilaterally, tongue midline and fully mobile. No tongue atrophy.    Motor: Normal tone in all four extremities, no atrophy. 5/5 strength bilaterally in shoulder abduction, elbow extensors and flexors, wrist extensors and flexors, hip flexors, knee extensors  and flexors, dorsi- and plantarflexion. No tremors or abnormal movements noted.   Sensory: Sensation intact to pinprick and vibration sensation on arms and legs bilaterally.    Coordination: Finger-nose-finger intact bilaterally.  Rapidly alternating movements intact bilaterally in the upper extremities.  Normal finger tapping bilaterally.  Intact heel-shin bilaterally. Normal Romberg.   Reflexes: 2+ and symmetric in triceps, biceps, brachioradialis, patellar, Achilles, and plantars downgoing bilaterally.   Gait: Normal gait.  Able to toe and heel walk.  Tandem gait normal.  Head: Normocephalic, atraumatic. No radiating pain with palpation over the supraorbital notches, occipital nerves.    Eyes: No conjunctival injection, no scleral icterus.           Data:     Narrative & Impression   MRI OF THE BRAIN WITHOUT AND WITH CONTRAST;  MRI OF THE SKULL BASE WITHOUT AND WITH CONTRAST 1/5/2024 11:13 AM      HISTORY: Trigeminal neuralgia.      COMPARISON: None      TECHNIQUE: Axial diffusion-weighted with ADC map, T2-weighted,  turboFLAIR and T1-weighted images of the brain and axial T1-weighted  and coronal T2-weighted with fat saturation images centered on the  basal cisterns were obtained without intravenous contrast. Following  intravenous administration of IV gadolinium (9 mL Gadavist), axial  turboFLAIR images of the brain and axial T1-weighted with fat  saturation and coronal T1-weighted images, centered on the skull base  and basal cisterns, were obtained.     FINDINGS: The ventricles and basal cisterns are within normal limits  in configuration. Gray-white differentiation of the brain is normal.  There is no midline shift.  There are no extra-axial fluid  collections.  There is no evidence for stroke or acute intracranial  hemorrhage.  There is no abnormal contrast enhancement in the brain or  its coverings.     The cisternal segments of the 5th cranial nerves bilaterally are  unremarkable with no evidence for  intrinsic abnormality or extrinsic  compression. Meckel's caves bilaterally are within normal limits. The  bilateral aspects of the cavernous sinus are unremarkable. There is no  evidence for abnormality of the infratemporal fossa or orbits on  either side.     There is no sinusitis or mastoiditis.                                                                      IMPRESSION:   1. No evidence for abnormality of any portion of the 5th cranial nerve  on either side.  2. Normal brain MRI.     ARMAND HEATH MD      CT ANGIOGRAM OF THE HEAD AND NECK WITH CONTRAST  12/14/2023 2:34 PM      HISTORY: Right-sided headache.     TECHNIQUE:  CT angiography with an injection of 67 mL [id number]-370 IV  with scans through the head and neck. Images were transferred to a  separate 3-D workstation where multiplanar reformations and 3-D images  were created. Estimates of carotid stenoses are made relative to the  distal internal carotid artery diameters except as noted. Radiation  dose for this scan was reduced using automated exposure control,  adjustment of the mA and/or kV according to patient size, or iterative  reconstruction technique.     COMPARISON: None.      CT ANGIOGRAM HEAD FINDINGS:  No evidence of large vessel occlusion,  high-grade stenosis, aneurysm, or high-flow vascular malformation. The  transverse sinuses appear slightly narrow, left more conspicuous than  right.     CT ANGIOGRAM NECK FINDINGS:  No evidence of large vessel occlusion,  high-grade stenosis, or dissection. Common origin of the  brachiocephalic and left common carotid arteries.      INCIDENTAL FINDINGS: Presumed atelectasis at the lung apices.                                                                       IMPRESSION:   CTA Head:   1. No evidence of large vessel occlusion or high-grade stenosis.  2. The transverse sinuses appear slightly narrow, nonspecific. Further  workup could be performed if there is concern for idiopathic  intracranial  hypertension.      CTA Neck:   1. No evidence of large vessel occlusion or high-grade stenosis.      CHEYENNE ESPARZA MD     -------------------------------------------------------------------------------------------------    Narrative & Impression   CT SCAN OF THE HEAD WITHOUT CONTRAST December 14, 2023 2:33 PM      HISTORY: Right-sided headache.     TECHNIQUE: Axial images of the head and coronal reformations without  IV contrast material. Radiation dose for this scan was reduced using  automated exposure control, adjustment of the mA and/or kV according  to patient size, or iterative reconstruction technique.     COMPARISON: None.     FINDINGS: No evidence of hemorrhage, mass, or hydrocephalus.  Parenchyma within normal limits. No acute osseous adenopathy.                                                                      IMPRESSION: No acute intracranial abnormality.     CHEYENNE ESPARZA MD            Again, thank you for allowing me to participate in the care of your patient.        Sincerely,        Li Montemayor MD

## 2024-03-20 DIAGNOSIS — I10 BENIGN ESSENTIAL HYPERTENSION: ICD-10-CM

## 2024-03-21 RX ORDER — LISINOPRIL 20 MG/1
20 TABLET ORAL DAILY
Qty: 30 TABLET | Refills: 0 | Status: SHIPPED | OUTPATIENT
Start: 2024-03-21 | End: 2024-04-22

## 2024-03-24 DIAGNOSIS — G50.0 TRIGEMINAL NEURALGIA: ICD-10-CM

## 2024-03-25 ENCOUNTER — OFFICE VISIT (OUTPATIENT)
Dept: FAMILY MEDICINE | Facility: CLINIC | Age: 39
End: 2024-03-25
Payer: COMMERCIAL

## 2024-03-25 VITALS
OXYGEN SATURATION: 100 % | HEIGHT: 62 IN | DIASTOLIC BLOOD PRESSURE: 86 MMHG | SYSTOLIC BLOOD PRESSURE: 128 MMHG | HEART RATE: 82 BPM | BODY MASS INDEX: 53.37 KG/M2 | RESPIRATION RATE: 18 BRPM | TEMPERATURE: 98.8 F | WEIGHT: 290 LBS

## 2024-03-25 DIAGNOSIS — R73.03 PREDIABETES: ICD-10-CM

## 2024-03-25 DIAGNOSIS — I10 BENIGN ESSENTIAL HYPERTENSION: Primary | ICD-10-CM

## 2024-03-25 DIAGNOSIS — N83.299 FUNCTIONAL OVARIAN CYSTS: ICD-10-CM

## 2024-03-25 DIAGNOSIS — E66.813 CLASS 3 OBESITY: ICD-10-CM

## 2024-03-25 PROBLEM — N73.0 PID (ACUTE PELVIC INFLAMMATORY DISEASE): Status: RESOLVED | Noted: 2022-05-16 | Resolved: 2024-03-25

## 2024-03-25 PROBLEM — N70.93 TOA (TUBO-OVARIAN ABSCESS): Status: RESOLVED | Noted: 2022-05-16 | Resolved: 2024-03-25

## 2024-03-25 LAB
ANION GAP SERPL CALCULATED.3IONS-SCNC: 10 MMOL/L (ref 7–15)
BUN SERPL-MCNC: 9.1 MG/DL (ref 6–20)
CALCIUM SERPL-MCNC: 10.1 MG/DL (ref 8.6–10)
CHLORIDE SERPL-SCNC: 105 MMOL/L (ref 98–107)
CREAT SERPL-MCNC: 0.67 MG/DL (ref 0.51–0.95)
DEPRECATED HCO3 PLAS-SCNC: 24 MMOL/L (ref 22–29)
EGFRCR SERPLBLD CKD-EPI 2021: >90 ML/MIN/1.73M2
GLUCOSE SERPL-MCNC: 114 MG/DL (ref 70–99)
POTASSIUM SERPL-SCNC: 4.3 MMOL/L (ref 3.4–5.3)
SODIUM SERPL-SCNC: 139 MMOL/L (ref 135–145)

## 2024-03-25 PROCEDURE — 99214 OFFICE O/P EST MOD 30 MIN: CPT | Performed by: STUDENT IN AN ORGANIZED HEALTH CARE EDUCATION/TRAINING PROGRAM

## 2024-03-25 PROCEDURE — 36415 COLL VENOUS BLD VENIPUNCTURE: CPT | Performed by: STUDENT IN AN ORGANIZED HEALTH CARE EDUCATION/TRAINING PROGRAM

## 2024-03-25 PROCEDURE — 80048 BASIC METABOLIC PNL TOTAL CA: CPT | Performed by: STUDENT IN AN ORGANIZED HEALTH CARE EDUCATION/TRAINING PROGRAM

## 2024-03-25 RX ORDER — CARBAMAZEPINE 200 MG/1
200 CAPSULE, EXTENDED RELEASE ORAL 2 TIMES DAILY
Qty: 60 CAPSULE | Refills: 0 | Status: SHIPPED | OUTPATIENT
Start: 2024-03-25 | End: 2024-04-22

## 2024-03-25 ASSESSMENT — PATIENT HEALTH QUESTIONNAIRE - PHQ9
5. POOR APPETITE OR OVEREATING: NOT AT ALL
SUM OF ALL RESPONSES TO PHQ QUESTIONS 1-9: 0

## 2024-03-25 ASSESSMENT — ANXIETY QUESTIONNAIRES
IF YOU CHECKED OFF ANY PROBLEMS ON THIS QUESTIONNAIRE, HOW DIFFICULT HAVE THESE PROBLEMS MADE IT FOR YOU TO DO YOUR WORK, TAKE CARE OF THINGS AT HOME, OR GET ALONG WITH OTHER PEOPLE: NOT DIFFICULT AT ALL
3. WORRYING TOO MUCH ABOUT DIFFERENT THINGS: NOT AT ALL
1. FEELING NERVOUS, ANXIOUS, OR ON EDGE: NOT AT ALL
GAD7 TOTAL SCORE: 0
2. NOT BEING ABLE TO STOP OR CONTROL WORRYING: NOT AT ALL
GAD7 TOTAL SCORE: 0
6. BECOMING EASILY ANNOYED OR IRRITABLE: NOT AT ALL
7. FEELING AFRAID AS IF SOMETHING AWFUL MIGHT HAPPEN: NOT AT ALL
5. BEING SO RESTLESS THAT IT IS HARD TO SIT STILL: NOT AT ALL

## 2024-03-25 NOTE — PATIENT INSTRUCTIONS
To Do:    Check blood pressure in the mornings and night for the next 3 days (as in prior print out). Then send me MyChart of that information.     Depending on the numbers, we will either continue same regimen (20mg lisinopril) OR add second hypertensive medication called hydrochlorothiazide.    If we start the second medication, then we will have you follow up two weeks AFTER starting that for blood pressure and additional bloodwork.     In the meantime, let me know when Neurology transitions you over to gabapentin so that we can also consider starting your birth control. Remember, no unprotected sex until then.

## 2024-03-25 NOTE — PROGRESS NOTES
"  Assessment & Plan     Benign essential hypertension  BP controlled in office today on 20mg lisinopril but at home BPs are more labile. Plan to check at home BP for next 3 days (according to prior AVS instructions) and report numbers. If still elevated, plan to start 12.5mg hydrochlorothiazide and follow up in two weeks for repeat BP and BMP (already discussed r/b/se). If BP WNL, plan to start incorporating gradual exercise.   - Basic metabolic panel  (Ca, Cl, CO2, Creat, Gluc, K, Na, BUN)    Prediabetes  A1c of 5.8. Patient has since decreased soda consumption significantly. Discussed lifestyle management.     Functional ovarian cysts  Plan to resume OCP once BP well controlled and Neurology clears. Discussed importance of avoiding all unprotected sex until then.     Class 3 obesity (H)  Likely contributing to HTN, prediabetes as above.     30 minutes spent on pre-charting, visit and documentation    Follow up in 3 days with BP data, after Neurology visit for starting OCP (MyChart ok)    Derrick Nunez MD  [facility]  3/25/2024    Earle Chris is a 38 year old, presenting for the following health issues:    Hypertension and Headache    History of Present Illness       Hypertension: She presents for follow up of hypertension.  She does check blood pressure  regularly outside of the clinic. Outside blood pressures have been over 140/90. She does not follow a low salt diet.     Headaches:   Since the patient's last clinic visit, headaches are: improved  The patient is getting headaches:  1 every other week  She is not able to do normal daily activities when she has a migraine.  The patient is taking the following rescue/relief medications:  Other   Patient states \"I get total relief\" from the rescue/relief medications.   The patient is taking the following medications to prevent migraines:  Other  In the past 4 weeks, the patient has gone to an Urgent Care or Emergency Room 0 times times due " "to headaches.    She eats 2-3 servings of fruits and vegetables daily.She consumes 1 sweetened beverage(s) daily.She exercises with enough effort to increase her heart rate 20 to 29 minutes per day.  She exercises with enough effort to increase her heart rate 5 days per week. She is missing 2 dose(s) of medications per week.  She is not taking prescribed medications regularly due to remembering to take.     Medication Followup of Carbamazepine, Lisinopril   Taking Medication as prescribed: Patient states she is trying to remember to take meds the night time one is the most missed.   Side Effects:  None  Medication Helping Symptoms:  yes    HTN  Taking medication consistently.  At home blood pressures are still somewhat elevated but does take this during the middle of the day, while at work.  Endorses, sitting for 15 minutes prior to checking blood pressure at work though too.    Prediabetes  Never drank plain water in the past.   Was always drinking caffeinated sodas all day long in addition to coffee with sugar.   Since getting the diagnosis of prediabetes, has transitioned over to drinking plain water.  Has been cutting out soda as well. Down to small soda maybe twice weekly.  Has incorporated more vegetables, less packaged meals too.    and dad are on board helping her with these things too.    DICKEY  Saw Neurology. Told to stay on carbamazepine for now.   Also told not start OCP until scans completed.   She hasn't had sex since this started.         Objective    /82 (BP Location: Right arm, Patient Position: Sitting, Cuff Size: Adult Large)   Pulse 82   Temp 98.8  F (37.1  C) (Oral)   Resp 18   Ht 1.575 m (5' 2\")   Wt 131.5 kg (290 lb)   LMP 03/02/2024   SpO2 100%   BMI 53.04 kg/m    Body mass index is 53.04 kg/m .    Physical Exam   GENERAL: healthy, alert and no distress  HEAD: Normocephalic, atraumatic.   EYES: Normal conjunctivae, sclera.   RESP: Normal respiratory effort.  MSK: no gross " musculoskeletal defects noted.  SKIN: no suspicious lesions or rashes.  NEURO: CNII-XII grossly intact. No focal deficits.  PSYCH: Groomed, dressed appropriately for weather. Normal mood with consistent affect.     Signed Electronically by: Derrick Nunez MD

## 2024-04-16 ENCOUNTER — HOSPITAL ENCOUNTER (OUTPATIENT)
Dept: MRI IMAGING | Facility: CLINIC | Age: 39
Discharge: HOME | End: 2024-04-16
Attending: PSYCHIATRY & NEUROLOGY
Payer: COMMERCIAL

## 2024-04-16 ENCOUNTER — HOSPITAL ENCOUNTER (OUTPATIENT)
Dept: CT IMAGING | Facility: CLINIC | Age: 39
Discharge: HOME | End: 2024-04-16
Attending: PSYCHIATRY & NEUROLOGY
Payer: COMMERCIAL

## 2024-04-16 DIAGNOSIS — G50.0 TRIGEMINAL NEURALGIA: ICD-10-CM

## 2024-04-16 PROCEDURE — 250N000009 HC RX 250: Performed by: PSYCHIATRY & NEUROLOGY

## 2024-04-16 PROCEDURE — 70553 MRI BRAIN STEM W/O & W/DYE: CPT

## 2024-04-16 PROCEDURE — A9585 GADOBUTROL INJECTION: HCPCS | Performed by: PSYCHIATRY & NEUROLOGY

## 2024-04-16 PROCEDURE — 71260 CT THORAX DX C+: CPT

## 2024-04-16 PROCEDURE — 250N000011 HC RX IP 250 OP 636: Performed by: PSYCHIATRY & NEUROLOGY

## 2024-04-16 PROCEDURE — 255N000002 HC RX 255 OP 636: Performed by: PSYCHIATRY & NEUROLOGY

## 2024-04-16 RX ORDER — IOPAMIDOL 755 MG/ML
500 INJECTION, SOLUTION INTRAVASCULAR ONCE
Status: COMPLETED | OUTPATIENT
Start: 2024-04-16 | End: 2024-04-16

## 2024-04-16 RX ORDER — GADOBUTROL 604.72 MG/ML
13 INJECTION INTRAVENOUS ONCE
Status: COMPLETED | OUTPATIENT
Start: 2024-04-16 | End: 2024-04-16

## 2024-04-16 RX ADMIN — GADOBUTROL 9 ML: 604.72 INJECTION INTRAVENOUS at 09:31

## 2024-04-16 RX ADMIN — IOPAMIDOL 91 ML: 755 INJECTION, SOLUTION INTRAVENOUS at 10:19

## 2024-04-16 RX ADMIN — SODIUM CHLORIDE 61 ML: 9 INJECTION, SOLUTION INTRAVENOUS at 10:19

## 2024-04-18 ENCOUNTER — MYC MEDICAL ADVICE (OUTPATIENT)
Dept: NEUROLOGY | Facility: CLINIC | Age: 39
End: 2024-04-18
Payer: COMMERCIAL

## 2024-04-18 NOTE — TELEPHONE ENCOUNTER
Thank you for getting back so quickly! You are scheduled for 10am on Monday for a virtual visit with Dr. Montemayor.      Thank you for the additional information on the medication.  That's perfect you have enough to get you to Monday.  Dr. Montemayor will be able to talk with you about the medication and refill if she would like you to continue taking the medication.     I will send your question about pneumonia directly to Dr. Montemayor and her nursing team so that you can get the answers you need.    Caesar Joshi LAT ATC on 4/18/2024 at 10:58 AM

## 2024-04-19 ENCOUNTER — ANCILLARY PROCEDURE (OUTPATIENT)
Dept: GENERAL RADIOLOGY | Facility: CLINIC | Age: 39
End: 2024-04-19
Attending: PHYSICIAN ASSISTANT
Payer: COMMERCIAL

## 2024-04-19 ENCOUNTER — OFFICE VISIT (OUTPATIENT)
Dept: URGENT CARE | Facility: URGENT CARE | Age: 39
End: 2024-04-19
Payer: COMMERCIAL

## 2024-04-19 VITALS
WEIGHT: 290 LBS | DIASTOLIC BLOOD PRESSURE: 74 MMHG | OXYGEN SATURATION: 97 % | TEMPERATURE: 98.1 F | RESPIRATION RATE: 14 BRPM | HEART RATE: 116 BPM | BODY MASS INDEX: 53.04 KG/M2 | SYSTOLIC BLOOD PRESSURE: 128 MMHG

## 2024-04-19 DIAGNOSIS — I10 BENIGN ESSENTIAL HYPERTENSION: ICD-10-CM

## 2024-04-19 DIAGNOSIS — J01.00 ACUTE NON-RECURRENT MAXILLARY SINUSITIS: ICD-10-CM

## 2024-04-19 DIAGNOSIS — J18.9 PNEUMONIA OF RIGHT MIDDLE LOBE DUE TO INFECTIOUS ORGANISM: Primary | ICD-10-CM

## 2024-04-19 PROCEDURE — 99215 OFFICE O/P EST HI 40 MIN: CPT | Performed by: PHYSICIAN ASSISTANT

## 2024-04-19 PROCEDURE — 71046 X-RAY EXAM CHEST 2 VIEWS: CPT | Mod: TC | Performed by: RADIOLOGY

## 2024-04-19 RX ORDER — AZITHROMYCIN 250 MG/1
TABLET, FILM COATED ORAL
Qty: 6 TABLET | Refills: 0 | Status: SHIPPED | OUTPATIENT
Start: 2024-04-19 | End: 2024-04-24

## 2024-04-19 NOTE — TELEPHONE ENCOUNTER
Li Montemayor MD  You1 hour ago (10:54 AM)     CRISTINA Tabor,    I agree with Dr. Schumacher's advice. She should follow up with these recommendations below. That would be the intervention. She will need labs as well (CBC/CMP) but these can be ordered with her other providers today (PCP/urgent care or the ED). She can let us know if they do not order.    Sincerely,  Li Montemayor       Called patient and advised that she present to urgent care for evaluation today. She is agreeable. Will follow up on Monday with Dr. Montemayor as scheduled. She understands that PCP should continue any future management of pneumonia.     Leander Hess RN, BSN  [facility] Neurology

## 2024-04-19 NOTE — PROGRESS NOTES
Assessment & Plan:      Problem List Items Addressed This Visit    None  Visit Diagnoses       Pneumonia of right middle lobe due to infectious organism    -  Primary    Relevant Medications    amoxicillin-clavulanate (AUGMENTIN) 875-125 MG tablet    azithromycin (ZITHROMAX) 250 MG tablet    Other Relevant Orders    XR Chest 2 Views (Completed)    Acute non-recurrent maxillary sinusitis        Relevant Medications    amoxicillin-clavulanate (AUGMENTIN) 875-125 MG tablet    azithromycin (ZITHROMAX) 250 MG tablet          Medical Decision Making  Patient presents with coincidental finding of right middle lobe pneumonia seen on chest CT obtained by neurology on 4/16.  Patient does note having occasional cough and right-sided chest pains ever since emergency room visit on 3/2.  Do suspect that patient could have had atypical presentation of right middle lobe pneumonia that was not seen on chest x-ray at that time.  Repeat chest x-ray today shows very minimal findings of right middle lobe pneumonia.  Recommend oral antibiotics and close follow-up.  Also has suspicion for maxillary sinusitis as patient has had headaches, is tender over the maxillary sinuses today, and continues to have anterior cervical lymphadenopathy.  If symptoms still not improving on antibiotics, patient may need a repeat chest CT.  Discussed treatment and symptomatic care.  Allergies and medication interactions reviewed.  Discussed signs of worsening symptoms and when to follow-up with PCP if no symptom improvement.    40 minutes spent in total for charting, chart review, patient examination, and discussion with patient on labs, counseling, and coordination of care as listed above.     Subjective:      Dot Ramirez is a 38 year old female here for evaluation of suspected pneumonia.  Patient has been seeing neurology for follow-up of facial pains and headaches due to unknown cause.  Last obtain imaging was on 4/16 where patient had an MRI of  the brain and a chest CT.  Chest CT from coincidental finding of groundglass opacities of the right middle lobe consistent with pneumonia.  Patient tried following up with neurology on this who recommended patient follow-up with primary care.  Primary care told the patient to reach back out to neurology.  Patient has now been waiting for an appointment neurology, and instead decided to present to the urgent care clinic as she was not getting any help.     The following portions of the patient's history were reviewed and updated as appropriate: allergies, current medications, and problem list.     Review of Systems  Pertinent items are noted in HPI.    Allergies  No Known Allergies    Family History   Problem Relation Age of Onset    Family History Negative Mother         born     Hypertension Mother     Neurologic Disorder Father         born  Charcot Rosemary Tooth    Prostate Cancer Father     Hypertension Father     Diabetes Maternal Grandmother          99yo Type II    Alzheimer Disease Maternal Grandfather 89         94yo     Family History Negative Paternal Grandmother             Family History Negative Paternal Grandfather             Family History Negative Brother         1/2 brother Hemochromatosis       Social History     Tobacco Use    Smoking status: Never     Passive exposure: Never    Smokeless tobacco: Never   Substance Use Topics    Alcohol use: Yes     Comment: very very rare        Objective:      /74 (BP Location: Right arm, Patient Position: Chair, Cuff Size: Adult Regular)   Pulse 116   Temp 98.1  F (36.7  C) (Oral)   Resp 14   Wt 131.5 kg (290 lb)   LMP 2024 (Exact Date)   SpO2 97%   BMI 53.04 kg/m    General appearance - alert, well appearing, and in no distress and non-toxic  Ears - TMs intact with significant serous fluid and bulging bilaterally  Nose - patient has tenderness to maxillary sinuses bilaterally  Mouth - mucous membranes  moist, pharynx normal without lesions  Neck - significant bilateral anterior cervical lymphadenopathy  Chest - clear to auscultation, no wheezes, rales or rhonchi, symmetric air entry  Heart - normal rate, regular rhythm, normal S1, S2, no murmurs, rubs, clicks or gallops     Lab & Imaging Results    Results for orders placed or performed in visit on 04/19/24   XR Chest 2 Views     Status: None (Preliminary result)    Narrative    CHEST TWO VIEWS  4/19/2024 2:17 PM     HISTORY:  Pneumonia of right middle lobe due to infectious organism.    COMPARISON: Chest radiograph performed 3/3/2024. Chest CT performed  4/16/2024.      Impression    IMPRESSION: Minimal opacity in the right middle lobe is likely related  to the previously described right middle lobe pneumonia. The left lung  is clear. No pleural effusions.        I personally reviewed these results and discussed findings with the patient.    The use of Dragon/[employer] dictation services was used to construct the content of this note; any grammatical errors are non-intentional. Please contact the author directly if you are in need of any clarification.

## 2024-04-19 NOTE — TELEPHONE ENCOUNTER
Jesenia Hollins MD  You1 hour ago (10:43 AM)     AM  No, I believe she is back in clinic in [place] today. If she is not available, I would advise patient to see PCP today or Urgent Care if not possible, for pneumonia concern. ER if severe symptoms, difficulty breathing, prolonged fever, not able to eat.    Jesenia Hollins

## 2024-04-22 ENCOUNTER — VIRTUAL VISIT (OUTPATIENT)
Dept: NEUROLOGY | Facility: CLINIC | Age: 39
End: 2024-04-22
Payer: COMMERCIAL

## 2024-04-22 ENCOUNTER — VIRTUAL VISIT (OUTPATIENT)
Dept: FAMILY MEDICINE | Facility: CLINIC | Age: 39
End: 2024-04-22
Payer: COMMERCIAL

## 2024-04-22 ENCOUNTER — ANCILLARY PROCEDURE (OUTPATIENT)
Dept: GENERAL RADIOLOGY | Facility: CLINIC | Age: 39
End: 2024-04-22
Attending: PHYSICIAN ASSISTANT
Payer: COMMERCIAL

## 2024-04-22 ENCOUNTER — OFFICE VISIT (OUTPATIENT)
Dept: PEDIATRICS | Facility: CLINIC | Age: 39
End: 2024-04-22
Payer: COMMERCIAL

## 2024-04-22 VITALS
HEART RATE: 80 BPM | SYSTOLIC BLOOD PRESSURE: 129 MMHG | OXYGEN SATURATION: 100 % | RESPIRATION RATE: 18 BRPM | BODY MASS INDEX: 53.04 KG/M2 | TEMPERATURE: 97.9 F | DIASTOLIC BLOOD PRESSURE: 86 MMHG | WEIGHT: 290 LBS

## 2024-04-22 VITALS — HEIGHT: 62 IN | BODY MASS INDEX: 53.37 KG/M2 | WEIGHT: 290 LBS

## 2024-04-22 DIAGNOSIS — J18.9 PNEUMONIA OF RIGHT MIDDLE LOBE DUE TO INFECTIOUS ORGANISM: Primary | ICD-10-CM

## 2024-04-22 DIAGNOSIS — G50.0 TRIGEMINAL NEURALGIA: ICD-10-CM

## 2024-04-22 DIAGNOSIS — J18.9 PNEUMONIA OF RIGHT MIDDLE LOBE DUE TO INFECTIOUS ORGANISM: ICD-10-CM

## 2024-04-22 DIAGNOSIS — R05.3 PERSISTENT COUGH: Primary | ICD-10-CM

## 2024-04-22 DIAGNOSIS — R05.3 PERSISTENT COUGH: ICD-10-CM

## 2024-04-22 LAB
BASOPHILS # BLD AUTO: 0 10E3/UL (ref 0–0.2)
BASOPHILS NFR BLD AUTO: 0 %
CRP SERPL-MCNC: 10.72 MG/L
EOSINOPHIL # BLD AUTO: 0.5 10E3/UL (ref 0–0.7)
EOSINOPHIL NFR BLD AUTO: 6 %
ERYTHROCYTE [DISTWIDTH] IN BLOOD BY AUTOMATED COUNT: 14.1 % (ref 10–15)
ERYTHROCYTE [SEDIMENTATION RATE] IN BLOOD BY WESTERGREN METHOD: 21 MM/HR (ref 0–20)
HCT VFR BLD AUTO: 37.8 % (ref 35–47)
HGB BLD-MCNC: 12.2 G/DL (ref 11.7–15.7)
IMM GRANULOCYTES # BLD: 0 10E3/UL
IMM GRANULOCYTES NFR BLD: 0 %
LYMPHOCYTES # BLD AUTO: 2.2 10E3/UL (ref 0.8–5.3)
LYMPHOCYTES NFR BLD AUTO: 25 %
MCH RBC QN AUTO: 26.5 PG (ref 26.5–33)
MCHC RBC AUTO-ENTMCNC: 32.3 G/DL (ref 31.5–36.5)
MCV RBC AUTO: 82 FL (ref 78–100)
MONOCYTES # BLD AUTO: 0.5 10E3/UL (ref 0–1.3)
MONOCYTES NFR BLD AUTO: 6 %
NEUTROPHILS # BLD AUTO: 5.5 10E3/UL (ref 1.6–8.3)
NEUTROPHILS NFR BLD AUTO: 63 %
NRBC # BLD AUTO: 0 10E3/UL
NRBC BLD AUTO-RTO: 0 /100
PLATELET # BLD AUTO: 360 10E3/UL (ref 150–450)
RBC # BLD AUTO: 4.61 10E6/UL (ref 3.8–5.2)
WBC # BLD AUTO: 8.8 10E3/UL (ref 4–11)

## 2024-04-22 PROCEDURE — 99207 REFERRAL TO ACUTE AND DIAGNOSTIC SERVICES: CPT | Mod: 95 | Performed by: NURSE PRACTITIONER

## 2024-04-22 PROCEDURE — 36415 COLL VENOUS BLD VENIPUNCTURE: CPT | Performed by: PHYSICIAN ASSISTANT

## 2024-04-22 PROCEDURE — 99214 OFFICE O/P EST MOD 30 MIN: CPT | Performed by: PHYSICIAN ASSISTANT

## 2024-04-22 PROCEDURE — 85025 COMPLETE CBC W/AUTO DIFF WBC: CPT | Performed by: PHYSICIAN ASSISTANT

## 2024-04-22 PROCEDURE — 85652 RBC SED RATE AUTOMATED: CPT | Performed by: PHYSICIAN ASSISTANT

## 2024-04-22 PROCEDURE — 86140 C-REACTIVE PROTEIN: CPT | Performed by: PHYSICIAN ASSISTANT

## 2024-04-22 PROCEDURE — 71046 X-RAY EXAM CHEST 2 VIEWS: CPT | Mod: TC | Performed by: RADIOLOGY

## 2024-04-22 PROCEDURE — 99214 OFFICE O/P EST MOD 30 MIN: CPT | Mod: 95 | Performed by: PSYCHIATRY & NEUROLOGY

## 2024-04-22 RX ORDER — LISINOPRIL 20 MG/1
20 TABLET ORAL DAILY
Qty: 90 TABLET | Refills: 1 | Status: SHIPPED | OUTPATIENT
Start: 2024-04-22

## 2024-04-22 RX ORDER — ALBUTEROL SULFATE 90 UG/1
2 AEROSOL, METERED RESPIRATORY (INHALATION) EVERY 6 HOURS PRN
Qty: 18 G | Refills: 0 | Status: SHIPPED | OUTPATIENT
Start: 2024-04-22 | End: 2024-10-07

## 2024-04-22 RX ORDER — GUAIFENESIN 600 MG/1
1200 TABLET, EXTENDED RELEASE ORAL 2 TIMES DAILY
Status: SHIPPED
Start: 2024-04-22 | End: 2024-10-07

## 2024-04-22 RX ORDER — CARBAMAZEPINE 200 MG/1
200 CAPSULE, EXTENDED RELEASE ORAL 2 TIMES DAILY
Qty: 186 CAPSULE | Refills: 0 | Status: SHIPPED | OUTPATIENT
Start: 2024-04-22 | End: 2024-08-13

## 2024-04-22 RX ORDER — BUDESONIDE AND FORMOTEROL FUMARATE DIHYDRATE 160; 4.5 UG/1; UG/1
2 AEROSOL RESPIRATORY (INHALATION) 2 TIMES DAILY
Qty: 10.2 G | Refills: 0 | Status: SHIPPED | OUTPATIENT
Start: 2024-04-22 | End: 2024-10-07

## 2024-04-22 ASSESSMENT — HEADACHE IMPACT TEST (HIT 6)
WHEN YOU HAVE HEADACHES HOW OFTEN IS THE PAIN SEVERE: VERY OFTEN
HOW OFTEN DID HEADACHS LIMIT CONCENTRATION ON WORK OR DAILY ACTIVITY: SOMETIMES
HOW OFTEN HAVE YOU FELT FED UP OR IRRITATED BECAUSE OF YOUR HEADACHES: ALWAYS
WHEN YOU HAVE A HEADACHE HOW OFTEN DO YOU WISH YOU COULD LIE DOWN: ALWAYS
HOW OFTEN DID HEADACHS LIMIT CONCENTRATION ON WORK OR DAILY ACTIVITY: SOMETIMES
WHEN YOU HAVE HEADACHES HOW OFTEN IS THE PAIN SEVERE: VERY OFTEN
HIT6 TOTAL SCORE: 67
HOW OFTEN HAVE YOU FELT TOO TIRED TO WORK BECAUSE OF YOUR HEADACHES: SOMETIMES
WHEN YOU HAVE A HEADACHE HOW OFTEN DO YOU WISH YOU COULD LIE DOWN: ALWAYS
HOW OFTEN HAVE YOU FELT FED UP OR IRRITATED BECAUSE OF YOUR HEADACHES: ALWAYS
HOW OFTEN DO HEADACHES LIMIT YOUR DAILY ACTIVITIES: SOMETIMES
HOW OFTEN DO HEADACHES LIMIT YOUR DAILY ACTIVITIES: SOMETIMES
HIT6 TOTAL SCORE: 67
HOW OFTEN HAVE YOU FELT TOO TIRED TO WORK BECAUSE OF YOUR HEADACHES: SOMETIMES

## 2024-04-22 ASSESSMENT — MIGRAINE DISABILITY ASSESSMENT (MIDAS)
HOW MANY DAYS DID YOU MISS WORK OR SCHOOL BECAUSE OF HEADACHES: 28
ON A SCALE FROM 0-10 ON AVERAGE HOW PAINFUL WERE HEADACHES: 8
HOW MANY DAYS DID YOU NOT DO HOUSEWORK BECAUSE OF HEADACHES: 25
HOW OFTEN WERE SOCIAL ACTIVITIES MISSED DUE TO HEADACHES: 5
HOW MANY DAYS WAS YOUR PRODUCTIVITY CUT IN HALF BECAUSE OF HEADACHES: 60
TOTAL SCORE: 163
HOW MANY DAYS WAS HOUSEWORK PRODUCTIVITY CUT IN HALF DUE TO HEADACHES: 45
HOW MANY DAYS IN THE PAST 3 MONTHS HAVE YOU HAD A HEADACHE: 10

## 2024-04-22 ASSESSMENT — PAIN SCALES - GENERAL: PAINLEVEL: NO PAIN (0)

## 2024-04-22 NOTE — NURSING NOTE
Is the patient currently in the state of [place]? YES    Visit mode:VIDEO    If the visit is dropped, the patient can be reconnected by: VIDEO VISIT: Text to cell phone:   Telephone Information:   Mobile [phone]       Will anyone else be joining the visit? NO  (If patient encounters technical issues they should call [phone]    How would you like to obtain your AVS? MyChart    Are changes needed to the allergy or medication list? No    Are refills needed on medications prescribed by this physician? YES    Reason for visit: MIMI LIMA

## 2024-04-22 NOTE — RESULT ENCOUNTER NOTE
No evidence of sinusitis on imaging, CT chest with focal right middle lobe consolidation. Patient instructed to seek urgent evaluation with general medicine/ED for evaluation of this incidental finding.

## 2024-04-22 NOTE — PROGRESS NOTES
Dot is a 38 year old who is being evaluated via a billable video visit.    How would you like to obtain your AVS? MyChart  If the video visit is dropped, the invitation should be resent by: Text to cell phone: [phone]  Will anyone else be joining your video visit? No      Assessment & Plan     Pneumonia of right middle lobe due to infectious organism  On day four of antibiotics (zithromax + augmentin) and feeling worse.  Frequent cough, SOB, wheezing.  Needs to be seen for in person exam today.  Discussed with [facility] who is agreeable to seeing patient.  Explained plan with pt and she verbalizes understanding.    - Referral to Acute and Diagnostic Services (Day of diagnostic / First order acute); Future      Referral to Acute and Diagnostic Services    [phone] ([place] [address], [address], [place] [address]    Transition to Acute & Diagnostic Services Clinic has been discussed with patient, and she agrees with next level of care.   Patient understands that evaluation/treatment at [facility] typically takes significantly longer than in clinic/urgent care (>2 hours).  [facility] has been contacted by provider/staff to confirm patient acceptance.         Special issues:      None                            Subjective   Dot is a 38 year old, presenting for the following health issues:  Video Visit and Urgent Care    HPI     ED/UC Followup:    Facility:  Urgent Care in [place]  Date of visit: 04/19/2024  Reason for visit: cough  Current Status: still having ongoing symptoms that have gotten worse.    Acute Illness  Acute illness concerns: COUGH  Onset/Duration: long time   Symptoms:  Fever: YES- 99  Chills/Sweats: YES  Headache (location?): YES  Sinus Pressure: YES  Conjunctivitis:  YES  Ear Pain: YES: bilateral  Rhinorrhea: YES  Congestion: YES- heaviness   Sore Throat: No  Cough: YES-non-productive, with shortness of breath,  worsening over time  Wheeze: YES  Decreased Appetite: YES  Nausea: No  Vomiting: No  Diarrhea: YES  Dysuria/Freq.: No  Dysuria or Hematuria: No  Fatigue/Achiness: YES  Sick/Strep Exposure: YES  Therapies tried and outcome: augmentin and z-rizwan-not working.     Feeling worse now than she was Friday.   Had a mild cough (only at night) on Friday and now coughing quite a bit.  Cough is worse at night.   Cough is productive in the morning, but otherwise dry.   C/o wheezing and tightness in the chest.   C/o pain in the center of chest.   Feeling SOB.   No fever reducing medications today.   Not taking any cough syrup.   Seen in the ER for CP on 3/2/24--pain felt to be MS etiology.   Had chest CT done with neuro on 4/16/24--revealed R mid lobe pneumonia.    Seen in UC on 4/19/24 for pneumonia and started on antibiotics.          Objective    Vitals - Patient Reported  Systolic (Patient Reported): (!) 169 (RIGHT ARM)  Diastolic (Patient Reported): 84  Weight (Patient Reported): 83.9 kg (185 lb)  Temperature (Patient Reported): 99.5  F (37.5  C) (TYMPANIC-LEFT EAR)  Pulse (Patient Reported): 87  Pain Score: Moderate Pain (5)  Pain Loc: Head        Physical Exam   GENERAL: alert and no distress  EYES: Eyes grossly normal to inspection.  No discharge or erythema, or obvious scleral/conjunctival abnormalities.  RESP: No audible wheeze or visible cyanosis.  Frequent dry cough heard.  At times sounds winded with prolonged talking  SKIN: Visible skin clear. No significant rash, abnormal pigmentation or lesions.  NEURO: Cranial nerves grossly intact.  Mentation and speech appropriate for age.  PSYCH: Appropriate affect, tone, and pace of words    No results found for this or any previous visit (from the past 24 hour(s)).      Video-Visit Details    Type of service:  Video Visit   Originating Location (pt. Location): Home    Distant Location (provider location):  Off-site  Platform used for Video Visit: Krishna  Signed Electronically  by: RADHA Fernando CNP

## 2024-04-22 NOTE — RESULT ENCOUNTER NOTE
Results discussed directly with patient while patient was present. Any further details documented in the note.   Judith Foster PA-C

## 2024-04-22 NOTE — PATIENT INSTRUCTIONS
Recommend Symbicort twice daily x 14 days (rinse mouth out afterward).    If too costly use albuterol every 4-6 hours as needed (short acting - no steroid)

## 2024-04-22 NOTE — PROGRESS NOTES
Acute and Diagnostic Services Clinic Visit    Assessment & Plan     Pneumonia of right middle lobe due to infectious organism  Persistent cough  Cough worsening despite antibiotics.  Stat labs revealed very mild elevation of inflammatory markers but overall CBC reassuring for diffuse infection.  Chest x-ray shows improvement from 4 days ago.  Likely symptoms worsening due to expectoration of infiltrate.  Recommend Mucinex to encourage expectoration.  Symbicort twice daily recommended (rinsing mouth out afterward also advised).  If cost prohibitive can use albuterol 4 times daily as needed.  Continue antibiotics to completion.  Advised of warning signs and when to seek urgent care.  All questions answered to the patient satisfaction.  - XR Chest 2 Views  - CBC with platelets differential  - CRP inflammation  - Erythrocyte sedimentation rate auto  - budesonide-formoterol (SYMBICORT) 160-4.5 MCG/ACT Inhaler  Dispense: 10.2 g; Refill: 0  - guaiFENesin (MUCINEX) 600 MG 12 hr tablet  - albuterol (PROAIR HFA/PROVENTIL HFA/VENTOLIN HFA) 108 (90 Base) MCG/ACT inhaler  Dispense: 18 g; Refill: 0      Patient Instructions   Recommend Symbicort twice daily x 14 days (rinse mouth out afterward).    If too costly use albuterol every 4-6 hours as needed (short acting - no steroid)        Return in about 1 week (around 4/29/2024) for follow up with PCP if not improving/worsening.      Judith Foster MBA, MS, PA-C  [facility]        Earle Chris is a 38 year old, presenting for the following health issues:  Cough (Pneumonia worsening sx's)        3/25/2024     9:02 AM   Additional Questions   Roomed by Hilary RICO     Acute Illness  Acute illness concerns: SOB with low grade fever and new diagnosis of pneumonia.  Was found incidentally on chest pain workup.  Onset/Duration: X 4 days  Symptoms:  Fever: YES- low grade  Chills/Sweats: YES- sweats  Headache (location?):  YES  Sinus Pressure: YES           Decreased energy level: YES  Conjunctivitis:  YES- when waking up  Ear Pain: YES: right  Rhinorrhea: YES  Congestion: YES  Sore Throat: No  Cough: YES-non-productive  Wheeze: YES           Breathing fast: No  Decreased Appetite/Intake: YES  Nausea: No  Vomiting: No  Diarrhea: YES- attributes to Amoxicillin   Genitourinary symptoms: YES- sneezing or blowing nose incontinence   Progression of symptoms: same worse  Sick/Strep Exposure: No  Therapies tried and outcome: Augmentin and Z pack started 4/19/24-did not have cough before starting this and now is coughing significantly especially right away in the morning        Review of Systems  Constitutional, HEENT, cardiovascular, pulmonary, GI, , musculoskeletal, neuro, skin, endocrine and psych systems are negative, except as otherwise noted.      Objective    /86 (BP Location: Right arm, Patient Position: Chair, Cuff Size: Adult Large)   Pulse 80   Temp 97.9  F (36.6  C) (Oral)   Resp 18   Wt 131.5 kg (290 lb)   LMP 03/31/2024 (Exact Date)   SpO2 100%   BMI 53.04 kg/m    Body mass index is 53.04 kg/m .  Physical Exam   GENERAL: alert and no distress  EYES: Eyes grossly normal to inspection, PERRL and conjunctivae and sclerae normal  HENT: ear canals and TM's normal, nose and mouth without ulcers or lesions  NECK: no adenopathy, no asymmetry, masses, or scars  RESP: Frequent coughing throughout examination lungs clear to auscultation - no rales, rhonchi.  Forced expiration reveals bilateral lower lobe wheezes  CV: regular rate and rhythm, normal S1 S2, no S3 or S4, no murmur, click or rub, no peripheral edema  MS: no gross musculoskeletal defects noted, no edema  SKIN: no suspicious lesions or rashes  NEURO: Normal strength and tone, mentation intact and speech normal  PSYCH: mentation appears normal, affect normal/bright    Results for orders placed or performed in visit on 04/22/24   XR Chest 2 Views     Status: None     Narrative    XR CHEST 2 VIEWS  4/22/2024 3:41 PM       INDICATION: Pneumonia of right middle lobe  COMPARISON: 4/19/2024       Impression    IMPRESSION: Negative chest. No current pneumonia.    INESSA ZARAGOZA MD         SYSTEM ID:  [id number]   Results for orders placed or performed in visit on 04/22/24   CRP inflammation     Status: Abnormal   Result Value Ref Range    CRP Inflammation 10.72 (H) <5.00 mg/L   Erythrocyte sedimentation rate auto     Status: Abnormal   Result Value Ref Range    Erythrocyte Sedimentation Rate 21 (H) 0 - 20 mm/hr   CBC with platelets and differential     Status: None   Result Value Ref Range    WBC Count 8.8 4.0 - 11.0 10e3/uL    RBC Count 4.61 3.80 - 5.20 10e6/uL    Hemoglobin 12.2 11.7 - 15.7 g/dL    Hematocrit 37.8 35.0 - 47.0 %    MCV 82 78 - 100 fL    MCH 26.5 26.5 - 33.0 pg    MCHC 32.3 31.5 - 36.5 g/dL    RDW 14.1 10.0 - 15.0 %    Platelet Count 360 150 - 450 10e3/uL    % Neutrophils 63 %    % Lymphocytes 25 %    % Monocytes 6 %    % Eosinophils 6 %    % Basophils 0 %    % Immature Granulocytes 0 %    NRBCs per 100 WBC 0 <1 /100    Absolute Neutrophils 5.5 1.6 - 8.3 10e3/uL    Absolute Lymphocytes 2.2 0.8 - 5.3 10e3/uL    Absolute Monocytes 0.5 0.0 - 1.3 10e3/uL    Absolute Eosinophils 0.5 0.0 - 0.7 10e3/uL    Absolute Basophils 0.0 0.0 - 0.2 10e3/uL    Absolute Immature Granulocytes 0.0 <=0.4 10e3/uL    Absolute NRBCs 0.0 10e3/uL   CBC with platelets differential     Status: None    Narrative    The following orders were created for panel order CBC with platelets differential.  Procedure                               Abnormality         Status                     ---------                               -----------         ------                     CBC with platelets and d...[[id number]]                      Final result                 Please view results for these tests on the individual orders.   CHEST TWO VIEWS  4/19/2024 2:17 PM      HISTORY:  Pneumonia of right middle lobe  due to infectious organism.     COMPARISON: Chest radiograph performed 3/3/2024. Chest CT performed  4/16/2024.                                                                      IMPRESSION: Minimal opacity in the right middle lobe is likely related  to the previously described right middle lobe pneumonia. The left lung  is clear. No pleural effusions.      ROSELIA ACHARYA MD         CT CHEST W CONTRAST 4/16/2024 10:31 AM     CLINICAL HISTORY: Hemicrania continua- concern for pancoast tumor,  lung adenocarcinoma, secondary smoke risk factor; Trigeminal neuralgia  TECHNIQUE: CT chest with IV contrast. Multiplanar reformats were  obtained. Dose reduction techniques were used.     CONTRAST: 91mL [id number]     COMPARISON: Chest radiograph 3/3/2024, neck pet CT 7/11/2023 and     FINDINGS:   LUNGS AND PLEURA: Patchy consolidation and groundglass opacities in  the right middle lobe. Left lung is grossly clear. No significant  pleural effusion. No suspicious pulmonary nodule or mass is  visualized.     MEDIASTINUM/AXILLAE: No lymphadenopathy. No thoracic aneurysm.     CORONARY ARTERY CALCIFICATION: None.     UPPER ABDOMEN: No significant finding.     MUSCULOSKELETAL: No acute bony abnormality.                                                                      IMPRESSION:   1.  Likely right middle lobe pneumonia, consider radiographic  follow-up to resolution.  2.  No suspicious pulmonary nodule or mass.     LATANYA RAMIREZ MD       Signed Electronically by: Judith Foster PA-C

## 2024-04-22 NOTE — LETTER
4/22/2024         RE: Dot Ramirez  [address] [address]        Dear Colleague,    Thank you for referring your patient, Dot Ramirez, to the [facility]. Please see a copy of my visit note below.    Virtual Visit Details    Type of service:  Video Visit     Originating Location (pt. Location): Home    Distant Location (provider location):  On-site  Platform used for Video Visit: Krishna    I met with Ms. Ramirez to follow up with her results on recent imaging.     Is now very symptomatic from focal pneumonia. She went to urgent care and the physician's assistant she saw thought is that this was present from at least March 2024 (last Chest X ray) but untreated.   This could be exacerbated an underlying headache condition; headaches can be a prominent feature of a focal pneumonia.     Recommend that the current focus be on evaluating and treating the pneumonia and then can focus on tapering off carbamazepine and transitioning to gabapentin if needed.     We will meet in about 2 months to re-evaluate her condition.     Total time spent today was 30 minutes.       Again, thank you for allowing me to participate in the care of your patient.        Sincerely,        Li Montemayor MD

## 2024-04-22 NOTE — PROGRESS NOTES
Virtual Visit Details    Type of service:  Video Visit     Originating Location (pt. Location): Home    Distant Location (provider location):  On-site  Platform used for Video Visit: Krishna    I met with Ms. Ramirez to follow up with her results on recent imaging.     Is now very symptomatic from focal pneumonia. She went to urgent care and the physician's assistant she saw thought is that this was present from at least March 2024 (last Chest X ray) but untreated.   This could be exacerbated an underlying headache condition; headaches can be a prominent feature of a focal pneumonia.     Recommend that the current focus be on evaluating and treating the pneumonia and then can focus on tapering off carbamazepine and transitioning to gabapentin if needed.     We will meet in about 2 months to re-evaluate her condition.     Total time spent today was 30 minutes.

## 2024-04-26 ENCOUNTER — TELEPHONE (OUTPATIENT)
Dept: NEUROLOGY | Facility: CLINIC | Age: 39
End: 2024-04-26
Payer: COMMERCIAL

## 2024-04-26 NOTE — TELEPHONE ENCOUNTER
Hi,    Please reach out to this pt to get her scheduled for the following follow up with Dr. Montemayor:    Return in about 2 months (around 6/22/2024) for Follow up, with me.     Thanks!

## 2024-05-20 ENCOUNTER — NURSE TRIAGE (OUTPATIENT)
Dept: FAMILY MEDICINE | Facility: CLINIC | Age: 39
End: 2024-05-20

## 2024-05-20 ENCOUNTER — OFFICE VISIT (OUTPATIENT)
Dept: PEDIATRICS | Facility: CLINIC | Age: 39
End: 2024-05-20
Payer: COMMERCIAL

## 2024-05-20 VITALS
WEIGHT: 186 LBS | BODY MASS INDEX: 34.02 KG/M2 | SYSTOLIC BLOOD PRESSURE: 124 MMHG | DIASTOLIC BLOOD PRESSURE: 90 MMHG | TEMPERATURE: 98.3 F | OXYGEN SATURATION: 98 % | RESPIRATION RATE: 18 BRPM | HEART RATE: 94 BPM

## 2024-05-20 DIAGNOSIS — R19.7 DIARRHEA, UNSPECIFIED TYPE: Primary | ICD-10-CM

## 2024-05-20 DIAGNOSIS — Z92.29 HISTORY OF ANTIMICROBIAL USE: ICD-10-CM

## 2024-05-20 DIAGNOSIS — R10.813 RIGHT LOWER QUADRANT ABDOMINAL TENDERNESS WITHOUT REBOUND TENDERNESS: ICD-10-CM

## 2024-05-20 DIAGNOSIS — R68.89 ALTERATION IN BLOOD PRESSURE: ICD-10-CM

## 2024-05-20 LAB
ALBUMIN SERPL BCG-MCNC: 4.2 G/DL (ref 3.5–5.2)
ALBUMIN UR-MCNC: NEGATIVE MG/DL
ALP SERPL-CCNC: 98 U/L (ref 40–150)
ALT SERPL W P-5'-P-CCNC: 11 U/L (ref 0–50)
ANION GAP SERPL CALCULATED.3IONS-SCNC: 13 MMOL/L (ref 7–15)
APPEARANCE UR: CLEAR
AST SERPL W P-5'-P-CCNC: 17 U/L (ref 0–45)
BASOPHILS # BLD AUTO: 0 10E3/UL (ref 0–0.2)
BASOPHILS NFR BLD AUTO: 0 %
BILIRUB SERPL-MCNC: <0.2 MG/DL
BILIRUB UR QL STRIP: NEGATIVE
BUN SERPL-MCNC: 6.2 MG/DL (ref 6–20)
C DIFF TOX B STL QL: NEGATIVE
CALCIUM SERPL-MCNC: 8.4 MG/DL (ref 8.6–10)
CHLORIDE SERPL-SCNC: 100 MMOL/L (ref 98–107)
COLOR UR AUTO: ABNORMAL
CREAT SERPL-MCNC: 0.63 MG/DL (ref 0.51–0.95)
CRP SERPL-MCNC: 38.6 MG/L
DEPRECATED HCO3 PLAS-SCNC: 25 MMOL/L (ref 22–29)
EGFRCR SERPLBLD CKD-EPI 2021: >90 ML/MIN/1.73M2
EOSINOPHIL # BLD AUTO: 0.1 10E3/UL (ref 0–0.7)
EOSINOPHIL NFR BLD AUTO: 1 %
ERYTHROCYTE [DISTWIDTH] IN BLOOD BY AUTOMATED COUNT: 14 % (ref 10–15)
ERYTHROCYTE [SEDIMENTATION RATE] IN BLOOD BY WESTERGREN METHOD: 20 MM/HR (ref 0–20)
GLUCOSE SERPL-MCNC: 99 MG/DL (ref 70–99)
GLUCOSE UR STRIP-MCNC: NEGATIVE MG/DL
HCT VFR BLD AUTO: 38.3 % (ref 35–47)
HGB BLD-MCNC: 12.3 G/DL (ref 11.7–15.7)
HGB UR QL STRIP: NEGATIVE
IMM GRANULOCYTES # BLD: 0 10E3/UL
IMM GRANULOCYTES NFR BLD: 0 %
KETONES UR STRIP-MCNC: ABNORMAL MG/DL
LACTATE SERPL-SCNC: 0.7 MMOL/L (ref 0.7–2)
LEUKOCYTE ESTERASE UR QL STRIP: NEGATIVE
LYMPHOCYTES # BLD AUTO: 1.7 10E3/UL (ref 0.8–5.3)
LYMPHOCYTES NFR BLD AUTO: 23 %
MCH RBC QN AUTO: 25.9 PG (ref 26.5–33)
MCHC RBC AUTO-ENTMCNC: 32.1 G/DL (ref 31.5–36.5)
MCV RBC AUTO: 81 FL (ref 78–100)
MONOCYTES # BLD AUTO: 0.6 10E3/UL (ref 0–1.3)
MONOCYTES NFR BLD AUTO: 9 %
MUCOUS THREADS #/AREA URNS LPF: PRESENT /LPF
NEUTROPHILS # BLD AUTO: 5.1 10E3/UL (ref 1.6–8.3)
NEUTROPHILS NFR BLD AUTO: 67 %
NITRATE UR QL: NEGATIVE
NRBC # BLD AUTO: 0 10E3/UL
NRBC BLD AUTO-RTO: 0 /100
PH UR STRIP: 5.5 [PH] (ref 5–7)
PLATELET # BLD AUTO: 301 10E3/UL (ref 150–450)
POTASSIUM SERPL-SCNC: 3.8 MMOL/L (ref 3.4–5.3)
PROCALCITONIN SERPL IA-MCNC: 0.15 NG/ML
PROT SERPL-MCNC: 7.3 G/DL (ref 6.4–8.3)
RBC # BLD AUTO: 4.74 10E6/UL (ref 3.8–5.2)
RBC URINE: <1 /HPF
SODIUM SERPL-SCNC: 138 MMOL/L (ref 135–145)
SP GR UR STRIP: 1 (ref 1–1.03)
SQUAMOUS EPITHELIAL: 1 /HPF
UROBILINOGEN UR STRIP-MCNC: NORMAL MG/DL
WBC # BLD AUTO: 7.5 10E3/UL (ref 4–11)
WBC URINE: 1 /HPF

## 2024-05-20 PROCEDURE — 36415 COLL VENOUS BLD VENIPUNCTURE: CPT | Performed by: PHYSICIAN ASSISTANT

## 2024-05-20 PROCEDURE — 85652 RBC SED RATE AUTOMATED: CPT | Performed by: PHYSICIAN ASSISTANT

## 2024-05-20 PROCEDURE — 81001 URINALYSIS AUTO W/SCOPE: CPT | Performed by: PHYSICIAN ASSISTANT

## 2024-05-20 PROCEDURE — 99214 OFFICE O/P EST MOD 30 MIN: CPT | Mod: 25 | Performed by: PHYSICIAN ASSISTANT

## 2024-05-20 PROCEDURE — 85025 COMPLETE CBC W/AUTO DIFF WBC: CPT | Performed by: PHYSICIAN ASSISTANT

## 2024-05-20 PROCEDURE — 96360 HYDRATION IV INFUSION INIT: CPT | Performed by: PHYSICIAN ASSISTANT

## 2024-05-20 PROCEDURE — 80053 COMPREHEN METABOLIC PANEL: CPT | Performed by: PHYSICIAN ASSISTANT

## 2024-05-20 PROCEDURE — 87493 C DIFF AMPLIFIED PROBE: CPT | Performed by: PHYSICIAN ASSISTANT

## 2024-05-20 PROCEDURE — 86140 C-REACTIVE PROTEIN: CPT | Performed by: PHYSICIAN ASSISTANT

## 2024-05-20 PROCEDURE — 84145 PROCALCITONIN (PCT): CPT | Performed by: PHYSICIAN ASSISTANT

## 2024-05-20 PROCEDURE — 83605 ASSAY OF LACTIC ACID: CPT | Performed by: PHYSICIAN ASSISTANT

## 2024-05-20 NOTE — TELEPHONE ENCOUNTER
Called pt .advised to hold lisinopril.   Called [facility]. They are able to see pt at 9:45 am.   Patient verbalized understanding of plan, and is agreeable.     Claudia Krishnamurthy RN

## 2024-05-20 NOTE — TELEPHONE ENCOUNTER
"Nurse Triage SBAR  Is this a 2nd Level Triage? YES, LICENSED PRACTITIONER REVIEW IS REQUIRED  Routed to provider    S-(situation): Diarrhea and drop in blood pressure since yesterday.    B-(background): \"Usually run 120/90s... but before I was diagnosed with pneumonia a few weeks ago I couldn't get it lower than 140/90s so they started me on lisinopril... since having pneumonia its been lower\" Was diagnosed with pneumonia 4/22/24. Diarrhea started yesterday and it dropped more. She missed doses of her lisinopril 20mg Friday and Saturday. She did take her lisinopril yesterday before she realized her BP was low. She has not taken it yet today.    A-(assessment):   -Diarrhea yesterday and today. Yesterday >7x, today so far 1x \"but I just woke up.\" Watery, brown. Denies blood in stool.  -Nausea yesterday but not today. Was able to have liquid IV and light foods yesterday. Last urinated this morning and it was normal, denies dysuria.  -Abdominal pain. Yesterday located as band around entire bra line, \"felt like bruising under ribs.\" It would get better after going to the bathroom. Today constant RLQ pain. 3/10 pain, \"feels like a side pain like when you're running.\"  -At bedtime last night was 99/70, . Now, 102/75, . Automatic cuff, upper arm. Temperature today 98.6F.  -Yesterday was foggy feeling, but that's improved today. Denies dizziness.  -denies recent travel, exposure to known sickness,     R-(recommendations): Call [facility]/Go to ED/UCC Now (Or To Office with PCP Approval). [facility] doesn't open until 9AM, so sending to PCP. What level of care do you recommend? Should she take her lisinopril today?     Reason for Disposition   Abdominal pain (Exception: Pain clears completely with each passage of diarrhea stool.)   Abdominal pain   Fall in systolic BP > 20 mm Hg from normal and feeling dizzy, lightheaded, or weak    Additional Information   Negative: Shock suspected (e.g., cold/pale/clammy skin, too weak to " stand, low BP, rapid pulse)   Negative: Difficult to awaken or acting confused (e.g., disoriented, slurred speech)   Negative: Sounds like a life-threatening emergency to the triager   Negative: Vomiting also present and worse than the diarrhea   Negative: Blood in stool and without diarrhea   Negative: SEVERE abdominal pain (e.g., excruciating) and present > 1 hour   Negative: SEVERE abdominal pain and age > 60 years   Negative: Bloody, black, or tarry bowel movements  (Exception: Chronic-unchanged black-grey bowel movements and is taking iron pills or Pepto-Bismol.)   Negative: SEVERE diarrhea (e.g., 7 or more times / day more than normal) and age > 60 years   Negative: Constant abdominal pain lasting > 2 hours   Negative: Drinking very little and dehydration suspected (e.g., no urine > 12 hours, very dry mouth, very lightheaded)   Negative: Patient sounds very sick or weak to the triager   Negative: SEVERE diarrhea (e.g., 7 or more times / day more than normal) and present > 24 hours (1 day)   Negative: MODERATE diarrhea (e.g., 4-6 times / day more than normal) and present > 48 hours (2 days)   Negative: MODERATE diarrhea (e.g., 4-6 times / day more than normal) and age > 70 years   Negative: Fever > 101 F (38.3 C)   Negative: Blood in the stool  (Exception: Only on toilet paper. Reason: Diarrhea can cause rectal irritation with blood on wiping.)   Negative: Mucus or pus in stool has been present > 2 days and diarrhea is more than mild   Negative: Weak immune system (e.g., HIV positive, cancer chemo, splenectomy, organ transplant, chronic steroids)   Negative: Travel to a foreign country in past month   Negative: Recent antibiotic therapy (i.e., within last 2 months) and diarrhea present > 3 days since antibiotic was stopped   Negative: Recent hospitalization and diarrhea present > 3 days   Negative: Tube feedings (e.g., nasogastric, g-tube, j-tube)   Negative: MILD diarrhea (e.g., 1-3 or more stools than normal  "in past 24 hours) diarrhea without known cause and present > 7 days   Negative: Patient wants to be seen   Negative: Diarrhea is a chronic symptom (recurrent or ongoing AND lasting > 4 weeks)   Negative: Systolic BP < 90 and feeling dizzy, lightheaded, or weak   Negative: Started suddenly after an allergic medicine, an allergic food, or bee sting   Negative: Shock suspected (e.g., cold/pale/clammy skin, too weak to stand, low BP, rapid pulse)   Negative: Difficult to awaken or acting confused  (e.g., disoriented, slurred speech)   Negative: Fainted   Negative: Chest pain   Negative: Bleeding (e.g., vomiting blood, rectal bleeding or tarry stools, severe vaginal bleeding)   Negative: Extra heart beats or heart is beating fast  (i.e., \"palpitations\")   Negative: Sounds like a life-threatening emergency to the triager   Negative: Systolic BP < 80 and NOT dizzy, lightheaded or weak (feels normal)   Negative: Drinking very little and has signs of dehydration (e.g., no urine > 12 hours, very dry mouth, very lightheaded)   Negative: Major surgery in the past month   Negative: Fever > 100.4 F (38.0 C)    Protocols used: Diarrhea-A-OH, Low Blood Pressure-A-OH    "

## 2024-05-20 NOTE — PROGRESS NOTES
Acute and Diagnostic Services Clinic Visit    Assessment & Plan     Diarrhea, unspecified type  Right lower quadrant abdominal tenderness without rebound tenderness  Alteration in blood pressure  History of antimicrobial use  Stat labs reassuring against acute infectious etiology.  Certainly could be viral.  Due to recent abx use, will send patient with c-diff cultures so she can collect if not improving/worsening.  Hold on restarting BP medication until SBP >140 or DBP > 90 consistently.  Symptoms improved with IVF bolus in clinic today.  Home hydration efforts encouraged.  Advised of warning signs/when to seek urgent care.    - lactated ringers BOLUS 1,000 mL  - CBC with platelets differential  - CRP inflammation  - Erythrocyte sedimentation rate auto  - Procalcitonin  - Comprehensive metabolic panel  - Lactic acid whole blood  - UA with Microscopic reflex to Culture  - C. difficile Toxin B PCR with reflex to C. difficile Antigen and Toxins A/B EIA      35  minutes were spent doing chart review, history and exam, documentation and further activities per the note.        Return in about 4 days (around 5/24/2024) for C diff stool sample if diarrhea persisting.      Judith Foster MBA, MS, PA-C  [facility]      Earle Chris is a 38 year old, presenting for the following health issues:  Diarrhea (Diarrhea X 2 days and low BP X 3-4 weeks)        3/25/2024     9:02 AM   Additional Questions   Roomed by Hilary RICO     Diarrhea  Onset/Duration: X 2 days  Description:       Consistency of stool: watery       Blood in stool: No       Number of loose stools past 24 hours: 10 +  Progression of Symptoms: improving  Accompanying signs and symptoms:       Fever: No       Nausea/Vomiting: YES- nausea only       Abdominal pain: YES- RLQ pain started yesterday as well. Notes hx of ovary cysts on Right side, has been hospitalized for sepsis for this       Weight loss:  No       Episodes of constipation: No  History   Ill contacts: No  Recent use of antibiotics: YES- Z rizwan and Amoxicillin for PNA about a month ago. Notes she has softer stool while taking these, denies watery stool like now  Recent travels: No  Recent medication-new or changes(Rx or OTC): No  Precipitating or alleviating factors: none  Therapies tried and outcome: Has held BP meds due to low BP at home, she notes she has taken 1 dose in the last 4 days on 5/18/24.  Notes elevated HR as well, BP continued to decrease.  Drank liquid IV last night and water as much as she can.  Rice crispy's and apple sauce.        Review of Systems  Constitutional, HEENT, cardiovascular, pulmonary, GI, , musculoskeletal, neuro, skin, endocrine and psych systems are negative, except as otherwise noted.      Objective    BP (!) 124/90 (BP Location: Left arm, Patient Position: Chair, Cuff Size: Adult Large)   Pulse 94   Temp 98.3  F (36.8  C) (Oral)   Resp 18   Wt 84.4 kg (186 lb)   LMP 04/28/2024 (Exact Date)   SpO2 98%   BMI 34.02 kg/m    Body mass index is 34.02 kg/m .  Physical Exam   GENERAL: alert and no distress  EYES: Eyes grossly normal to inspection, PERRL and conjunctivae and sclerae normal  RESP: lungs clear to auscultation - no rales, rhonchi or wheezes  CV: regular rate and rhythm, normal S1 S2, no S3 or S4, no murmur, click or rub, no peripheral edema  ABDOMEN: soft, mild epigastric tenderness without guarding, moderate right lower quadrant tenderness with some guarding, however, markedly improved (but not resolved) after 1 L lactated ringer bolus no hepatosplenomegaly, no masses and bowel sounds normal  MS: no gross musculoskeletal defects noted, no edema  SKIN: no suspicious lesions or rashes  NEURO: Normal strength and tone, mentation intact and speech normal  PSYCH: mentation appears normal, affect normal/bright    Results for orders placed or performed in visit on 05/20/24   CRP inflammation     Status:  Abnormal   Result Value Ref Range    CRP Inflammation 38.60 (H) <5.00 mg/L   Erythrocyte sedimentation rate auto     Status: Normal   Result Value Ref Range    Erythrocyte Sedimentation Rate 20 0 - 20 mm/hr   Procalcitonin     Status: Normal   Result Value Ref Range    Procalcitonin 0.15 <0.50 ng/mL   Comprehensive metabolic panel     Status: Abnormal   Result Value Ref Range    Sodium 138 135 - 145 mmol/L    Potassium 3.8 3.4 - 5.3 mmol/L    Carbon Dioxide (CO2) 25 22 - 29 mmol/L    Anion Gap 13 7 - 15 mmol/L    Urea Nitrogen 6.2 6.0 - 20.0 mg/dL    Creatinine 0.63 0.51 - 0.95 mg/dL    GFR Estimate >90 >60 mL/min/1.73m2    Calcium 8.4 (L) 8.6 - 10.0 mg/dL    Chloride 100 98 - 107 mmol/L    Glucose 99 70 - 99 mg/dL    Alkaline Phosphatase 98 40 - 150 U/L    AST 17 0 - 45 U/L    ALT 11 0 - 50 U/L    Protein Total 7.3 6.4 - 8.3 g/dL    Albumin 4.2 3.5 - 5.2 g/dL    Bilirubin Total <0.2 <=1.2 mg/dL   Lactic acid whole blood     Status: Normal   Result Value Ref Range    Lactic Acid 0.7 0.7 - 2.0 mmol/L   CBC with platelets and differential     Status: Abnormal   Result Value Ref Range    WBC Count 7.5 4.0 - 11.0 10e3/uL    RBC Count 4.74 3.80 - 5.20 10e6/uL    Hemoglobin 12.3 11.7 - 15.7 g/dL    Hematocrit 38.3 35.0 - 47.0 %    MCV 81 78 - 100 fL    MCH 25.9 (L) 26.5 - 33.0 pg    MCHC 32.1 31.5 - 36.5 g/dL    RDW 14.0 10.0 - 15.0 %    Platelet Count 301 150 - 450 10e3/uL    % Neutrophils 67 %    % Lymphocytes 23 %    % Monocytes 9 %    % Eosinophils 1 %    % Basophils 0 %    % Immature Granulocytes 0 %    NRBCs per 100 WBC 0 <1 /100    Absolute Neutrophils 5.1 1.6 - 8.3 10e3/uL    Absolute Lymphocytes 1.7 0.8 - 5.3 10e3/uL    Absolute Monocytes 0.6 0.0 - 1.3 10e3/uL    Absolute Eosinophils 0.1 0.0 - 0.7 10e3/uL    Absolute Basophils 0.0 0.0 - 0.2 10e3/uL    Absolute Immature Granulocytes 0.0 <=0.4 10e3/uL    Absolute NRBCs 0.0 10e3/uL   UA with Microscopic reflex to Culture     Status: Abnormal    Specimen: Urine,  Clean Catch   Result Value Ref Range    Color Urine Straw Colorless, Straw, Light Yellow, Yellow    Appearance Urine Clear Clear    Glucose Urine Negative Negative mg/dL    Bilirubin Urine Negative Negative    Ketones Urine Trace (A) Negative mg/dL    Specific Gravity Urine 1.005 1.003 - 1.035    Blood Urine Negative Negative    pH Urine 5.5 5.0 - 7.0    Protein Albumin Urine Negative Negative mg/dL    Urobilinogen Urine Normal Normal, 2.0 mg/dL    Nitrite Urine Negative Negative    Leukocyte Esterase Urine Negative Negative    Mucus Urine Present (A) None Seen /LPF    RBC Urine <1 <=2 /HPF    WBC Urine 1 <=5 /HPF    Squamous Epithelials Urine 1 <=1 /HPF    Narrative    Urine Culture not indicated   CBC with platelets differential     Status: Abnormal    Narrative    The following orders were created for panel order CBC with platelets differential.  Procedure                               Abnormality         Status                     ---------                               -----------         ------                     CBC with platelets and d...[[id number]]  Abnormal            Final result                 Please view results for these tests on the individual orders.           Signed Electronically by: Judith Foster PA-C

## 2024-05-20 NOTE — PATIENT INSTRUCTIONS
Hold lisinopril until systolic BP consistently > 140 or consistently diastolic >90.  If diarrhea persisting with elevated BP recommend close monitoring of BP and consideration of adding in 1/2 dose to start.

## 2024-05-21 NOTE — RESULT ENCOUNTER NOTE
Dot  I have reviewed your recent test results:    Great news!  Stool culture for C difficile infection is negative!   This is likely a viral diarrhea and will resolve in the coming days.  If it does not resolve/improve over the next 5 days please follow up with your primary care provider to determine next steps.    For additional lab test information, www.testing.com is an excellent reference.      Healthy regards,     Judith Foster MBA, MS, PA-C  [facility]      
Results discussed directly with patient while patient was present. Any further details documented in the note.   Judith Foster PA-C
[employer]

## 2024-08-13 DIAGNOSIS — G50.0 TRIGEMINAL NEURALGIA: ICD-10-CM

## 2024-08-13 RX ORDER — CARBAMAZEPINE 200 MG/1
200 CAPSULE, EXTENDED RELEASE ORAL 2 TIMES DAILY
Qty: 186 CAPSULE | Refills: 0 | Status: SHIPPED | OUTPATIENT
Start: 2024-08-13

## 2024-08-13 NOTE — TELEPHONE ENCOUNTER
[facility] Call [facility]    Phone Message    May a detailed message be left on voicemail: yes     Reason for Call: Medication Refill Request    Has the patient contacted the pharmacy for the refill? Yes   Name of medication being requested: carBAMazepine (CARBATROL) 200 MG 12 hr capsule     Provider who prescribed the medication: Dr. Montemayor    Pharmacy: [id number] [id number]  [address]    Date medication is needed: 08/13 (Pt is out of medication)    Action Taken: Message routed to:  Other: [facility]    Travel Screening: Not Applicable     Date of Service:

## 2024-08-13 NOTE — TELEPHONE ENCOUNTER
Refill request for the following medication (s) listed below.    Pending Prescriptions:                       Disp   Refills    carBAMazepine (CARBATROL) 200 MG 12 hr ca*186 ca*0            Sig: Take 1 capsule (200 mg) by mouth 2 times daily      Last office visit provider:  4/22/24  Next appointment scheduled: 10/7/24      Medication T'd for review and signature  Caesar ZUNIGA ATC on 8/13/2024 at 3:55 PM

## 2024-08-23 ENCOUNTER — OFFICE VISIT (OUTPATIENT)
Dept: URGENT CARE | Facility: URGENT CARE | Age: 39
End: 2024-08-23
Payer: COMMERCIAL

## 2024-08-23 ENCOUNTER — NURSE TRIAGE (OUTPATIENT)
Dept: NURSING | Facility: CLINIC | Age: 39
End: 2024-08-23

## 2024-08-23 VITALS
SYSTOLIC BLOOD PRESSURE: 134 MMHG | BODY MASS INDEX: 34.23 KG/M2 | OXYGEN SATURATION: 99 % | HEART RATE: 96 BPM | DIASTOLIC BLOOD PRESSURE: 96 MMHG | RESPIRATION RATE: 16 BRPM | TEMPERATURE: 98.8 F | WEIGHT: 187.13 LBS

## 2024-08-23 DIAGNOSIS — R07.0 THROAT PAIN: Primary | ICD-10-CM

## 2024-08-23 DIAGNOSIS — Z20.818 STREP THROAT EXPOSURE: Primary | ICD-10-CM

## 2024-08-23 LAB
DEPRECATED S PYO AG THROAT QL EIA: NEGATIVE
GROUP A STREP BY PCR: NOT DETECTED

## 2024-08-23 PROCEDURE — 99213 OFFICE O/P EST LOW 20 MIN: CPT | Performed by: FAMILY MEDICINE

## 2024-08-23 PROCEDURE — 87651 STREP A DNA AMP PROBE: CPT | Performed by: FAMILY MEDICINE

## 2024-08-23 NOTE — PROGRESS NOTES
SUBJECTIVE:   Dot Ramirez is a 39 year old female presenting with a chief complaint of sore throat, nasal congestion, chest/abdominal pain, low grade fever.  Had more chest pain and cough  Onset of symptoms was 4 day(s) ago.  Course of illness is worsening.    Severity moderate  Current and Associated symptoms: cough, sore throat  Treatment measures tried include Tylenol/Ibuprofen, OTC Cough med, Fluids, and Rest.  Predisposing factors include exposure to strep.    Home rapid COVID test negative    Drove in car with friend and other kids over the weekend, friend tested positive for strep this morning    Past Medical History:   Diagnosis Date    Abnormal Pap smear of cervix 02/22/2010    see problem list    Anxiety     Benign essential hypertension 02/21/2024    Depressive disorder 20yrs    When I was 16yrs I had my first episodes    Jaw claudication     Moderate major depression (H) 11/2014    onset with fetal loss    PID (acute pelvic inflammatory disease) 05/16/2022    Temporal pain     TOA (tubo-ovarian abscess) 05/16/2022     Current Outpatient Medications   Medication Sig Dispense Refill    budesonide-formoterol (SYMBICORT) 160-4.5 MCG/ACT Inhaler Inhale 2 puffs into the lungs 2 times daily 10.2 g 0    carBAMazepine (CARBATROL) 200 MG 12 hr capsule Take 1 capsule (200 mg) by mouth 2 times daily 186 capsule 0    lisinopril (ZESTRIL) 20 MG tablet TAKE 1 TABLET BY MOUTH EVERY DAY 90 tablet 1    albuterol (PROAIR HFA/PROVENTIL HFA/VENTOLIN HFA) 108 (90 Base) MCG/ACT inhaler Inhale 2 puffs into the lungs every 6 hours as needed for shortness of breath, wheezing or cough (Patient not taking: Reported on 8/23/2024) 18 g 0    guaiFENesin (MUCINEX) 600 MG 12 hr tablet Take 2 tablets (1,200 mg) by mouth 2 times daily (Patient not taking: Reported on 8/23/2024)       Social History     Tobacco Use    Smoking status: Never     Passive exposure: Never    Smokeless tobacco: Never   Substance Use Topics    Alcohol use:  Yes     Comment: very very rare       ROS:  Review of systems negative except as stated above.    OBJECTIVE:  BP (!) 134/96   Pulse 96   Temp 98.8  F (37.1  C)   Resp 16   Wt 84.9 kg (187 lb 2 oz)   SpO2 99%   BMI 34.23 kg/m    GENERAL APPEARANCE: healthy, alert and no distress  EYES: EOMI,  PERRL, conjunctiva clear  HENT: mouth without ulcers, erythema or lesions  RESP: lungs clear to auscultation - no rales, rhonchi or wheezes  PSYCH: mentation appears normal and affect normal/bright    Results for orders placed or performed in visit on 08/23/24   Streptococcus A Rapid Screen w/Reflex to PCR - Clinic Collect     Status: Normal    Specimen: Throat; Swab   Result Value Ref Range    Group A Strep antigen Negative Negative       ASSESSMENT/PLAN:  (R07.0) Throat pain  (primary encounter diagnosis)  Comment: viral  Plan: Streptococcus A Rapid Screen w/Reflex to PCR -         Clinic Collect, Group A Streptococcus PCR         Throat Swab            Reassurance given, reviewed symptomatic treatment with tylenol, ibuprofen, plenty of fluids and rest.  Will follow up on throat culture and treat if positive for strep.    Follow up with primary provider if no improvement of symptoms in 1 week    Otoniel Reynoso MD  August 23, 2024 12:03 PM

## 2024-08-24 RX ORDER — AMOXICILLIN 875 MG
875 TABLET ORAL 2 TIMES DAILY
Qty: 20 TABLET | Refills: 0 | Status: SHIPPED | OUTPATIENT
Start: 2024-08-24 | End: 2024-09-03

## 2024-08-24 NOTE — TELEPHONE ENCOUNTER
Please notify    Daughter positive for strep  RX Amoxicillin 875 mg BID X 10 days efaxed to pharmacy due to close strep exposure and current symptoms

## 2024-08-24 NOTE — TELEPHONE ENCOUNTER
Nurse Triage SBAR    Is this a 2nd Level Triage? NO    Situation: Pt calling with questions about strep testing.    Background: Pt was seen today in  for a sore throat. She was tested for strep which came back negative.    Assessment: Pt states she was with a group of 6 people, 2 or 3 which have now tested positive for strep. One is her child. She continues to have a sore throat which is about the same as earlier. She would like to speak to someone about this.    Protocol Recommended Disposition:   Home Care    Recommendation: Please review and contact pt to discuss.     Reason for Disposition   [1] Rapid strep test negative AND [2] symptoms not worse   [1] Throat culture negative AND [2] symptoms not worse    Additional Information   Negative: [1] Negative throat culture or rapid strep test (according to lab, PCP, caller, etc.) AND [2]  symptoms worse   Negative: [1] Diagnosed strep throat AND [2] taking antibiotic AND [3] symptoms continue   Negative: Patient sounds very sick or weak to the triager   Negative: [1] Positive throat culture or rapid strep test (according to lab, PCP, caller, etc.) AND [2] NO standing order to call in prescription for antibiotic   Negative: [1] Positive throat culture or rapid strep test (according to lab, PCP, caller, etc.) AND [2] standing order to call in prescription for antibiotic    Protocols used: Strep Throat Test Follow-up Call-SHIVANI-KATELYNN Bishop RN  [facility] Nurse Advisor   8/23/2024  9:01 PM

## 2024-10-07 ENCOUNTER — VIRTUAL VISIT (OUTPATIENT)
Dept: NEUROLOGY | Facility: CLINIC | Age: 39
End: 2024-10-07
Payer: COMMERCIAL

## 2024-10-07 VITALS — HEIGHT: 62 IN | WEIGHT: 190 LBS | BODY MASS INDEX: 34.96 KG/M2

## 2024-10-07 DIAGNOSIS — G43.009 MIGRAINE WITHOUT AURA AND WITHOUT STATUS MIGRAINOSUS, NOT INTRACTABLE: Primary | ICD-10-CM

## 2024-10-07 PROCEDURE — G2211 COMPLEX E/M VISIT ADD ON: HCPCS | Mod: 95 | Performed by: PSYCHIATRY & NEUROLOGY

## 2024-10-07 PROCEDURE — 99214 OFFICE O/P EST MOD 30 MIN: CPT | Mod: 95 | Performed by: PSYCHIATRY & NEUROLOGY

## 2024-10-07 ASSESSMENT — HEADACHE IMPACT TEST (HIT 6)
HOW OFTEN HAVE YOU FELT TOO TIRED TO WORK BECAUSE OF YOUR HEADACHES: RARELY
HOW OFTEN DID HEADACHS LIMIT CONCENTRATION ON WORK OR DAILY ACTIVITY: SOMETIMES
HIT6 TOTAL SCORE: 61
WHEN YOU HAVE HEADACHES HOW OFTEN IS THE PAIN SEVERE: SOMETIMES
HOW OFTEN DO HEADACHES LIMIT YOUR DAILY ACTIVITIES: SOMETIMES
HOW OFTEN HAVE YOU FELT FED UP OR IRRITATED BECAUSE OF YOUR HEADACHES: SOMETIMES
WHEN YOU HAVE A HEADACHE HOW OFTEN DO YOU WISH YOU COULD LIE DOWN: ALWAYS

## 2024-10-07 ASSESSMENT — PAIN SCALES - GENERAL: PAINLEVEL: NO PAIN (0)

## 2024-10-07 ASSESSMENT — MIGRAINE DISABILITY ASSESSMENT (MIDAS)
HOW MANY DAYS WAS HOUSEWORK PRODUCTIVITY CUT IN HALF DUE TO HEADACHES: 7
HOW MANY DAYS DID YOU NOT DO HOUSEWORK BECAUSE OF HEADACHES: 5
HOW MANY DAYS DID YOU MISS WORK OR SCHOOL BECAUSE OF HEADACHES: 0
TOTAL SCORE: 19
ON A SCALE FROM 0-10 ON AVERAGE HOW PAINFUL WERE HEADACHES: 6
HOW MANY DAYS IN THE PAST 3 MONTHS HAVE YOU HAD A HEADACHE: 0
HOW OFTEN WERE SOCIAL ACTIVITIES MISSED DUE TO HEADACHES: 2
HOW MANY DAYS WAS YOUR PRODUCTIVITY CUT IN HALF BECAUSE OF HEADACHES: 5

## 2024-10-07 NOTE — LETTER
10/7/2024      Dot Ramirez  [address] [facility] [address]      Dear Colleague,    Thank you for referring your patient, Dot Ramirez, to the [facility]UR[facility]. Please see a copy of my visit note below.    Virtual Visit Details    Type of service:  Video Visit     Originating Location (pt. Location): Home    Distant Location (provider location):  On-site  Platform used for Video Visit: [facility]    Headache Neurology Progress Note  October 7, 2024    Assessment/Plan:   Dot Ramirez is a 39 year old female previously with headaches with a probable hemicrania continua phenotype which were secondary to focal pneumonia presents with 2 headaches meeting criteria for probable migraine (meet all criteria, pending 3 more migrainous headaches to meet the full criteria).     Discussed taper of carbamazepine and consideration of alternate preventative if needed.    Will obtain chest X ray to rule out recurrence of focal pneumonia.    Acute headache treatment:  Ubrelvy 100mg, given that she has uncontrolled hypertension, triptans are contraindicated    Preventative headache treatment:  Trial tapering off carbamazepine by:  Week 1: 200mg evening  Week 2: off    Would consider topiramate or atenolol if needed for migraine treatment.    Will meet again in 3 months.     The longitudinal plan of care for Dot was addressed during this visit. Due to the added complexity in care, I will continue to support Dot in the subsequent management of this condition(s) and with the ongoing continuity of care of this condition(s).    Total time spent was 38 minutes.      Li Montemayor MD  Neurology     Subjective:    Dot Ramirez returns for follow up of headaches.    She had a walking pneumonia. She had had this for a very long time. She was treated with azithromycin and then Saturday afternoon she then realized  "she had this. She was then prescribed an inhaler. She has been stable from that. Headaches have subsided and are not nearly as bad.     She has had strep 3 times in the last 3 months. 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 The headaches completely remitted up until last Wednesday. She was able to kick it prior to going to work. She awoke last Friday and felt sick and took meds. She was very nauseous with her headaches. Prior to Wednesday, she had them associated with menses. These were non-debilitating. It was not an issue since the pneumonia.     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     Wednesday and Friday headaches were headaches at the temple area and into the ear. She took Midol complete and by the time she awoke was fine. She thought that she would have had the right eye lacrimate. She had Wednesday pain on the left and Friday pain on the RIGHT. She thought perhaps a massage would have triggered it. These headaches did not feel like when she had the focal pneumonia. She has had photophobia, phonophobia. She would have pain worsen with physical activity. She has not taken any additional carbamazepine. The pain on Friday was sharp, but not stabbing. It was similar to the pain from prior, but not that.     She endorses that her prior diagnosis of temporal arteritis was incorrect.     Objective:    Vitals: Ht 1.575 m (5' 2\")   Wt 86.2 kg (190 lb)   BMI 34.75 kg/m    General: Cooperative, NAD  Neurologic:  Mental Status: Fully alert, attentive and oriented. Speech clear and fluent.   Cranial Nerves: Facial movements symmetric.   Motor: No abnormal movements.      Pertinent Investigations:            4/22/2024     9:52 AM 10/7/2024     7:13 AM   HIT-6   When you have headaches, how often is the pain severe 11 10   How often do headaches limit your ability to do usual daily activities including household work, work, school, or social activities? 10 10   When you have a headache, how often do you wish you could " lie down? 13 13   In the past 4 weeks, how often have you felt too tired to do work or daily activities because of your headaches 10 8   In the past 4 weeks, how often have you felt fed up or irritated because of your headaches 13 10   In the past 4 weeks, how often did headaches limit your ability to concentrate on work or daily activities 10 10   HIT-6 Total Score 67 61           4/22/2024     9:56 AM 10/7/2024     7:15 AM   MIDAS - in the past three months:   On how many days did you miss work or school because of your headaches? 28 0   How many days was your productivity at work or school reduced by half or more because of your headaches? 60 5   On how many days did you not do household work because of your headaches? 25 5   How many days was your productivity in household work reduced by half or more because of your headaches? 45 7   On how many days did you miss family, social, or leisure activities because of your headaches? 5 2   On how many days did you have a headache? 10 0   On a scale of 0-10, on average how painful were these headaches? 8 6   MIDAS Score 163 (IV - Severe Disability) 19 (III - Moderate Disability)           Again, thank you for allowing me to participate in the care of your patient.        Sincerely,        Li Montemayor MD

## 2024-10-07 NOTE — PROGRESS NOTES
Virtual Visit Details    Type of service:  Video Visit     Originating Location (pt. Location): Home    Distant Location (provider location):  On-site  Platform used for Video Visit: [facility]    Headache Neurology Progress Note  October 7, 2024    Assessment/Plan:   Dot Ramirez is a 39 year old female previously with headaches with a probable hemicrania continua phenotype which were secondary to focal pneumonia presents with 2 headaches meeting criteria for probable migraine (meet all criteria, pending 3 more migrainous headaches to meet the full criteria).     Discussed taper of carbamazepine and consideration of alternate preventative if needed.    Will obtain chest X ray to rule out recurrence of focal pneumonia.    Acute headache treatment:  Ubrelvy 100mg, given that she has uncontrolled hypertension, triptans are contraindicated    Preventative headache treatment:  Trial tapering off carbamazepine by:  Week 1: 200mg evening  Week 2: off    Would consider topiramate or atenolol if needed for migraine treatment.    Will meet again in 3 months.     The longitudinal plan of care for Dot was addressed during this visit. Due to the added complexity in care, I will continue to support Dot in the subsequent management of this condition(s) and with the ongoing continuity of care of this condition(s).    Total time spent was 38 minutes.      Li Montemayor MD  Neurology     Subjective:    Dot Ramirez returns for follow up of headaches.    She had a walking pneumonia. She had had this for a very long time. She was treated with azithromycin and then Saturday afternoon she then realized she had this. She was then prescribed an inhaler. She has been stable from that. Headaches have subsided and are not nearly as bad.     She has had strep 3 times in the last 3 months. 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 The headaches completely remitted up until last Wednesday.  "She was able to kick it prior to going to work. She awoke last Friday and felt sick and took meds. She was very nauseous with her headaches. Prior to Wednesday, she had them associated with menses. These were non-debilitating. It was not an issue since the pneumonia.     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     Wednesday and Friday headaches were headaches at the temple area and into the ear. She took Midol complete and by the time she awoke was fine. She thought that she would have had the right eye lacrimate. She had Wednesday pain on the left and Friday pain on the RIGHT. She thought perhaps a massage would have triggered it. These headaches did not feel like when she had the focal pneumonia. She has had photophobia, phonophobia. She would have pain worsen with physical activity. She has not taken any additional carbamazepine. The pain on Friday was sharp, but not stabbing. It was similar to the pain from prior, but not that.     She endorses that her prior diagnosis of temporal arteritis was incorrect.     Objective:    Vitals: Ht 1.575 m (5' 2\")   Wt 86.2 kg (190 lb)   BMI 34.75 kg/m    General: Cooperative, NAD  Neurologic:  Mental Status: Fully alert, attentive and oriented. Speech clear and fluent.   Cranial Nerves: Facial movements symmetric.   Motor: No abnormal movements.      Pertinent Investigations:            4/22/2024     9:52 AM 10/7/2024     7:13 AM   HIT-6   When you have headaches, how often is the pain severe 11 10   How often do headaches limit your ability to do usual daily activities including household work, work, school, or social activities? 10 10   When you have a headache, how often do you wish you could lie down? 13 13   In the past 4 weeks, how often have you felt too tired to do work or daily activities because of your headaches 10 8   In the past 4 weeks, how often have you felt fed up or irritated because of your headaches 13 10   In the past 4 weeks, how often did " headaches limit your ability to concentrate on work or daily activities 10 10   HIT-6 Total Score 67 61           4/22/2024     9:56 AM 10/7/2024     7:15 AM   MIDAS - in the past three months:   On how many days did you miss work or school because of your headaches? 28 0   How many days was your productivity at work or school reduced by half or more because of your headaches? 60 5   On how many days did you not do household work because of your headaches? 25 5   How many days was your productivity in household work reduced by half or more because of your headaches? 45 7   On how many days did you miss family, social, or leisure activities because of your headaches? 5 2   On how many days did you have a headache? 10 0   On a scale of 0-10, on average how painful were these headaches? 8 6   MIDAS Score 163 (IV - Severe Disability) 19 (III - Moderate Disability)

## 2024-10-07 NOTE — NURSING NOTE
Current patient location: [id number] [place] [id number]    Is the patient currently in the state of [place]? YES    Visit mode:VIDEO    If the visit is dropped, the patient can be reconnected by: VIDEO VISIT: Text to cell phone:   Telephone Information:   Mobile [phone]       Will anyone else be joining the visit? NO  (If patient encounters technical issues they should call [phone])    Are changes needed to the allergy or medication list? Pt stated no changes to allergies and Pt stated no med changes    Are refills needed on medications prescribed by this physician? NO    Rooming Documentation:  Not applicable    Reason for visit: MIMI HUSSEINF

## 2024-10-08 ENCOUNTER — ANCILLARY PROCEDURE (OUTPATIENT)
Dept: GENERAL RADIOLOGY | Facility: CLINIC | Age: 39
End: 2024-10-08
Attending: PSYCHIATRY & NEUROLOGY
Payer: COMMERCIAL

## 2024-10-08 DIAGNOSIS — G43.009 MIGRAINE WITHOUT AURA AND WITHOUT STATUS MIGRAINOSUS, NOT INTRACTABLE: ICD-10-CM

## 2024-10-08 PROCEDURE — 71046 X-RAY EXAM CHEST 2 VIEWS: CPT | Mod: TC | Performed by: RADIOLOGY

## 2024-10-18 DIAGNOSIS — I10 BENIGN ESSENTIAL HYPERTENSION: ICD-10-CM

## 2024-10-18 RX ORDER — LISINOPRIL 20 MG/1
20 TABLET ORAL DAILY
Qty: 90 TABLET | Refills: 0 | Status: SHIPPED | OUTPATIENT
Start: 2024-10-18

## 2024-11-03 ENCOUNTER — OFFICE VISIT (OUTPATIENT)
Dept: URGENT CARE | Facility: URGENT CARE | Age: 39
End: 2024-11-03
Payer: COMMERCIAL

## 2024-11-03 VITALS
BODY MASS INDEX: 34.93 KG/M2 | OXYGEN SATURATION: 99 % | HEART RATE: 99 BPM | DIASTOLIC BLOOD PRESSURE: 75 MMHG | HEIGHT: 61 IN | SYSTOLIC BLOOD PRESSURE: 132 MMHG | RESPIRATION RATE: 18 BRPM | WEIGHT: 185 LBS | TEMPERATURE: 98.3 F

## 2024-11-03 DIAGNOSIS — R05.1 ACUTE COUGH: Primary | ICD-10-CM

## 2024-11-03 PROCEDURE — 99213 OFFICE O/P EST LOW 20 MIN: CPT | Performed by: FAMILY MEDICINE

## 2024-11-03 PROCEDURE — 87798 DETECT AGENT NOS DNA AMP: CPT | Performed by: FAMILY MEDICINE

## 2024-11-03 RX ORDER — BENZONATATE 100 MG/1
100-200 CAPSULE ORAL 3 TIMES DAILY PRN
Qty: 30 CAPSULE | Refills: 0 | Status: SHIPPED | OUTPATIENT
Start: 2024-11-03

## 2024-11-03 RX ORDER — ALBUTEROL SULFATE 90 UG/1
2 INHALANT RESPIRATORY (INHALATION) EVERY 4 HOURS PRN
Qty: 17 G | Refills: 0 | Status: SHIPPED | OUTPATIENT
Start: 2024-11-03

## 2024-11-03 NOTE — PATIENT INSTRUCTIONS
Use Tessalon perles 1-2 pills every 8 hours as needed to reduce coughing.    Use albuterol inhaler 2 puffs every 4 hours as needed, especially when feeling tight in chest.    Pertussis test is in process.  If this comes back positive, we will contact you and get your started on azithromycin to reduce spread of infection.

## 2024-11-03 NOTE — PROGRESS NOTES
"  ICD-10-CM    1. Acute cough  R05.1 B. pertussis/parapertussis PCR-NP     albuterol (PROAIR HFA/PROVENTIL HFA/VENTOLIN HFA) 108 (90 Base) MCG/ACT inhaler     benzonatate (TESSALON) 100 MG capsule        Spasmodic cough almost to the point of emesis following mild viral URI-like prodrome--need to rule out pertussis.  Lung exam normal today, which is reassuring.  I have very low suspicion for pneumonia at this time without any fevers or malaise.  If pertussis swab is negative and meds prescribed today are not helping, I think a CXR would be in order.    PLAN:  Patient Instructions   Use Tessalon perles 1-2 pills every 8 hours as needed to reduce coughing.    Use albuterol inhaler 2 puffs every 4 hours as needed, especially when feeling tight in chest.    Pertussis test is in process.  If this comes back positive, we will contact you and get your started on azithromycin to reduce spread of infection.    SUBJECTIVE:  Dot Ramirez is a 39 year old female who presents to  today with worsening cough.  Illness started early this week with runny nose.  No fever.  +fatigue.  About 3 days ago she feels like the illness settled into her chest and her cough is now the dominant symptoms.  Has been coughing in long spells, sometimes to the point of almost vomiting.    Had pneumonia earlier this year.  No history of asthma.    OBJECTIVE:  /75   Pulse 99   Temp 98.3  F (36.8  C) (Oral)   Resp 18   Ht 1.549 m (5' 1\")   Wt 83.9 kg (185 lb)   LMP 11/03/2024   SpO2 99%   BMI 34.96 kg/m    GEN: well-appearing, in NAD  ENT: TMs normal, oral MMM, pharynx not erythematous, no exudates, uvula midline  Neck: no significant LAD noted  Lungs:  CTAB, good air entry throughout.  Frequent coughing jags during our visit.  CV:  RRR, no murmurs noted  "

## 2024-11-04 LAB
B PARAPERT DNA SPEC QL NAA+PROBE: NOT DETECTED
B PERT DNA SPEC QL NAA+PROBE: NOT DETECTED

## 2024-12-13 ENCOUNTER — ANCILLARY PROCEDURE (OUTPATIENT)
Dept: GENERAL RADIOLOGY | Facility: CLINIC | Age: 39
End: 2024-12-13
Attending: FAMILY MEDICINE
Payer: COMMERCIAL

## 2024-12-13 PROCEDURE — 71046 X-RAY EXAM CHEST 2 VIEWS: CPT | Mod: TC | Performed by: RADIOLOGY

## 2025-01-11 DIAGNOSIS — I10 BENIGN ESSENTIAL HYPERTENSION: ICD-10-CM

## 2025-01-13 RX ORDER — LISINOPRIL 20 MG/1
20 TABLET ORAL DAILY
Qty: 90 TABLET | Refills: 0 | Status: SHIPPED | OUTPATIENT
Start: 2025-01-13

## 2025-01-17 ENCOUNTER — VIRTUAL VISIT (OUTPATIENT)
Dept: OBGYN | Facility: CLINIC | Age: 40
End: 2025-01-17
Payer: COMMERCIAL

## 2025-01-17 DIAGNOSIS — F32.9 MAJOR DEPRESSIVE DISORDER, REMISSION STATUS UNSPECIFIED, UNSPECIFIED WHETHER RECURRENT: ICD-10-CM

## 2025-01-17 DIAGNOSIS — R73.03 PREDIABETES: ICD-10-CM

## 2025-01-17 DIAGNOSIS — Z98.891 HISTORY OF CESAREAN DELIVERY: ICD-10-CM

## 2025-01-17 DIAGNOSIS — I10 BENIGN ESSENTIAL HYPERTENSION: Primary | ICD-10-CM

## 2025-01-17 DIAGNOSIS — O09.299 HISTORY OF CERCLAGE, CURRENTLY PREGNANT: ICD-10-CM

## 2025-01-17 DIAGNOSIS — Z98.890 HISTORY OF CERCLAGE, CURRENTLY PREGNANT: ICD-10-CM

## 2025-01-17 DIAGNOSIS — O09.521 MULTIGRAVIDA OF ADVANCED MATERNAL AGE IN FIRST TRIMESTER: ICD-10-CM

## 2025-01-17 PROCEDURE — 98005 SYNCH AUDIO-VIDEO EST LOW 20: CPT | Performed by: OBSTETRICS & GYNECOLOGY

## 2025-01-17 PROCEDURE — G2211 COMPLEX E/M VISIT ADD ON: HCPCS | Performed by: OBSTETRICS & GYNECOLOGY

## 2025-01-17 RX ORDER — ASPIRIN 81 MG/1
81 TABLET, CHEWABLE ORAL DAILY
Qty: 90 TABLET | Refills: 3 | Status: SHIPPED | OUTPATIENT
Start: 2025-01-17

## 2025-01-17 NOTE — PROGRESS NOTES
SUBJECTIVE:                                                   CC:  Patient presents with:  Consult:  , LMP 2024, hx of incompetent cervix x two, hx of two C/S. 39 years old, has questions about high risk pregnancy      HPI:  Dot Ramirez is a 39 year old  presents with a recently noted positive pregnancy test.  She describes she has a very high risk pregnancy and was unable to get in for a new OB visit in a timely manner.  She is here today for a virtual visit to get the ball rolling on placement of cerclage potentially as well as early ultrasound and blood testing.    She has a history of chronic hypertension, was on lisinopril 20 mg daily, which she stopped about 1 week ago.    She has a history of prediabetes as well as class III obesity.    She has a history of SAB at 18 weeks and her first pregnancy in .  In her second pregnancy in , she had a history indicated cerclage placed at 11 weeks.  She went into labor and was breech after a failed ECV and so ended with a  around 37.  In her third pregnancy, she also had a history indicated cerclage placed in the first trimester, had it removed at 37 weeks and then SROM and delivered at 38.    This was an unplanned pregnancy, she and her  have been together for 21 years but she found out late last year before the positive pregnancy test that he had cheated on her.  She is not in favor of therapeutic AB's, however she is not completely decided whether she is planning to continue the pregnancy to full-term.  She has an additional many questions regarding age and relation to her high risk pregnancy status.    Gyn History:  Patient's last menstrual period was 2024.       OBJECTIVE:     LMP 2024     Gen: Healthy appearing female, no acute distress, comfortable  Psych: mentation appears normal and affect bright    ASSESSMENT/PLAN:                                                      1. Benign essential  hypertension (Primary)  Recommend starting a baby aspirin and continuing throughout pregnancy.  She was on lisinopril 20 mg up until 1 week ago.  A blood pressure this morning that she took herself at home was 125/80.  We discussed that we will monitor this closely throughout pregnancy and potentially start a different medication to treat hypertension in pregnancy.  We also discussed that she is likely to deliver by 37 to 38 weeks depending on her level of control blood pressure with or without medications.  Will also require serial surveillance for growth and  surveillance in the final trimester of pregnancy.   - aspirin 81 mg    2. Prediabetes  Recommend getting hemoglobin A1c with her new OB labs.    3. Major depressive disorder, remission status unspecified, unspecified whether recurrent  Reviewed risk of progression of depression/anxiety in pregnancy and postpartum.    4. Multigravida of advanced maternal age in first trimester  Reviewed higher risk of aneuploidy and miscarriage as well as other pregnancy comorbidities such as diabetes and hypertension.  See #1 and #2.  Recommend NIPT for screening and consideration of NT as well.   - US OB < 14 Weeks Single; Future  - [facility] Referral - Pregnancy; Future    5. History of cerclage, currently pregnant  H/o history-indicated cerclage x2 after 18 wk loss.    - [facility] Referral - Pregnancy; Future    6. History of  delivery  Discussed likely  delivery at 37-38 wks, also briefly reviewed BTL procedure if desired.      Video visit  Patient location: In her car, parked at home  Provider location: Home  Total time of video visit 25:46    Raine Sutton MD, MPH  Obstetrics and Gynecology

## 2025-01-21 DIAGNOSIS — O09.299 HX OF CERCLAGE, CURRENTLY PREGNANT: Primary | ICD-10-CM

## 2025-01-21 DIAGNOSIS — Z98.890 HX OF CERCLAGE, CURRENTLY PREGNANT: Primary | ICD-10-CM

## 2025-02-03 ENCOUNTER — VIRTUAL VISIT (OUTPATIENT)
Dept: OBGYN | Facility: CLINIC | Age: 40
End: 2025-02-03
Payer: COMMERCIAL

## 2025-02-03 DIAGNOSIS — Z34.90 SUPERVISION OF NORMAL PREGNANCY: Primary | ICD-10-CM

## 2025-02-03 PROBLEM — G50.0 TRIGEMINAL NEURALGIA: Status: RESOLVED | Noted: 2024-02-21 | Resolved: 2025-02-03

## 2025-02-03 PROCEDURE — 99207 PR NO CHARGE NURSE ONLY: CPT | Mod: 93

## 2025-02-03 RX ORDER — VITAMIN A, VITAMIN C, VITAMIN D-3, VITAMIN E, VITAMIN B-1, VITAMIN B-2, NIACIN, VITAMIN B-6, CALCIUM, IRON, ZINC, COPPER 4000; 120; 400; 22; 1.84; 3; 20; 10; 1; 12; 200; 27; 25; 2 [IU]/1; MG/1; [IU]/1; MG/1; MG/1; MG/1; MG/1; MG/1; MG/1; UG/1; MG/1; MG/1; MG/1; MG/1
1 TABLET ORAL DAILY
COMMUNITY

## 2025-02-03 ASSESSMENT — PATIENT HEALTH QUESTIONNAIRE - PHQ9: SUM OF ALL RESPONSES TO PHQ QUESTIONS 1-9: 5

## 2025-02-03 NOTE — PROGRESS NOTES
NPN nurse visit done over the phone. Pt will be given NPN folder and book at her upcoming appt.  Discussed optional screening available to assess chromosomal anomalies. Questions answered. Informed pt of the clinic structure (on-call does deliveries for the day, may be male or female doctor). Pt advised to call the clinic if she has any questions or concerns related to her pregnancy. Prenatal labs will be obtained at her upcoming appt. New prenatal visit scheduled on 25 with Dr Sutton.      8w4d    Cerclage x2  C/s x2  Hx of PTL and SAB at 18wks  HTN    Menstrual cycles: regular  Date of positive pregnancy test: sometime in January  Medications stopped upon pos HPT: Lisinopril    Last pap: 2024        Patient supplied answers from flow sheet for:  Prenatal OB Questionnaire.  Past Medical History  Have you ever recieved care for your mental health? : (!) Yes (counseling and anxiety medication surronding pregnancy and pregnancy loss)  Have you ever been in a major accident or suffered serious trauma?: No  Within the last year, has anyone hit, slapped, kicked or otherwise hurt you?: No  In the last year, has anyone forced you to have sex when you didn't want to?: No    Past Medical History 2   Have you ever received a blood transfusion?: No  Would you accept a blood transfusion if was medically recommended?: Yes  Does anyone in your home smoke?: No   Is your blood type Rh negative?: No  Have you ever ?: (!) Yes  Have you been hospitalized for a nonsurgical reason excluding normal delivery?: (!) Yes (cyst on ovaries blocked fallopian tube- needed IV abx)  Have you ever had an abnormal pap smear?: No    Past Medical History (Continued)  Do you have a history of abnormalities of the uterus?: (!) Yes (needing cerclages for all pergnancies)  Did your mother take GERSON or any other hormones when she was pregnant with you?: No  Do you have any other problems we have not asked about which you feel may be  important to this pregnancy?: No                   PATT Correa

## 2025-02-04 ENCOUNTER — LAB (OUTPATIENT)
Dept: LAB | Facility: CLINIC | Age: 40
End: 2025-02-04
Payer: COMMERCIAL

## 2025-02-04 ENCOUNTER — ANCILLARY PROCEDURE (OUTPATIENT)
Dept: ULTRASOUND IMAGING | Facility: CLINIC | Age: 40
End: 2025-02-04
Payer: COMMERCIAL

## 2025-02-04 DIAGNOSIS — Z34.90 SUPERVISION OF NORMAL PREGNANCY: ICD-10-CM

## 2025-02-04 DIAGNOSIS — O09.521 MULTIGRAVIDA OF ADVANCED MATERNAL AGE IN FIRST TRIMESTER: ICD-10-CM

## 2025-02-04 LAB
ALBUMIN SERPL BCG-MCNC: 4.1 G/DL (ref 3.5–5.2)
ALP SERPL-CCNC: 90 U/L (ref 40–150)
ALT SERPL W P-5'-P-CCNC: 14 U/L (ref 0–50)
ANION GAP SERPL CALCULATED.3IONS-SCNC: 15 MMOL/L (ref 7–15)
AST SERPL W P-5'-P-CCNC: 17 U/L (ref 0–45)
BILIRUB SERPL-MCNC: 0.2 MG/DL
BUN SERPL-MCNC: 8.8 MG/DL (ref 6–20)
CALCIUM SERPL-MCNC: 9.2 MG/DL (ref 8.8–10.4)
CHLORIDE SERPL-SCNC: 101 MMOL/L (ref 98–107)
CREAT SERPL-MCNC: 0.57 MG/DL (ref 0.51–0.95)
EGFRCR SERPLBLD CKD-EPI 2021: >90 ML/MIN/1.73M2
ERYTHROCYTE [DISTWIDTH] IN BLOOD BY AUTOMATED COUNT: 15.2 % (ref 10–15)
EST. AVERAGE GLUCOSE BLD GHB EST-MCNC: 120 MG/DL
GLUCOSE SERPL-MCNC: 81 MG/DL (ref 70–99)
HBA1C MFR BLD: 5.8 % (ref 0–5.6)
HBV SURFACE AG SERPL QL IA: NONREACTIVE
HCO3 SERPL-SCNC: 20 MMOL/L (ref 22–29)
HCT VFR BLD AUTO: 34.7 % (ref 35–47)
HCV AB SERPL QL IA: NONREACTIVE
HGB BLD-MCNC: 11.3 G/DL (ref 11.7–15.7)
MCH RBC QN AUTO: 24.1 PG (ref 26.5–33)
MCHC RBC AUTO-ENTMCNC: 32.6 G/DL (ref 31.5–36.5)
MCV RBC AUTO: 74 FL (ref 78–100)
PLATELET # BLD AUTO: 443 10E3/UL (ref 150–450)
POTASSIUM SERPL-SCNC: 4.1 MMOL/L (ref 3.4–5.3)
PROT SERPL-MCNC: 7.2 G/DL (ref 6.4–8.3)
RBC # BLD AUTO: 4.69 10E6/UL (ref 3.8–5.2)
RUBV IGG SERPL QL IA: 1.66 INDEX
RUBV IGG SERPL QL IA: POSITIVE
SODIUM SERPL-SCNC: 136 MMOL/L (ref 135–145)
T PALLIDUM AB SER QL: NONREACTIVE
WBC # BLD AUTO: 10.9 10E3/UL (ref 4–11)

## 2025-02-04 PROCEDURE — 86780 TREPONEMA PALLIDUM: CPT

## 2025-02-04 PROCEDURE — 80053 COMPREHEN METABOLIC PANEL: CPT

## 2025-02-04 PROCEDURE — 87340 HEPATITIS B SURFACE AG IA: CPT

## 2025-02-04 PROCEDURE — 86762 RUBELLA ANTIBODY: CPT

## 2025-02-04 PROCEDURE — 87389 HIV-1 AG W/HIV-1&-2 AB AG IA: CPT

## 2025-02-04 PROCEDURE — 83036 HEMOGLOBIN GLYCOSYLATED A1C: CPT

## 2025-02-04 PROCEDURE — 36415 COLL VENOUS BLD VENIPUNCTURE: CPT

## 2025-02-04 PROCEDURE — 85027 COMPLETE CBC AUTOMATED: CPT

## 2025-02-04 PROCEDURE — 84156 ASSAY OF PROTEIN URINE: CPT

## 2025-02-04 PROCEDURE — 86803 HEPATITIS C AB TEST: CPT

## 2025-02-04 PROCEDURE — 87086 URINE CULTURE/COLONY COUNT: CPT

## 2025-02-04 PROCEDURE — 76801 OB US < 14 WKS SINGLE FETUS: CPT | Performed by: OBSTETRICS & GYNECOLOGY

## 2025-02-05 LAB
ALBUMIN MFR UR ELPH: <6 MG/DL
BACTERIA UR CULT: NO GROWTH
CREAT UR-MCNC: 31.9 MG/DL
HIV 1+2 AB+HIV1 P24 AG SERPL QL IA: NONREACTIVE
PROT/CREAT 24H UR: NORMAL MG/G{CREAT}

## 2025-02-12 ENCOUNTER — OFFICE VISIT (OUTPATIENT)
Dept: URGENT CARE | Facility: URGENT CARE | Age: 40
End: 2025-02-12
Payer: COMMERCIAL

## 2025-02-12 VITALS
OXYGEN SATURATION: 100 % | WEIGHT: 186 LBS | RESPIRATION RATE: 18 BRPM | TEMPERATURE: 97.8 F | HEART RATE: 103 BPM | DIASTOLIC BLOOD PRESSURE: 96 MMHG | SYSTOLIC BLOOD PRESSURE: 143 MMHG | BODY MASS INDEX: 35.14 KG/M2

## 2025-02-12 DIAGNOSIS — R05.2 SUBACUTE COUGH: Primary | ICD-10-CM

## 2025-02-12 RX ORDER — AZITHROMYCIN 250 MG/1
TABLET, FILM COATED ORAL
Qty: 6 TABLET | Refills: 0 | Status: SHIPPED | OUTPATIENT
Start: 2025-02-12 | End: 2025-02-17

## 2025-02-12 RX ORDER — ACETAMINOPHEN 500 MG
500-1000 TABLET ORAL EVERY 6 HOURS PRN
COMMUNITY

## 2025-02-12 RX ORDER — ALBUTEROL SULFATE 90 UG/1
2 INHALANT RESPIRATORY (INHALATION) EVERY 6 HOURS PRN
Qty: 18 G | Refills: 1 | Status: SHIPPED | OUTPATIENT
Start: 2025-02-12

## 2025-02-12 ASSESSMENT — ENCOUNTER SYMPTOMS
WHEEZING: 1
CHILLS: 1
COUGH: 1
SHORTNESS OF BREATH: 1

## 2025-02-12 NOTE — PATIENT INSTRUCTIONS
Discharge instructions from Dr. Goncalves:    Today you were seen for:  given time and history    The Plan for you to get better is :  Try antibiotics and inhaler for next week    If not better - see Dr. Nunez     What do I do if this does not change or fails to improve? :    I anticipate you will improve in 7 to 10 days. If you fail to improve or worsen please be reseen by your primary care physician or clinician, or urgent care.  If you are worse please go to the emergency room.       What to do if I am really worse? :    If you feel you are woserning with life threatening symptoms such as: a very fast heart rate difficulty breathing, increasing confusion, uncontrolled pain, the inability to keep any solids or liquids down, a failure to urinate or other abnormal and worsening symptoms go to an emergency room immediately.        Where are my test results if they were not back when I was discharged? :      Test results done in Urgent Care are released automatically as soon as they are resulted via [employer]. There are some instances when we call you with positive same day results (strep, covid,urine culture) They may also be mailed to you if you do not have my chart.      If you are waiting on a test for strep throat and it is positive medication will be called into the pharmacy of record.        FYI if you were prescribed narcotics:    If you have been prescribed any narcotic pain medication in the urgent care these medications are not refilled by Urgent Care and you should see her primary care physician for any refills.  Of note any narcotic pain medication has the potential to become habit-forming and can impact a person sobriety and recovery. If they were prescribed for medically appropriate reason and are causing these unintended effects please discuss with your primary care physician immediately.    Your health is important to all of us here at [facility] and the Urgent Care and we appreciate your trust and  partnership in your return to health here today.

## 2025-02-12 NOTE — PROGRESS NOTES
Patient presents with:  Cold Symptoms: C/o low grade fever, congestion and cough on/off x3 weeks-whole house has been sick, only told viral illness      Assessment/MDM:    Dot Ramirez is a 39 year old female is here today for possible bronchitis . the following systemic symptoms are present : fever, cough . Will treat with antibiotics, inhaler = reviewed safety in pregnancy  Over 30  minutes was spent in the direct history taking, physical exam, note review, review of medical records and pertinent articles in up-to-date as well as creation of a plan to prevent significant morbidity and mortality in care. This time is excluding any treatments such as NEBS or IV fluid        HPI:  Has had multiple viral illness at home, she has not gotten better. She is 10W preganant     Cough  The current episode started more than 1 week ago. The problem occurs every few hours. The problem has not changed since onset.The cough is Non-productive. There has been no fever. The fever has been present for 5 days or more. Associated symptoms include chills, shortness of breath and wheezing.        Review of Systems   Constitutional:  Positive for chills.   Respiratory:  Positive for cough, shortness of breath and wheezing.    All other systems reviewed and are negative.      Vitals:    02/12/25 1157   BP: (!) 143/96   BP Location: Right arm   Patient Position: Sitting   Cuff Size: Adult Regular   Pulse: 103   Resp: 18   Temp: 97.8  F (36.6  C)   TempSrc: Tympanic   SpO2: 100%   Weight: 84.4 kg (186 lb)       Physical Exam  Vitals and nursing note reviewed.   HENT:      Right Ear: A middle ear effusion is present. Tympanic membrane is bulging.      Left Ear: A middle ear effusion is present. Tympanic membrane is bulging.   Cardiovascular:      Rate and Rhythm: Normal rate.   Pulmonary:      Breath sounds: Wheezing present.   Neurological:      Mental Status: She is alert.         Results:  No results found for any visits on  02/12/25.        Past Medical History: has been reviewed by me. I have also reviewed past visits, lab results and studies  Adverse Drug Reactions: Patient has no known allergies.    Medications: reviewed by me today    Family History: Reviewed by me today  Social History:   Social History     Tobacco Use    Smoking status: Never     Passive exposure: Never    Smokeless tobacco: Never   Substance Use Topics    Alcohol use: Not Currently     Comment: very very rare       Tobacco:   History   Smoking Status    Never   Smokeless Tobacco    Never         I have reviewed and recommended any over-the-counter medications that will aid in the symptomatic relief of this illness.    The risk of complications, morbidity, and/or mortality of patient management decisions were made during the visit with the patient. These may be associated with the patient s problems, the diagnostic procedures, or the treatment. This includes possible management options selected, as well options considered but ultimately not selected, after shared medical decision making with the patient and/or family.        ICD-10-CM    1. Subacute cough  R05.2 albuterol (PROAIR HFA/PROVENTIL HFA/VENTOLIN HFA) 108 (90 Base) MCG/ACT inhaler     azithromycin (ZITHROMAX) 250 MG tablet           Unique Goncalves MD  2/12/2025, 1:12 PM.      Patient Instructions   Discharge instructions from Dr. Goncalves:    Today you were seen for:  given time and history    The Plan for you to get better is :  Try antibiotics and inhaler for next week    If not better - see Dr. Nunez     What do I do if this does not change or fails to improve? :    I anticipate you will improve in 7 to 10 days. If you fail to improve or worsen please be reseen by your primary care physician or clinician, or urgent care.  If you are worse please go to the emergency room.       What to do if I am really worse? :    If you feel you are woserning with life threatening symptoms such as: a very fast heart  rate difficulty breathing, increasing confusion, uncontrolled pain, the inability to keep any solids or liquids down, a failure to urinate or other abnormal and worsening symptoms go to an emergency room immediately.        Where are my test results if they were not back when I was discharged? :      Test results done in Urgent Care are released automatically as soon as they are resulted via [employer]. There are some instances when we call you with positive same day results (strep, covid,urine culture) They may also be mailed to you if you do not have my chart.      If you are waiting on a test for strep throat and it is positive medication will be called into the pharmacy of record.        FYI if you were prescribed narcotics:    If you have been prescribed any narcotic pain medication in the urgent care these medications are not refilled by Urgent Care and you should see her primary care physician for any refills.  Of note any narcotic pain medication has the potential to become habit-forming and can impact a person sobriety and recovery. If they were prescribed for medically appropriate reason and are causing these unintended effects please discuss with your primary care physician immediately.    Your health is important to all of us here at [facility] and the Urgent Care and we appreciate your trust and partnership in your return to health here today.

## 2025-02-18 ENCOUNTER — PRENATAL OFFICE VISIT (OUTPATIENT)
Dept: OBGYN | Facility: CLINIC | Age: 40
End: 2025-02-18
Payer: COMMERCIAL

## 2025-02-18 VITALS — WEIGHT: 189 LBS | SYSTOLIC BLOOD PRESSURE: 120 MMHG | DIASTOLIC BLOOD PRESSURE: 68 MMHG | BODY MASS INDEX: 35.71 KG/M2

## 2025-02-18 DIAGNOSIS — O09.521 MULTIGRAVIDA OF ADVANCED MATERNAL AGE IN FIRST TRIMESTER: Primary | ICD-10-CM

## 2025-02-18 DIAGNOSIS — Z12.4 SCREENING FOR CERVICAL CANCER: ICD-10-CM

## 2025-02-18 DIAGNOSIS — O99.810 PREDIABETES IN MOTHER DURING PREGNANCY: ICD-10-CM

## 2025-02-18 DIAGNOSIS — D64.9 ANEMIA, UNSPECIFIED TYPE: ICD-10-CM

## 2025-02-18 DIAGNOSIS — Z11.3 SCREENING EXAMINATION FOR STI: ICD-10-CM

## 2025-02-18 LAB — FERRITIN SERPL-MCNC: 19 NG/ML (ref 6–175)

## 2025-02-18 PROCEDURE — 87591 N.GONORRHOEAE DNA AMP PROB: CPT | Performed by: OBSTETRICS & GYNECOLOGY

## 2025-02-18 PROCEDURE — 99213 OFFICE O/P EST LOW 20 MIN: CPT | Performed by: OBSTETRICS & GYNECOLOGY

## 2025-02-18 PROCEDURE — 36415 COLL VENOUS BLD VENIPUNCTURE: CPT | Performed by: OBSTETRICS & GYNECOLOGY

## 2025-02-18 PROCEDURE — G2211 COMPLEX E/M VISIT ADD ON: HCPCS | Performed by: OBSTETRICS & GYNECOLOGY

## 2025-02-18 PROCEDURE — 99459 PELVIC EXAMINATION: CPT | Performed by: OBSTETRICS & GYNECOLOGY

## 2025-02-18 PROCEDURE — 87491 CHLMYD TRACH DNA AMP PROBE: CPT | Performed by: OBSTETRICS & GYNECOLOGY

## 2025-02-18 PROCEDURE — 87624 HPV HI-RISK TYP POOLED RSLT: CPT | Performed by: OBSTETRICS & GYNECOLOGY

## 2025-02-18 PROCEDURE — 82728 ASSAY OF FERRITIN: CPT | Performed by: OBSTETRICS & GYNECOLOGY

## 2025-02-18 NOTE — PROGRESS NOTES
Dot is a 39 year old  at 10w5d here for new ob visit.      Pregnancy was not planned but is welcome.  FOB is  Dante.  They have 2 daughters daughters together Chelita age 9 and Harjinder age 8.  They also had an 18-week loss of a son.  Has a history of cerclage x 2 for early loss, see progress note dated 2025 for more details.  History of  x 2, plans repeat  History of chronic hypertension, was on lisinopril 20 mg, stopped after finding out she was pregnant.  Has a home blood pressure cuff and numbers have been normal so far.  Is already on aspirin 81 mg.  Has already discussed delivery at 37 to 38 weeks depending on level of blood pressure control with or without medications.  History of prediabetes, hemoglobin A1c was 5.8.  Planning an early GTT, she declines GCT because she always feels the 1 hour.  History of mild anemia with hemoglobin 11.3 and MCV of 74.  Will do a ferritin level today.  She was initiated on oral iron but vomited it so is potentially interested in IV iron but not going back on orals.  History of depression/anxiety, currently mood is good and on no medications.  AMA, desires NIPT  Planning nuchal translucency with MFM on     OB History    Para Term  AB Living   4 2 2 0 1 2   SAB IAB Ectopic Multiple Live Births   1 0 0 0 2      # Outcome Date GA Lbr Taran/2nd Weight Sex Type Anes PTL Lv   4 Current            3 Term 17 38w4d  3.63 kg (8 lb) F CS-LTranv Spinal N DEE      Name: Harjinder England      Apgar1: 8  Apgar5: 9   2 Term 12/09/15 37w4d  3.229 kg (7 lb 1.9 oz) F CS-LTranv Spinal  DEE      Birth Comments: Breast and bottle fed with breast milk      Name: Chelita      Apgar1: 4  Apgar5: 6   1 SAB 14 18w1d   M Vag-Spont   FD      Name: Caesar      Apgar1: 0  Apgar5: 0       ROS: Ten point review of systems was reviewed and negative except the above.    Past Medical History:   Diagnosis Date    Abnormal Pap smear of cervix 2010    see  problem list    Anxiety     Benign essential hypertension 2024    Depressive disorder 20yrs    When I was 16yrs I had my first episodes    Jaw claudication     Moderate major depression (H) 2014    onset with fetal loss    PID (acute pelvic inflammatory disease) 2022    Temporal pain     TOA (tubo-ovarian abscess) 2022    Varicella     Walking pneumonia      Past Surgical History:   Procedure Laterality Date    BIOPSY ARTERY TEMPORAL Bilateral 2023    Procedure: TEMPORAL ARTERY BIOPSY-BILATERAL;  Surgeon: Juno Kruger MD;  Location: [facility]    CERCLAGE CERVICAL N/A 2015    Procedure: CERCLAGE CERVICAL;  Surgeon: Damion Nayak MD;  [facility]: [facility]    CERCLAGE CERVICAL N/A 2016    Procedure: CERCLAGE CERVICAL;  Surgeon: Damion Nayak MD;  Location: [facility]     SECTION N/A 2015    Procedure:  SECTION;  Surgeon: Jamison Florian MD;  Location: [facility]     SECTION N/A 3/6/2017    Procedure:  SECTION;  Surgeon: Jamison Florian MD;  Location: [facility] OR    Cystectomy hand  2016    EXCISE MASS FINGER Left 2016    Procedure: Excision of cystic mass, left ring finger. Surgeon:  Rosales Jones MD  [facility]    EXTRACTION(S) DENTAL  2009     Patient Active Problem List    Diagnosis Date Noted    Functional ovarian cysts 2024     Priority: Medium     Hx of recurrent, painful ovarian cysts per patient. Was following with Ob/gyn and on continuous OCP but this was discontinued as patient did not follow up. Completed BEL today. No absolute contraindications for OCP but will need to monitor BP closely.       Class 3 obesity 2024     Priority: Medium    Prediabetes 2024     Priority: Medium     A1c of 5.8 on 3/2024.      Benign essential hypertension 2024     Priority: Medium     New in   On 20mg lisinopril. Discussed teratogenicity of lisinopril.      Moderate major depression  (H) 06/11/2018     Priority: Medium    ELIANA (generalized anxiety disorder) 03/09/2016     Priority: Medium    Restless legs syndrome (RLS) 03/09/2016     Priority: Medium    FHx: diabetes mellitus 07/21/2014     Priority: Medium    Vitamin D deficiency 07/18/2011     Priority: Medium    Noninfectious gastroenteritis and colitis 01/21/2009     Priority: Medium     LW Onset:  Jan2009  ; Colitis  NOS        No Known Allergies  Current Outpatient Medications   Medication Sig Dispense Refill    acetaminophen (TYLENOL) 500 MG tablet Take 500-1,000 mg by mouth every 6 hours as needed for mild pain.      albuterol (PROAIR HFA/PROVENTIL HFA/VENTOLIN HFA) 108 (90 Base) MCG/ACT inhaler Inhale 2 puffs into the lungs every 6 hours as needed for shortness of breath, wheezing or cough. 18 g 1    aspirin (ASA) 81 MG chewable tablet Take 1 tablet (81 mg) by mouth daily. 90 tablet 3    Prenatal Vit-Fe Fumarate-FA (PRENATAL MULTIVITAMIN  PLUS IRON) 27-1 MG TABS Take 1 tablet by mouth daily.      ubrogepant (UBRELVY) 100 MG tablet Take 1 tablet (100 mg) by mouth at onset of headache. (Patient not taking: Reported on 1/17/2025) 18 tablet 3     Current Facility-Administered Medications   Medication Dose Route Frequency Provider Last Rate Last Admin    sodium chloride (PF) 0.9% PF flush 3 mL  3 mL Intracatheter q1 min prn Judith Foster PA-C   3 mL at 05/20/24 1149       Physical Exam:   /68 (BP Location: Right arm, Patient Position: Chair, Cuff Size: Adult Large)   Wt 85.7 kg (189 lb)   LMP 12/05/2024   Breastfeeding No   BMI 35.71 kg/m      Gen:  no acute distress, comfortable, smiling  HENT: No scleral injection or icterus  CV: Regular rate and rhythm, no m/g/r  Resp: Normal work of breathing, no cough  GI: Abdomen soft, non-tender. No masses, organomegaly  Skin: No suspicious lesions or rashes  Psychiatric: mentation appears normal and affect bright  Cvx nl pap and gc obtained  Doptones 170s  FH cwd    Lab Results    Component Value Date    ABO A 2017    RH  Pos 2017       A/P 39 year old  at 10w5d here for NOB visit.  - Discussed physician coverage, tertiary support, diet, exercise, weight gain, schedule of visits, routine and indicated ultrasounds, and childbirth education.  Discussed MFM coverage and reviewed options for  testing in the first trimester.  Recommended PNV.  NOB labs and US reviewed.       Concerns:  - A+/RI.  FOB Dante  - 18-week loss, history of cerclage x 2   - History of  x 2, plans repeat  -cHTN, was on lisinopril 20 mg, stopped after finding out she was pregnant. on aspirin 81 mg.  Has baseline HELLP labs  - prediabetes, hemoglobin A1c 5.8.  Planning an early GTT, she declines GCT because she always fails the 1 hour.  -mild anemia with hgb 11.3 and MCV of 74.  Will do a ferritin level today.  She was initiated on oral iron but vomited it so is potentially interested in IV iron but not going back on orals.  -History of depression/anxiety, currently mood is good and on no medications.  -AMA, NIPT today  - Planning nuchal translucency with MFM on     RTC 4 weeks    Raine Sutton MD, MPH  [facility]

## 2025-02-18 NOTE — NURSING NOTE
"Chief Complaint   Patient presents with    Prenatal Care     10 5/7 weeks       Initial /68 (BP Location: Right arm, Patient Position: Chair, Cuff Size: Adult Large)   Wt 85.7 kg (189 lb)   LMP 2024   Breastfeeding No   BMI 35.71 kg/m   Estimated body mass index is 35.71 kg/m  as calculated from the following:    Height as of 11/3/24: 1.549 m (5' 1\").    Weight as of this encounter: 85.7 kg (189 lb).  BP completed using cuff size: large    Questioned patient about current smoking habits.  Pt. has never smoked.          The following HM Due: pap and GC/C  Jerri Alex, SOLEDAD        "

## 2025-02-19 LAB
C TRACH DNA SPEC QL NAA+PROBE: NEGATIVE
HPV HR 12 DNA CVX QL NAA+PROBE: NEGATIVE
HPV16 DNA CVX QL NAA+PROBE: NEGATIVE
HPV18 DNA CVX QL NAA+PROBE: NEGATIVE
HUMAN PAPILLOMA VIRUS FINAL DIAGNOSIS: NORMAL
N GONORRHOEA DNA SPEC QL NAA+PROBE: NEGATIVE
SPECIMEN TYPE: NORMAL
SPECIMEN TYPE: NORMAL

## 2025-02-20 ENCOUNTER — PRE VISIT (OUTPATIENT)
Dept: MATERNAL FETAL MEDICINE | Facility: CLINIC | Age: 40
End: 2025-02-20

## 2025-02-20 ENCOUNTER — LAB (OUTPATIENT)
Dept: LAB | Facility: CLINIC | Age: 40
End: 2025-02-20
Payer: COMMERCIAL

## 2025-02-20 DIAGNOSIS — O99.810 PREDIABETES IN MOTHER DURING PREGNANCY: ICD-10-CM

## 2025-02-20 LAB
GESTATIONAL GTT 1 HR POST DOSE: 169 MG/DL (ref 60–179)
GESTATIONAL GTT 2 HR POST DOSE: 156 MG/DL (ref 60–154)
GESTATIONAL GTT 3 HR POST DOSE: 149 MG/DL (ref 60–139)
GLUCOSE P FAST SERPL-MCNC: 93 MG/DL (ref 60–94)

## 2025-02-25 LAB
BKR AP ASSOCIATED HPV REPORT: ABNORMAL
BKR LAB AP GYN ADEQUACY: ABNORMAL
BKR LAB AP GYN INTERPRETATION: ABNORMAL
BKR LAB AP PREVIOUS ABNORMAL: ABNORMAL
PATH REPORT.COMMENTS IMP SPEC: ABNORMAL
PATH REPORT.COMMENTS IMP SPEC: ABNORMAL
PATH REPORT.RELEVANT HX SPEC: ABNORMAL

## 2025-02-26 DIAGNOSIS — O24.419 GESTATIONAL DIABETES: Primary | ICD-10-CM

## 2025-02-27 LAB — SCANNED LAB RESULT: NORMAL

## 2025-02-28 ENCOUNTER — HOSPITAL ENCOUNTER (OUTPATIENT)
Dept: ULTRASOUND IMAGING | Facility: CLINIC | Age: 40
Discharge: HOME | End: 2025-02-28
Attending: STUDENT IN AN ORGANIZED HEALTH CARE EDUCATION/TRAINING PROGRAM
Payer: COMMERCIAL

## 2025-02-28 DIAGNOSIS — Z98.890 HX OF CERCLAGE, CURRENTLY PREGNANT: ICD-10-CM

## 2025-02-28 DIAGNOSIS — O09.299 HX OF CERCLAGE, CURRENTLY PREGNANT: ICD-10-CM

## 2025-02-28 PROCEDURE — 76801 OB US < 14 WKS SINGLE FETUS: CPT | Mod: 26 | Performed by: STUDENT IN AN ORGANIZED HEALTH CARE EDUCATION/TRAINING PROGRAM

## 2025-02-28 PROCEDURE — 99204 OFFICE O/P NEW MOD 45 MIN: CPT | Mod: 25 | Performed by: STUDENT IN AN ORGANIZED HEALTH CARE EDUCATION/TRAINING PROGRAM

## 2025-02-28 PROCEDURE — 76801 OB US < 14 WKS SINGLE FETUS: CPT

## 2025-02-28 NOTE — H&P
[facility]  OB History and Physical      Dot Ramirez MRN# [id number]   Age: 39 year old YOB: 1985     CC: Here for cerclage     HPI:  Ms. Dot Ramirez is a 39 year old  at 12w6d by 8w5d ultrasound consistent with LMP, who presents for history indicated cervical cerclage.  She denies contractions, vaginal bleeding, and loss of fluid.     Dot has a history of an 18 week loss due to vaginal bleeding,  labor, and delivery in . Given this history, she underwent cervical length surveillance during her next pregnancy in . During the time of that surveillance, progressive cervical shortening was noted. She was closely monitored and deemed to be a candidate for cervical cerclage. Per the operative report, at the time of the cerclage, the cervix was dilated 1.5cm with exposed membranes. She ultimately delivered at 37w4d via  birth given malpresentation. During her subsequent pregnancy in , she opted for history indicated cerclage. This was placed at 11w4d and she ultimately delivered at 38w4d. She desires repeat history indicated cervical cerclage.     Dot walked a lot at the mall this weekend and felt lower abdominal cramps after that, but has felt comfortable today. She denies vaginal bleeding or abnormal vaginal discharge. She is overall feeling well today.     Pregnancy Complications:  1. CHTN on ASA 81mg   2. Pre-diabetes/early gestational diabetes by failed 3 hr GTT this pregnancy  3. 18w loss  4. 2x history indicated cerclages with term deliveries in the past   5. AMA   6. 2x  sections     Prenatal Labs:   Lab Results   Component Value Date    ABO A 2017    RH  Pos 2017    AS Negative 2025    HEPBANG Nonreactive 2025    CHPCRT Negative 2025    GCPCRT Negative 2025    TREPAB Negative 2017    HGB 10.7 (L) 2025       OB History  OB History    Para Term  AB  Living   4 2 2 0 1 2   SAB IAB Ectopic Multiple Live Births   1 0 0 0 2      # Outcome Date GA Lbr Taran/2nd Weight Sex Type Anes PTL Lv   4 Current            3 Term 17 38w4d  3.63 kg (8 lb) F CS-LTranv Spinal N DEE      Name: Harjinder England      Apgar1: 8  Apgar5: 9   2 Term 12/09/15 37w4d  3.229 kg (7 lb 1.9 oz) F CS-LTranv Spinal  DEE      Birth Comments: Breast and bottle fed with breast milk      Name: Chelita      Apgar1: 4  Apgar5: 6   1 SAB 14 18w1d   M Vag-Spont   FD      Name: Caesar      Apgar1: 0  Apgar5: 0       PMHx:   Past Medical History:   Diagnosis Date    Abnormal Pap smear of cervix 2010    see problem list    Anxiety     Benign essential hypertension 2024    Depressive disorder 20yrs    When I was 16yrs I had my first episodes    Jaw claudication     Moderate major depression (H) 2014    onset with fetal loss    PID (acute pelvic inflammatory disease) 2022    Temporal pain     TOA (tubo-ovarian abscess) 2022    Varicella     Walking pneumonia      PSHx:   Past Surgical History:   Procedure Laterality Date    BIOPSY ARTERY TEMPORAL Bilateral 2023    Procedure: TEMPORAL ARTERY BIOPSY-BILATERAL;  Surgeon: Juno Kruger MD;  Location: [facility]    CERCLAGE CERVICAL N/A 2015    Procedure: CERCLAGE CERVICAL;  Surgeon: Damion Nayak MD;  [facility]    CERCLAGE CERVICAL N/A 2016    Procedure: CERCLAGE CERVICAL;  Surgeon: Damion Nayak MD;  [facility]: [facility]     SECTION N/A 2015    Procedure:  SECTION;  Surgeon: Jamison Florian MD;  Location: [facility]     SECTION N/A 3/6/2017    Procedure:  SECTION;  Surgeon: Jamison Florian MD;  Location: [facility] OR    Cystectomy hand  2016    EXCISE MASS FINGER Left 2016    Procedure: Excision of cystic mass, left ring finger. Surgeon:  Rosales Jones MD  [facility]    EXTRACTION(S) DENTAL  2009      Meds:   Facility-Administered Medications Prior to Admission   Medication Dose Route Frequency Provider Last Rate Last Admin    sodium chloride (PF) 0.9% PF flush 3 mL  3 mL Intracatheter q1 min prn Judith Foster PA-C   3 mL at 24 1149     Medications Prior to Admission   Medication Sig Dispense Refill Last Dose/Taking    acetaminophen (TYLENOL) 500 MG tablet Take 500-1,000 mg by mouth every 6 hours as needed for mild pain.       acetone urine (KETOSTIX) test strip Use one strip daily to check urine ketones in the morning per  directions. 50 strip 3     albuterol (PROAIR HFA/PROVENTIL HFA/VENTOLIN HFA) 108 (90 Base) MCG/ACT inhaler Inhale 2 puffs into the lungs every 6 hours as needed for shortness of breath, wheezing or cough. 18 g 1     aspirin (ASA) 81 MG chewable tablet Take 1 tablet (81 mg) by mouth daily. 90 tablet 3     blood glucose (NO BRAND SPECIFIED) test strip Use to test blood sugar 4 times daily or as directed. 90 strip 3     blood glucose monitoring (FREESTYLE) lancets Use to test blood sugar 4 times daily or as directed. 112 each 3     blood glucose monitoring (NO BRAND SPECIFIED) meter device kit Use to test blood sugar 4 times daily or as directed. 1 kit 0     Prenatal Vit-Fe Fumarate-FA (PRENATAL MULTIVITAMIN  PLUS IRON) 27-1 MG TABS Take 1 tablet by mouth daily.       ubrogepant (UBRELVY) 100 MG tablet Take 1 tablet (100 mg) by mouth at onset of headache. (Patient not taking: Reported on 2025) 18 tablet 3      Allergies:  No Known Allergies   FmHx:   Family History   Problem Relation Age of Onset    Family History Negative Mother         born     Hypertension Mother     Neurologic Disorder Father         born  Charcot Rosemary Tooth    Prostate Cancer Father     Hypertension Father     Family History Negative Brother         1/2 brother Hemochromatosis    Diabetes Maternal Grandmother          97yo Type II    Alzheimer Disease Maternal Grandfather 89          96yo     Family History Negative Paternal Grandmother             Family History Negative Paternal Grandfather             Breast Cancer Maternal Aunt     Breast Cancer Maternal Aunt     Breast Cancer Maternal Aunt     Breast Cancer Maternal Aunt     Breast Cancer Maternal Aunt     Breast Cancer Maternal Aunt     Breast Cancer Paternal Aunt     Breast Cancer Paternal Aunt      SocHx: She denies any tobacco, alcohol, or other drug use during this pregnancy.    ROS:   Complete 10-point ROS negative except as noted in HPI.    PE:  Vit: Patient Vitals for the past 4 hrs:   BP Temp Temp src Resp   25 0845 117/73 97.8  F (36.6  C) Oral 18     Gen: Well-appearing, NAD, comfortable   CV: Well perfused   Pulm: Breathing comfortably on room air   Abd: Soft, gravid, non-tender  Ext: LE edema b/l       Assessment/Plan  Dot Ramirez is a 39 year old  female at 12w6d by 8w5d ultrasound consistent with LMP,  here for history indicated cervical cerclage.  Her pregnancy has otherwise complicated by: CHTN and early gestational diabetes.    History indicated Cerclage  We discussed that there are two main strategies for management this pregnancy given her history: placement of an elective, early (<14 weeks of gestation) history indicated cerclage, or cerclage placement only when ultrasound demonstrates cervical shortening. I discussed the risks, benefits, limitations and alternatives of each approach. We discussed that a history-indicated cerclage is typically placed at the end of the first trimester (13 to 14 weeks of gestation). In patients who choose serial cervical length screening we typically monitor the cervical length via ultrasound every 2 weeks from 16-23 weeks and reserve cerclage placement for patients with a cervical length less than 25 mm. This approach may avoid unnecessary cerclage placement in up to 50% of patients. We reviewed that since she required a cerclage in her  two prior pregnancies, we would recommend proceeding with a history indicated cerclage again in this pregnancy. We discussed the risk of bleeding, infection, or possible damage to surrounding organs. She was also counseled on the up to 80% chance of success rate of pregnancy prolongation up to planned cerclage removal time at 36 weeks when one is placed in a history indicated setting. However, there is a possibility of placing the cerclage and it ultimately not preventing a previable, periviable, or  birth. After counseling she would like to proceed with cerclage placement.     - Discussed risks of a cerclage including but not limited to: bleeding (including placental), infection (including chorioamnionitis), damage to surrounding structures including but not limited to bowel, bladder, cervix, risk of PPROM, failure of cerclage to prevent pre-viable, zeke-viable or  birth. Reviewed that usually the cerclage is removed at 36 weeks unless indicated sooner.  - Doptones before and after the procedure  - Surgical and blood consent signed  - NPO at this time  - CBC and type and screen   - Anesthesia alerted    The patient was discussed with Dr. Fransico Melendez who is in agreement with the treatment plan.    Ingris Joseph MD  Maternal Fetal Medicine Fellow

## 2025-03-03 ENCOUNTER — VIRTUAL VISIT (OUTPATIENT)
Dept: EDUCATION SERVICES | Facility: CLINIC | Age: 40
End: 2025-03-03
Attending: OBSTETRICS & GYNECOLOGY
Payer: COMMERCIAL

## 2025-03-03 ENCOUNTER — ANESTHESIA EVENT (OUTPATIENT)
Dept: OBGYN | Facility: CLINIC | Age: 40
End: 2025-03-03
Payer: COMMERCIAL

## 2025-03-03 DIAGNOSIS — O24.419 GESTATIONAL DIABETES: Primary | ICD-10-CM

## 2025-03-03 PROCEDURE — G0109 DIAB MANAGE TRN IND/GROUP: HCPCS | Mod: 95

## 2025-03-03 NOTE — PATIENT INSTRUCTIONS
Test glucose 4 times per day:   Fasting (when you first awake for the day): 95 mg/dL or below   1 hour after breakfast: 140 mg/dL or below   1 hour after lunch: 140 mg/dL or below   1 hour after dinner: 140 mg/dL or below     Please bring your meter and log book to all appointments     If you miss 1 hour after meal test, test 2 hours after the meal.  Goal 2 hours after is 120 mg/dL or below.     2.  Check your urine ketones once a day, when you first awake for the day until they are negative to trace for 7 days in a row.  Then decrease and check once a week.     3.  Meal Plan    Breakfast: 30 grams carbohydrate + protein   Snack: 15-30 grams carbohydrate + protein  Lunch: 45-60 grams carbohydrate + protein  Snack: 15-30 grams carbohydrate + protein  Dinner: 45-60 grams carbohydrate + protein  Snack: 15-30 grams carbohydrate + protein    A few tips:   -consume some carbohydrate every 2-3 hours while awake   -you need a minimum of 175 grams of carbohydrate per day   -fruit and cold breakfast cereal are best tolerated at lunch or later   -protein includes: cheese, eggs, fish, nuts, nut butter, chicken, turkey, beef, and pork   -snack ideas: an individual container of Greek yogurt (try Chobani Less Sugar), whole grain crackers and cheese, chocolate fairlife milk, a Kashi or KIND bar, a baseball size piece of whole fruit + nut butter (apple + peanut butter), fruit canned in it's own juice + cottage cheese    4.  Aim for 20-30 minutes of activity most days of the week (with the okay of your OB provider).      5.   Call Diabetes Education at [phone] or send a [employer] message with:   -questions or concerns   -ketones that are small, moderate, or large   -3 or more blood sugars above target in a 7 day period

## 2025-03-03 NOTE — GROUP NOTE
Diabetes Diabetes Self-Management Education & Support  3/3/2025  Virtual GDM Class via Medical Zoom  Patient Location: [place]  Provider Location: Home - [facility]    Start and End Time  Start Time: 1309  End Time: 1420    Subjective/Objective  Dot is an 39 year old year old, presenting for the following diabetes education related to: Gestational Diabetes  How confident are you filling out medical forms by yourself:: (Patient-Rptd) Quite a bit  Diabetes management related comments/concerns: (Patient-Rptd) Would also like to cordinate iron and diabtic foods    Cultural Influences/Ethnic Background:  Not  or     Estimated Date of Delivery: Sep 11, 2025    1 hour OGTT  Lab Results   Component Value Date    GLU1 143 (H) 12/26/2016       3 hour OGTT    Fasting  Lab Results   Component Value Date    GLF 78 12/30/2016       1 hour  Lab Results   Component Value Date    GL1 137 12/30/2016       2 hour  Lab Results   Component Value Date    GL2 95 12/30/2016       3 hour  Lab Results   Component Value Date    GL3 123 12/30/2016       INTERVENTION:  Patient was instructed on glucose meter and was provided with resources for further information as needed.    Educational topics covered today:  GDM diagnosis, pathophysiology, risks and complications of GDM, means of controlling GDM, using a blood glucose monitor, blood glucose goals, logging and interpreting glucose results, ketone testing, when to call a diabetes educator or OB provider, healthy eating during pregnancy, counting carbohydrates, meal planning for GDM, and physical activity    Educational materials emailed today:   Yuki Understanding Gestational Diabetes  GDM Log Sheet  Sharps Disposal  Care After Delivery  HS Snack List  Food Log Sheet    Test glucose 4 times per day:   Fasting (when you first awake for the day): 95 mg/dL or below   1 hour after breakfast: 140 mg/dL or below   1 hour after lunch: 140 mg/dL or below   1 hour after dinner: 140  mg/dL or below     Please bring your meter and log book to all appointments     If you miss 1 hour after meal test, test 2 hours after the meal.  Goal 2 hours after is 120 mg/dL or below.     2.  Check your urine ketones once a day, when you first awake for the day until they are negative to trace for 7 days in a row.  Then decrease and check once a week.     3.  Meal Plan    Breakfast: 30 grams carbohydrate + protein   Snack: 15-30 grams carbohydrate + protein  Lunch: 45-60 grams carbohydrate + protein  Snack: 15-30 grams carbohydrate + protein  Dinner: 45-60 grams carbohydrate + protein  Snack: 15-30 grams carbohydrate + protein    A few tips:   -consume some carbohydrate every 2-3 hours while awake   -you need a minimum of 175 grams of carbohydrate per day   -fruit and cold breakfast cereal are best tolerated at lunch or later   -protein includes: cheese, eggs, fish, nuts, nut butter, chicken, turkey, beef, and pork   -snack ideas: an individual container of Greek yogurt (try Chobani Less Sugar), whole grain crackers and cheese, chocolate fairlife milk, a Kashi or KIND bar, a baseball size piece of whole fruit + nut butter (apple + peanut butter), fruit canned in it's own juice + cottage cheese    4.  Aim for 20-30 minutes of activity most days of the week (with the okay of your OB provider).      5.   Call Diabetes Education at [phone] or send a IngagePatient message with:   -questions or concerns   -ketones that are small, moderate, or large   -3 or more blood sugars above target in a 7 day period    Ro Sepulveda, MPH, RD, CDCES, LD 3/3/2025

## 2025-03-04 RX ORDER — ASPIRIN 81 MG/1
81 TABLET, CHEWABLE ORAL DAILY
Status: CANCELLED | OUTPATIENT
Start: 2025-03-04

## 2025-03-04 RX ORDER — ACETAMINOPHEN 500 MG
500-1000 TABLET ORAL EVERY 6 HOURS PRN
Status: CANCELLED | OUTPATIENT
Start: 2025-03-04

## 2025-03-04 RX ORDER — ALBUTEROL SULFATE 90 UG/1
2 INHALANT RESPIRATORY (INHALATION) EVERY 6 HOURS PRN
Status: CANCELLED | OUTPATIENT
Start: 2025-03-04

## 2025-03-04 RX ORDER — VITAMIN A, VITAMIN C, VITAMIN D-3, VITAMIN E, VITAMIN B-1, VITAMIN B-2, NIACIN, VITAMIN B-6, CALCIUM, IRON, ZINC, COPPER 4000; 120; 400; 22; 1.84; 3; 20; 10; 1; 12; 200; 27; 25; 2 [IU]/1; MG/1; [IU]/1; MG/1; MG/1; MG/1; MG/1; MG/1; MG/1; UG/1; MG/1; MG/1; MG/1; MG/1
1 TABLET ORAL DAILY
Status: CANCELLED | OUTPATIENT
Start: 2025-03-04

## 2025-03-04 NOTE — ANESTHESIA PREPROCEDURE EVALUATION
Anesthesia Pre-Procedure Evaluation    Patient: Dot Ramirez   MRN: [id number] : 1985        Procedure : Procedure(s):  CERCLAGE, CERVIX, VAGINAL APPROACH          Past Medical History:   Diagnosis Date    Abnormal Pap smear of cervix 2010    see problem list    Anxiety     Benign essential hypertension 2024    Depressive disorder 20yrs    When I was 16yrs I had my first episodes    Jaw claudication     Moderate major depression (H) 2014    onset with fetal loss    PID (acute pelvic inflammatory disease) 2022    Temporal pain     TOA (tubo-ovarian abscess) 2022    Varicella     Walking pneumonia       Past Surgical History:   Procedure Laterality Date    BIOPSY ARTERY TEMPORAL Bilateral 2023    Procedure: TEMPORAL ARTERY BIOPSY-BILATERAL;  Surgeon: Juno Kruger MD;  Location: [facility] OR    CERCLAGE CERVICAL N/A 2015    Procedure: CERCLAGE CERVICAL;  Surgeon: Damion Nayak MD;  Location: [facility] L+D    CERCLAGE CERVICAL N/A 2016    Procedure: CERCLAGE CERVICAL;  Surgeon: Damion Nayak MD;  Location: [facility] L[facility]     SECTION N/A 2015    Procedure:  SECTION;  Surgeon: Jamison Florian MD;  Location: [facility] L+[facility]     SECTION N/A 3/6/2017    Procedure:  SECTION;  Surgeon: Jamison Florian MD;  Location: [facility] OR    Cystectomy hand  2016    EXCISE MASS FINGER Left 2016    Procedure: Excision of cystic mass, left ring finger. Surgeon:  Rosales Jones MD  Location: [facility]    EXTRACTION(S) DENTAL  2009      No Known Allergies   Social History     Tobacco Use    Smoking status: Never     Passive exposure: Never    Smokeless tobacco: Never   Substance Use Topics    Alcohol use: Not Currently     Comment: very very rare      Wt Readings from Last 1 Encounters:   25 85.7 kg (189 lb)        Anesthesia Evaluation   Pt has had prior anesthetic. Type: OB Labor Epidural.    History of anesthetic  complications  - PONV.      ROS/MED HX  ENT/Pulmonary: Comment: Recurrent bronchitis       Neurologic:  - neg neurologic ROS     Cardiovascular: Comment: cHTN on ASA 81mg        METS/Exercise Tolerance:     Hematologic:  - neg hematologic  ROS     Musculoskeletal:       GI/Hepatic:  - neg GI/hepatic ROS     Renal/Genitourinary:       Endo: Comment:  Pre-diabetes/early gestational diabetes by failed 3 hr GTT this pregnancy    (+)               Obesity,       Psychiatric/Substance Use:     (+) psychiatric history anxiety and depression       Infectious Disease:       Malignancy:       Other:   Hx of 18 week loss 2/2 vaginal bleeding,  labor, and delivery in . Patient noted to have progressive cervical shortening.    (+)  , ,previous  (x2)         Physical Exam    Airway        Mallampati: II   TM distance: > 3 FB   Neck ROM: full   Mouth opening: > 3 cm    Respiratory Devices and Support         Dental  no notable dental history     (+) Completely normal teeth      Cardiovascular   cardiovascular exam normal          Pulmonary   pulmonary exam normal            Other findings: Patient reports concern of severe PONV she experienced during two past spinals; she notes she did not vomit when previous providers administered anti-nausea meds before. Will plan on giving zofran prior to spinal.     OUTSIDE LABS:  CBC:   Lab Results   Component Value Date    WBC 10.9 2025    WBC 7.5 2024    HGB 11.3 (L) 2025    HGB 12.3 2024    HCT 34.7 (L) 2025    HCT 38.3 2024     2025     2024     BMP:   Lab Results   Component Value Date     2025     2024    POTASSIUM 4.1 2025    POTASSIUM 3.8 2024    CHLORIDE 101 2025    CHLORIDE 100 2024    CO2 20 (L) 2025    CO2 25 2024    BUN 8.8 2025    BUN 6.2 2024    CR 0.57 2025    CR 0.63 2024    GLC 81 2025    GLC 99 2024      COAGS:   Lab Results   Component Value Date    INR 0.98 11/17/2014    FIBR 517 (H) 11/17/2014     POC:   Lab Results   Component Value Date    HCG Negative 12/14/2023     HEPATIC:   Lab Results   Component Value Date    ALBUMIN 4.1 02/04/2025    PROTTOTAL 7.2 02/04/2025    ALT 14 02/04/2025    AST 17 02/04/2025    ALKPHOS 90 02/04/2025    BILITOTAL 0.2 02/04/2025     OTHER:   Lab Results   Component Value Date    PH 7.35 05/16/2022    LACT 0.7 05/20/2024    A1C 5.8 (H) 02/04/2025    AMERICA 9.2 02/04/2025    LIPASE 128 05/15/2022    TSH 1.71 12/27/2023    SED 20 05/20/2024       Anesthesia Plan    ASA Status:  2    NPO Status:  ELEVATED Aspiration Risk/Unknown    Anesthesia Type: Spinal.              Consents    Anesthesia Plan(s) and associated risks, benefits, and realistic alternatives discussed. Questions answered and patient/representative(s) expressed understanding.     - Discussed:     - Discussed with:  Patient            Postoperative Care       PONV prophylaxis: Ondansetron (or other 5HT-3)     Comments:    Other Comments: We discussed the risks and benefits of neuraxial analgesia and/or anesthesia including, but not limited to: spinal headache, infection, bleeding, damage to surrounding structures, failed or patchy epidural requiring replacement or conversion to general anesthesia in an emergent setting. Dot Ramirez verbalized understanding of these risks. All questions were addressed.               Maribeth Smalls MD    I have reviewed the pertinent notes and labs in the chart from the past 30 days and (re)examined the patient.  Any updates or changes from those notes are reflected in this note.    Clinically Significant Risk Factors Present on Admission                   # Hypertension: Noted on problem list

## 2025-03-05 ENCOUNTER — HOSPITAL ENCOUNTER (OUTPATIENT)
Facility: CLINIC | Age: 40
End: 2025-03-05
Admitting: STUDENT IN AN ORGANIZED HEALTH CARE EDUCATION/TRAINING PROGRAM
Payer: COMMERCIAL

## 2025-03-05 ENCOUNTER — HOSPITAL ENCOUNTER (OUTPATIENT)
Facility: CLINIC | Age: 40
Discharge: HOME | End: 2025-03-05
Attending: STUDENT IN AN ORGANIZED HEALTH CARE EDUCATION/TRAINING PROGRAM | Admitting: STUDENT IN AN ORGANIZED HEALTH CARE EDUCATION/TRAINING PROGRAM
Payer: COMMERCIAL

## 2025-03-05 ENCOUNTER — ANESTHESIA (OUTPATIENT)
Dept: OBGYN | Facility: CLINIC | Age: 40
End: 2025-03-05
Payer: COMMERCIAL

## 2025-03-05 VITALS
TEMPERATURE: 97.8 F | SYSTOLIC BLOOD PRESSURE: 124 MMHG | DIASTOLIC BLOOD PRESSURE: 80 MMHG | OXYGEN SATURATION: 100 % | RESPIRATION RATE: 18 BRPM

## 2025-03-05 DIAGNOSIS — R19.7 DIARRHEA, UNSPECIFIED TYPE: ICD-10-CM

## 2025-03-05 PROBLEM — O09.299 HISTORY OF PRIOR PREGNANCY WITH SHORT CERVIX, CURRENTLY PREGNANT: Status: ACTIVE | Noted: 2025-03-05

## 2025-03-05 LAB
ABO + RH BLD: NORMAL
ALBUMIN UR-MCNC: NEGATIVE MG/DL
APPEARANCE UR: CLEAR
BASOPHILS # BLD AUTO: 0 10E3/UL (ref 0–0.2)
BASOPHILS NFR BLD AUTO: 0 %
BILIRUB UR QL STRIP: NEGATIVE
BLD GP AB SCN SERPL QL: NEGATIVE
CLUE CELLS: ABNORMAL
COLOR UR AUTO: ABNORMAL
EOSINOPHIL # BLD AUTO: 0.1 10E3/UL (ref 0–0.7)
EOSINOPHIL NFR BLD AUTO: 1 %
ERYTHROCYTE [DISTWIDTH] IN BLOOD BY AUTOMATED COUNT: 16.5 % (ref 10–15)
GLUCOSE BLDC GLUCOMTR-MCNC: 133 MG/DL (ref 70–99)
GLUCOSE BLDC GLUCOMTR-MCNC: 65 MG/DL (ref 70–99)
GLUCOSE BLDC GLUCOMTR-MCNC: 72 MG/DL (ref 70–99)
GLUCOSE UR STRIP-MCNC: NEGATIVE MG/DL
HCT VFR BLD AUTO: 31.6 % (ref 35–47)
HGB BLD-MCNC: 10.7 G/DL (ref 11.7–15.7)
HGB UR QL STRIP: NEGATIVE
IMM GRANULOCYTES # BLD: 0 10E3/UL
IMM GRANULOCYTES NFR BLD: 0 %
KETONES UR STRIP-MCNC: 10 MG/DL
LEUKOCYTE ESTERASE UR QL STRIP: NEGATIVE
LYMPHOCYTES # BLD AUTO: 2.1 10E3/UL (ref 0.8–5.3)
LYMPHOCYTES NFR BLD AUTO: 19 %
MCH RBC QN AUTO: 25.8 PG (ref 26.5–33)
MCHC RBC AUTO-ENTMCNC: 33.9 G/DL (ref 31.5–36.5)
MCV RBC AUTO: 76 FL (ref 78–100)
MONOCYTES # BLD AUTO: 0.6 10E3/UL (ref 0–1.3)
MONOCYTES NFR BLD AUTO: 6 %
NEUTROPHILS # BLD AUTO: 8.4 10E3/UL (ref 1.6–8.3)
NEUTROPHILS NFR BLD AUTO: 74 %
NITRATE UR QL: NEGATIVE
NRBC # BLD AUTO: 0 10E3/UL
NRBC BLD AUTO-RTO: 0 /100
PH UR STRIP: 6.5 [PH] (ref 5–7)
PLATELET # BLD AUTO: 372 10E3/UL (ref 150–450)
RBC # BLD AUTO: 4.14 10E6/UL (ref 3.8–5.2)
RBC URINE: 0 /HPF
SP GR UR STRIP: 1.01 (ref 1–1.03)
SPECIMEN EXP DATE BLD: NORMAL
SQUAMOUS EPITHELIAL: 1 /HPF
TRICHOMONAS, WET PREP: ABNORMAL
UROBILINOGEN UR STRIP-MCNC: NORMAL MG/DL
WBC # BLD AUTO: 11.3 10E3/UL (ref 4–11)
WBC URINE: 1 /HPF
WBC'S/HIGH POWER FIELD, WET PREP: ABNORMAL
YEAST, WET PREP: ABNORMAL

## 2025-03-05 PROCEDURE — 250N000011 HC RX IP 250 OP 636

## 2025-03-05 PROCEDURE — 999N000141 HC STATISTIC PRE-PROCEDURE NURSING ASSESSMENT: Performed by: STUDENT IN AN ORGANIZED HEALTH CARE EDUCATION/TRAINING PROGRAM

## 2025-03-05 PROCEDURE — 86850 RBC ANTIBODY SCREEN: CPT | Performed by: STUDENT IN AN ORGANIZED HEALTH CARE EDUCATION/TRAINING PROGRAM

## 2025-03-05 PROCEDURE — 710N000010 HC RECOVERY PHASE 1, LEVEL 2, PER MIN: Performed by: STUDENT IN AN ORGANIZED HEALTH CARE EDUCATION/TRAINING PROGRAM

## 2025-03-05 PROCEDURE — 258N000003 HC RX IP 258 OP 636: Performed by: STUDENT IN AN ORGANIZED HEALTH CARE EDUCATION/TRAINING PROGRAM

## 2025-03-05 PROCEDURE — 258N000003 HC RX IP 258 OP 636

## 2025-03-05 PROCEDURE — 370N000017 HC ANESTHESIA TECHNICAL FEE, PER MIN: Performed by: STUDENT IN AN ORGANIZED HEALTH CARE EDUCATION/TRAINING PROGRAM

## 2025-03-05 PROCEDURE — 87591 N.GONORRHOEAE DNA AMP PROB: CPT | Performed by: STUDENT IN AN ORGANIZED HEALTH CARE EDUCATION/TRAINING PROGRAM

## 2025-03-05 PROCEDURE — 250N000013 HC RX MED GY IP 250 OP 250 PS 637: Performed by: STUDENT IN AN ORGANIZED HEALTH CARE EDUCATION/TRAINING PROGRAM

## 2025-03-05 PROCEDURE — 81001 URINALYSIS AUTO W/SCOPE: CPT | Performed by: STUDENT IN AN ORGANIZED HEALTH CARE EDUCATION/TRAINING PROGRAM

## 2025-03-05 PROCEDURE — 82962 GLUCOSE BLOOD TEST: CPT

## 2025-03-05 PROCEDURE — 999N000285 HC STATISTIC VASC ACCESS LAB DRAW WITH PIV START

## 2025-03-05 PROCEDURE — 272N000001 HC OR GENERAL SUPPLY STERILE: Performed by: STUDENT IN AN ORGANIZED HEALTH CARE EDUCATION/TRAINING PROGRAM

## 2025-03-05 PROCEDURE — 360N000074 HC SURGERY LEVEL 1, PER MIN: Performed by: STUDENT IN AN ORGANIZED HEALTH CARE EDUCATION/TRAINING PROGRAM

## 2025-03-05 PROCEDURE — 86900 BLOOD TYPING SEROLOGIC ABO: CPT | Performed by: STUDENT IN AN ORGANIZED HEALTH CARE EDUCATION/TRAINING PROGRAM

## 2025-03-05 PROCEDURE — 999N000127 HC STATISTIC PERIPHERAL IV START W US GUIDANCE

## 2025-03-05 PROCEDURE — 87491 CHLMYD TRACH DNA AMP PROBE: CPT | Performed by: STUDENT IN AN ORGANIZED HEALTH CARE EDUCATION/TRAINING PROGRAM

## 2025-03-05 PROCEDURE — 85004 AUTOMATED DIFF WBC COUNT: CPT | Performed by: STUDENT IN AN ORGANIZED HEALTH CARE EDUCATION/TRAINING PROGRAM

## 2025-03-05 PROCEDURE — 250N000011 HC RX IP 250 OP 636: Performed by: STUDENT IN AN ORGANIZED HEALTH CARE EDUCATION/TRAINING PROGRAM

## 2025-03-05 PROCEDURE — 87210 SMEAR WET MOUNT SALINE/INK: CPT | Performed by: STUDENT IN AN ORGANIZED HEALTH CARE EDUCATION/TRAINING PROGRAM

## 2025-03-05 RX ORDER — ACETAMINOPHEN 325 MG/1
975 TABLET ORAL ONCE
Status: COMPLETED | OUTPATIENT
Start: 2025-03-05 | End: 2025-03-05

## 2025-03-05 RX ORDER — ONDANSETRON 2 MG/ML
INJECTION INTRAMUSCULAR; INTRAVENOUS PRN
Status: DISCONTINUED | OUTPATIENT
Start: 2025-03-05 | End: 2025-03-05

## 2025-03-05 RX ORDER — LIDOCAINE 40 MG/G
CREAM TOPICAL
Status: DISCONTINUED | OUTPATIENT
Start: 2025-03-05 | End: 2025-03-05 | Stop reason: HOSPADM

## 2025-03-05 RX ORDER — CHLOROPROCAINE HYDROCHLORIDE 20 MG/ML
INJECTION, SOLUTION EPIDURAL; INFILTRATION; INTRACAUDAL; PERINEURAL
Status: COMPLETED | OUTPATIENT
Start: 2025-03-05 | End: 2025-03-05

## 2025-03-05 RX ORDER — CITRIC ACID/SODIUM CITRATE 334-500MG
30 SOLUTION, ORAL ORAL ONCE
Status: COMPLETED | OUTPATIENT
Start: 2025-03-05 | End: 2025-03-05

## 2025-03-05 RX ORDER — SODIUM CHLORIDE, SODIUM LACTATE, POTASSIUM CHLORIDE, CALCIUM CHLORIDE 600; 310; 30; 20 MG/100ML; MG/100ML; MG/100ML; MG/100ML
INJECTION, SOLUTION INTRAVENOUS CONTINUOUS
Status: DISCONTINUED | OUTPATIENT
Start: 2025-03-05 | End: 2025-03-05 | Stop reason: HOSPADM

## 2025-03-05 RX ORDER — FENTANYL CITRATE 50 UG/ML
INJECTION, SOLUTION INTRAMUSCULAR; INTRAVENOUS
Status: COMPLETED | OUTPATIENT
Start: 2025-03-05 | End: 2025-03-05

## 2025-03-05 RX ADMIN — ONDANSETRON 4 MG: 2 INJECTION INTRAMUSCULAR; INTRAVENOUS at 14:12

## 2025-03-05 RX ADMIN — SODIUM CITRATE AND CITRIC ACID MONOHYDRATE 30 ML: 500; 334 SOLUTION ORAL at 14:01

## 2025-03-05 RX ADMIN — PHENYLEPHRINE HYDROCHLORIDE 50 MCG/MIN: 10 INJECTION INTRAVENOUS at 14:24

## 2025-03-05 RX ADMIN — FENTANYL CITRATE 10 MCG: 50 INJECTION INTRAMUSCULAR; INTRAVENOUS at 14:13

## 2025-03-05 RX ADMIN — FAMOTIDINE 20 MG: 10 INJECTION, SOLUTION INTRAVENOUS at 10:19

## 2025-03-05 RX ADMIN — ACETAMINOPHEN 975 MG: 325 TABLET, FILM COATED ORAL at 10:40

## 2025-03-05 RX ADMIN — SODIUM CHLORIDE, POTASSIUM CHLORIDE, SODIUM LACTATE AND CALCIUM CHLORIDE: 600; 310; 30; 20 INJECTION, SOLUTION INTRAVENOUS at 14:10

## 2025-03-05 RX ADMIN — CHLOROPROCAINE HYDROCHLORIDE 50 MG: 20 INJECTION, SOLUTION EPIDURAL; INFILTRATION; INTRACAUDAL; PERINEURAL at 14:13

## 2025-03-05 RX ADMIN — PHENYLEPHRINE HYDROCHLORIDE 100 MCG: 10 INJECTION INTRAVENOUS at 14:29

## 2025-03-05 RX ADMIN — SODIUM CHLORIDE, POTASSIUM CHLORIDE, SODIUM LACTATE AND CALCIUM CHLORIDE: 600; 310; 30; 20 INJECTION, SOLUTION INTRAVENOUS at 10:23

## 2025-03-05 ASSESSMENT — ACTIVITIES OF DAILY LIVING (ADL)
ADLS_ACUITY_SCORE: 23
ADLS_ACUITY_SCORE: 46
ADLS_ACUITY_SCORE: 23

## 2025-03-05 NOTE — PROVIDER NOTIFICATION
03/05/25 1707   Provider Notification   Provider Name/Title Dr. Strauss   Method of Notification Electronic Page   Request Evaluate - Remote   Notification Reason Status Update   Comments   Nursing Comments RN providing update to MD Strauss following cerclage. VSS, denies pain, voiding, eating. Spotting with use of bathroom. Order to d/c home and follow up as scheduled.

## 2025-03-05 NOTE — ANESTHESIA PROCEDURE NOTES
"Intrathecal injection Procedure Note    Pre-Procedure   Staff -        Anesthesiologist:  Jan Paulino MD       Resident/Fellow: Maribeth Smalls MD       Performed By: resident       Location: OR       Procedure Start/Stop Times: 3/5/2025 2:13 PM and 3/5/2025 2:18 PM       Pre-Anesthestic Checklist: patient identified, IV checked, risks and benefits discussed, informed consent, monitors and equipment checked, pre-op evaluation, at physician/surgeon's request and post-op pain management  Timeout:       Correct Patient: Yes        Correct Procedure: Yes        Correct Site: Yes        Correct Position: Yes   Procedure Documentation  Procedure: intrathecal injection         Patient Position: sitting       Patient Prep/Sterile Barriers: sterile gloves, mask, patient draped       Skin prep: Chloraprep       Insertion Site: L3-4.       Needle Gauge: 25.        Needle Length (Inches): 3.5        Spinal Needle Type: Pencan       Introducer used       # of attempts: 2 and  # of redirects:  1    Assessment/Narrative         Paresthesias: No.       CSF fluid: clear.       Opening pressure was cmH2O while  Sitting.      Medication(s) Administered   1.6 mL BUPivacaine 0.75% in dextrose 8.25% (intrathecal) 0.75-8.25 %  2% Chloroprocaine PF (Intrathecal) - Intrathecal   50 mg - 3/5/2025 2:13:00 PM  Fentanyl PF (Intrathecal) - Intrathecal   10 mcg - 3/5/2025 2:13:00 PM  Medication Administration Time: 3/5/2025 2:13 PM      FOR [facility] ([facility]) ONLY:   Pain Team Contact information: please page the Pain Team Via [employer]. Search \"Pain\". During daytime hours, please page the attending first. At night please page the resident first.      "

## 2025-03-05 NOTE — ANESTHESIA POSTPROCEDURE EVALUATION
Patient: Dot Ramirez    Procedure: Procedure(s):  CERCLAGE, CERVIX, VAGINAL APPROACH       Anesthesia Type:  Spinal    Note:  Disposition: Admission; Inpatient   Postop Pain Control: Uneventful            Sign Out: Well controlled pain   PONV: No   Neuro/Psych: Uneventful            Sign Out: Acceptable/Baseline neuro status   Airway/Respiratory: Uneventful            Sign Out: Acceptable/Baseline resp. status   CV/Hemodynamics: Uneventful            Sign Out: Acceptable CV status; No obvious hypovolemia; No obvious fluid overload   Other NRE: NONE   DID A NON-ROUTINE EVENT OCCUR? No           Last vitals:  Vitals:    03/05/25 1500 03/05/25 1504 03/05/25 1509   BP: 120/56 106/59 120/76   Resp:      Temp:      SpO2: 97% 98% 100%       Electronically Signed By: Laila Lama MD  March 5, 2025  3:22 PM   Doctor's office

## 2025-03-05 NOTE — ANESTHESIA CARE TRANSFER NOTE
Patient: Dot Ramirez    Procedure: Procedure(s):  CERCLAGE, CERVIX, VAGINAL APPROACH       Diagnosis: History of cerclage, currently pregnant, first trimester [O09.291, Z98.890]  Diagnosis Additional Information: No value filed.    Anesthesia Type:   Spinal     Note:    Oropharynx: oropharynx clear of all foreign objects  Level of Consciousness: awake  Oxygen Supplementation: room air        Vital Signs Stable: post-procedure vital signs reviewed and stable  Report to RN Given: handoff report given  Patient transferred to: PACU  Comments: Patient noticed tingling of left hand, no specific distribution. Full  and LUE strength 5/5. Denies SOB. This was noticed on side where BP cuff was, so suspect it may be from frequent BP cuff measurements. Will make BP measurements less frequent and continue to monitor.   Handoff Report: Identifed the Patient, Identified the Reponsible Provider, Reviewed the pertinent medical history, Discussed the surgical course, Reviewed Intra-OP anesthesia mangement and issues during anesthesia, Set expectations for post-procedure period and Allowed opportunity for questions and acknowledgement of understanding      Vitals:  Vitals Value Taken Time   BP     Temp     Pulse     Resp     SpO2         Electronically Signed By: Maribeth Smalls MD  March 5, 2025  2:57 PM

## 2025-03-05 NOTE — PROVIDER NOTIFICATION
03/05/25 1700   Provider Notification   Provider Name/Title Dr. Strauss   Method of Notification Electronic Page   Request Evaluate - Remote   Notification Reason Status Update   Comments   Nursing Comments RN providing update to MD Strauss following cerclage. VSS, denies pain, voiding, eating. Spotting with use of bathroom. Order to d/c home and follow up as scheduled.

## 2025-03-05 NOTE — CONSULTS
"Consult received for Vascular access care.  See LDA for details. For additional needs place \"Nursing to Consult for Vascular Access\" [id number] order in EPIC.  "

## 2025-03-06 LAB
C TRACH DNA SPEC QL PROBE+SIG AMP: NEGATIVE
N GONORRHOEA DNA SPEC QL NAA+PROBE: NEGATIVE
SPECIMEN TYPE: NORMAL

## 2025-03-06 PROCEDURE — 59320 REVISION OF CERVIX: CPT | Mod: GC | Performed by: STUDENT IN AN ORGANIZED HEALTH CARE EDUCATION/TRAINING PROGRAM

## 2025-03-06 NOTE — PLAN OF CARE
Pt VSS throughout the day. Afebrile. Brought to OR following preparation for cerclage procedure. One MR DBP in recovery, MD notified. Pt reporting mild cramping, given heat packs. Voiding well, good PO intake. Small spotting with voiding.  via doppler. See flowsheets for full assessment documentation. RN reviewed upcoming appointment, discharge instructions, after care, and return precautions. Pt left at 1820 with family.

## 2025-03-06 NOTE — OP NOTE
Operative Note: Cervical Cerclage         Pre-Op Diagnosis:   1) Single intrauterine pregnancy at 12w6d by LMP=9w2d U/S   2) Cervical insufficiency by prior 18w previable cervical dilation/labor and delivery   3) 2 prior history indicated cerclages placed, with subsequent term deliveries in each pregnancy         Post-Op Diagnosis:   1) Same         Procedure:   1) History indicated Zimmerman cervical cerclage, 2 sutures (knots tied at 11 and 12 o'clock position)         Surgeons:   Attending: Fransico Melendez MD  Fellow: Ingris Joseph MD, MFM Fellow  Resident: Candie Strauss, PGY3         Anesthesia:   Spinal          Estimated Blood Loss:   10cc         Findings:   1) Cervix closed prior to the procedure with well-healed scar from previous cerclages. Cervix closed following the procedure  2) Fetal heart tones confirmed by doptone (165 bpm) following the procedure         Specimens:   1) None         Complications:   1) None apparent          History:     Dot Ramirez is a 39 y.o.  at 12w6d by LMP=9w2d U/S. She has the above noted history. Due to 2 prior history indicated cerclages and successful term deliveries in those pregnancies, she desired another history indicated cerclage to be placed instead of monitoring serial cervical lengths.  After counseling regarding these findings, the patient has decided to proceed with history indicated cerclage.         Details of Procedure:   After administration of spinal anesthesia the patient was placed in the dorsal lithotomy position and prepped and draped in the usual fashion. The vagina and cervix were copiously cleansed with betadine solution. The bladder was drained of clear yellow urine. A weighted speculum was placed into the vagina and Briesky retractors were used to visualize the cervix.     The anterior lip of the cervix was grasped with a ring forcep.  A circumferential suture of #2 Ethilon was placed in the usual fashion at the cervicovaginal reflection  with knot tied at 12 o'clock position. A second suture of the same material was again placed circumferentially 0.5cm distal to the first cerclage, and tied at the 11 o'clock position. Both of these sutures were securely tied down and digital exam confirmed that the cervix was closed.    The bladder was again drained of clear urine and a rectal exam confirmed that no sutures were present in the rectum.    The cervix and vaginal vault were inspected and noted to be free of injury and hemostatic.      The instruments were removed from the vagina. She tolerated her spinal anesthesia well without incident. She was subsequently transferred to the recovery room in satisfactory condition.    Sponge and needle counts were correct at the close of the case x 2.    Dr. Fransico Melendez was present and scrubbed throughout the entire procedure.     Ingris Joseph MD  Maternal Fetal Medicine Fellow  3/6/2025 8:03 AM

## 2025-03-17 ENCOUNTER — PRENATAL OFFICE VISIT (OUTPATIENT)
Dept: OBGYN | Facility: CLINIC | Age: 40
End: 2025-03-17
Payer: COMMERCIAL

## 2025-03-17 VITALS — DIASTOLIC BLOOD PRESSURE: 70 MMHG | WEIGHT: 187 LBS | SYSTOLIC BLOOD PRESSURE: 114 MMHG | BODY MASS INDEX: 35.33 KG/M2

## 2025-03-17 DIAGNOSIS — Z34.80 SUPERVISION OF OTHER NORMAL PREGNANCY, ANTEPARTUM: Primary | ICD-10-CM

## 2025-03-17 DIAGNOSIS — D64.9 ANEMIA, UNSPECIFIED TYPE: ICD-10-CM

## 2025-03-17 DIAGNOSIS — O24.419 GESTATIONAL DIABETES MELLITUS (GDM) IN SECOND TRIMESTER, GESTATIONAL DIABETES METHOD OF CONTROL UNSPECIFIED: ICD-10-CM

## 2025-03-17 DIAGNOSIS — I10 BENIGN ESSENTIAL HYPERTENSION: ICD-10-CM

## 2025-03-17 DIAGNOSIS — O34.32 CERVICAL CERCLAGE SUTURE PRESENT IN SECOND TRIMESTER: ICD-10-CM

## 2025-03-17 PROCEDURE — 99207 PR PRENATAL VISIT: CPT | Performed by: OBSTETRICS & GYNECOLOGY

## 2025-03-17 PROCEDURE — 3074F SYST BP LT 130 MM HG: CPT | Performed by: OBSTETRICS & GYNECOLOGY

## 2025-03-17 PROCEDURE — 0502F SUBSEQUENT PRENATAL CARE: CPT | Performed by: OBSTETRICS & GYNECOLOGY

## 2025-03-17 PROCEDURE — 3078F DIAST BP <80 MM HG: CPT | Performed by: OBSTETRICS & GYNECOLOGY

## 2025-03-17 PROCEDURE — 99213 OFFICE O/P EST LOW 20 MIN: CPT | Mod: 25 | Performed by: OBSTETRICS & GYNECOLOGY

## 2025-03-17 NOTE — NURSING NOTE
"Chief Complaint   Patient presents with    Prenatal Care     14 4/7 weeks, cerclage placed 3/       Initial /70 (BP Location: Right arm, Patient Position: Chair, Cuff Size: Adult Large)   Wt 84.8 kg (187 lb)   LMP 2024   BMI 35.33 kg/m   Estimated body mass index is 35.33 kg/m  as calculated from the following:    Height as of 11/3/24: 1.549 m (5' 1\").    Weight as of this encounter: 84.8 kg (187 lb).  BP completed using cuff size: large    Questioned patient about current smoking habits.  Pt. has never smoked.          The following HM Due: NONE    Jerri Alex CMA           "

## 2025-03-17 NOTE — PROGRESS NOTES
39 year old  at 14w4d     - A+/RI.  FOB Dante.  NIPT nl XX.  Normal NT.   - history indicated Zimmerman cerclage present with TWO sutures, placed at 12+6 wga (h/o 18-week loss, history of cerclage x 2)  - History of  x 2, plans repeat (will plan for cerclage removal at that time unless indicated prior)  - cHTN (on lisinopril prior to pregnancy), on LDA.  Has baseline HELLP labs.  Planning serial growth and delivery at 37-38 wks.   - GDM dx at 11 wks: Has DM ED visit tomorrow.  Also planning fetal echo. Serial growth q4w, twice wkly BPP after 32 wks if starting insulin, otherwise wkly at 36 wks  -mild anemia with hgb 11.3 and MCV of 74.  Normal ferritin.  Consider IV iron if worsening anemia.   -History of depression/anxiety, currently mood is good and on no medications.    RTC 4 wks    1. Supervision of other normal pregnancy, antepartum (Primary)    2. Anemia, unspecified type    3. Benign essential hypertension    4. Gestational diabetes mellitus (GDM) in second trimester, gestational diabetes method of control unspecified     5. Cervical cerclage suture present in second trimester     Raine Sutton MD, MPH  [facility]

## 2025-03-18 ENCOUNTER — ALLIED HEALTH/NURSE VISIT (OUTPATIENT)
Dept: EDUCATION SERVICES | Facility: CLINIC | Age: 40
End: 2025-03-18
Payer: COMMERCIAL

## 2025-03-18 DIAGNOSIS — O24.410 DIET CONTROLLED GESTATIONAL DIABETES MELLITUS (GDM) IN SECOND TRIMESTER: Primary | ICD-10-CM

## 2025-03-18 NOTE — Clinical Note
3/18/2025         RE: Dot Ramirez  [address] [address] [place] [id number]        Dear Colleague,    Thank you for referring your patient, Dot Ramirez, to the [facility]. Please see a copy of my visit note below.    Diabetes Self-Management Education & Support  Type of Service: In Person Visit    Assessment  Ketones: negative to trace.   Fasting blood glucoses: 67% in target.  After breakfast: 100% in target.  After lunch: 100% in target.  After dinner: 100% in target.    Dot is doing well testing glucose and ketones. No questions at this point. States her schedule shifts often at work and she sometimes forgets to bring her meter supplies. She also reports her appetite is small as she grazes throughout the day and sometimes doesn't feel hungry enough to have a meal. She will also eat meals late at night, at times. Discussed where she is at in her pregnancy in relation to insulin needs and glucose patterns. Anticipate she will see a rise in glucose as pregnancy progresses. She understands concepts and is agreeable to learning more about insulin. Demonstrated insulin pen use, injection sites and sharps in person today.     Patient verbalized understanding of diabetes self-management education concepts discussed, opportunities for ongoing education and support, and recommendations provided today    Plan  Recommend one more follow up visit if needed to start insulin (would be comfortable with a video visit as well) and/or further discuss post pregnancy recommendations. She plans to schedule.  Check glucose four times daily, before breakfast and 1 hour after each meal.  Check ketones once a week when readings are consistently negative.  Continue with recommended physical activity.  Continue to follow recommended meal plan: 30-45g carbohydrates at breakfast, 45-60g carbohydrates at lunch, 45-60g carbohydrates at supper, 15-30g carbohydrates at snacks.  Follow  consistent carbohydrate meal plan, eat carbohydrates and protein/fat at all meals/snacks.    Send in Glucose Log (blood sugars) via [employer] in 7 days. If 3 or more blood sugars are above the goal at a given time, or if Ketones are small, moderate or large, call or [employer] message the diabetes educator.    Follow-up:    Follow up on Upcoming Diabetes Ed Appointments     Visit Type Date Time Department    GDM FOLLOW UP 3/18/2025  8:45 AM [facility]        Subjective/Objective  Dot is an 39 year old year old, presenting for the following diabetes education related to:      Accompanied by: Self  Gestational weeks: 14 weeks 5 days  Hospital planned for delivery: [facility]  Next OB Visit Date: 04/15/25  Number of previous pregnancies: 3  Had any babies over 9 lbs: No  Previously had Gestational Diabetes: No  Have you ever had thyroid problems or taken thyroid medication?: No  Heart disease, mitral valve prolapse or rheumatic fever?: No  Hypertension : (!) Yes  High Cholesterol: No  High Triglycerides: No  Do you use tobacco products?: No  Do you drink beer, wine or hard liquor?: No    Cultural Influences/Ethnic Background:  Not  or     LMP 12/05/2024       Estimated Date of Delivery: Sep 11, 2025    Blood Glucose/Ketone Log: States ketones have been trace to negative    Date Ketones Fasting Post Breakfast Post Lunch Post Supper   3/18  95      3/17  95 2 hours- 110 -- 1 hr- 100 ate dinner at 1030 PM   3/16  89 1 hr-121 2 hr- 103 1 hr-108   3/15  90 1 hr-123 1 hr-100 1 hr-97   3/14  93 1 hr-126 1 hr-90  2 hr-106   3/13  -- 2 hr - 114 2 hr- 104 --   3/12  93 2 hr-113 1 hr 146 --       Healthy Eating:  Pre-pregnancy weight (lbs): 180  Barrier to exercise: Other  Cultural/Church diet restrictions?: No  Meal planning/habits: Avoiding sweets, Calorie counting, Carb counting, Heart healthy, Other  How many times a week on average do you eat food made away from home (restaurant/take-out)?: 4  Meals  include: Lunch  Beverages: Soda, Sports drinks, Other (see Comments)  How many servings of fruits/vegetables per day: 1  Biggest challenges to healthy eating: Portion control, Eating out, Emotional eating, Evening snacking  Pre-nicol vitamin?: Yes  Supplements?: No  Experiencing nausea?: No  Experiencing heartburn?: No    Healthy Coping:  Emotional response to diabetes: Ready to learn  Informal Support system:: Children, Family, Friends, Parent, Spouse  Stage of change: ACTION (Actively working towards change)    Current Management:  Taking medications for gestational diabetes?: No  Difficulty affording diabetes testing supplies?: No    Carol Garcia RD  Time Spent: 30 minutes  Encounter Type: Individual     Diabetes medication dose changes were made via the CDCES Standing Orders under the patient's referring provider.

## 2025-03-18 NOTE — PROGRESS NOTES
Diabetes Self-Management Education & Support  Type of Service: In Person Visit    Assessment  Ketones: negative to trace.   Fasting blood glucoses: 67% in target.  After breakfast: 100% in target.  After lunch: 100% in target.  After dinner: 100% in target.    Dot is doing well testing glucose and ketones. No questions at this point. States her schedule shifts often at work and she sometimes forgets to bring her meter supplies. She also reports her appetite is small as she grazes throughout the day and sometimes doesn't feel hungry enough to have a meal. She will also eat meals late at night, at times. Discussed where she is at in her pregnancy in relation to insulin needs and glucose patterns. Anticipate she will see a rise in glucose as pregnancy progresses. She understands concepts and is agreeable to learning more about insulin. Demonstrated insulin pen use, injection sites and sharps in person today.     Patient verbalized understanding of diabetes self-management education concepts discussed, opportunities for ongoing education and support, and recommendations provided today    Plan  Recommend one more follow up visit if needed to start insulin (would be comfortable with a video visit as well) and/or further discuss post pregnancy recommendations. She plans to schedule.  Check glucose four times daily, before breakfast and 1 hour after each meal.  Check ketones once a week when readings are consistently negative.  Continue with recommended physical activity.  Continue to follow recommended meal plan: 30-45g carbohydrates at breakfast, 45-60g carbohydrates at lunch, 45-60g carbohydrates at supper, 15-30g carbohydrates at snacks.  Follow consistent carbohydrate meal plan, eat carbohydrates and protein/fat at all meals/snacks.    Send in Glucose Log (blood sugars) via [employer] in 7 days. If 3 or more blood sugars are above the goal at a given time, or if Ketones are small, moderate or large, call or [email or web address]  message the diabetes educator.    Follow-up:    Follow up on Upcoming Diabetes Ed Appointments     Visit Type Date Time Department    GDM FOLLOW UP 3/18/2025  8:45 AM [facility]        Subjective/Objective  Dot is an 39 year old year old, presenting for the following diabetes education related to:      Accompanied by: Self  Gestational weeks: 14 weeks 5 days  [facility] planned for delivery: [facility]  Next OB Visit Date: 04/15/25  Number of previous pregnancies: 3  Had any babies over 9 lbs: No  Previously had Gestational Diabetes: No  Have you ever had thyroid problems or taken thyroid medication?: No  Heart disease, mitral valve prolapse or rheumatic fever?: No  Hypertension : (!) Yes  High Cholesterol: No  High Triglycerides: No  Do you use tobacco products?: No  Do you drink beer, wine or hard liquor?: No    Cultural Influences/Ethnic Background:  Not  or     LMP 2024       Estimated Date of Delivery: Sep 11, 2025    Blood Glucose/Ketone Log: States ketones have been trace to negative    Date Ketones Fasting Post Breakfast Post Lunch Post Supper   3/18  95      3/17  95 2 hours- 110 -- 1 hr- 100 ate dinner at 1030 PM   3/16  89 1 hr-121 2 hr- 103 1 hr-108   3/15  90 1 hr-123 1 hr-100 1 hr-97   3/14  93 1 hr-126 1 hr-90  2 hr-106   3/13  -- 2 hr - 114 2 hr- 104 --   3/12  93 2 hr-113 1 hr 146 --       Healthy Eating:  Pre-pregnancy weight (lbs): 180  Barrier to exercise: Other  Cultural/Voodoo diet restrictions?: No  Meal planning/habits: Avoiding sweets, Calorie counting, Carb counting, Heart healthy, Other  How many times a week on average do you eat food made away from home (restaurant/take-out)?: 4  Meals include: Lunch  Beverages: Soda, Sports drinks, Other (see Comments)  How many servings of fruits/vegetables per day: 1  Biggest challenges to healthy eating: Portion control, Eating out, Emotional eating, Evening snacking  Pre- vitamin?: Yes  Supplements?:  No  Experiencing nausea?: No  Experiencing heartburn?: No    Healthy Coping:  Emotional response to diabetes: Ready to learn  Informal Support system:: Children, Family, Friends, Parent, Spouse  Stage of change: ACTION (Actively working towards change)    Current Management:  Taking medications for gestational diabetes?: No  Difficulty affording diabetes testing supplies?: No    Carol Garcia RD  Time Spent: 30 minutes  Encounter Type: Individual     Diabetes medication dose changes were made via the CDCES Standing Orders under the patient's referring provider.

## 2025-03-18 NOTE — LETTER
3/18/2025         RE: Dot Ramirez  [address] [address] [id number]        Dear Colleague,    Thank you for referring your patient, Dot Ramirez, to the [facility]. Please see a copy of my visit note below.    Diabetes Self-Management Education & Support  Type of Service: In Person Visit    Assessment  Ketones: negative to trace.   Fasting blood glucoses: 67% in target.  After breakfast: 100% in target.  After lunch: 100% in target.  After dinner: 100% in target.    Dot is doing well testing glucose and ketones. No questions at this point. States her schedule shifts often at work and she sometimes forgets to bring her meter supplies. She also reports her appetite is small as she grazes throughout the day and sometimes doesn't feel hungry enough to have a meal. She will also eat meals late at night, at times. Discussed where she is at in her pregnancy in relation to insulin needs and glucose patterns. Anticipate she will see a rise in glucose as pregnancy progresses. She understands concepts and is agreeable to learning more about insulin. Demonstrated insulin pen use, injection sites and sharps in person today.     Patient verbalized understanding of diabetes self-management education concepts discussed, opportunities for ongoing education and support, and recommendations provided today    Plan  Recommend one more follow up visit if needed to start insulin (would be comfortable with a video visit as well) and/or further discuss post pregnancy recommendations. She plans to schedule.  Check glucose four times daily, before breakfast and 1 hour after each meal.  Check ketones once a week when readings are consistently negative.  Continue with recommended physical activity.  Continue to follow recommended meal plan: 30-45g carbohydrates at breakfast, 45-60g carbohydrates at lunch, 45-60g carbohydrates at supper, 15-30g carbohydrates at snacks.  Follow  consistent carbohydrate meal plan, eat carbohydrates and protein/fat at all meals/snacks.    Send in Glucose Log (blood sugars) via [email or web address] in 7 days. If 3 or more blood sugars are above the goal at a given time, or if Ketones are small, moderate or large, call or [employer] message the diabetes educator.    Follow-up:    Follow up on Upcoming Diabetes Ed Appointments     Visit Type Date Time Department    GDM FOLLOW UP 3/18/2025  8:45 AM [facility]        Subjective/Objective  Dot is an 39 year old year old, presenting for the following diabetes education related to:      Accompanied by: Self  Gestational weeks: 14 weeks 5 days  Hospital planned for delivery: [facility]  Next OB Visit Date: 04/15/25  Number of previous pregnancies: 3  Had any babies over 9 lbs: No  Previously had Gestational Diabetes: No  Have you ever had thyroid problems or taken thyroid medication?: No  Heart disease, mitral valve prolapse or rheumatic fever?: No  Hypertension : (!) Yes  High Cholesterol: No  High Triglycerides: No  Do you use tobacco products?: No  Do you drink beer, wine or hard liquor?: No    Cultural Influences/Ethnic Background:  Not  or     LMP 12/05/2024       Estimated Date of Delivery: Sep 11, 2025    Blood Glucose/Ketone Log: States ketones have been trace to negative    Date Ketones Fasting Post Breakfast Post Lunch Post Supper   3/18  95      3/17  95 2 hours- 110 -- 1 hr- 100 ate dinner at 1030 PM   3/16  89 1 hr-121 2 hr- 103 1 hr-108   3/15  90 1 hr-123 1 hr-100 1 hr-97   3/14  93 1 hr-126 1 hr-90  2 hr-106   3/13  -- 2 hr - 114 2 hr- 104 --   3/12  93 2 hr-113 1 hr 146 --       Healthy Eating:  Pre-pregnancy weight (lbs): 180  Barrier to exercise: Other  Cultural/Caodaism diet restrictions?: No  Meal planning/habits: Avoiding sweets, Calorie counting, Carb counting, Heart healthy, Other  How many times a week on average do you eat food made away from home (restaurant/take-out)?: 4  Meals  include: Lunch  Beverages: Soda, Sports drinks, Other (see Comments)  How many servings of fruits/vegetables per day: 1  Biggest challenges to healthy eating: Portion control, Eating out, Emotional eating, Evening snacking  Pre-nicol vitamin?: Yes  Supplements?: No  Experiencing nausea?: No  Experiencing heartburn?: No    Healthy Coping:  Emotional response to diabetes: Ready to learn  Informal Support system:: Children, Family, Friends, Parent, Spouse  Stage of change: ACTION (Actively working towards change)    Current Management:  Taking medications for gestational diabetes?: No  Difficulty affording diabetes testing supplies?: No    Carol Garcia RD  Time Spent: 30 minutes  Encounter Type: Individual     Diabetes medication dose changes were made via the CDCES Standing Orders under the patient's referring provider.

## 2025-03-19 ENCOUNTER — TELEPHONE (OUTPATIENT)
Dept: MATERNAL FETAL MEDICINE | Facility: CLINIC | Age: 40
End: 2025-03-19
Payer: COMMERCIAL

## 2025-03-19 NOTE — TELEPHONE ENCOUNTER
3/19/2025    Called patient to answer questions she had about surveillance after placement of cervical cerclage. There was no answer, but discreet voicemail was left for patient to return the call.    Fransico Melendez MD  Maternal-Fetal Medicine Specialist

## 2025-03-22 ENCOUNTER — HEALTH MAINTENANCE LETTER (OUTPATIENT)
Age: 40
End: 2025-03-22

## 2025-04-15 ENCOUNTER — PRENATAL OFFICE VISIT (OUTPATIENT)
Dept: OBGYN | Facility: CLINIC | Age: 40
End: 2025-04-15
Payer: COMMERCIAL

## 2025-04-15 VITALS — SYSTOLIC BLOOD PRESSURE: 110 MMHG | WEIGHT: 186 LBS | DIASTOLIC BLOOD PRESSURE: 70 MMHG | BODY MASS INDEX: 35.14 KG/M2

## 2025-04-15 DIAGNOSIS — Z34.80 SUPERVISION OF OTHER NORMAL PREGNANCY, ANTEPARTUM: Primary | ICD-10-CM

## 2025-04-15 PROCEDURE — 99207 PR PRENATAL VISIT: CPT | Performed by: OBSTETRICS & GYNECOLOGY

## 2025-04-15 PROCEDURE — 0502F SUBSEQUENT PRENATAL CARE: CPT | Performed by: OBSTETRICS & GYNECOLOGY

## 2025-04-15 PROCEDURE — 3078F DIAST BP <80 MM HG: CPT | Performed by: OBSTETRICS & GYNECOLOGY

## 2025-04-15 PROCEDURE — 3074F SYST BP LT 130 MM HG: CPT | Performed by: OBSTETRICS & GYNECOLOGY

## 2025-04-15 RX ORDER — OMEGA-3/DHA/EPA/FISH OIL 60 MG-90MG
CAPSULE ORAL
COMMUNITY

## 2025-04-15 NOTE — NURSING NOTE
"Chief Complaint   Patient presents with    Prenatal Care     18 5/7 weeks       Initial /70 (BP Location: Right arm, Patient Position: Chair, Cuff Size: Adult Large)   Wt 84.4 kg (186 lb)   LMP 2024   BMI 35.14 kg/m   Estimated body mass index is 35.14 kg/m  as calculated from the following:    Height as of 11/3/24: 1.549 m (5' 1\").    Weight as of this encounter: 84.4 kg (186 lb).  BP completed using cuff size: large    Questioned patient about current smoking habits.  Pt. has never smoked.          The following HM Due: NONE  Jerri Alex CMA      "

## 2025-04-15 NOTE — PROGRESS NOTES
39 year old  at 18w5d     - A+/RI.  FOB Dante.  NIPT nl XX.  Normal NT.  Level 2 US on   - history indicated Zimmerman cerclage present with TWO sutures, placed at 12+6 wga (h/o 18-week loss, history of cerclage x 2)  - History of  x 2, plans repeat (will plan for cerclage removal at that time unless indicated prior)  - cHTN (on lisinopril prior to pregnancy), on LDA.  Has baseline HELLP labs.  Planning serial growth and delivery at 37-38 wks.   - GDM dx at 11 wks: s/p DM ED, doing FBGs and getting 85-95 on no meds.  Also planning fetal echo. Serial growth q4w, twice wkly BPP after 32 wks if starting insulin, otherwise wkly at 36 wks  -mild anemia with hgb 11.3 and MCV of 74.  Normal ferritin.  Consider IV iron if worsening anemia.   -History of depression/anxiety, currently mood is good and on no medications.    RTC 4 wks    Raine Sutton MD, MPH  [facility]

## 2025-04-18 ENCOUNTER — HOSPITAL ENCOUNTER (OUTPATIENT)
Dept: ULTRASOUND IMAGING | Facility: CLINIC | Age: 40
Discharge: HOME | End: 2025-04-18
Attending: STUDENT IN AN ORGANIZED HEALTH CARE EDUCATION/TRAINING PROGRAM
Payer: COMMERCIAL

## 2025-04-18 DIAGNOSIS — O09.522 MULTIGRAVIDA OF ADVANCED MATERNAL AGE IN SECOND TRIMESTER: ICD-10-CM

## 2025-04-18 DIAGNOSIS — O10.919 CHRONIC HYPERTENSION AFFECTING PREGNANCY: ICD-10-CM

## 2025-04-18 PROCEDURE — 76817 TRANSVAGINAL US OBSTETRIC: CPT

## 2025-04-18 PROCEDURE — 99213 OFFICE O/P EST LOW 20 MIN: CPT | Mod: 25 | Performed by: STUDENT IN AN ORGANIZED HEALTH CARE EDUCATION/TRAINING PROGRAM

## 2025-04-18 PROCEDURE — 76811 OB US DETAILED SNGL FETUS: CPT | Mod: 26 | Performed by: STUDENT IN AN ORGANIZED HEALTH CARE EDUCATION/TRAINING PROGRAM

## 2025-04-18 PROCEDURE — 76817 TRANSVAGINAL US OBSTETRIC: CPT | Mod: 26 | Performed by: STUDENT IN AN ORGANIZED HEALTH CARE EDUCATION/TRAINING PROGRAM

## 2025-04-18 PROCEDURE — 76811 OB US DETAILED SNGL FETUS: CPT

## 2025-04-28 ENCOUNTER — TELEPHONE (OUTPATIENT)
Dept: OBGYN | Facility: CLINIC | Age: 40
End: 2025-04-28

## 2025-04-28 ENCOUNTER — PRENATAL OFFICE VISIT (OUTPATIENT)
Dept: OBGYN | Facility: CLINIC | Age: 40
End: 2025-04-28
Payer: COMMERCIAL

## 2025-04-28 VITALS — BODY MASS INDEX: 35.71 KG/M2 | WEIGHT: 189 LBS | SYSTOLIC BLOOD PRESSURE: 122 MMHG | DIASTOLIC BLOOD PRESSURE: 68 MMHG

## 2025-04-28 DIAGNOSIS — O26.852 SPOTTING AFFECTING PREGNANCY IN SECOND TRIMESTER: Primary | ICD-10-CM

## 2025-04-28 LAB
ALBUMIN UR-MCNC: NEGATIVE MG/DL
APPEARANCE UR: CLEAR
BILIRUB UR QL STRIP: NEGATIVE
COLOR UR AUTO: YELLOW
GLUCOSE UR STRIP-MCNC: NEGATIVE MG/DL
HGB UR QL STRIP: NEGATIVE
KETONES UR STRIP-MCNC: 15 MG/DL
LEUKOCYTE ESTERASE UR QL STRIP: NEGATIVE
MUCOUS THREADS #/AREA URNS LPF: PRESENT /LPF
NITRATE UR QL: NEGATIVE
PH UR STRIP: 6 [PH] (ref 5–7)
RBC #/AREA URNS AUTO: ABNORMAL /HPF
SP GR UR STRIP: 1.01 (ref 1–1.03)
SQUAMOUS #/AREA URNS AUTO: ABNORMAL /LPF
UROBILINOGEN UR STRIP-ACNC: 0.2 E.U./DL
WBC #/AREA URNS AUTO: ABNORMAL /HPF

## 2025-04-28 PROCEDURE — 0502F SUBSEQUENT PRENATAL CARE: CPT | Performed by: OBSTETRICS & GYNECOLOGY

## 2025-04-28 PROCEDURE — 3074F SYST BP LT 130 MM HG: CPT | Performed by: OBSTETRICS & GYNECOLOGY

## 2025-04-28 PROCEDURE — 81001 URINALYSIS AUTO W/SCOPE: CPT | Performed by: OBSTETRICS & GYNECOLOGY

## 2025-04-28 PROCEDURE — 99214 OFFICE O/P EST MOD 30 MIN: CPT | Performed by: OBSTETRICS & GYNECOLOGY

## 2025-04-28 PROCEDURE — 87086 URINE CULTURE/COLONY COUNT: CPT | Performed by: OBSTETRICS & GYNECOLOGY

## 2025-04-28 PROCEDURE — 3078F DIAST BP <80 MM HG: CPT | Performed by: OBSTETRICS & GYNECOLOGY

## 2025-04-28 PROCEDURE — 99207 PR PRENATAL VISIT: CPT | Performed by: OBSTETRICS & GYNECOLOGY

## 2025-04-28 NOTE — NURSING NOTE
"Chief Complaint   Patient presents with    Prenatal Care     20w4d, spotting     initial /68   Wt 85.7 kg (189 lb)   LMP 12/05/2024   BMI 35.71 kg/m   Estimated body mass index is 35.71 kg/m  as calculated from the following:    Height as of 11/3/24: 1.549 m (5' 1\").    Weight as of this encounter: 85.7 kg (189 lb).  BP completed using cuff size regular    Cerclage placed in 12w6d  Spotting today when wiping  Increase in pressure    Roselyn Alvarenga CMA on 4/28/2025 at 1:50 PM    "

## 2025-04-28 NOTE — PROGRESS NOTES
SUBJECTIVE:                                                   CC:  Patient presents with:  Prenatal Care: 20w4d, spotting      HPI:  Dot Ramirez is a 39 year old  with a Zimmerman cerclage in place with two sutures, with documented funneling to the stitch noted at last US with MFM at 19 wks, who presents with vaginal bleeding today.     - Experienced pink spotting during urination, but no further spotting after urinating 2-3 times.  - Felt a little cramp and discomfort 20 minutes after spotting while driving home.  - No cramping or feeling funny immediately after spotting.  - No contractions reported, but experienced discomfort and pressure, particularly when sitting or moving.  - Last Wednesday, felt a sensation on the way to work, unsure if it was a contraction.  - Concerned about potential cramping and its implications at 20 weeks of pregnancy.  - Blood spotting prompted concern about whether to rest or seek medical evaluation.   - experiencing fetal movement now    OBJECTIVE:     /68   Wt 85.7 kg (189 lb)   LMP 2024   BMI 35.71 kg/m      Gen: Healthy appearing female, no acute distress, comfortable  Psych: mentation appears normal and affect bright  : Normal external female genitalia.  No external lesions.   SSE: Speculum exam reveals vaginal epithelium well rugated with normal physiologic discharge. Cervix appears smooth, pink, with no visible lesions.  No blood in vault.     Physical Exam- GENITOURINARY: Cervix is visually closed, no bleeding observed, both cerclage stitches intact.     Cervix is digitally closed (soft digital exam)     ASSESSMENT/PLAN:                                                      1. Spotting affecting pregnancy in second trimester (Primary)  - UA with Microscopic  - Urine Culture    Assessment & Plan  Pink spotting and cramping during pregnancy  - Cervix visually appears closed with no active bleeding. No contractions observed. Patient is at a critical  stage in pregnancy, currently at 20 weeks. Concerns about potential cramping and contractions as pregnancy progresses.  - Schedule an ultrasound to assess cervix. Leave a urinalysis for further evaluation. Be on pelvic rest to avoid irritation. Monitor cramping and discomfort, especially as patient approaches 22 weeks. Appointment scheduled for May 13, 2025, with follow-up appointments on May 15 and May 16, 2025.     Discussed pt with Dr Dickinson of [facility].  If pt presents with further symptoms after viability (22 wks onward) would recommend she proceed directly to [facility] L&D for [facility] evaluation of the cerclage.  Could do BMZ and NICU consult at that time, not indicated prior.     Raine Sutton MD, MPH  Obstetrics and Gynecology

## 2025-04-28 NOTE — TELEPHONE ENCOUNTER
20w4d    Cerclage, dbl stitch put in place on 12w6d    She had some bright red blood when wiping today.   Last U/s 1.5 weeks ago.  No intercourse.  No constipation.  No pain or cramping.     Do you want her to some in for exam, or go for U/s?    Last note from [facility] 4/18/25:  History indicated cerclage, short cervix on ultrasound  We discussed that there is funneling of the cervix to the level of the cerclage, though this does not appear past the cerclage. Additionally, the cervix appeared closed. Given  these findings, however, a sterile speculum exam was performed. Both of the cerclage stitches were visualized and did not appear on tension. The cervix appeared  multiparous, but normal in appearance. On digital exam, the cerclage sutures did not feel to be on tension, the cervix felt firm, and was closed.    Juliann LORA RN BSN  [facility]

## 2025-04-29 LAB — BACTERIA UR CULT: NO GROWTH

## 2025-05-13 ENCOUNTER — PRENATAL OFFICE VISIT (OUTPATIENT)
Dept: OBGYN | Facility: CLINIC | Age: 40
End: 2025-05-13
Payer: COMMERCIAL

## 2025-05-13 VITALS — BODY MASS INDEX: 35.52 KG/M2 | DIASTOLIC BLOOD PRESSURE: 68 MMHG | SYSTOLIC BLOOD PRESSURE: 120 MMHG | WEIGHT: 188 LBS

## 2025-05-13 DIAGNOSIS — Z34.80 SUPERVISION OF OTHER NORMAL PREGNANCY, ANTEPARTUM: Primary | ICD-10-CM

## 2025-05-13 PROCEDURE — 0502F SUBSEQUENT PRENATAL CARE: CPT | Performed by: OBSTETRICS & GYNECOLOGY

## 2025-05-13 PROCEDURE — 99207 PR PRENATAL VISIT: CPT | Performed by: OBSTETRICS & GYNECOLOGY

## 2025-05-13 PROCEDURE — 3074F SYST BP LT 130 MM HG: CPT | Performed by: OBSTETRICS & GYNECOLOGY

## 2025-05-13 PROCEDURE — 3078F DIAST BP <80 MM HG: CPT | Performed by: OBSTETRICS & GYNECOLOGY

## 2025-05-13 NOTE — PROGRESS NOTES
39 year old  at 22w5d     - A+/RI.  FOB Dante.  NIPT nl XX.  Normal NT.  Level 2 US on   - history indicated Zimmerman cerclage present with TWO sutures, placed at 12+6 wga (h/o 18-week loss, history of cerclage x 2). Experieced VB x1 at 20 wks.   D/w MFM: If pt presents with further symptoms (VB, ctx) would recommend she proceed directly to [facility] L&D for MFM evaluation of the cerclage.  Could do BMZ and NICU consult at that time, not indicated prior.   - History of  x 2, plans repeat (will plan for cerclage removal at that time unless indicated prior)  - cHTN (on lisinopril prior to pregnancy), on LDA.  Has baseline HELLP labs.  Planning serial growth and delivery at 37-38 wks.   - GDM dx at 11 wks: s/p DM ED, doing FBGs and getting 85-90 on no meds.  Also planning fetal echo. Serial growth q4w, twice wkly BPP after 32 wks if starting insulin, otherwise wkly at 36 wks  -mild anemia with hgb 11.3 and MCV of 74.  Normal ferritin.  Consider IV iron if worsening anemia.   -History of depression/anxiety, currently mood is good and on no medications.    Raine Sutton MD, MPH  [facility]

## 2025-05-16 ENCOUNTER — HOSPITAL ENCOUNTER (OUTPATIENT)
Dept: ULTRASOUND IMAGING | Facility: CLINIC | Age: 40
Discharge: HOME | End: 2025-05-16
Attending: STUDENT IN AN ORGANIZED HEALTH CARE EDUCATION/TRAINING PROGRAM
Payer: COMMERCIAL

## 2025-05-16 DIAGNOSIS — O99.212 MATERNAL OBESITY SYNDROME IN SECOND TRIMESTER: ICD-10-CM

## 2025-05-16 DIAGNOSIS — O10.919 CHRONIC HYPERTENSION AFFECTING PREGNANCY: ICD-10-CM

## 2025-05-16 DIAGNOSIS — O34.32 CERVICAL CERCLAGE SUTURE PRESENT IN SECOND TRIMESTER: ICD-10-CM

## 2025-05-16 DIAGNOSIS — O24.410 DIET CONTROLLED GESTATIONAL DIABETES MELLITUS (GDM) IN SECOND TRIMESTER: ICD-10-CM

## 2025-05-16 PROCEDURE — 76817 TRANSVAGINAL US OBSTETRIC: CPT

## 2025-05-16 PROCEDURE — 76816 OB US FOLLOW-UP PER FETUS: CPT | Mod: 26 | Performed by: OBSTETRICS & GYNECOLOGY

## 2025-05-16 PROCEDURE — 99213 OFFICE O/P EST LOW 20 MIN: CPT | Mod: 25 | Performed by: OBSTETRICS & GYNECOLOGY

## 2025-05-16 PROCEDURE — 76817 TRANSVAGINAL US OBSTETRIC: CPT | Mod: 26 | Performed by: OBSTETRICS & GYNECOLOGY

## 2025-05-19 ENCOUNTER — RESULTS FOLLOW-UP (OUTPATIENT)
Dept: OBGYN | Facility: CLINIC | Age: 40
End: 2025-05-19

## 2025-06-09 ENCOUNTER — PRENATAL OFFICE VISIT (OUTPATIENT)
Dept: OBGYN | Facility: CLINIC | Age: 40
End: 2025-06-09
Payer: COMMERCIAL

## 2025-06-09 VITALS — WEIGHT: 191 LBS | SYSTOLIC BLOOD PRESSURE: 120 MMHG | BODY MASS INDEX: 36.09 KG/M2 | DIASTOLIC BLOOD PRESSURE: 80 MMHG

## 2025-06-09 DIAGNOSIS — Z98.891 HISTORY OF CESAREAN DELIVERY: ICD-10-CM

## 2025-06-09 DIAGNOSIS — L98.9 SKIN LESION: ICD-10-CM

## 2025-06-09 DIAGNOSIS — Z23 ENCOUNTER FOR IMMUNIZATION: ICD-10-CM

## 2025-06-09 DIAGNOSIS — Z34.80 SUPERVISION OF OTHER NORMAL PREGNANCY, ANTEPARTUM: ICD-10-CM

## 2025-06-09 DIAGNOSIS — Z30.2 ENCOUNTER FOR STERILIZATION: ICD-10-CM

## 2025-06-09 DIAGNOSIS — O34.32 CERVICAL CERCLAGE SUTURE PRESENT IN SECOND TRIMESTER: ICD-10-CM

## 2025-06-09 LAB
ERYTHROCYTE [DISTWIDTH] IN BLOOD BY AUTOMATED COUNT: 14.5 % (ref 10–15)
HCT VFR BLD AUTO: 30.7 % (ref 35–47)
HGB BLD-MCNC: 10.3 G/DL (ref 11.7–15.7)
MCH RBC QN AUTO: 27 PG (ref 26.5–33)
MCHC RBC AUTO-ENTMCNC: 33.6 G/DL (ref 31.5–36.5)
MCV RBC AUTO: 81 FL (ref 78–100)
PLATELET # BLD AUTO: 343 10E3/UL (ref 150–450)
RBC # BLD AUTO: 3.81 10E6/UL (ref 3.8–5.2)
WBC # BLD AUTO: 11.8 10E3/UL (ref 4–11)

## 2025-06-09 PROCEDURE — 99207 E-CONSULT TO DERMATOLOGY (ADULT OUTPT PROVIDER TO SPECIALIST WRITTEN QUESTION & RESPONSE): CPT | Performed by: OBSTETRICS & GYNECOLOGY

## 2025-06-09 PROCEDURE — 90471 IMMUNIZATION ADMIN: CPT | Performed by: OBSTETRICS & GYNECOLOGY

## 2025-06-09 PROCEDURE — 36415 COLL VENOUS BLD VENIPUNCTURE: CPT | Performed by: OBSTETRICS & GYNECOLOGY

## 2025-06-09 PROCEDURE — 0502F SUBSEQUENT PRENATAL CARE: CPT | Performed by: OBSTETRICS & GYNECOLOGY

## 2025-06-09 PROCEDURE — 99213 OFFICE O/P EST LOW 20 MIN: CPT | Mod: 25 | Performed by: OBSTETRICS & GYNECOLOGY

## 2025-06-09 PROCEDURE — 3074F SYST BP LT 130 MM HG: CPT | Performed by: OBSTETRICS & GYNECOLOGY

## 2025-06-09 PROCEDURE — 90715 TDAP VACCINE 7 YRS/> IM: CPT | Performed by: OBSTETRICS & GYNECOLOGY

## 2025-06-09 PROCEDURE — 86780 TREPONEMA PALLIDUM: CPT | Performed by: OBSTETRICS & GYNECOLOGY

## 2025-06-09 PROCEDURE — 85027 COMPLETE CBC AUTOMATED: CPT | Performed by: OBSTETRICS & GYNECOLOGY

## 2025-06-09 PROCEDURE — 99207 PR PRENATAL VISIT: CPT | Performed by: OBSTETRICS & GYNECOLOGY

## 2025-06-09 PROCEDURE — 3079F DIAST BP 80-89 MM HG: CPT | Performed by: OBSTETRICS & GYNECOLOGY

## 2025-06-09 NOTE — NURSING NOTE
"Chief Complaint   Patient presents with    Prenatal Care     26 4/7 weeks       Initial /80 (BP Location: Left arm, Patient Position: Chair, Cuff Size: Adult Large)   Wt 86.6 kg (191 lb)   LMP 2024   BMI 36.09 kg/m   Estimated body mass index is 36.09 kg/m  as calculated from the following:    Height as of 11/3/24: 1.549 m (5' 1\").    Weight as of this encounter: 86.6 kg (191 lb).  BP completed using cuff size: large    Questioned patient about current smoking habits.  Pt. has never smoked.          The following HM Due: NONE  +fetal movement  Jerri Alex, CMA         "

## 2025-06-10 LAB — T PALLIDUM AB SER QL: NONREACTIVE

## 2025-06-11 ENCOUNTER — OFFICE VISIT (OUTPATIENT)
Dept: MATERNAL FETAL MEDICINE | Facility: CLINIC | Age: 40
End: 2025-06-11
Attending: OBSTETRICS & GYNECOLOGY
Payer: COMMERCIAL

## 2025-06-11 ENCOUNTER — TELEPHONE (OUTPATIENT)
Dept: OBGYN | Facility: CLINIC | Age: 40
End: 2025-06-11

## 2025-06-11 ENCOUNTER — HOSPITAL ENCOUNTER (OUTPATIENT)
Dept: ULTRASOUND IMAGING | Facility: CLINIC | Age: 40
Discharge: HOME | End: 2025-06-11
Attending: OBSTETRICS & GYNECOLOGY
Payer: COMMERCIAL

## 2025-06-11 DIAGNOSIS — O34.32 CERVICAL CERCLAGE SUTURE PRESENT IN SECOND TRIMESTER: ICD-10-CM

## 2025-06-11 DIAGNOSIS — O10.919 CHRONIC HYPERTENSION AFFECTING PREGNANCY: Primary | ICD-10-CM

## 2025-06-11 DIAGNOSIS — O24.410 DIET CONTROLLED GESTATIONAL DIABETES MELLITUS (GDM) IN SECOND TRIMESTER: ICD-10-CM

## 2025-06-11 DIAGNOSIS — O09.522 MULTIGRAVIDA OF ADVANCED MATERNAL AGE IN SECOND TRIMESTER: ICD-10-CM

## 2025-06-11 DIAGNOSIS — O99.212 MATERNAL OBESITY SYNDROME IN SECOND TRIMESTER: ICD-10-CM

## 2025-06-11 DIAGNOSIS — O10.919 CHRONIC HYPERTENSION AFFECTING PREGNANCY: ICD-10-CM

## 2025-06-11 PROCEDURE — 76817 TRANSVAGINAL US OBSTETRIC: CPT

## 2025-06-11 NOTE — TELEPHONE ENCOUNTER
Patient Name: Dot Ramirez   MRN: [id number]   Case#: [id number]   Surgeons and Role:      * Raine Sutton MD - Primary   Date requested: * No dates entered *   Location:  L+D   Procedure(s):    + bilateral salpingectomy

## 2025-06-11 NOTE — NURSING NOTE
Pt is here for RL2. Patient reports positive fetal movement.  She has a cerclage in place and notices some light cramping and has light bleeding (bright red blood x2) after more physical activity. Reports blood sugar values fasting 85-93 (99 x1 after eating ice cream before bed).  She is not compliant with checking post-prandial BG checks.  Patient denies headache, visual changes, nausea/vomiting, epigastric pain related to preeclampsia.  She states her BP checks have been WDL.  SBAR given to WARREN VELEZ, see their note in Epic.

## 2025-06-11 NOTE — PROGRESS NOTES
"39 year old  at 26w4d    - A+/RI.  FOB Dante.  NIPT nl XX.  Normal NT.  Level 2 US on   - history indicated Zimmerman cerclage present with TWO sutures, placed at 12+6 wga (h/o 18-week loss, history of cerclage x 2). Experieced VB x1 at 20 wks.   D/w MFM: If pt presents with further symptoms (VB, ctx) would recommend she proceed directly to [facility] L&D for MFM evaluation of the cerclage.  Could do BMZ and NICU consult at that time, not indicated prior.   - History of  x 2, plans repeat (will plan for cerclage removal at that time unless indicated prior), desires to do it with BTL as well  - cHTN (on lisinopril prior to pregnancy), on LDA.  Has baseline HELLP labs.  Planning serial growth and delivery at 37-38 wks.   - GDM dx at 11 wks: s/p DM ED, doing FBGs and getting 85-90 on no meds.  Also planning fetal echo. Serial growth q4w, twice wkly BPP after 32 wks if starting insulin, otherwise wkly at 36 wks  -mild anemia with hgb 11.3 and MCV of 74.  Normal ferritin.  Consider IV iron if worsening anemia.   -History of depression/anxiety, currently mood is good and on no medications.   -history of pitiryasis rosea with a new non itchy \"herald patch,\" interested in Econsult to Dermatology.  Will initiate this today.    1. Lactating mother (Primary)  - Breast Pump Order for DME - ONLY FOR DME    2. Encounter for immunization  - TDAP 7+ (ADACEL,BOOSTRIX)    3. Supervision of other normal pregnancy, antepartum  - CBC with platelets; Future  - Treponema Abs w Reflex to RPR and Titer; Future  - CBC with platelets  - Treponema Abs w Reflex to RPR and Titer    4. Skin lesion  Located on left mid back, not itchy or irritated, slightly scaly          - Adult E-Consult to Dermatology (Outpt Provider to Specialist Written Question & Response)     5. History of  delivery  - Case Request:  + bilateral salpingectomy, cerclage removal    6. Encounter for sterilization  - Case Request:  + " bilateral salpingectomy    7. Cervical cerclage suture present in second trimester  - Case Request:  + bilateral salpingectomy     Will have MFM perform cerclage removal at 36 weeks and will do CS at 38 weeks due to cHTN well controlled on no medications.     Raine Sutton MD, MPH  [facility]

## 2025-06-11 NOTE — PROGRESS NOTES
The patient was seen for an ultrasound in the Maternal[facility]Fetal Medicine Center at the [facility] today.  For a detailed report of the ultrasound examination, please see the ultrasound report which can be found under the imaging tab.    If you have questions regarding today's evaluation or if we can be of further service, please contact the Maternal-Fetal Medicine Center.    Mariza Boyer MD  , OB/GYN  Maternal-Fetal Medicine  [phone] (Pager)

## 2025-06-12 ENCOUNTER — E-CONSULT (OUTPATIENT)
Dept: DERMATOLOGY | Facility: CLINIC | Age: 40
End: 2025-06-12
Payer: COMMERCIAL

## 2025-06-12 ENCOUNTER — RESULTS FOLLOW-UP (OUTPATIENT)
Dept: OBGYN | Facility: CLINIC | Age: 40
End: 2025-06-12

## 2025-06-12 NOTE — TELEPHONE ENCOUNTER
Type of surgery: , BILATERAL SALPINGECTOMY  Location of surgery: [facility]  Date and time of surgery: 25 @ 9:00 AM  Surgeon: DR SUNNY DELANEY  Pre-Op Appt Date: @ PRENATAL APPOINTMENT  Post-Op Appt Date: PATIENT ADVISED TO SCHEDULE.   Packet sent out: Yes  Pre-cert/Authorization completed:  No  Date: 25

## 2025-06-12 NOTE — PROGRESS NOTES
6/12/2025     E-Consult has been accepted.    Interprofessional consultation requested by:  Raine Sutton MD      Clinical Question/Purpose: MY CLINICAL QUESTION IS: in a pregnant patient, is this consistent with pitiriasis rosea?  Anything we need to do for pregnancy differently than normal?     Patient assessment and information reviewed: notes and photos    Recommendations:   The eruption appears most consistent with pityriasis rosea. The differential diagnosis includes guttate psoriasis versus much less likely secondary syphilis (negative Treponema antibodies on 6/9 are noted). Recommend symptomatic management with oral antihistamines +/- triamcinolone 0.1% ointment BID PRN.     Data on the effect of pityriasis rosea are limited conflicting, and some studies have been associated with negative outcomes, but these data are mixed and likely dependent on the extent of involvement (e.g. widespread and prolonged vs limited/shorter duration), how early in the pregnancy it occurs (e.g. <15 weeks or >15 weeks), and the presence of extracutaneous symptoms (fever, chills, arthralgias, etc).      The recommendations provided in this E-Consult are based on a review of clinical data pertinent to the clinical question presented, without a review of the patient's complete medical record or, the benefit of a comprehensive in-person or virtual patient evaluation. This consultation should not replace the clinical judgement and evaluation of the provider ordering this E-Consult. Any new clinical issues, or changes in patient status since the filing of this E-Consult will need to be taken into account when assessing these recommendations. Please contact me if you have further questions.    My total time spent reviewing clinical information and formulating assessment was 5 minutes.    Rodney Villalpando MD, FAAD   of Dermatology  Department of Dermatology  [facility]

## 2025-06-17 ENCOUNTER — NURSE TRIAGE (OUTPATIENT)
Dept: FAMILY MEDICINE | Facility: CLINIC | Age: 40
End: 2025-06-17
Payer: COMMERCIAL

## 2025-06-17 NOTE — TELEPHONE ENCOUNTER
"FMOB Triage Call      Is patient's OB affiliated with E or [facility]? [facility] or Midwives- Do not triage. Direct patient to call [phone]      Patient had been transferred 4 times prior to reaching this RN.      27w5d pregnant     Vaginal bleeding started about 25 minutes ago.  Bright red blood in the toilet and when wiping - doesn't think hematuria.  Describes bleeding \"like the 2nd day of your period.\"  During call wiped again and still some blood on the tissue but nothing in the toilet this time, \"like you're period is about to start.\"     No pain/cramping.    High risk pregnancy.  Has cerclage in place with shortened cervix and noted \"a little\" dilation on last transvaginal ultrasound.      Triaged per protocol to L&D.       Called [facility] - per charge RN as patient is less than 28 wks, send to [facility].      Called [facility] - spoke with charge RN, full/closed.  Referred to out of network L&D, either [facility] or [facility].  If [facility] unable to accept patient call [facility] back and they will take patient.    Called [facility], spoke with charge RN Eli - able to accept patient for triage/evaluation, if needing admission, provider will need to admit directly to provider.        Called patient back and informed [facility] will see her and to head there.  Patient already en route to [place] and will go to [facility] now.          Poonam Alford RN  [facility]             27w5d    Estimated Date of Delivery: Sep 11, 2025        OB History    Para Term  AB Living   4 2 2 0 1 2   SAB IAB Ectopic Multiple Live Births   1 0 0 0 2      # Outcome Date GA Lbr Taran/2nd Weight Sex Type Anes PTL Lv   4 Current            3 Term 17 38w4d  3.63 kg (8 lb) F CS-LTranv Spinal N DEE      Name: Harjinder England      Apgar1: 8  Apgar5: 9   2 Term 12/09/15 37w4d  3.229 kg (7 lb 1.9 oz) F CS-LTranv Spinal  DEE      Birth Comments: Breast and bottle fed " with breast milk      Name: Chelita      Apgar1: 4  Apgar5: 6   1 SAB 11/17/14 18w1d   M Vag-Spont   FD      Name: Caesar      Apgar1: 0  Apgar5: 0       Lab Results   Component Value Date    GBS (A) 02/13/2017     Positive  Positive: GBS DNA detected, presumed positive for GBS.   Assay performed on incubated broth culture of specimen using [employer] real-time   PCR.            Poonam Alford RN            Reason for Disposition   MILD-MODERATE vaginal bleeding (e.g., small to medium clots; like mild menstrual period)  (Exception: Single episode of faint spotting when wiping, or slight spotting after intercourse or pelvic exam.)    Additional Information   Negative: Passed out (i.e., lost consciousness, collapsed and was not responding)   Negative: Shock suspected (e.g., cold/pale/clammy skin, too weak to stand, low BP, rapid pulse)   Negative: Difficult to awaken or acting confused (e.g., disoriented, slurred speech)   Negative: SEVERE vaginal bleeding (e.g., continuous red blood from vagina, large blood clots)   Negative: [1] SEVERE abdominal pain (e.g., excruciating) AND [2] constant AND [3] present > 1 hour   Negative: Sounds like a life-threatening emergency to the triager   Negative: [1] Vaginal bleeding AND [2] pregnant < 20 weeks    Protocols used: Pregnancy - Vaginal Bleeding Greater Than 20 Weeks [facility]

## 2025-07-07 ENCOUNTER — PRENATAL OFFICE VISIT (OUTPATIENT)
Dept: OBGYN | Facility: CLINIC | Age: 40
End: 2025-07-07
Payer: COMMERCIAL

## 2025-07-07 VITALS — WEIGHT: 194.4 LBS | SYSTOLIC BLOOD PRESSURE: 118 MMHG | BODY MASS INDEX: 36.73 KG/M2 | DIASTOLIC BLOOD PRESSURE: 74 MMHG

## 2025-07-07 DIAGNOSIS — Z34.80 SUPERVISION OF OTHER NORMAL PREGNANCY, ANTEPARTUM: Primary | ICD-10-CM

## 2025-07-07 PROCEDURE — 3074F SYST BP LT 130 MM HG: CPT | Performed by: OBSTETRICS & GYNECOLOGY

## 2025-07-07 PROCEDURE — 99207 PR PRENATAL VISIT: CPT | Performed by: OBSTETRICS & GYNECOLOGY

## 2025-07-07 PROCEDURE — 3078F DIAST BP <80 MM HG: CPT | Performed by: OBSTETRICS & GYNECOLOGY

## 2025-07-07 PROCEDURE — 0502F SUBSEQUENT PRENATAL CARE: CPT | Performed by: OBSTETRICS & GYNECOLOGY

## 2025-07-07 RX ORDER — FERROUS SULFATE 325(65) MG
18 TABLET, DELAYED RELEASE (ENTERIC COATED) ORAL DAILY
COMMUNITY

## 2025-07-07 NOTE — NURSING NOTE
"Chief Complaint   Patient presents with    Prenatal Care     30 weeks 5 days    GDM Cerclage in place        Initial /74 (BP Location: Left arm, Cuff Size: Adult Regular)   Wt 88.2 kg (194 lb 6.4 oz)   LMP 2024   BMI 36.73 kg/m   Estimated body mass index is 36.73 kg/m  as calculated from the following:    Height as of 11/3/24: 1.549 m (5' 1\").    Weight as of this encounter: 88.2 kg (194 lb 6.4 oz).  BP completed using cuff size: large    Questioned patient about current smoking habits.  Pt. has never smoked.    30w4d       The following HM Due: NONE        +FM Daily   GDM   Cerclage in place           Poonam Camarena CMA on 2025 at 8:59 AM                "

## 2025-07-07 NOTE — PROGRESS NOTES
"39 year old  at 30w4d     - A+/RI.  FOB Dante.  NIPT nl XX.  Normal NT.  Level 2 US on   - history indicated Zimmerman cerclage present with TWO sutures, placed at 12+6 wga (h/o 18-week loss, history of cerclage x 2). Experieced VB x1 at 20 wks and again at 27+5, s/p visit to [facility] ([facility] on divert at that time), was told she had a kidney stone.  No further bleeding. Planning for removal of cerclage in [facility] clinic at 36 wks.   - History of  x 2, plans repeat w BTL on  at 38+1  - cHTN (on lisinopril prior to pregnancy), on LDA.  Has baseline HELLP labs.  Planning serial growth and delivery at 37-38 wks.   - GDM dx at 11 wks: s/p DM ED, doing FBGs and getting 85-90 on no meds. S/p nl fetal echo. Serial growth q4w, twice wkly BPP after 32 wks if starting insulin, otherwise wkly at 36 wks  -mild anemia with hgb 10.3 and MCV of 81.  Normal ferritin.  Consider IV iron if worsening anemia.   -History of depression/anxiety, currently mood is good and on no medications.   -history of pitiryasis rosea with a new non itchy \"herald patch,\" s/p E-consult to Dermatology.  Recommend symptomatic management with oral antihistamines +/- triamcinolone 0.1% ointment BID PRN.     RTC 2 wks    Raine Sutton MD, MPH  [facility]     "

## 2025-07-09 ENCOUNTER — HOSPITAL ENCOUNTER (OUTPATIENT)
Facility: CLINIC | Age: 40
Discharge: HOME | End: 2025-07-09
Attending: OBSTETRICS & GYNECOLOGY | Admitting: OBSTETRICS & GYNECOLOGY
Payer: COMMERCIAL

## 2025-07-09 ENCOUNTER — OFFICE VISIT (OUTPATIENT)
Dept: MATERNAL FETAL MEDICINE | Facility: CLINIC | Age: 40
End: 2025-07-09
Attending: OBSTETRICS & GYNECOLOGY
Payer: COMMERCIAL

## 2025-07-09 ENCOUNTER — HOSPITAL ENCOUNTER (OUTPATIENT)
Dept: ULTRASOUND IMAGING | Facility: CLINIC | Age: 40
Discharge: HOME | End: 2025-07-09
Attending: OBSTETRICS & GYNECOLOGY
Payer: COMMERCIAL

## 2025-07-09 VITALS — DIASTOLIC BLOOD PRESSURE: 85 MMHG | SYSTOLIC BLOOD PRESSURE: 133 MMHG | OXYGEN SATURATION: 98 % | HEART RATE: 89 BPM

## 2025-07-09 VITALS — SYSTOLIC BLOOD PRESSURE: 112 MMHG | TEMPERATURE: 98 F | DIASTOLIC BLOOD PRESSURE: 67 MMHG

## 2025-07-09 DIAGNOSIS — O09.522 MULTIGRAVIDA OF ADVANCED MATERNAL AGE IN SECOND TRIMESTER: ICD-10-CM

## 2025-07-09 DIAGNOSIS — O10.919 CHRONIC HYPERTENSION AFFECTING PREGNANCY: ICD-10-CM

## 2025-07-09 DIAGNOSIS — O10.919 CHRONIC HYPERTENSION AFFECTING PREGNANCY: Primary | ICD-10-CM

## 2025-07-09 DIAGNOSIS — O34.32 CERVICAL CERCLAGE SUTURE PRESENT IN SECOND TRIMESTER: ICD-10-CM

## 2025-07-09 DIAGNOSIS — O24.410 DIET CONTROLLED GESTATIONAL DIABETES MELLITUS (GDM) IN SECOND TRIMESTER: ICD-10-CM

## 2025-07-09 PROBLEM — Z36.89 ENCOUNTER FOR TRIAGE IN PREGNANT PATIENT: Status: ACTIVE | Noted: 2025-07-09

## 2025-07-09 LAB
ALBUMIN MFR UR ELPH: <6 MG/DL
ALBUMIN SERPL BCG-MCNC: 3.1 G/DL (ref 3.5–5.2)
ALP SERPL-CCNC: 110 U/L (ref 40–150)
ALT SERPL W P-5'-P-CCNC: 20 U/L (ref 0–50)
ANION GAP SERPL CALCULATED.3IONS-SCNC: 12 MMOL/L (ref 7–15)
AST SERPL W P-5'-P-CCNC: 21 U/L (ref 0–45)
BILIRUB SERPL-MCNC: 0.2 MG/DL
BUN SERPL-MCNC: 4.9 MG/DL (ref 6–20)
CALCIUM SERPL-MCNC: 9 MG/DL (ref 8.8–10.4)
CHLORIDE SERPL-SCNC: 102 MMOL/L (ref 98–107)
CREAT SERPL-MCNC: 0.55 MG/DL (ref 0.51–0.95)
CREAT UR-MCNC: 25.8 MG/DL
EGFRCR SERPLBLD CKD-EPI 2021: >90 ML/MIN/1.73M2
ERYTHROCYTE [DISTWIDTH] IN BLOOD BY AUTOMATED COUNT: 15.9 % (ref 10–15)
GLUCOSE SERPL-MCNC: 84 MG/DL (ref 70–99)
HCO3 SERPL-SCNC: 21 MMOL/L (ref 22–29)
HCT VFR BLD AUTO: 32.8 % (ref 35–47)
HGB BLD-MCNC: 10.8 G/DL (ref 11.7–15.7)
MCH RBC QN AUTO: 27 PG (ref 26.5–33)
MCHC RBC AUTO-ENTMCNC: 32.9 G/DL (ref 31.5–36.5)
MCV RBC AUTO: 82 FL (ref 78–100)
PLATELET # BLD AUTO: 332 10E3/UL (ref 150–450)
POTASSIUM SERPL-SCNC: 5.2 MMOL/L (ref 3.4–5.3)
PROT SERPL-MCNC: 6.5 G/DL (ref 6.4–8.3)
PROT/CREAT 24H UR: NORMAL MG/G{CREAT}
RBC # BLD AUTO: 4 10E6/UL (ref 3.8–5.2)
SODIUM SERPL-SCNC: 135 MMOL/L (ref 135–145)
WBC # BLD AUTO: 12.9 10E3/UL (ref 4–11)

## 2025-07-09 PROCEDURE — 76816 OB US FOLLOW-UP PER FETUS: CPT

## 2025-07-09 PROCEDURE — 99213 OFFICE O/P EST LOW 20 MIN: CPT | Performed by: OBSTETRICS & GYNECOLOGY

## 2025-07-09 PROCEDURE — 84155 ASSAY OF PROTEIN SERUM: CPT | Performed by: OBSTETRICS & GYNECOLOGY

## 2025-07-09 PROCEDURE — 85014 HEMATOCRIT: CPT | Performed by: OBSTETRICS & GYNECOLOGY

## 2025-07-09 PROCEDURE — 36415 COLL VENOUS BLD VENIPUNCTURE: CPT | Performed by: OBSTETRICS & GYNECOLOGY

## 2025-07-09 PROCEDURE — 84156 ASSAY OF PROTEIN URINE: CPT | Performed by: OBSTETRICS & GYNECOLOGY

## 2025-07-09 PROCEDURE — G0463 HOSPITAL OUTPT CLINIC VISIT: HCPCS

## 2025-07-09 ASSESSMENT — ACTIVITIES OF DAILY LIVING (ADL)
ADLS_ACUITY_SCORE: 46

## 2025-07-09 NOTE — DISCHARGE INSTRUCTIONS
Learning About When to Call Your Doctor During Pregnancy (After 20 Weeks)  Overview  It's common to have concerns about what might be a problem when you're pregnant. Most pregnancies don't have any serious problems. But it's still important to know when to call your doctor if you have certain symptoms or signs of labor.  These are general suggestions. Your doctor may give you some more information about when to call.  When to call your doctor (after 20 weeks)  Call 911 anytime you think you may need emergency care. For example, call if:  You have severe vaginal bleeding. You have soaked through one or more pads in an hour, and the bleeding is not slowing down.  You have sudden, severe pain in your belly that does not go away.  You have chest pain, are short of breath, or cough up blood.  You passed out (lost consciousness).  You have a seizure.  You see or feel the umbilical cord.  You think you are about to deliver your baby and can't make it safely to the hospital or birthing center.  Call your doctor now or seek immediate medical care if:  You have vaginal bleeding.  You have belly pain.  You have a fever.  You are dizzy or lightheaded, or you feel like you may faint.  You have signs of a blood clot in your leg (called a deep vein thrombosis), such as:  Pain in the calf, back of the knee, thigh, or groin.  Swelling in your leg or groin.  A color change on the leg or groin. The skin may be reddish or purplish.  You have symptoms of preeclampsia, such as:  Sudden swelling of your face, hands, or feet.  New vision problems (such as dimness, blurring, or seeing spots).  A severe headache that will not go away.  You have a sudden release or slow trickle of fluid from your vagina. This may mean your water has broken.  You've been having regular contractions for an hour at less than 37 weeks. This means that you've had at least 6 contractions within 1 hour, even after you change your position and drink fluids.  You  "notice that your baby has stopped moving or is moving less than normal.  You have signs of heart failure, such as:  New or increased shortness of breath.  New or worse swelling in your legs, ankles, or feet.  Sudden weight gain, such as more than 2 to 3 pounds in a day or 5 pounds in a week.  Feeling so tired or weak that you cannot do your usual activities.  You have symptoms of a urinary tract infection. These may include:  Pain or burning when you urinate.  A frequent need to urinate without being able to pass much urine.  Pain in your low back (below the rib cage and above the waist).  Blood in your urine.  Watch closely for changes in your health, and be sure to contact your doctor if:  You have vaginal discharge that smells bad.  You feel sad, anxious, or hopeless for more than a few days.  You have skin changes, such as a rash, itching, or a yellow color to your skin.  You have other concerns about your pregnancy.  If you have labor signs at 37 weeks or more  If you have signs of labor at 37 weeks or more, your doctor may tell you to call when your labor becomes more active. During active labor:  Contractions happen more often and at regular intervals (about every 3 to 5 minutes).  Contractions last longer (about 60 seconds or more).  Contractions get stronger and are hard to talk through.  Follow-up care is a key part of your treatment and safety. Be sure to make and go to all appointments, and call your doctor if you are having problems. It's also a good idea to know your test results and keep a list of the medicines you take.  Where can you learn more?  Go to https://www.[employer].net/patiented  Enter N531 in the search box to learn more about \"Learning About When to Call Your Doctor During Pregnancy (After 20 Weeks).\"  Current as of: April 30, 2024  Content Version: 14.5    [id number] [facility] [employer].   Care instructions adapted under license by your healthcare professional. If you have questions " about a medical condition or this instruction, always ask your healthcare professional. [employer] disclaims any warranty or liability for your use of this information.

## 2025-07-09 NOTE — NURSING NOTE
Dot presents today for a follow-up ultrasound. Patient reports active fetal movement, denies contractions, leaking of fluid, or bleeding.  Reports blood sugar values overall within normal range.  Patient denies headache, visual changes, nausea/vomiting, epigastric pain related to preeclampsia today. Will check BP today due to a home report of a mild-range BP earlier today. Education provided to patient on today's ultrasound.  SBAR given to M MD, see their note in Epic.     Mild range BP in [facility]. Reviewed with Dr. Ross, recommendation to have pre-e evaluation in L&D. Pt amenable to plan and  report called by Kirstin BARRETT RN to PATT Stephens. Dr. Rose paged for provider report.

## 2025-07-09 NOTE — PLAN OF CARE
Data: Patient assessed in the [facility] for elevated blood pressures.  Cervical exam not examined.  Membranes intact.  Contractions/uterine assessment soft.  Action:  Presumed adequate fetal oxygenation documented (see flow record). Discharge instructions reviewed.  Patient instructed to report change in fetal movement, vaginal leaking of fluid or bleeding, abdominal pain, or any concerns related to the pregnancy to her nurse/physician.    Response: Orders to discharge home per Cuong Rose.  Patient verbalized understanding of education and verbalized agreement with plan. Discharged to home at 1500.

## 2025-07-09 NOTE — PLAN OF CARE
Data: Patient presented to [facility]: 2025 12:05 PM.  Reason for maternal/fetal assessment is elevated blood pressures. Patient reports elevated bps and headache yesterday at home, M appt today who sent pt for PreE evaluation.  Patient is a .  Prenatal record reviewed. Pregnancy  has been complicated by gestational diabetes, diet controlled and hypertension.  Gestational Age 30w6d. VSS. Fetal movement active. Patient denies uterine contractions, leaking of vaginal fluid/rupture of membranes, vaginal bleeding, abdominal pain, pelvic pressure, nausea, vomiting, headache, visual disturbances, epigastric or URQ pain, significant edema. Support person is not present.   Action: Verbal consent for EFM. Triage assessment completed. Bill of rights reviewed.  Response: Patient verbalized agreement with plan. Will contact Dr Cuong Rose with update and for further orders.

## 2025-07-09 NOTE — PROGRESS NOTES
"Please see \"Imaging\" tab under \"Chart Review\" for details of today's visit.    Merly Ross MD    "

## 2025-07-10 ENCOUNTER — HOSPITAL ENCOUNTER (INPATIENT)
Facility: CLINIC | Age: 40
Setting detail: SURGERY ADMIT
End: 2025-07-10
Attending: OBSTETRICS & GYNECOLOGY | Admitting: OBSTETRICS & GYNECOLOGY
Payer: COMMERCIAL

## 2025-07-14 ENCOUNTER — TRANSFERRED RECORDS (OUTPATIENT)
Dept: HEALTH INFORMATION MANAGEMENT | Facility: CLINIC | Age: 40
End: 2025-07-14
Payer: COMMERCIAL

## 2025-07-21 ENCOUNTER — OFFICE VISIT (OUTPATIENT)
Dept: FAMILY MEDICINE | Facility: CLINIC | Age: 40
End: 2025-07-21
Payer: COMMERCIAL

## 2025-07-21 VITALS
RESPIRATION RATE: 16 BRPM | WEIGHT: 186 LBS | HEIGHT: 62 IN | OXYGEN SATURATION: 98 % | BODY MASS INDEX: 34.23 KG/M2 | DIASTOLIC BLOOD PRESSURE: 88 MMHG | TEMPERATURE: 98.3 F | HEART RATE: 108 BPM | SYSTOLIC BLOOD PRESSURE: 134 MMHG

## 2025-07-21 DIAGNOSIS — K64.4 EXTERNAL HEMORRHOIDS: ICD-10-CM

## 2025-07-21 DIAGNOSIS — K64.4 EXTERNAL HEMORRHOIDS: Primary | ICD-10-CM

## 2025-07-21 PROCEDURE — 3079F DIAST BP 80-89 MM HG: CPT | Performed by: FAMILY MEDICINE

## 2025-07-21 PROCEDURE — G2211 COMPLEX E/M VISIT ADD ON: HCPCS | Performed by: FAMILY MEDICINE

## 2025-07-21 PROCEDURE — 99213 OFFICE O/P EST LOW 20 MIN: CPT | Performed by: FAMILY MEDICINE

## 2025-07-21 PROCEDURE — 3075F SYST BP GE 130 - 139MM HG: CPT | Performed by: FAMILY MEDICINE

## 2025-07-21 RX ORDER — LIDOCAINE HCL AND HYDROCORTISONE ACETATE 20; 20 MG/G; MG/G
1 CREAM RECTAL 2 TIMES DAILY
Qty: 1 KIT | Refills: 0 | Status: SHIPPED | OUTPATIENT
Start: 2025-07-21 | End: 2025-07-23

## 2025-07-21 ASSESSMENT — PATIENT HEALTH QUESTIONNAIRE - PHQ9
10. IF YOU CHECKED OFF ANY PROBLEMS, HOW DIFFICULT HAVE THESE PROBLEMS MADE IT FOR YOU TO DO YOUR WORK, TAKE CARE OF THINGS AT HOME, OR GET ALONG WITH OTHER PEOPLE: NOT DIFFICULT AT ALL
SUM OF ALL RESPONSES TO PHQ QUESTIONS 1-9: 0
SUM OF ALL RESPONSES TO PHQ QUESTIONS 1-9: 0

## 2025-07-21 NOTE — PROGRESS NOTES
"  Assessment & Plan     Hemorrhoids:  - Large, extrenal hemorrhoid confirmed on exam.Patient reports severe pain, more bothersome than  pain, and significant impact on daily comfort.  - Prescribed topical medication (rectal cream) to be applied to the affected area three times daily.  - Continue and increase fiber intake (Metamucil, 2 gummies in the morning) to ensure looser stools and minimize straining.  - Patient unable to use sitz baths due to ongoing  wound healing.  - If not improved by Thursday (7 days from onset), a referral to a rectal surgeon will be made for potential ligation or further intervention. An appointment with a rectal surgeon will be scheduled as a precaution, with the option to cancel if symptoms improve before the appointment.  - Patient instructed to monitor symptoms and contact the office if pain worsens or does not improve as expected. Prescription sent to [facility] as requested.    Consent was obtained from the patient to use an AI documentation tool in the creation of this note.    BMI  Estimated body mass index is 34.02 kg/m  as calculated from the following:    Height as of this encounter: 1.575 m (5' 2\").    Weight as of this encounter: 84.4 kg (186 lb).           Earle Chris is a 39 year old, presenting for the following health issues:  Rectal Problem      2025     7:52 AM   Additional Questions   Roomed by Kitty         2025     7:52 AM   Patient Reported Additional Medications   Patient reports taking the following new medications labetalol, procardia     History of Present Illness       Reason for visit:  Hemorrhoid    She eats 0-1 servings of fruits and vegetables daily.She consumes 1 sweetened beverage(s) daily.She exercises with enough effort to increase her heart rate 9 or less minutes per day.  She exercises with enough effort to increase her heart rate 3 or less days per week.   She is taking medications regularly.    "   Hemorrhoids  Onset/Duration: 1 week  Description:   Paula-anal lump: YES  Pain: YES  Itching: No  Accompanying Signs & Symptoms:  Blood in stool: No  Changes in stool pattern: No  History:   Any previous GI studies done:none  Family History of colon cancer: No  Precipitating factors:   None  Alleviating factors:  None  Therapies tried and outcome: witch hazel pads  History of Present Illness-  - Name: Dot Ramirez, age: 39 years, female  - Delivered a baby at 31 weeks, 4 days gestational age; as of the encounter, baby is 32 weeks, 5 days old and in special care unit after graduating from NICU following 4 days; baby is off IV and CPAP, on low oxygen to conserve calories for weight gain; birth weight 3 lbs; baby cried at birth, unlike previous children at 36 weeks. Baby is described as doing well, alert, and with lots of hair.  - Patient was hospitalized for one week due to premature rupture of membranes, followed by . During hospitalization, she was initially given stool softeners due to iron supplementation, then switched to stool hardeners post-. She reports no prior history of constipation and has never had hemorrhoids before, including during previous pregnancies or vaginal deliveries.  - On Thursday morning (2025), while still hospitalized, she began experiencing perianal pain and discomfort, particularly when lying in bed. She reported the pain to a nurse, who offered petroleum jelly. Upon self-application, she discovered a mass in the perianal area. No medical staff directly examined the area during her hospitalization.  - A nurse prescribed a topical cream, which the patient used three times daily, but symptoms persisted. Another nurse provided witch hazel pads. The patient did not use Tylenol, ibuprofen, or narcotics for  pain and remained ambulatory and active postoperatively.  - She was discharged from the hospital on Thursday night (2025) and continued  "to experience significant perianal pain and swelling. Her , who has a history of hemorrhoids, examined the area and expressed concern about the size and appearance, describing it as \"not OK\" and \"massive,\" and suggested ER evaluation. He felt it was worse than a typical hemorrhoid and was alarmed by its appearance.  - The patient describes the hemorrhoid as large, tender, and possibly thrombosed, with pain that is severe and more bothersome than her  pain. She denies constipation; stools are not hard but required some pushing initially. She is currently taking Metamucil (2 gummies in the morning) for fiber supplementation. She reports that her stools are soft, sometimes loose, and not difficult to pass.  - She notes the presence of additional smaller hemorrhoids around the rectum, with the largest one being most symptomatic. She is unable to tolerate sitz baths due to ongoing  wound healing. She expresses frustration and distress regarding the severity of pain and its impact on her daily comfort and recovery.  - During the visit, physical exam confirmed a large, thrombosed hemorrhoid with multiple smaller perianal hemorrhoids. The patient was counseled on the expected course of thrombosed hemorrhoids, which typically improve within 7 days, and the plan for management was discussed in detail.            Objective    /88 (BP Location: Left arm, Patient Position: Chair, Cuff Size: Adult Regular)   Pulse 108   Temp 98.3  F (36.8  C) (Oral)   Resp 16   Ht 1.575 m (5' 2\")   Wt 84.4 kg (186 lb)   LMP 2024   SpO2 98%   BMI 34.02 kg/m    Body mass index is 34.02 kg/m .  Physical Exam   GENERAL: alert and no distress  RECTAL (female): large hemorrhoid noticed at 12 o'clock about 3 cm in size, tender.            Signed Electronically by: Riri Lopez MD    "

## 2025-07-22 ENCOUNTER — PATIENT OUTREACH (OUTPATIENT)
Dept: CARE COORDINATION | Facility: CLINIC | Age: 40
End: 2025-07-22

## 2025-07-22 NOTE — TELEPHONE ENCOUNTER
No there is no alternative, I would like a combination of hydrocortisone and lidocaine, can the pharmacy do that?

## 2025-07-22 NOTE — TELEPHONE ENCOUNTER
Message left for patient to return call   Upon return call, please advise that the medication Dr. Lopez ordered is not available at the [facility] where it was originally sent   The medication needs to be sent to the compounding pharmacy   Is patient okay with us sending there? If so please route to Dr Lopez to sign     RN checking with pharmacy staff at [facility] pharmacy   They will teams this RN after pharmacist researches to see if we can order this through them or the compounding pharmacy     Billie Mccarthy, Registered Nurse  [facility]

## 2025-07-23 RX ORDER — LIDOCAINE HCL AND HYDROCORTISONE ACETATE 20; 20 MG/G; MG/G
1 CREAM RECTAL 2 TIMES DAILY
Qty: 1 KIT | Refills: 0 | Status: SHIPPED | OUTPATIENT
Start: 2025-07-23

## 2025-07-23 RX ORDER — LIDOCAINE HCL AND HYDROCORTISONE ACETATE 20; 20 MG/G; MG/G
1 CREAM RECTAL 2 TIMES DAILY
Qty: 1 KIT | Refills: 0 | OUTPATIENT
Start: 2025-07-23

## 2025-07-23 NOTE — TELEPHONE ENCOUNTER
"Dr. Lopez   Patient is unable to get into see the colon rectal specialists until August 6th and she does not believe she can wait until this date due to pain. Can you update the referral with more urgent status to see if we can get her in sooner? RN pended (my chart patient after this has been completed so she knows to call and see if they can get her in sooner)   [facility] is unable to obtain [facility] medication as ordered, it will need to be sent to compounding pharmacy - pended     RN spoke to patient   Agreeable to compounding pharmacy - address and phone number sent via my chart   Requesting new plan for colon rectal surgery as unable to get in until 8/6/2025 and \"unsure I can wait until this time\" - routing to Dr Lopez for review     Billie Mccarthy, Registered Nurse  [facility]     "

## 2025-07-23 NOTE — TELEPHONE ENCOUNTER
It won't allow me to sign the medication.  You should remove the medicine completely, then send it to me and I will put a new order

## 2025-07-24 ENCOUNTER — TELEPHONE (OUTPATIENT)
Dept: SURGERY | Facility: CLINIC | Age: 40
End: 2025-07-24
Payer: COMMERCIAL

## 2025-07-24 NOTE — TELEPHONE ENCOUNTER
Patient confirmed scheduled appointment:  Date: 7/30/25  Time: 300 pm   Visit type: New Patient   Provider: Mariam Arauz NP   Location: [facility]   Testing/imaging: n/a  Additional notes: Pt being referred for hemorrhoids

## 2025-07-26 ENCOUNTER — HEALTH MAINTENANCE LETTER (OUTPATIENT)
Age: 40
End: 2025-07-26

## 2025-07-30 ENCOUNTER — OFFICE VISIT (OUTPATIENT)
Dept: SURGERY | Facility: CLINIC | Age: 40
End: 2025-07-30
Payer: COMMERCIAL

## 2025-07-30 VITALS
SYSTOLIC BLOOD PRESSURE: 131 MMHG | OXYGEN SATURATION: 97 % | WEIGHT: 181.1 LBS | DIASTOLIC BLOOD PRESSURE: 81 MMHG | BODY MASS INDEX: 33.12 KG/M2 | HEART RATE: 88 BPM

## 2025-07-30 DIAGNOSIS — K64.5 THROMBOSED EXTERNAL HEMORRHOIDS: Primary | ICD-10-CM

## 2025-07-30 RX ORDER — NIFEDIPINE 30 MG/1
30 TABLET, EXTENDED RELEASE ORAL
COMMUNITY
Start: 2025-07-14

## 2025-07-30 ASSESSMENT — PAIN SCALES - GENERAL: PAINLEVEL_OUTOF10: NO PAIN (0)

## 2025-07-30 NOTE — PROGRESS NOTES
Patient seen today, 25, with Sunni Curran DNP Student  I agree with the dictation and have made any necessary changes.     RADHA Cooney, NP-C  Colon and Rectal Surgery  [facility]    Colon and Rectal Surgery Consult Clinic Note    Referring provider:  Riri Lopez MD  [address] [place] [address]     Patient: Dot Ramirez  YOB: 1985  Date of Visit: 2025    Dot Ramirez is a very pleasant 40 year old female here with concerns for hemorrhoids. She had a  on 2025. After  she states that she noticed a swollen external tender lump. The pain was so severe it was difficult to walk. She was taking tylenol for the pain. She has regular formed soft bowel movements. Does not note any straining. Denies anal surgeries. No vaginal births. No family history of colon cancer.     No prior colonoscopy.     No  was used for this encounter.    Physical Examination:  /81 (BP Location: Left arm, Patient Position: Sitting, Cuff Size: Adult Regular)   Pulse 88   Wt 181 lb 1.6 oz   LMP 2024   SpO2 97%   BMI 33.12 kg/m    General: alert, oriented, in no acute distress, sitting comfortably  Respiratory: non-labored breathing on RA  Chaperone: Mariam Ng NP  Perianal External Examination:  Perianal skin: Intact with no excoriation or lichenification.  Lesions: No evidence of an external lesion, nodularity, or induration in the perianal region.  Eversion of buttocks: There was not evidence of an anal fissure. Details: N/A.  Skin tags or external hemorrhoids: Yes: small anterior lateral skin tag and a resolving right posterior thrombosed external hemorrhoid     Digital Rectal Examination: Was deferred    Anoscopy: Was deferred in order not to cause further trauma.    Assessment/Plan: Dot Ramirez is a 40 year old female without a significant past medical history with a resolving  thrombosed external hemorrhoid. We reviewed etiology and management of thrombosed external hemorrhoids. Discussed that excision can be considered within 72 hours of the hemorrhoid flare however currently this is resolving so would recommend conservative management. Tylenol, ibuprofen, and warm tub baths for any pain. She does currently take metamucil so reviewed continuing this and minimizing straining. All patient questions were answered to her stated satisfaction and she agrees with plan.      Past Medical History:   Diagnosis Date    Abnormal Pap smear of cervix 2010    see problem list    Anxiety     Benign essential hypertension 2024    Depressive disorder 20yrs    When I was 16yrs I had my first episodes    Jaw claudication     Moderate major depression (H) 2014    onset with fetal loss    PID (acute pelvic inflammatory disease) 2022    Temporal pain     TOA (tubo-ovarian abscess) 2022    Varicella     Walking pneumonia      Past Surgical History:   Procedure Laterality Date    BIOPSY ARTERY TEMPORAL Bilateral 2023    Procedure: TEMPORAL ARTERY BIOPSY-BILATERAL;  Surgeon: Juno Kruger MD;  Location: [facility]    CERCLAGE CERVICAL N/A 2015    Procedure: CERCLAGE CERVICAL;  Surgeon: Damion Nayak MD;  [facility]: [facility]    CERCLAGE CERVICAL N/A 2016    Procedure: CERCLAGE CERVICAL;  Surgeon: Damion Nayak MD;  [facility]: [facility]    CERCLAGE CERVICAL N/A 3/5/2025    Procedure: CERCLAGE, CERVIX, VAGINAL APPROACH;  Surgeon: Fransico Melendez MD;  Location: [facility]     SECTION N/A 2015    Procedure:  SECTION;  Surgeon: Jamison Florian MD;  Location: [facility]     SECTION N/A 3/6/2017    Procedure:  SECTION;  Surgeon: Jamison Florian MD;  Location: [facility] OR    Cystectomy hand  2016    EXCISE MASS FINGER Left 2016    Procedure: Excision of cystic mass, left ring finger. Surgeon:  Rosales Jones MD  Location: [facility]    EXTRACTION(S) DENTAL  2009     Current Outpatient Medications   Medication Sig Dispense Refill    acetaminophen (TYLENOL) 500 MG tablet Take 500-1,000 mg by mouth every 6 hours as needed for mild pain.      NIFEdipine ER OSMOTIC (PROCARDIA XL) 30 MG 24 hr tablet Take 30 mg by mouth.      Prenatal Vit-Fe Fumarate-FA (PRENATAL MULTIVITAMIN  PLUS IRON) 27-1 MG TABS Take 1 tablet by mouth daily.      acetone urine (KETOSTIX) test strip Use one strip daily to check urine ketones in the morning per  directions. 50 strip 3    aspirin (ASA) 81 MG chewable tablet Take 1 tablet (81 mg) by mouth daily. 90 tablet 3    blood glucose (NO BRAND SPECIFIED) test strip Use to test blood sugar 4 times daily or as directed. 90 strip 3    blood glucose monitoring (FREESTYLE) lancets Use to test blood sugar 4 times daily or as directed. 112 each 3    blood glucose monitoring (NO BRAND SPECIFIED) meter device kit Use to test blood sugar 4 times daily or as directed. 1 kit 0    ferrous sulfate (FE TABS) 325 (65 Fe) MG EC tablet Take 18 mg by mouth daily.      fish oil-omega-3 fatty acids 500 MG capsule Take by mouth.      Lidocaine-Hydrocortisone Ace 2-2 % KIT Place 1 applicator rectally 2 times daily. 1 kit 0    magnesium gluconate (MAGONATE) 500 (27 Mg) MG tablet Take 100 mg by mouth 2 times daily.      progesterone (PROMETRIUM) 200 MG capsule Place 1 capsule (200 mg) vaginally at bedtime. 30 capsule 4     No Known Allergies  Family History   Problem Relation Age of Onset    Family History Negative Mother         born [birth year]    Hypertension Mother     Neurologic Disorder Father         born [birth year] [place] Rosemary Tooth    Prostate Cancer Father     Hypertension Father     Family History Negative Brother         1/2 brother Hemochromatosis    Diabetes Maternal Grandmother          99yo Type II    Alzheimer Disease Maternal Grandfather 89         96yo     Family History Negative Paternal  Grandmother             Family History Negative Paternal Grandfather             Breast Cancer Maternal Aunt     Breast Cancer Maternal Aunt     Breast Cancer Maternal Aunt     Breast Cancer Maternal Aunt     Breast Cancer Maternal Aunt     Breast Cancer Maternal Aunt     Breast Cancer Paternal Aunt     Breast Cancer Paternal Aunt      Social History     Tobacco Use    Smoking status: Never     Passive exposure: Never    Smokeless tobacco: Never   Substance Use Topics    Alcohol use: Not Currently     Comment: very very rare    Marital status: .    Sunni FIERRO      I spent a total of 20 minutes on the day of the visit.  Time spent on date of encounter doing chart review, history and exam, documentation, and further activities in this note.

## 2025-07-30 NOTE — NURSING NOTE
Chief Complaint   Patient presents with    New Patient     Hemorrhoids       Vitals:    07/30/25 1459   BP: 131/81   BP Location: Left arm   Patient Position: Sitting   Cuff Size: Adult Regular   Pulse: 88   SpO2: 97%   Weight: 181 lb 1.6 oz       Body mass index is 33.12 kg/m .                          Kierra Friedman, EMT

## 2025-07-30 NOTE — LETTER
2025       RE: oDt Ramirez  [address] [facility] [address]     Dear Colleague,    Thank you for referring your patient, Dot Ramirez, to the [facility] [place] [facility]. Please see a copy of my visit note below.    Patient seen today, 25, with Sunni Curran DNP Student  I agree with the dictation and have made any necessary changes.     RADHA Cooney, NP-C  Colon and Rectal Surgery  [facility]    Colon and Rectal Surgery Consult Clinic Note    Referring provider:  Riri Lopez MD  [address] [place] [address]     Patient: Dot Ramirez  YOB: 1985  Date of Visit: 2025    Dot Ramirez is a very pleasant 40 year old female here with concerns for hemorrhoids. She had a  on 2025. After  she states that she noticed a swollen external tender lump. The pain was so severe it was difficult to walk. She was taking tylenol for the pain. She has regular formed soft bowel movements. Does not note any straining. Denies anal surgeries. No vaginal births. No family history of colon cancer.     No prior colonoscopy.     No  was used for this encounter.    Physical Examination:  /81 (BP Location: Left arm, Patient Position: Sitting, Cuff Size: Adult Regular)   Pulse 88   Wt 181 lb 1.6 oz   LMP 2024   SpO2 97%   BMI 33.12 kg/m    General: alert, oriented, in no acute distress, sitting comfortably  Respiratory: non-labored breathing on RA  Chaperone: Mariam Ng NP  Perianal External Examination:  Perianal skin: Intact with no excoriation or lichenification.  Lesions: No evidence of an external lesion, nodularity, or induration in the perianal region.  Eversion of buttocks: There was not evidence of an anal fissure. Details: N/A.  Skin tags or external  hemorrhoids: Yes: small anterior lateral skin tag and a resolving right posterior thrombosed external hemorrhoid     Digital Rectal Examination: Was deferred    Anoscopy: Was deferred in order not to cause further trauma.    Assessment/Plan: Dot Ramirez is a 40 year old female without a significant past medical history with a resolving thrombosed external hemorrhoid. We reviewed etiology and management of thrombosed external hemorrhoids. Discussed that excision can be considered within 72 hours of the hemorrhoid flare however currently this is resolving so would recommend conservative management. Tylenol, ibuprofen, and warm tub baths for any pain. She does currently take metamucil so reviewed continuing this and minimizing straining. All patient questions were answered to her stated satisfaction and she agrees with plan.      Past Medical History:   Diagnosis Date     Abnormal Pap smear of cervix 2010    see problem list     Anxiety      Benign essential hypertension 2024     Depressive disorder 20yrs    When I was 16yrs I had my first episodes     Jaw claudication      Moderate major depression (H) 2014    onset with fetal loss     PID (acute pelvic inflammatory disease) 2022     Temporal pain      TOA (tubo-ovarian abscess) 2022     Varicella      Walking pneumonia      Past Surgical History:   Procedure Laterality Date     BIOPSY ARTERY TEMPORAL Bilateral 2023    Procedure: TEMPORAL ARTERY BIOPSY-BILATERAL;  Surgeon: Juno Kruger MD;  [facility]: [facility]     CERCLAGE CERVICAL N/A 2015    Procedure: CERCLAGE CERVICAL;  Surgeon: Damion Nayak MD;  [facility]     CERCLAGE CERVICAL N/A 2016    Procedure: CERCLAGE CERVICAL;  Surgeon: Damion Nayak MD;  [facility]     CERCLAGE CERVICAL N/A 3/5/2025    Procedure: CERCLAGE, CERVIX, VAGINAL APPROACH;  Surgeon: Fransico Melendez MD;  [facility]      SECTION N/A 2015     Procedure:  SECTION;  Surgeon: Jamison Florian MD;  [facility]      SECTION N/A 3/6/2017    Procedure:  SECTION;  Surgeon: Jamison Florian MD;  Location: [facility]     Cystectomy hand  2016     EXCISE MASS FINGER Left 2016    Procedure: Excision of cystic mass, left ring finger. Surgeon:  Rosales Jones MD  [facility]: [facility]     EXTRACTION(S) DENTAL  2009     Current Outpatient Medications   Medication Sig Dispense Refill     acetaminophen (TYLENOL) 500 MG tablet Take 500-1,000 mg by mouth every 6 hours as needed for mild pain.       NIFEdipine ER OSMOTIC (PROCARDIA XL) 30 MG 24 hr tablet Take 30 mg by mouth.       Prenatal Vit-Fe Fumarate-FA (PRENATAL MULTIVITAMIN  PLUS IRON) 27-1 MG TABS Take 1 tablet by mouth daily.       acetone urine (KETOSTIX) test strip Use one strip daily to check urine ketones in the morning per  directions. 50 strip 3     aspirin (ASA) 81 MG chewable tablet Take 1 tablet (81 mg) by mouth daily. 90 tablet 3     blood glucose (NO BRAND SPECIFIED) test strip Use to test blood sugar 4 times daily or as directed. 90 strip 3     blood glucose monitoring (FREESTYLE) lancets Use to test blood sugar 4 times daily or as directed. 112 each 3     blood glucose monitoring (NO BRAND SPECIFIED) meter device kit Use to test blood sugar 4 times daily or as directed. 1 kit 0     ferrous sulfate (FE TABS) 325 (65 Fe) MG EC tablet Take 18 mg by mouth daily.       fish oil-omega-3 fatty acids 500 MG capsule Take by mouth.       Lidocaine-Hydrocortisone Ace 2-2 % KIT Place 1 applicator rectally 2 times daily. 1 kit 0     magnesium gluconate (MAGONATE) 500 (27 Mg) MG tablet Take 100 mg by mouth 2 times daily.       progesterone (PROMETRIUM) 200 MG capsule Place 1 capsule (200 mg) vaginally at bedtime. 30 capsule 4     No Known Allergies  Family History   Problem Relation Age of Onset     Family History Negative Mother         born [birth year]      Hypertension Mother      Neurologic Disorder Father         born [birth year] [place] Rosemary Tooth     Prostate Cancer Father      Hypertension Father      Family History Negative Brother         1/2 brother Hemochromatosis     Diabetes Maternal Grandmother          97yo Type II     Alzheimer Disease Maternal Grandfather 89         94yo      Family History Negative Paternal Grandmother              Family History Negative Paternal Grandfather              Breast Cancer Maternal Aunt      Breast Cancer Maternal Aunt      Breast Cancer Maternal Aunt      Breast Cancer Maternal Aunt      Breast Cancer Maternal Aunt      Breast Cancer Maternal Aunt      Breast Cancer Paternal Aunt      Breast Cancer Paternal Aunt      Social History     Tobacco Use     Smoking status: Never     Passive exposure: Never     Smokeless tobacco: Never   Substance Use Topics     Alcohol use: Not Currently     Comment: very very rare    Marital status: .    Sunni Curran RN SNP      I spent a total of 20 minutes on the day of the visit.  Time spent on date of encounter doing chart review, history and exam, documentation, and further activities in this note.      Again, thank you for allowing me to participate in the care of your patient.      Sincerely,    RADHA Mo CNP

## 2025-08-15 ENCOUNTER — TELEPHONE (OUTPATIENT)
Dept: OBGYN | Facility: CLINIC | Age: 40
End: 2025-08-15
Payer: COMMERCIAL

## (undated) DEVICE — SU MONOCRYL 0 CT-1 36" UND Y946H

## (undated) DEVICE — GLOVE PROTEXIS BLUE W/NEU-THERA 7.0  2D73EB70

## (undated) DEVICE — SUCTION CANISTER MEDIVAC LINER 3000ML W/LID 65651-530

## (undated) DEVICE — STOCKING SLEEVE VASOPRESS COMPRESSION CALF MED 18" VP501M

## (undated) DEVICE — PREP CHLORAPREP 26ML TINTED ORANGE  260815

## (undated) DEVICE — SU VICRYL 5-0 P-3 18" UND J493H

## (undated) DEVICE — DECANTER BAG 2002S

## (undated) DEVICE — LINEN DRAPE 54X72" 5467

## (undated) DEVICE — Device

## (undated) DEVICE — BAG CLEAR TRASH 1.3M 39X33" P4040C

## (undated) DEVICE — PREP SKIN SCRUB TRAY 4461A

## (undated) DEVICE — ESU GROUND PAD ADULT W/CORD E7507

## (undated) DEVICE — SUCTION MITY VAC MUSHROOM CUP 10007LP

## (undated) DEVICE — SU SILK 4-0 TIE 24" SA73H

## (undated) DEVICE — SYR TRANSFER DEVICE BLOOD NDL HOLDER 3648800

## (undated) DEVICE — CAP BABY PINK/BLUE IC-2

## (undated) DEVICE — GLOVE PROTEXIS W/NEU-THERA 6.5  2D73TE65

## (undated) DEVICE — TRANSFER DEVICE BLOOD NDL HOLDER 364880

## (undated) DEVICE — SU MONOCRYL 2-0 CT-1 36" UND Y945H

## (undated) DEVICE — GLOVE PROTEXIS POWDER FREE SMT 8.0  2D72PT80X

## (undated) DEVICE — DRSG STERI STRIP 1/2X4" R1547

## (undated) DEVICE — SOLUTION IRRIGATION 0.9% NACL 1000ML BOTTLE R5200-01

## (undated) DEVICE — CATH TRAY FOLEY 16FR DRAINAGE BAG STATLOCK 899916

## (undated) DEVICE — PACK C-SECTION LF PL15OTA83B

## (undated) DEVICE — LINEN TOWEL PACK X5 5464

## (undated) DEVICE — NDL 27GA 1.25" 305136

## (undated) DEVICE — SU ETHILON 2 CT-2 30" D-6865

## (undated) DEVICE — STPL SKIN 35W APPOSE 8886803712

## (undated) DEVICE — SOL WATER IRRIG 1000ML BOTTLE 2F7114

## (undated) DEVICE — SU PLAIN 2-0 CT-1 27" 843H

## (undated) DEVICE — DRSG TELFA ISLAND 4X10"

## (undated) DEVICE — SUTURE VICRYL+ 0 CT-1 36IN VLT VCP346H

## (undated) DEVICE — LINEN HALF SHEET 5512

## (undated) DEVICE — SUCTION CANISTER STRAW 65652-008

## (undated) DEVICE — SU VICRYL 3-0 SH 27" J316H

## (undated) DEVICE — DRAPE MINOR PROCEDURE LAP 29496

## (undated) DEVICE — SPONGE BALL KERLIX ROUND XL W/O STRING LATEX 4935

## (undated) DEVICE — PREP CHLORHEXIDINE 4% 32OZ

## (undated) DEVICE — GLOVE ESTEEM POWDER FREE SMT 6.5  2D72PT65

## (undated) DEVICE — UNDERPAD 36X30 PREMIERPRO MAX ABS NS LF 676111

## (undated) DEVICE — ESU PENCIL W/SMOKE EVAC NEPTUNE STRYKER 0703-046-000

## (undated) DEVICE — SOL NACL 0.9% IRRIG 1000ML BOTTLE 2F7124

## (undated) DEVICE — LUBRICATING JELLY 2.7GM T00137

## (undated) DEVICE — STOCKING SLEEVE VASOPRESS COMPRESSION CALF MED VP501M

## (undated) DEVICE — DECANTER VIAL 2006S

## (undated) DEVICE — SU DERMABOND MINI DHVM12

## (undated) DEVICE — PREP CHLORAPREP 26ML TINTED HI-LITE ORANGE 930815

## (undated) DEVICE — PAD CHUX UNDERPAD 30X36" P3036C

## (undated) DEVICE — ESU GROUND PAD UNIVERSAL W/O CORD

## (undated) DEVICE — GLOVE BIOGEL PI ULTRATOUCH SZ 7.5 41175

## (undated) DEVICE — LINEN FULL SHEET 5511

## (undated) DEVICE — SPONGE RAY-TEC 4X8" 7318

## (undated) DEVICE — BLADE CLIPPER SGL USE 9680

## (undated) DEVICE — SU VICRYL 0 CT-1 36" J346H

## (undated) DEVICE — NDL 19GA 1.5"

## (undated) DEVICE — SU VICRYL+ 3-0 KS UNDYED VCP663H

## (undated) DEVICE — GLOVE PROTEXIS BLUE W/NEU-THERA 6.5  2D73EB65

## (undated) DEVICE — LINEN TOWEL PACK X10 5473

## (undated) DEVICE — SU PLAIN 3-0 CT 27" 852H

## (undated) DEVICE — BLADE CLIPPER DISP 4406

## (undated) DEVICE — SOLUTION WATER 1000ML BOTTLE R5000-01

## (undated) DEVICE — GLOVE PROTEXIS W/NEU-THERA 7.5  2D73TE75

## (undated) DEVICE — LINEN BABY BLANKET 5434

## (undated) DEVICE — PACK MINOR SBA15MIFSE

## (undated) DEVICE — ESU ELEC NDL 1" E1552

## (undated) DEVICE — SU CHROMIC 1 CT 27" 803H

## (undated) DEVICE — SOL WATER IRRIG 1000ML BOTTLE 07139-09

## (undated) RX ORDER — ONDANSETRON 2 MG/ML
INJECTION INTRAMUSCULAR; INTRAVENOUS
Status: DISPENSED
Start: 2017-03-06

## (undated) RX ORDER — BUPIVACAINE HYDROCHLORIDE 5 MG/ML
INJECTION, SOLUTION EPIDURAL; INTRACAUDAL
Status: DISPENSED
Start: 2023-06-30

## (undated) RX ORDER — MORPHINE SULFATE 1 MG/ML
INJECTION, SOLUTION EPIDURAL; INTRATHECAL; INTRAVENOUS
Status: DISPENSED
Start: 2017-03-06

## (undated) RX ORDER — FENTANYL CITRATE 50 UG/ML
INJECTION, SOLUTION INTRAMUSCULAR; INTRAVENOUS
Status: DISPENSED
Start: 2017-03-06

## (undated) RX ORDER — OXYTOCIN/0.9 % SODIUM CHLORIDE 30/500 ML
PLASTIC BAG, INJECTION (ML) INTRAVENOUS
Status: DISPENSED
Start: 2017-03-06

## (undated) RX ORDER — FENTANYL CITRATE 50 UG/ML
INJECTION, SOLUTION INTRAMUSCULAR; INTRAVENOUS
Status: DISPENSED
Start: 2025-03-05

## (undated) RX ORDER — LIDOCAINE HYDROCHLORIDE 10 MG/ML
INJECTION, SOLUTION INFILTRATION; PERINEURAL
Status: DISPENSED
Start: 2023-06-30

## (undated) RX ORDER — DEXAMETHASONE SODIUM PHOSPHATE 4 MG/ML
INJECTION, SOLUTION INTRA-ARTICULAR; INTRALESIONAL; INTRAMUSCULAR; INTRAVENOUS; SOFT TISSUE
Status: DISPENSED
Start: 2017-03-06